# Patient Record
Sex: MALE | Race: WHITE | NOT HISPANIC OR LATINO | Employment: OTHER | URBAN - METROPOLITAN AREA
[De-identification: names, ages, dates, MRNs, and addresses within clinical notes are randomized per-mention and may not be internally consistent; named-entity substitution may affect disease eponyms.]

---

## 2023-05-18 DIAGNOSIS — I10 BENIGN ESSENTIAL HYPERTENSION: Primary | ICD-10-CM

## 2023-05-18 PROBLEM — Z85.46 HISTORY OF PROSTATE CANCER: Status: ACTIVE | Noted: 2023-05-18

## 2023-05-18 PROBLEM — N40.0 BPH (BENIGN PROSTATIC HYPERPLASIA): Status: ACTIVE | Noted: 2023-05-18

## 2023-05-18 PROBLEM — M25.551 PAIN OF RIGHT HIP JOINT: Status: ACTIVE | Noted: 2023-05-18

## 2023-05-18 PROBLEM — Z96.642 PRESENCE OF ARTIFICIAL HIP, LEFT: Status: ACTIVE | Noted: 2023-05-18

## 2023-05-18 PROBLEM — M16.11 ARTHRITIS OF RIGHT HIP: Status: ACTIVE | Noted: 2023-05-18

## 2023-05-18 PROBLEM — M25.552 PAIN OF LEFT HIP JOINT: Status: ACTIVE | Noted: 2023-05-18

## 2023-05-18 PROBLEM — M16.12 ARTHRITIS OF LEFT HIP: Status: ACTIVE | Noted: 2023-05-18

## 2023-05-18 RX ORDER — NEOMYCIN SULFATE, POLYMYXIN B SULFATE AND DEXAMETHASONE 3.5; 10000; 1 MG/ML; [USP'U]/ML; MG/ML
SUSPENSION/ DROPS OPHTHALMIC
COMMUNITY
Start: 2023-05-09 | End: 2023-07-17 | Stop reason: ALTCHOICE

## 2023-05-18 RX ORDER — ALBUTEROL SULFATE 90 UG/1
AEROSOL, METERED RESPIRATORY (INHALATION) 4 TIMES DAILY
COMMUNITY
Start: 2022-12-07 | End: 2023-07-17 | Stop reason: ALTCHOICE

## 2023-05-18 RX ORDER — LISINOPRIL 40 MG/1
40 TABLET ORAL DAILY
Qty: 90 TABLET | Refills: 3 | Status: SHIPPED | OUTPATIENT
Start: 2023-05-18 | End: 2024-06-03

## 2023-05-18 RX ORDER — KETOROLAC TROMETHAMINE 5 MG/ML
SOLUTION OPHTHALMIC
COMMUNITY
Start: 2023-05-09 | End: 2023-07-17 | Stop reason: ALTCHOICE

## 2023-05-18 RX ORDER — PREDNISONE 10 MG/1
TABLET ORAL
COMMUNITY
Start: 2022-12-07 | End: 2023-07-17 | Stop reason: ALTCHOICE

## 2023-05-18 RX ORDER — ACETAMINOPHEN 325 MG/1
TABLET ORAL EVERY 4 HOURS PRN
COMMUNITY
Start: 2022-12-07 | End: 2023-12-02 | Stop reason: ENTERED-IN-ERROR

## 2023-05-18 RX ORDER — OSELTAMIVIR PHOSPHATE 75 MG/1
CAPSULE ORAL EVERY 12 HOURS
COMMUNITY
Start: 2022-12-07 | End: 2023-07-17 | Stop reason: ALTCHOICE

## 2023-05-18 RX ORDER — LISINOPRIL 40 MG/1
40 TABLET ORAL DAILY
COMMUNITY
End: 2023-07-17 | Stop reason: SDUPTHER

## 2023-05-18 RX ORDER — TAMSULOSIN HYDROCHLORIDE 0.4 MG/1
0.4 CAPSULE ORAL DAILY
COMMUNITY
End: 2023-05-27

## 2023-05-26 DIAGNOSIS — N40.0 BENIGN PROSTATIC HYPERPLASIA, UNSPECIFIED WHETHER LOWER URINARY TRACT SYMPTOMS PRESENT: Primary | ICD-10-CM

## 2023-05-27 RX ORDER — TAMSULOSIN HYDROCHLORIDE 0.4 MG/1
CAPSULE ORAL
Qty: 90 CAPSULE | Refills: 3 | Status: SHIPPED | OUTPATIENT
Start: 2023-05-27

## 2023-07-17 ENCOUNTER — OFFICE VISIT (OUTPATIENT)
Dept: PRIMARY CARE | Facility: CLINIC | Age: 68
End: 2023-07-17
Payer: COMMERCIAL

## 2023-07-17 VITALS
BODY MASS INDEX: 39.89 KG/M2 | DIASTOLIC BLOOD PRESSURE: 82 MMHG | HEART RATE: 85 BPM | SYSTOLIC BLOOD PRESSURE: 144 MMHG | WEIGHT: 278 LBS | OXYGEN SATURATION: 96 %

## 2023-07-17 DIAGNOSIS — Z85.46 HISTORY OF PROSTATE CANCER: ICD-10-CM

## 2023-07-17 DIAGNOSIS — Z12.11 COLON CANCER SCREENING: ICD-10-CM

## 2023-07-17 DIAGNOSIS — Z12.5 PROSTATE CANCER SCREENING: ICD-10-CM

## 2023-07-17 DIAGNOSIS — Z13.6 ENCOUNTER FOR SCREENING FOR CARDIOVASCULAR DISORDERS: ICD-10-CM

## 2023-07-17 DIAGNOSIS — I10 BENIGN ESSENTIAL HYPERTENSION: ICD-10-CM

## 2023-07-17 DIAGNOSIS — Z00.00 WELLNESS EXAMINATION: Primary | ICD-10-CM

## 2023-07-17 DIAGNOSIS — Z13.29 SCREENING FOR THYROID DISORDER: ICD-10-CM

## 2023-07-17 DIAGNOSIS — N40.0 BENIGN PROSTATIC HYPERPLASIA, UNSPECIFIED WHETHER LOWER URINARY TRACT SYMPTOMS PRESENT: ICD-10-CM

## 2023-07-17 DIAGNOSIS — Z13.0 SCREENING FOR DEFICIENCY ANEMIA: ICD-10-CM

## 2023-07-17 DIAGNOSIS — Z01.818 PREOPERATIVE CLEARANCE: ICD-10-CM

## 2023-07-17 PROBLEM — M25.552 PAIN OF LEFT HIP JOINT: Status: RESOLVED | Noted: 2023-05-18 | Resolved: 2023-07-17

## 2023-07-17 PROBLEM — M25.551 PAIN OF RIGHT HIP JOINT: Status: RESOLVED | Noted: 2023-05-18 | Resolved: 2023-07-17

## 2023-07-17 LAB
ALANINE AMINOTRANSFERASE (SGPT) (U/L) IN SER/PLAS: 19 U/L (ref 10–52)
ALBUMIN (G/DL) IN SER/PLAS: 4.3 G/DL (ref 3.4–5)
ALKALINE PHOSPHATASE (U/L) IN SER/PLAS: 87 U/L (ref 33–136)
ANION GAP IN SER/PLAS: 13 MMOL/L (ref 10–20)
ASPARTATE AMINOTRANSFERASE (SGOT) (U/L) IN SER/PLAS: 14 U/L (ref 9–39)
BASOPHILS (10*3/UL) IN BLOOD BY AUTOMATED COUNT: 0.06 X10E9/L (ref 0–0.1)
BASOPHILS/100 LEUKOCYTES IN BLOOD BY AUTOMATED COUNT: 0.7 % (ref 0–2)
BILIRUBIN TOTAL (MG/DL) IN SER/PLAS: 0.4 MG/DL (ref 0–1.2)
CALCIUM (MG/DL) IN SER/PLAS: 9.2 MG/DL (ref 8.6–10.6)
CARBON DIOXIDE, TOTAL (MMOL/L) IN SER/PLAS: 24 MMOL/L (ref 21–32)
CHLORIDE (MMOL/L) IN SER/PLAS: 101 MMOL/L (ref 98–107)
CHOLESTEROL (MG/DL) IN SER/PLAS: 188 MG/DL (ref 0–199)
CHOLESTEROL IN HDL (MG/DL) IN SER/PLAS: 57.2 MG/DL
CHOLESTEROL/HDL RATIO: 3.3
CREATININE (MG/DL) IN SER/PLAS: 0.69 MG/DL (ref 0.5–1.3)
EOSINOPHILS (10*3/UL) IN BLOOD BY AUTOMATED COUNT: 0.11 X10E9/L (ref 0–0.7)
EOSINOPHILS/100 LEUKOCYTES IN BLOOD BY AUTOMATED COUNT: 1.2 % (ref 0–6)
ERYTHROCYTE DISTRIBUTION WIDTH (RATIO) BY AUTOMATED COUNT: 14.1 % (ref 11.5–14.5)
ERYTHROCYTE MEAN CORPUSCULAR HEMOGLOBIN CONCENTRATION (G/DL) BY AUTOMATED: 31.4 G/DL (ref 32–36)
ERYTHROCYTE MEAN CORPUSCULAR VOLUME (FL) BY AUTOMATED COUNT: 101 FL (ref 80–100)
ERYTHROCYTES (10*6/UL) IN BLOOD BY AUTOMATED COUNT: 4.62 X10E12/L (ref 4.5–5.9)
GFR MALE: >90 ML/MIN/1.73M2
GLUCOSE (MG/DL) IN SER/PLAS: 82 MG/DL (ref 74–99)
HEMATOCRIT (%) IN BLOOD BY AUTOMATED COUNT: 46.5 % (ref 41–52)
HEMOGLOBIN (G/DL) IN BLOOD: 14.6 G/DL (ref 13.5–17.5)
IMMATURE GRANULOCYTES/100 LEUKOCYTES IN BLOOD BY AUTOMATED COUNT: 0.6 % (ref 0–0.9)
LDL: 96 MG/DL (ref 0–99)
LEUKOCYTES (10*3/UL) IN BLOOD BY AUTOMATED COUNT: 8.9 X10E9/L (ref 4.4–11.3)
LYMPHOCYTES (10*3/UL) IN BLOOD BY AUTOMATED COUNT: 2.02 X10E9/L (ref 1.2–4.8)
LYMPHOCYTES/100 LEUKOCYTES IN BLOOD BY AUTOMATED COUNT: 22.7 % (ref 13–44)
MONOCYTES (10*3/UL) IN BLOOD BY AUTOMATED COUNT: 0.83 X10E9/L (ref 0.1–1)
MONOCYTES/100 LEUKOCYTES IN BLOOD BY AUTOMATED COUNT: 9.3 % (ref 2–10)
NEUTROPHILS (10*3/UL) IN BLOOD BY AUTOMATED COUNT: 5.81 X10E9/L (ref 1.2–7.7)
NEUTROPHILS/100 LEUKOCYTES IN BLOOD BY AUTOMATED COUNT: 65.5 % (ref 40–80)
NRBC (PER 100 WBCS) BY AUTOMATED COUNT: 0 /100 WBC (ref 0–0)
PLATELETS (10*3/UL) IN BLOOD AUTOMATED COUNT: 229 X10E9/L (ref 150–450)
POTASSIUM (MMOL/L) IN SER/PLAS: 4.7 MMOL/L (ref 3.5–5.3)
PROTEIN TOTAL: 7.1 G/DL (ref 6.4–8.2)
SODIUM (MMOL/L) IN SER/PLAS: 133 MMOL/L (ref 136–145)
THYROTROPIN (MIU/L) IN SER/PLAS BY DETECTION LIMIT <= 0.05 MIU/L: 1.44 MIU/L (ref 0.44–3.98)
TRIGLYCERIDE (MG/DL) IN SER/PLAS: 176 MG/DL (ref 0–149)
UREA NITROGEN (MG/DL) IN SER/PLAS: 14 MG/DL (ref 6–23)
VLDL: 35 MG/DL (ref 0–40)

## 2023-07-17 PROCEDURE — G0439 PPPS, SUBSEQ VISIT: HCPCS | Performed by: STUDENT IN AN ORGANIZED HEALTH CARE EDUCATION/TRAINING PROGRAM

## 2023-07-17 PROCEDURE — 80061 LIPID PANEL: CPT

## 2023-07-17 PROCEDURE — 84154 ASSAY OF PSA FREE: CPT

## 2023-07-17 PROCEDURE — 1159F MED LIST DOCD IN RCRD: CPT | Performed by: STUDENT IN AN ORGANIZED HEALTH CARE EDUCATION/TRAINING PROGRAM

## 2023-07-17 PROCEDURE — 1125F AMNT PAIN NOTED PAIN PRSNT: CPT | Performed by: STUDENT IN AN ORGANIZED HEALTH CARE EDUCATION/TRAINING PROGRAM

## 2023-07-17 PROCEDURE — 1170F FXNL STATUS ASSESSED: CPT | Performed by: STUDENT IN AN ORGANIZED HEALTH CARE EDUCATION/TRAINING PROGRAM

## 2023-07-17 PROCEDURE — 85025 COMPLETE CBC W/AUTO DIFF WBC: CPT

## 2023-07-17 PROCEDURE — 3079F DIAST BP 80-89 MM HG: CPT | Performed by: STUDENT IN AN ORGANIZED HEALTH CARE EDUCATION/TRAINING PROGRAM

## 2023-07-17 PROCEDURE — 93000 ELECTROCARDIOGRAM COMPLETE: CPT | Performed by: STUDENT IN AN ORGANIZED HEALTH CARE EDUCATION/TRAINING PROGRAM

## 2023-07-17 PROCEDURE — 99214 OFFICE O/P EST MOD 30 MIN: CPT | Performed by: STUDENT IN AN ORGANIZED HEALTH CARE EDUCATION/TRAINING PROGRAM

## 2023-07-17 PROCEDURE — 80053 COMPREHEN METABOLIC PANEL: CPT

## 2023-07-17 PROCEDURE — 84443 ASSAY THYROID STIM HORMONE: CPT

## 2023-07-17 PROCEDURE — 84153 ASSAY OF PSA TOTAL: CPT

## 2023-07-17 PROCEDURE — 3077F SYST BP >= 140 MM HG: CPT | Performed by: STUDENT IN AN ORGANIZED HEALTH CARE EDUCATION/TRAINING PROGRAM

## 2023-07-17 PROCEDURE — 1160F RVW MEDS BY RX/DR IN RCRD: CPT | Performed by: STUDENT IN AN ORGANIZED HEALTH CARE EDUCATION/TRAINING PROGRAM

## 2023-07-17 ASSESSMENT — ENCOUNTER SYMPTOMS
OCCASIONAL FEELINGS OF UNSTEADINESS: 1
LOSS OF SENSATION IN FEET: 0
DEPRESSION: 0

## 2023-07-17 ASSESSMENT — ACTIVITIES OF DAILY LIVING (ADL)
BATHING: INDEPENDENT
MANAGING_FINANCES: INDEPENDENT
DOING_HOUSEWORK: INDEPENDENT
TAKING_MEDICATION: INDEPENDENT
GROCERY_SHOPPING: INDEPENDENT
DRESSING: INDEPENDENT

## 2023-07-17 ASSESSMENT — PATIENT HEALTH QUESTIONNAIRE - PHQ9
SUM OF ALL RESPONSES TO PHQ9 QUESTIONS 1 AND 2: 0
2. FEELING DOWN, DEPRESSED OR HOPELESS: NOT AT ALL
1. LITTLE INTEREST OR PLEASURE IN DOING THINGS: NOT AT ALL

## 2023-07-17 ASSESSMENT — PAIN SCALES - GENERAL: PAINLEVEL: 4

## 2023-07-17 NOTE — PATIENT INSTRUCTIONS
1.  Wellness visit.  Medicare wellness.  Also needs preoperative clearance.  No concerns on exam.  Screening labs ordered today.  EKG today in clinic shows no concerns.  He is up-to-date with pneumonia shots.  Defers on colon cancer screening.    2.  History of hypertension, continue current med.    3.  History of BPH, prostate cancer.  Continue Flomax.    4.  Chronic right hip pain.  Cleared for orthopedic surgery next month.

## 2023-07-20 LAB
PROSTATE SPECIFIC AG (NG/ML) IN SER/PLAS: 4.8 NG/ML (ref 0–4)
PROSTATE SPECIFIC AG FREE (NG/ML) IN SER/PLAS: 0.1 NG/ML
PROSTATE SPECIFIC AG FREE/PROSTATE SPECIFIC AG TOTAL IN SER/PLAS: 2 %

## 2023-07-21 NOTE — RESULT ENCOUNTER NOTE
Spoke with patient about results. He does not know of any family history of prostate. He does have some symptoms but he is taking flomax for it. He said to let him do some research and he will get back to you about seeing urology.

## 2023-07-25 ENCOUNTER — TELEPHONE (OUTPATIENT)
Dept: PRIMARY CARE | Facility: CLINIC | Age: 68
End: 2023-07-25
Payer: COMMERCIAL

## 2023-07-25 NOTE — TELEPHONE ENCOUNTER
Pt wife called and is wondering with the pt PSA being elevated if that is going to stop surgery and what the pt could do to get it back to normal.

## 2023-08-01 ENCOUNTER — HOSPITAL ENCOUNTER (OUTPATIENT)
Dept: DATA CONVERSION | Facility: HOSPITAL | Age: 68
End: 2023-08-02
Attending: ORTHOPAEDIC SURGERY | Admitting: ORTHOPAEDIC SURGERY
Payer: COMMERCIAL

## 2023-08-01 DIAGNOSIS — I10 ESSENTIAL (PRIMARY) HYPERTENSION: ICD-10-CM

## 2023-08-01 DIAGNOSIS — F17.210 NICOTINE DEPENDENCE, CIGARETTES, UNCOMPLICATED: ICD-10-CM

## 2023-08-01 DIAGNOSIS — E66.9 OBESITY, UNSPECIFIED: ICD-10-CM

## 2023-08-01 DIAGNOSIS — F10.10 ALCOHOL ABUSE, UNCOMPLICATED: ICD-10-CM

## 2023-08-01 DIAGNOSIS — M16.11 UNILATERAL PRIMARY OSTEOARTHRITIS, RIGHT HIP: ICD-10-CM

## 2023-08-01 DIAGNOSIS — N40.0 BENIGN PROSTATIC HYPERPLASIA WITHOUT LOWER URINARY TRACT SYMPTOMS: ICD-10-CM

## 2023-08-02 LAB
ANION GAP IN SER/PLAS: 11 MMOL/L (ref 10–20)
BASOPHILS (10*3/UL) IN BLOOD BY AUTOMATED COUNT: 0.02 X10E9/L (ref 0–0.1)
BASOPHILS/100 LEUKOCYTES IN BLOOD BY AUTOMATED COUNT: 0.2 % (ref 0–2)
CALCIUM (MG/DL) IN SER/PLAS: 8.4 MG/DL (ref 8.6–10.3)
CARBON DIOXIDE, TOTAL (MMOL/L) IN SER/PLAS: 24 MMOL/L (ref 21–32)
CHLORIDE (MMOL/L) IN SER/PLAS: 101 MMOL/L (ref 98–107)
CREATININE (MG/DL) IN SER/PLAS: 0.66 MG/DL (ref 0.5–1.3)
EOSINOPHILS (10*3/UL) IN BLOOD BY AUTOMATED COUNT: 0.04 X10E9/L (ref 0–0.7)
EOSINOPHILS/100 LEUKOCYTES IN BLOOD BY AUTOMATED COUNT: 0.4 % (ref 0–6)
ERYTHROCYTE DISTRIBUTION WIDTH (RATIO) BY AUTOMATED COUNT: 13.1 % (ref 11.5–14.5)
ERYTHROCYTE MEAN CORPUSCULAR HEMOGLOBIN CONCENTRATION (G/DL) BY AUTOMATED: 32.7 G/DL (ref 32–36)
ERYTHROCYTE MEAN CORPUSCULAR VOLUME (FL) BY AUTOMATED COUNT: 96 FL (ref 80–100)
ERYTHROCYTES (10*6/UL) IN BLOOD BY AUTOMATED COUNT: 3.72 X10E12/L (ref 4.5–5.9)
GFR MALE: >90 ML/MIN/1.73M2
GLUCOSE (MG/DL) IN SER/PLAS: 118 MG/DL (ref 74–99)
HEMATOCRIT (%) IN BLOOD BY AUTOMATED COUNT: 35.8 % (ref 41–52)
HEMOGLOBIN (G/DL) IN BLOOD: 11.7 G/DL (ref 13.5–17.5)
IMMATURE GRANULOCYTES/100 LEUKOCYTES IN BLOOD BY AUTOMATED COUNT: 0.7 % (ref 0–0.9)
LEUKOCYTES (10*3/UL) IN BLOOD BY AUTOMATED COUNT: 9 X10E9/L (ref 4.4–11.3)
LYMPHOCYTES (10*3/UL) IN BLOOD BY AUTOMATED COUNT: 1.26 X10E9/L (ref 1.2–4.8)
LYMPHOCYTES/100 LEUKOCYTES IN BLOOD BY AUTOMATED COUNT: 14 % (ref 13–44)
MONOCYTES (10*3/UL) IN BLOOD BY AUTOMATED COUNT: 0.89 X10E9/L (ref 0.1–1)
MONOCYTES/100 LEUKOCYTES IN BLOOD BY AUTOMATED COUNT: 9.9 % (ref 2–10)
NEUTROPHILS (10*3/UL) IN BLOOD BY AUTOMATED COUNT: 6.7 X10E9/L (ref 1.2–7.7)
NEUTROPHILS/100 LEUKOCYTES IN BLOOD BY AUTOMATED COUNT: 74.8 % (ref 40–80)
NRBC (PER 100 WBCS) BY AUTOMATED COUNT: 0 /100 WBC (ref 0–0)
PLATELETS (10*3/UL) IN BLOOD AUTOMATED COUNT: 208 X10E9/L (ref 150–450)
POTASSIUM (MMOL/L) IN SER/PLAS: 4 MMOL/L (ref 3.5–5.3)
SODIUM (MMOL/L) IN SER/PLAS: 132 MMOL/L (ref 136–145)
UREA NITROGEN (MG/DL) IN SER/PLAS: 7 MG/DL (ref 6–23)

## 2023-08-03 LAB
ANION GAP IN SER/PLAS: NORMAL
BASOPHILS (10*3/UL) IN BLOOD BY AUTOMATED COUNT: NORMAL
BASOPHILS/100 LEUKOCYTES IN BLOOD BY AUTOMATED COUNT: NORMAL
CALCIUM (MG/DL) IN SER/PLAS: NORMAL
CARBON DIOXIDE, TOTAL (MMOL/L) IN SER/PLAS: NORMAL
CHLORIDE (MMOL/L) IN SER/PLAS: NORMAL
CREATININE (MG/DL) IN SER/PLAS: NORMAL
EOSINOPHILS (10*3/UL) IN BLOOD BY AUTOMATED COUNT: NORMAL
EOSINOPHILS/100 LEUKOCYTES IN BLOOD BY AUTOMATED COUNT: NORMAL
ERYTHROCYTE DISTRIBUTION WIDTH (RATIO) BY AUTOMATED COUNT: NORMAL
ERYTHROCYTE MEAN CORPUSCULAR HEMOGLOBIN CONCENTRATION (G/DL) BY AUTOMATED: NORMAL
ERYTHROCYTE MEAN CORPUSCULAR VOLUME (FL) BY AUTOMATED COUNT: NORMAL
ERYTHROCYTES (10*6/UL) IN BLOOD BY AUTOMATED COUNT: NORMAL
GFR FEMALE: NORMAL
GFR MALE: NORMAL
GLUCOSE (MG/DL) IN SER/PLAS: NORMAL
HEMATOCRIT (%) IN BLOOD BY AUTOMATED COUNT: NORMAL
HEMOGLOBIN (G/DL) IN BLOOD: NORMAL
IMMATURE GRANULOCYTES/100 LEUKOCYTES IN BLOOD BY AUTOMATED COUNT: NORMAL
LEUKOCYTES (10*3/UL) IN BLOOD BY AUTOMATED COUNT: NORMAL
LYMPHOCYTES (10*3/UL) IN BLOOD BY AUTOMATED COUNT: NORMAL
LYMPHOCYTES/100 LEUKOCYTES IN BLOOD BY AUTOMATED COUNT: NORMAL
MANUAL DIFFERENTIAL Y/N: NORMAL
MONOCYTES (10*3/UL) IN BLOOD BY AUTOMATED COUNT: NORMAL
MONOCYTES/100 LEUKOCYTES IN BLOOD BY AUTOMATED COUNT: NORMAL
NEUTROPHILS (10*3/UL) IN BLOOD BY AUTOMATED COUNT: NORMAL
NEUTROPHILS/100 LEUKOCYTES IN BLOOD BY AUTOMATED COUNT: NORMAL
NRBC (PER 100 WBCS) BY AUTOMATED COUNT: NORMAL
PLATELETS (10*3/UL) IN BLOOD AUTOMATED COUNT: NORMAL
POTASSIUM (MMOL/L) IN SER/PLAS: NORMAL
SODIUM (MMOL/L) IN SER/PLAS: NORMAL
UREA NITROGEN (MG/DL) IN SER/PLAS: NORMAL

## 2023-08-11 LAB
COMPLETE PATHOLOGY REPORT: NORMAL
CONVERTED CLINICAL DIAGNOSIS-HISTORY: NORMAL
CONVERTED FINAL DIAGNOSIS: NORMAL
CONVERTED FINAL REPORT PDF LINK TO COPY AND PASTE: NORMAL
CONVERTED GROSS DESCRIPTION: NORMAL

## 2023-09-29 VITALS — WEIGHT: 277.78 LBS | BODY MASS INDEX: 38.89 KG/M2 | HEIGHT: 71 IN

## 2023-09-29 DIAGNOSIS — Z96.641 STATUS POST HIP REPLACEMENT, RIGHT: Primary | ICD-10-CM

## 2023-09-29 RX ORDER — TRAMADOL HYDROCHLORIDE 50 MG/1
50 TABLET ORAL EVERY 6 HOURS PRN
COMMUNITY
Start: 2023-08-07 | End: 2023-12-02 | Stop reason: ENTERED-IN-ERROR

## 2023-09-29 RX ORDER — OXYCODONE AND ACETAMINOPHEN 5; 325 MG/1; MG/1
1 TABLET ORAL EVERY 6 HOURS
COMMUNITY
Start: 2023-08-07 | End: 2023-12-02 | Stop reason: ENTERED-IN-ERROR

## 2023-09-29 RX ORDER — OXYCODONE HYDROCHLORIDE 5 MG/1
5 TABLET ORAL EVERY 4 HOURS PRN
COMMUNITY
Start: 2023-08-02 | End: 2023-12-02 | Stop reason: ENTERED-IN-ERROR

## 2023-09-30 NOTE — H&P
History of Present Illness:   History Present Illness:  Reason for surgery: Right hip pain secondary to arthritis   HPI:    Patient is a 68-year-old gentleman who presented with worsening right hip pain and difficulty with performing activities of daily living.  This was secondary to degenerative  joint disease of his right hip.  He had tried and failed multiple modalities of conservative management.  In the end however he wished to undergo an elective right total hip replacement.    Allergies:        Allergies:  ·  No Known Allergies :     Home Medication Review:   Home Medications Reviewed: yes     Impression/Procedure:   ·  Impression and Planned Procedure: Patient has elected undergo a right total hip replacement.       ERAS (Enhanced Recovery After Surgery):  ·  ERAS Patient: no       Physical Exam by System:    Constitutional: Patient is alert and orient x3.   He is in no acute distress.   Respiratory/Thorax: Lungs are clear to auscultation  bilaterally.   Cardiovascular: Heart is regular rate and rhythm.   S1 and S2 are normal.   Musculoskeletal: Patient complains of right groin  pain with flexion or rotation of his right hip.     Consent:   COVID-19 Consent:  ·  COVID-19 Risk Consent Surgeon has reviewed key risks related to the risk of sharifa COVID-19 and if they contract COVID-19 what the risks are.       Electronic Signatures:  Kip Webb)  (Signed 01-Aug-2023 07:24)   Authored: History of Present Illness, Allergies, Home  Medication Review, Impression/Procedure, ERAS, Physical Exam, Consent, Note Completion      Last Updated: 01-Aug-2023 07:24 by Kip Webb)

## 2023-09-30 NOTE — PROGRESS NOTES
Service: Orthopaedics     Subjective Data:   LINDA GUTIÉRREZ is a 68 year old Male who is Hospital Day # 2 and POD #1 for 1. RIGHT TOTAL HIP REPLACEMENT WITH PORTABLE FLAT PLATE XRAY;    Additional Information:    Patient is postoperative day 1 following a right total hip replacement.  He still has moderate pain.  He currently denies numbness and paresthesias in his right lower  extremity.  He states he does have pain when attempting to place weight on his right leg.    Objective Data:     Objective Information:      T   P  R  BP   MAP  SpO2   Value  36.2  91  20  132/89   103  94%  Date/Time 8/2 2:56 8/2 2:56 8/2 2:56 8/2 2:56  8/2 2:56 8/2 2:56  Range  (35.5C - 36.9C )  (55 - 94 )  (18 - 20 )  (107 - 146 )/ (59 - 92 )  (77 - 103 )  (94% - 96% )  Highest temp of 36.9 C was recorded at 8/1 23:41      Pain reported at 8/2 3:45: sleeping    ---- Intake and Output  -----  Mn/Dy/Year Time  Intake   Output  Net  Aug 2, 2023 6:00 am  800   350  450  Aug 1, 2023 10:00 pm  400   925  -525    The Intake and Output Totals for the last 24 hours are:      Intake   Output  Net      1200   1275  -75    Physical Exam Narrative:  ·  Physical Exam:    Patient is in no acute distress.  He is sitting up at bedside.  Exam of his right leg reveals his bandage has mild bloody shadow.  His thigh compartments are soft.   He has no right calf tenderness.  He has adequate strength with resisted plantarflexion and dorsiflexion of his right foot and ankle.  He has mild complaints of pain with just rotation of his right hip.    Recent Lab Results:    Results:    CBC: 8/2/2023 05:50              \     Hgb     /                              \     11.7 L    /  WBC  ----------------  Plt               9.0       ----------------    208              /     Hct     \                              /     35.8 L    \            RBC: 3.72 L    MCV: 96     Neutrophil %: 74.8      BMP: 8/2/2023 05:50  NA+        Cl-     BUN  /                          132 L   101    7  /  --------------------------------  Glucose                ---------------------------  118 H    K+     HCO3-   Creat \                         4.0  24    0.66  \  Calcium : 8.4 L    Anion Gap : 11      Radiology Results:    Results:        Impression:    1.  Postoperative changes status post  right hip arthroplasty.  2.  No evidence of hardware failure or acute fracture..     Xray Pelvis 1 or 2 View [Aug  1 2023  3:30PM]      Assessment and Plan:        Additional Dx:   Arthritis of right hip: Entered Date: 01-Aug-2023 13:10    Comorbidities:  ·  Comorbidity obesity   ·  Obesity obesity (BMI 35-39.9)   ·  BMI 38.85     Code Status:  ·  Code Status Full Code     Assessment:    Patient is postop day 1 following a right total hip replacement.  He will be mobilized today with physical therapy.  He is weightbearing as tolerated on his right  lower extremity.  He will be on aspirin twice daily for DVT prophylaxis.  The plan would be for the patient to be able to be discharged home later today following therapy.      Electronic Signatures:  Kip Webb)  (Signed 02-Aug-2023 07:53)   Authored: Service, Subjective Data, Objective Data, Assessment  and Plan, Note Completion      Last Updated: 02-Aug-2023 07:53 by Kip Webb)

## 2023-09-30 NOTE — PROGRESS NOTES
Service: Medicine     Subjective Data:   LINDA GUTIÉRREZ is a 68 year old Male who is Hospital Day # 2 and POD #1 for 1. RIGHT TOTAL HIP REPLACEMENT WITH PORTABLE FLAT PLATE XRAY;     Seen this morning, ambulating with walker with nursing.  Urine retention with straight cath needed overnight.  Urinating better this AM.  No other complaints.  restarted lisinopril.  Patient anxious  for discharge.    Objective Data:     Objective Information:      T   P  R  BP   MAP  SpO2   Value  37.2  91  18  167/72   104  94%  Date/Time 8/2 8:04 8/2 8:04 8/2 8:04 8/2 8:04 8/2 8:04 8/2 8:04  Range  (35.5C - 37.2C )  (55 - 94 )  (18 - 20 )  (107 - 167 )/ (59 - 92 )  (77 - 104 )  (94% - 96% )  Highest temp of 37.2 C was recorded at 8/2 8:04      Pain reported at 8/2 12:55: 8 = Severe    ---- Intake and Output  -----  Mn/Dy/Year Time  Intake   Output  Net  Aug 2, 2023 6:00 am  800   350  450  Aug 1, 2023 10:00 pm  400   925  -525    The Intake and Output Totals for the last 24 hours are:      Intake   Output  Net      1200   1275  -75    GENERAL:  awake/alert/oriented x3, no distress, alert and cooperative  HEENT: Normal Inspection, Mucous membranes moist, No JVD, No Lymphadenopathy  CARDIOVASCULAR: RRR, no murmurs, 2+ equal pulses of the extremities, normal S1 and S 2  RESPIRATORY: Patent airways, CTAB, normal breath sounds with good chest expansion, thorax symmetric, No Wheezes, Rales or Rhonchi  ABDOMEN: Soft, Non-Tender, Normal Bowel Sounds, No Distention  SKIN: Warm and dry, no lesions, no rashes  EXTREMITIES: normal extremities, no cyanosis edema, contusions or wounds, no clubbing  NEURO: A&O x 3, CN II-XII grossly intact  PSYCH: Appropriate mood and behavior      Medication:    Medications:          Continuous Medications       --------------------------------    1. Lactated Ringers Infusion:  1000  mL  IntraVenous  <Continuous>         Scheduled Medications       --------------------------------    1. Acetaminophen:   650  mg  Oral  Every 6 Hours    2. Aspirin Enteric Coated:  325  mg  Oral  2 Times a Day    3. ceFAZolin 2 gram/ D5W 100 mL Premix IVPB:  100  mL  IntraVenous Piggyback  Once    4. Docusate:  100  mg  Oral  2 Times a Day    5. Lisinopril:  40  mg  Oral  Daily    6. Polyethylene Glycol:  17  gram(s)  Oral  2 Times a Day    7. Tamsulosin:  0.4  mg  Oral  Daily         PRN Medications       --------------------------------    1. diphenhydrAMINE:  25  mg  Oral  Every 6 Hours    2. Morphine Injectable:  2  mg  IntraVenous Push  Every 2 Hours    3. Ondansetron Injectable:  4  mg  IntraVenous Push  Every 6 Hours    4. oxyCODONE Immediate Release:  5  mg  Oral  Every 4 Hours    5. oxyCODONE Immediate Release:  10  mg  Oral  Every 4 Hours        Recent Lab Results:    Results:    CBC: 8/2/2023 05:50              \     Hgb     /                              \     11.7 L    /  WBC  ----------------  Plt               9.0       ----------------    208              /     Hct     \                              /     35.8 L    \            RBC: 3.72 L    MCV: 96     Neutrophil %: 74.8      BMP: 8/2/2023 05:50  NA+        Cl-     BUN  /                         132 L   101    7  /  --------------------------------  Glucose                ---------------------------  118 H    K+     HCO3-   Creat \                         4.0  24    0.66  \  Calcium : 8.4 L    Anion Gap : 11      Assessment and Plan:   Code Status:  ·  Code Status Full Code     Advance Care Planning:  Advance Care Planning: I evaluated the patient  and determined the patient's capacity to understand the risks, benefits and alternatives to treatment. I elicited the patient's goals for treatment and reviewed advance directives and medical orders for life sustaining treatment. The patient was given  an opportunity to review a blank advance directive as appropriate.     Assessment:        1.) Right hip OA POD #1  Stable.  Pain controlled    2.) HTN  Resumed  lisinopril this AM    3.) BPH  Cont flomax    4.) Daily ETOH abuse  May consider CIWA if any signs of etoh withdrawal were to develop  No signs of withdrawal this AM    5.) tobacco abuse  Nicotine patch offered, declined at this time.       DVT Proph  as per ortho    Medically stable at this time      Electronic Signatures:  Juaquin Joseph)  (Signed 02-Aug-2023 13:07)   Authored: Service, Subjective Data, Objective Data, Assessment  and Plan, Note Completion      Last Updated: 02-Aug-2023 13:07 by Juaquin Joseph ()

## 2023-09-30 NOTE — DISCHARGE SUMMARY
Send Summary:   Discharge Summary Providers:  Provider Role Provider Name   · Attending Kip Webb   · Consulting Juaquin Joseph       Note Recipients: Eddie Crum DO - [phone] [preferred]       Discharge:    Summary:   Admission Date: .01-Aug-2023 08:26:00   Discharge Date: 02-Aug-2023   Attending Physician at Discharge: Kip Webb   Admission Reason: Hip pain (1)   Final Discharge Diagnoses: Osteoarthritis   Procedures: Date: 01-Aug-2023 13:07:00  Procedure Name: 1. RIGHT TOTAL HIP REPLACEMENT WITH PORTABLE FLAT PLATE XRAY   Condition at Discharge: Satisfactory   Disposition at Discharge: Home Health Care - New   Vital Signs:        T   P  R  BP   MAP  SpO2   Value  37.2  91  20  158/87   104  95%  Date/Time 8/2 12:55 8/2 12:55 8/2 12:55 8/2 12:55  8/2 8:04 8/2 12:55  Range  (35.5C - 37.2C )  (55 - 94 )  (18 - 20 )  (107 - 167 )/ (59 - 92 )  (77 - 104 )  (94% - 96% )  Highest temp of 37.2 C was recorded at 8/2 8:04    Date:            Weight/Scale Type:  Height:   01-Aug-2023 16:00  126  kg / bed  180.1  cm  Physical Exam:    Patient is in no acute distress.  He is sitting up at bedside.  His right hip bandage has mild bloody shadow.  He has no right calf tenderness.  He has adequate strength  with resisted plantarflexion dorsiflexion of his right foot and ankle.  Hospital Course:    Patient had severe right hip pain and limitations in activities of daily living secondary to right hip arthritis.  On August 1, 2023, he underwent an elective right  total hip replacement.  Patient tolerated the procedure without any complications.  He was admitted to the orthopedic floor afterwards.  He was cleared to be discharged home by physical therapy.  He was discharged home on postoperative day #1 in stable  condition.  He will follow-up with orthopedic surgery in 1 month.      Discharge Information:    and Continuing Care:   Lab Results - Pending:    Surgical Pathology Drawn at 01-Aug-2023 12:00:00  Radiology  Results - Pending: Xray Hip 1 View at 01-Aug-2023  14:08:00  Xray Hip 1 View at 01-Aug-2023 12:30:00   Discharge Instructions:    Activity:           activity as tolerated   with walker.          May shower..            May not drive.            Weight-bearing Instructions: weight-bearing as tolerated.            Follow hip precautions as instructed by therapists  Do NOT bend your hip past 90 degrees.  Do NOT sit on a low surface.  Do NOT roll your leg inward.  Do NOT cross your legs.  Do NOT bend over.    Nutrition/Diet:           resume normal diet          Encourage Fluids:   Increase your water and fiber intake to help prevent constipation    Wound Care:           Wound Site:   right hip          Change Dressing:   Remove dressing in one week.  Follow directions on instruction sheet provided.          Cover With:   no dressing, leave open to air,  if no drainage noted          Other Instructions:   Call your surgeon's office if you need assistance with bandage    Additional Orders:           Special Equipment:   use regular pillow between knees while in bed to maintain hip precautions          Additional Instructions:   .  Continue to apply ice to incision 4 to 5 times a day for 20 minutes    Take over the counter stool softener while you are taking pain medications.  Stop stool softener if you experience diarrhea.    Do NOT take any non steroid anti-inflammatory medications (NSAIDS) such as ibuprofen, aleve, or naproxen.    Wear bilateral maciej hose compression stockings to lower legs for next 4 to 6 weeks    Continue to use incentive spirometer at home.  Use 10 times every hour while you are awake     NO SMOKING.  Smoking will increase your risk for complications including infections     Home Care Certification:           Home Care Agency:    [facility] [phone]          Skilled Disciplines Ordered:   PT,  OT    Home Care Services:           Home Care Skilled Service:   Rehab (PT/OT/SP eval and treat),   "wound care    Follow Up Appointments:    Follow-Up Appointment 01:           Physician/Dept/Service:   Dr Webb          Call to Schedule in:   4 weeks          Phone Number:   [phone]    Discharge Medications: Home Medication   tamsulosin 0.4 mg oral capsule - 1 cap(s) orally once a day (in the morning)  lisinopril 40 mg oral tablet - 1 tab(s) orally once a day (in the morning)  Tylenol 500 mg oral tablet - 2 tab(s) orally every 8 hours as needed for mild pain   aspirin 325 mg oral delayed release tablet - 1 tab(s) orally 2 times a day x 14 days   Colace 100 mg oral capsule - 1 cap(s) orally once a day  traMADol 50 mg oral tablet - 1 tab(s) orally every 4 to 6 hours x 7 days as needed for moderate pain G89.18  oxyCODONE 5 mg oral tablet - 1 tab(s) orally every 4 hours x 7 days as needed for severe incisional pain G89.18     PRN Medication   MiraLax oral powder for reconstitution - orally once a day, As Needed for constipation  take dose if you do NOT have bowel movement by Saturday Aug 4th     DNR Status:   ·  Code Status Code Status order at time of discharge: Full Code       Electronic Signatures:  Kip Webb)  (Signed 02-Aug-2023 14:56)   Authored: Send Summary, Summary Content, Ongoing Care,  DNR Status, Note Completion      Last Updated: 02-Aug-2023 14:56 by Kip Webb)    References:  1.  Data Referenced From \"Consult-Medicine\" 01-Aug-2023 17:34   "

## 2023-10-01 ENCOUNTER — DOCUMENTATION (OUTPATIENT)
Dept: ORTHOPEDIC SURGERY | Facility: CLINIC | Age: 68
End: 2023-10-01
Payer: COMMERCIAL

## 2023-10-01 NOTE — OP NOTE
PROCEDURE DETAILS    Preoperative Diagnosis:  Unilateral osteoarthritis of hip, right, M16.11    Postoperative Diagnosis:  Unilateral osteoarthritis of hip, right, M16.11    Surgeon: Kip Webb  Resident/Fellow/Other Assistant: Eddie Kirby    Procedure:  1. RIGHT TOTAL HIP REPLACEMENT WITH PORTABLE FLAT PLATE XRAY    Anesthesia: No anesthesiologist associated with this case  Estimated Blood Loss: 500mL  Blood Replaced: none  Findings: severe DJD right hip  Specimens(s) Collected: yes,  right femoral head   Complications: none  Implants: DePuy Corail femoral stem and cluster hole acetabular shell  Patient Returned To/Condition: stable    Date of Dictation: 01-Aug-2023  Dictated By: Kip Webb MD  Dictation Job Number: [id number]                            Attestation:   Note Completion:  Attending Attestation I performed the procedure without a resident         Electronic Signatures:  Kip Webb)  (Signed 01-Aug-2023 13:09)   Authored: Post-Operative Note, Chart Review, Note Completion      Last Updated: 01-Aug-2023 13:09 by Kip Webb)

## 2023-10-02 ENCOUNTER — OFFICE VISIT (OUTPATIENT)
Dept: ORTHOPEDIC SURGERY | Facility: CLINIC | Age: 68
End: 2023-10-02
Payer: COMMERCIAL

## 2023-10-02 ENCOUNTER — ANCILLARY PROCEDURE (OUTPATIENT)
Dept: RADIOLOGY | Facility: CLINIC | Age: 68
End: 2023-10-02
Payer: COMMERCIAL

## 2023-10-02 DIAGNOSIS — Z96.641 STATUS POST HIP REPLACEMENT, RIGHT: ICD-10-CM

## 2023-10-02 DIAGNOSIS — M16.11 PRIMARY OSTEOARTHRITIS OF RIGHT HIP: Primary | ICD-10-CM

## 2023-10-02 PROCEDURE — 99024 POSTOP FOLLOW-UP VISIT: CPT | Performed by: ORTHOPAEDIC SURGERY

## 2023-10-02 PROCEDURE — 1159F MED LIST DOCD IN RCRD: CPT | Performed by: ORTHOPAEDIC SURGERY

## 2023-10-02 PROCEDURE — 73502 X-RAY EXAM HIP UNI 2-3 VIEWS: CPT | Mod: RT

## 2023-10-02 PROCEDURE — 3008F BODY MASS INDEX DOCD: CPT | Performed by: ORTHOPAEDIC SURGERY

## 2023-10-02 PROCEDURE — 1160F RVW MEDS BY RX/DR IN RCRD: CPT | Performed by: ORTHOPAEDIC SURGERY

## 2023-10-02 PROCEDURE — 1125F AMNT PAIN NOTED PAIN PRSNT: CPT | Performed by: ORTHOPAEDIC SURGERY

## 2023-10-02 PROCEDURE — 73502 X-RAY EXAM HIP UNI 2-3 VIEWS: CPT | Mod: RIGHT SIDE

## 2023-10-02 NOTE — PROGRESS NOTES
Subjective    Patient ID: Wade Machado is a 68 y.o. male.    Chief Complaint: Follow-up right total hip replacement  Last Surgery: Right total hip replacement  Last Surgery Date: No surgery found    HPI    Patient is approximately 2-month status post a right total hip replacement.  He has completed his formal physical therapy.  He has had very little pain since his last visit.  He denies numbness and paresthesias in his right lower extremity.    Objective   Ortho Exam  Patient is in no acute distress.  He walks with no evidence of an antalgic gait.  Exam of his right hip reveals his incision is well-healed.  He has no complaints of pain with flexion or rotation of his right lower extremity.  He has 5-5 strength with resisted plantarflexion and dorsiflexion of his right foot and ankle.  Image Results:  XR hip w pelvis  Narrative: Interpreted By:  SHANKAR MCAARIO MD  MRN: [id number]  Patient Name: WADE MACHADO     STUDY:  HIP, UNILATERAL W/PELVIS WHEN PERFORMED 2-3 VIEWS;  8/30/2023 8:40 am     INDICATION:  s/p SOPHIA  M16.11: Arthritis of right hip.     COMPARISON:  Most recent prior is from 08/01/2023     ACCESSION NUMBER(S):  [id number]     ORDERING CLINICIAN:  KUN MTZ     TECHNIQUE:  AP view pelvis and 2 views  of the  right hip were obtained.     FINDINGS:  Previous bilateral total hip arthroplasties, more recently on the  right. Left femoral and acetabular components remain well seated and  in good alignment, however, the distal stem of the femoral component  was not included. More recently status post right hip arthroplasty.  Right femoral and acetabular components remain well seated and in  good alignment. There are sclerotic arthritic changes in both SI  joints.  Previous prostate gland brachytherapy. Prominent bilateral iliac and  femoral artery calcifications.  No lytic or blastic destructive bone lesion.  No acute fracture or dislocation.  No opaque soft tissue foreign body.  No periosteal reaction or  erosion.     Impression: Stable and intact bilateral total hip arthroplasties.      Assessment/Plan   Encounter Diagnoses:  Primary osteoarthritis of right hip    Status post hip replacement, right    Orders Placed This Encounter    XR hip right 2 or 3 views     Patient is doing satisfactory following his right total hip replacement.  He will continue with his dislocation precautions.  He will continue with his home exercise program.  He will follow-up in 1 year with x-rays of his right hip.

## 2023-10-02 NOTE — Clinical Note
October 2, 2023     Eddie Crum DO  [address]  [address]  [facility] [address]    Patient: Wade Machado   YOB: 1955   Date of Visit: 10/2/2023       Dear Dr. Eddie Crum, :    Thank you for referring Wade Machado to me for evaluation. Below are my notes for this consultation.  If you have questions, please do not hesitate to call me. I look forward to following your patient along with you.       Sincerely,     Kip Webb MD      CC: No Recipients  ______________________________________________________________________________________

## 2023-10-03 ENCOUNTER — APPOINTMENT (OUTPATIENT)
Dept: PHYSICAL THERAPY | Facility: CLINIC | Age: 68
End: 2023-10-03
Payer: COMMERCIAL

## 2023-10-05 ENCOUNTER — APPOINTMENT (OUTPATIENT)
Dept: PHYSICAL THERAPY | Facility: CLINIC | Age: 68
End: 2023-10-05
Payer: COMMERCIAL

## 2023-10-09 ENCOUNTER — APPOINTMENT (OUTPATIENT)
Dept: PHYSICAL THERAPY | Facility: CLINIC | Age: 68
End: 2023-10-09
Payer: COMMERCIAL

## 2023-10-09 ENCOUNTER — HOSPITAL ENCOUNTER (EMERGENCY)
Facility: HOSPITAL | Age: 68
Discharge: HOME | End: 2023-10-09
Attending: INTERNAL MEDICINE
Payer: COMMERCIAL

## 2023-10-09 ENCOUNTER — APPOINTMENT (OUTPATIENT)
Dept: RADIOLOGY | Facility: HOSPITAL | Age: 68
End: 2023-10-09
Payer: COMMERCIAL

## 2023-10-09 VITALS
HEIGHT: 71 IN | TEMPERATURE: 97.3 F | SYSTOLIC BLOOD PRESSURE: 168 MMHG | DIASTOLIC BLOOD PRESSURE: 86 MMHG | RESPIRATION RATE: 20 BRPM | OXYGEN SATURATION: 96 % | WEIGHT: 264.55 LBS | HEART RATE: 87 BPM | BODY MASS INDEX: 37.04 KG/M2

## 2023-10-09 DIAGNOSIS — S73.004A DISLOCATION OF RIGHT HIP, INITIAL ENCOUNTER (MULTI): Primary | ICD-10-CM

## 2023-10-09 LAB
ABO GROUP (TYPE) IN BLOOD: NORMAL
ALBUMIN SERPL BCP-MCNC: 3.9 G/DL (ref 3.4–5)
ALP SERPL-CCNC: 82 U/L (ref 33–136)
ALT SERPL W P-5'-P-CCNC: 17 U/L (ref 10–52)
ANION GAP SERPL CALC-SCNC: 13 MMOL/L (ref 10–20)
ANTIBODY SCREEN: NORMAL
AST SERPL W P-5'-P-CCNC: 21 U/L (ref 9–39)
BASOPHILS # BLD AUTO: 0.05 X10*3/UL (ref 0–0.1)
BASOPHILS NFR BLD AUTO: 0.5 %
BILIRUB SERPL-MCNC: 0.5 MG/DL (ref 0–1.2)
BUN SERPL-MCNC: 6 MG/DL (ref 6–23)
CALCIUM SERPL-MCNC: 8.8 MG/DL (ref 8.6–10.3)
CHLORIDE SERPL-SCNC: 100 MMOL/L (ref 98–107)
CO2 SERPL-SCNC: 23 MMOL/L (ref 21–32)
CREAT SERPL-MCNC: 0.6 MG/DL (ref 0.5–1.3)
EOSINOPHIL # BLD AUTO: 0.1 X10*3/UL (ref 0–0.7)
EOSINOPHIL NFR BLD AUTO: 0.9 %
ERYTHROCYTE [DISTWIDTH] IN BLOOD BY AUTOMATED COUNT: 13.8 % (ref 11.5–14.5)
GFR SERPL CREATININE-BSD FRML MDRD: >90 ML/MIN/1.73M*2
GLUCOSE SERPL-MCNC: 95 MG/DL (ref 74–99)
HCT VFR BLD AUTO: 41.3 % (ref 41–52)
HGB BLD-MCNC: 13.4 G/DL (ref 13.5–17.5)
IMM GRANULOCYTES # BLD AUTO: 0.05 X10*3/UL (ref 0–0.7)
IMM GRANULOCYTES NFR BLD AUTO: 0.5 % (ref 0–0.9)
LYMPHOCYTES # BLD AUTO: 1.74 X10*3/UL (ref 1.2–4.8)
LYMPHOCYTES NFR BLD AUTO: 16 %
MCH RBC QN AUTO: 30.8 PG (ref 26–34)
MCHC RBC AUTO-ENTMCNC: 32.4 G/DL (ref 32–36)
MCV RBC AUTO: 95 FL (ref 80–100)
MONOCYTES # BLD AUTO: 0.99 X10*3/UL (ref 0.1–1)
MONOCYTES NFR BLD AUTO: 9.1 %
NEUTROPHILS # BLD AUTO: 7.97 X10*3/UL (ref 1.2–7.7)
NEUTROPHILS NFR BLD AUTO: 73 %
NRBC BLD-RTO: 0 /100 WBCS (ref 0–0)
PLATELET # BLD AUTO: 230 X10*3/UL (ref 150–450)
PMV BLD AUTO: 10.9 FL (ref 7.5–11.5)
POTASSIUM SERPL-SCNC: 5.1 MMOL/L (ref 3.5–5.3)
PROT SERPL-MCNC: 6.5 G/DL (ref 6.4–8.2)
RBC # BLD AUTO: 4.35 X10*6/UL (ref 4.5–5.9)
RH FACTOR (ANTIGEN D): NORMAL
SODIUM SERPL-SCNC: 131 MMOL/L (ref 136–145)
WBC # BLD AUTO: 10.9 X10*3/UL (ref 4.4–11.3)

## 2023-10-09 PROCEDURE — 73501 X-RAY EXAM HIP UNI 1 VIEW: CPT | Mod: RT

## 2023-10-09 PROCEDURE — 96376 TX/PRO/DX INJ SAME DRUG ADON: CPT

## 2023-10-09 PROCEDURE — 99285 EMERGENCY DEPT VISIT HI MDM: CPT | Mod: 25

## 2023-10-09 PROCEDURE — 84075 ASSAY ALKALINE PHOSPHATASE: CPT | Performed by: INTERNAL MEDICINE

## 2023-10-09 PROCEDURE — 36415 COLL VENOUS BLD VENIPUNCTURE: CPT | Performed by: INTERNAL MEDICINE

## 2023-10-09 PROCEDURE — 27265 TREAT HIP DISLOCATION: CPT | Performed by: INTERNAL MEDICINE

## 2023-10-09 PROCEDURE — 96360 HYDRATION IV INFUSION INIT: CPT | Mod: XU

## 2023-10-09 PROCEDURE — 96361 HYDRATE IV INFUSION ADD-ON: CPT | Mod: XU

## 2023-10-09 PROCEDURE — 73502 X-RAY EXAM HIP UNI 2-3 VIEWS: CPT | Mod: RIGHT SIDE | Performed by: RADIOLOGY

## 2023-10-09 PROCEDURE — 96374 THER/PROPH/DIAG INJ IV PUSH: CPT

## 2023-10-09 PROCEDURE — 85025 COMPLETE CBC W/AUTO DIFF WBC: CPT | Performed by: INTERNAL MEDICINE

## 2023-10-09 PROCEDURE — 2500000004 HC RX 250 GENERAL PHARMACY W/ HCPCS (ALT 636 FOR OP/ED): Performed by: INTERNAL MEDICINE

## 2023-10-09 PROCEDURE — 73502 X-RAY EXAM HIP UNI 2-3 VIEWS: CPT | Mod: RT

## 2023-10-09 PROCEDURE — 86850 RBC ANTIBODY SCREEN: CPT | Performed by: INTERNAL MEDICINE

## 2023-10-09 RX ORDER — PROPOFOL 10 MG/ML
0.5 INJECTION, EMULSION INTRAVENOUS AS NEEDED
Status: DISCONTINUED | OUTPATIENT
Start: 2023-10-09 | End: 2023-10-09 | Stop reason: HOSPADM

## 2023-10-09 RX ORDER — PROPOFOL 10 MG/ML
100 INJECTION, EMULSION INTRAVENOUS ONCE
Status: DISCONTINUED | OUTPATIENT
Start: 2023-10-09 | End: 2023-10-09 | Stop reason: HOSPADM

## 2023-10-09 RX ORDER — PROPOFOL 10 MG/ML
INJECTION, EMULSION INTRAVENOUS CODE/TRAUMA/SEDATION MEDICATION
Status: COMPLETED | OUTPATIENT
Start: 2023-10-09 | End: 2023-10-09

## 2023-10-09 RX ADMIN — SODIUM CHLORIDE 1000 ML: 9 INJECTION, SOLUTION INTRAVENOUS at 12:05

## 2023-10-09 RX ADMIN — PROPOFOL 40 MG: 10 INJECTION, EMULSION INTRAVENOUS at 13:36

## 2023-10-09 RX ADMIN — PROPOFOL 60 MG: 10 INJECTION, EMULSION INTRAVENOUS at 13:02

## 2023-10-09 RX ADMIN — PROPOFOL 60 MG: 10 INJECTION, EMULSION INTRAVENOUS at 13:35

## 2023-10-09 RX ADMIN — PROPOFOL 25 MG: 10 INJECTION, EMULSION INTRAVENOUS at 13:03

## 2023-10-09 RX ADMIN — Medication 2 L/MIN: at 12:05

## 2023-10-09 ASSESSMENT — PAIN DESCRIPTION - LOCATION
LOCATION: HIP

## 2023-10-09 ASSESSMENT — LIFESTYLE VARIABLES
EVER FELT BAD OR GUILTY ABOUT YOUR DRINKING: NO
EVER HAD A DRINK FIRST THING IN THE MORNING TO STEADY YOUR NERVES TO GET RID OF A HANGOVER: NO
HAVE YOU EVER FELT YOU SHOULD CUT DOWN ON YOUR DRINKING: NO
HAVE PEOPLE ANNOYED YOU BY CRITICIZING YOUR DRINKING: NO

## 2023-10-09 ASSESSMENT — PAIN - FUNCTIONAL ASSESSMENT
PAIN_FUNCTIONAL_ASSESSMENT: 0-10

## 2023-10-09 ASSESSMENT — PAIN DESCRIPTION - ORIENTATION: ORIENTATION: RIGHT

## 2023-10-09 ASSESSMENT — COLUMBIA-SUICIDE SEVERITY RATING SCALE - C-SSRS
1. IN THE PAST MONTH, HAVE YOU WISHED YOU WERE DEAD OR WISHED YOU COULD GO TO SLEEP AND NOT WAKE UP?: NO
2. HAVE YOU ACTUALLY HAD ANY THOUGHTS OF KILLING YOURSELF?: NO
6. HAVE YOU EVER DONE ANYTHING, STARTED TO DO ANYTHING, OR PREPARED TO DO ANYTHING TO END YOUR LIFE?: NO

## 2023-10-09 ASSESSMENT — PAIN SCALES - GENERAL
PAINLEVEL_OUTOF10: 4
PAINLEVEL_OUTOF10: 2
PAINLEVEL_OUTOF10: 4
PAINLEVEL_OUTOF10: 0 - NO PAIN

## 2023-10-09 ASSESSMENT — PAIN DESCRIPTION - PAIN TYPE: TYPE: ACUTE PAIN

## 2023-10-09 NOTE — DISCHARGE INSTRUCTIONS
Please follow-up with orthopedics as outpatient.  If having recurrent dislocation ongoing pain, or other concerns please return to the emergency department for further evaluation.

## 2023-10-09 NOTE — ED PROVIDER NOTES
HPI   Chief Complaint   Patient presents with    Hip Pain       Patient presenting for pain in the right hip.  Patient notes in August of this year he had a hip replacement.  Patient notes that last evening he went out to have dinner and drinks with his wife.  Upon returning home he bent over to turn on the stereo.  Upon kneeling on the ground he felt a pop in his right hip.  Patient notes he could not get off the ground.  Patient has not been able to walk since this time.  Patient did not fall or hit his head.  Patient took Motrin this morning with minimal relief of pain.  Patient believes the hip might of gone back into place this time but has not attempted to ambulate on the hip.                          No data recorded                Patient History   Past Medical History:   Diagnosis Date    Unspecified osteoarthritis, unspecified site     Osteoarthritis     Past Surgical History:   Procedure Laterality Date    OTHER SURGICAL HISTORY  05/09/2022    Retinal detachment repair    OTHER SURGICAL HISTORY  05/09/2022    Cataract surgery     No family history on file.  Social History     Tobacco Use    Smoking status: Every Day     Types: Cigarettes    Smokeless tobacco: Never   Substance Use Topics    Alcohol use: Yes     Alcohol/week: 15.0 standard drinks of alcohol     Types: 15 Standard drinks or equivalent per week    Drug use: Never       Physical Exam   ED Triage Vitals [10/09/23 1039]   Temp Heart Rate Resp BP   36.3 °C (97.3 °F) 93 16 159/89      SpO2 Temp src Heart Rate Source Patient Position   96 % -- Monitor --      BP Location FiO2 (%)     -- --       Physical Exam  Vitals and nursing note reviewed. Exam conducted with a chaperone present.   Constitutional:       Appearance: Normal appearance.   HENT:      Head: Atraumatic.      Right Ear: External ear normal.      Left Ear: External ear normal.      Nose: Nose normal.      Mouth/Throat:      Mouth: Mucous membranes are moist.      Pharynx: Oropharynx  is clear.   Eyes:      Extraocular Movements: Extraocular movements intact.      Pupils: Pupils are equal, round, and reactive to light.   Cardiovascular:      Rate and Rhythm: Normal rate and regular rhythm.      Pulses: Normal pulses.   Pulmonary:      Effort: Pulmonary effort is normal.      Breath sounds: Normal breath sounds.   Abdominal:      Palpations: Abdomen is soft.      Tenderness: There is no abdominal tenderness.   Musculoskeletal:         General: No signs of injury.      Cervical back: Normal range of motion and neck supple. No rigidity or tenderness.      Right hip: Tenderness present. Decreased range of motion.      Left hip: No tenderness. Normal range of motion.      Right knee: No tenderness.      Left knee: No tenderness.      Right ankle: No tenderness.      Left ankle: No tenderness.      Comments: Right lower extremity shortened compared to the left.   Skin:     General: Skin is warm and dry.   Neurological:      General: No focal deficit present.      Mental Status: He is alert and oriented to person, place, and time. Mental status is at baseline.   Psychiatric:         Mood and Affect: Mood normal.         Behavior: Behavior normal.         ED Course & MDM   Diagnoses as of 10/10/23 1102   Dislocation of right hip, initial encounter (CMS/[facility])       Medical Decision Making  Patient presenting with a discussion of the right hip after bending over.  Patient was kneeling and felt a pop in his right hip.  Patient notes that this happened over 12 hours ago.  Patient is unable to get off the ground.  Patient found to have a superior dislocation of the prosthetic right hip.  Required procedural sedation and reduction in emergency room.  Patient required 2 attempts of procedural sedation and reduction.  Orthopedic surgery consulted.  Successful second attempt.  Patient placed in knee immobilizer.  Patient ambulatory with crutches in the ED.  Patient given orthopedic follow-up as outpatient.   Patient is neurovascular intact distally postreduction.      Amount and/or Complexity of Data Reviewed  Radiology: ordered and independent interpretation performed.        Procedure  Orthopaedic Injury Treatment - Lower Extremity    Performed by: Lamine Torres DO  Authorized by: Lamine Torres DO    Consent:     Consent obtained:  Written    Consent given by:  Patient    Risks, benefits, and alternatives were discussed: yes      Risks discussed:  Irreducible dislocation, recurrent dislocation, vascular damage and fracture    Alternatives discussed:  No treatment and delayed treatment  Universal protocol:     Procedure explained and questions answered to patient or proxy's satisfaction: yes      Relevant documents present and verified: yes      Test results available: yes      Imaging studies available: yes      Required blood products, implants, devices, and special equipment available: yes      Site/side marked: yes      Immediately prior to procedure, a time out was called: yes      Patient identity confirmed:  Verbally with patient and hospital-assigned identification number  Location:     Location:  Hip    Hip injury location:  L hip    Hip dislocation type comment:  Superior    Etiology: spontaneous      Prosthesis: yes    Pre-procedure details:     Distal perfusion: normal      Range of motion: reduced    Sedation:     Sedation type:  Deep  Anesthesia:     Anesthesia method:  None  Procedure details:     Manipulation performed: yes      Hip reduction method:  Traction and counter traction    Reduction successful: yes      Reduction confirmed with imaging: yes      Immobilization: Knee immobilizer.    Supplies used: Crutches.  Post-procedure details:     Neurological function: normal      Distal perfusion: normal      Range of motion: improved      Procedure completion:  Tolerated  Comments:      Patient required 2 attempts for reduction of right hip dislocation.  Patient required sedation x2.  Moderate  Sedation    Performed by: Lamine Torres DO  Authorized by: Lamine Torres DO    Consent:     Consent obtained:  Verbal    Consent given by:  Patient    Risks discussed:  Allergic reaction, dysrhythmia, prolonged sedation necessitating reversal, inadequate sedation, respiratory compromise necessitating ventilatory assistance and intubation, vomiting, prolonged hypoxia resulting in organ damage and nausea    Alternatives discussed:  Regional anesthesia  [place] protocol:     Procedure explained and questions answered to patient or proxy's satisfaction: yes      Relevant documents present and verified: yes      Test results available: yes      Imaging studies available: yes      Required blood products, implants, devices, and special equipment available: yes      Site/side marked: yes      Immediately prior to procedure, a time out was called: yes      Patient identity confirmed:  Verbally with patient and hospital-assigned identification number  Indications:     Procedure performed:  Dislocation reduction    Procedure necessitating sedation performed by:  Physician performing sedation    Intended level of sedation:  Deep  Pre-sedation assessment:     Time since last food or drink:  4 hours    ASA classification: class 2 - patient with mild systemic disease      Mouth opening:  3 or more finger widths    Thyromental distance:  3 finger widths    Mallampati score:  III - soft palate, base of uvula visible    Neck mobility: normal      Pre-sedation assessments completed and reviewed: airway patency, anesthesia/sedation history, cardiovascular function, hydration status, mental status, nausea/vomiting, pain level, respiratory function and temperature      Pre-sedation assessments completed and reviewed comment:  Patient notes he has a history of difficult intubations    History of difficult intubation: yes      Pre-sedation assessment completed:  10/9/2023 12:35 PM  Immediate pre-procedure details:     Reassessment:  Patient reassessed immediately prior to procedure      Reviewed: vital signs, relevant labs/tests and NPO status      Verified: bag valve mask available, emergency equipment available, intubation equipment available, IV patency confirmed, oxygen available and reversal medications available    Procedure details (see MAR for exact dosages):     Sedation start time:  10/9/2023 1:02 PM    Preoxygenation:  Nasal cannula    Sedation:  Propofol (60 mg x1, then 25 mg x 1)    Analgesia:  None    Intra-procedure monitoring:  Blood pressure monitoring, continuous capnometry, frequent LOC assessments, cardiac monitor, continuous pulse oximetry and frequent vital sign checks    Intra-procedure events: none      Sedation end time:  10/9/2023 1:16 PM    Total sedation time (minutes):  14  Post-procedure details:     Attendance: Constant attendance by certified staff until patient recovered      Recovery: Patient returned to pre-procedure baseline      Estimated blood loss (see I/O flowsheets): no      Post-sedation assessments completed and reviewed: airway patency, cardiovascular function, mental status, nausea/vomiting and respiratory function      Procedure completion:  Tolerated  Moderate Sedation    Performed by: Lamine Torres DO  Authorized by: Lamine Torres DO    Consent:     Consent obtained:  Written    Consent given by:  Patient    Risks, benefits, and alternatives were discussed: yes      Risks discussed:  Prolonged hypoxia resulting in organ damage, prolonged sedation necessitating reversal, dysrhythmia, allergic reaction, inadequate sedation, respiratory compromise necessitating ventilatory assistance and intubation, nausea and vomiting    Alternatives discussed:  Regional anesthesia  [place] protocol:     Procedure explained and questions answered to patient or proxy's satisfaction: yes      Relevant documents present and verified: yes      Test results available: yes      Imaging studies available: yes       Required blood products, implants, devices, and special equipment available: yes      Site/side marked: yes      Immediately prior to procedure, a time out was called: yes      Patient identity confirmed:  Verbally with patient and hospital-assigned identification number  Indications:     Procedure performed:  Dislocation reduction    Procedure necessitating sedation performed by:  Physician performing sedation    Intended level of sedation:  Deep  Pre-sedation assessment:     Time since last food or drink:  4 hours    ASA classification: class 2 - patient with mild systemic disease      Mouth opening:  3 or more finger widths    Thyromental distance:  3 finger widths    Mallampati score:  III - soft palate, base of uvula visible    Neck mobility: normal      Pre-sedation assessments completed and reviewed: airway patency, anesthesia/sedation history, cardiovascular function, hydration status, mental status, nausea/vomiting, pain level, respiratory function and temperature      History of difficult intubation: yes      Pre-sedation assessment completed:  10/9/2023 1:35 PM  Immediate pre-procedure details:     Reassessment: Patient reassessed immediately prior to procedure      Reviewed: vital signs      Verified: bag valve mask available, emergency equipment available, intubation equipment available, IV patency confirmed and oxygen available    Procedure details (see MAR for exact dosages):     Sedation start time:  10/9/2023 1:35 PM    Preoxygenation:  Nasal cannula    Sedation:  Propofol (100)    Intra-procedure monitoring:  Blood pressure monitoring, continuous capnometry, frequent LOC assessments, frequent vital sign checks, continuous pulse oximetry and cardiac monitor    Intra-procedure events: none      Intra-procedure management:  Airway repositioning    Sedation end time:  10/9/2023 2:19 PM  Post-procedure details:     Post-sedation assessment completed:  10/9/2023 2:19 PM    Attendance: Constant attendance  by certified staff until patient recovered      Recovery: Patient returned to pre-procedure baseline      Estimated blood loss (see I/O flowsheets): no      Post-sedation assessments completed and reviewed: airway patency, cardiovascular function, hydration status, mental status, nausea/vomiting, pain level, respiratory function and temperature      Specimens recovered:  None    Patient is stable for discharge or admission: yes      Procedure completion:  Tolerated       Lamine Torres,   10/10/23 1101

## 2023-10-09 NOTE — ED TRIAGE NOTES
"BIBA from home for right hip pain. Pt knelt down to the floor last night and states he felt his right hip pop out. Pt was unable to get up, on the ground all night but states \"I think I felt it go back in\". Rates pain 3/10, right hip replaced on August 1st. Sensation and pulses intact. + Shortening and rotation  "

## 2023-10-11 ENCOUNTER — APPOINTMENT (OUTPATIENT)
Dept: PHYSICAL THERAPY | Facility: CLINIC | Age: 68
End: 2023-10-11
Payer: COMMERCIAL

## 2023-10-17 ENCOUNTER — APPOINTMENT (OUTPATIENT)
Dept: PHYSICAL THERAPY | Facility: CLINIC | Age: 68
End: 2023-10-17
Payer: COMMERCIAL

## 2023-10-23 ENCOUNTER — OFFICE VISIT (OUTPATIENT)
Dept: ORTHOPEDIC SURGERY | Facility: CLINIC | Age: 68
End: 2023-10-23
Payer: MEDICARE

## 2023-10-23 ENCOUNTER — ANCILLARY PROCEDURE (OUTPATIENT)
Dept: RADIOLOGY | Facility: CLINIC | Age: 68
End: 2023-10-23
Payer: COMMERCIAL

## 2023-10-23 ENCOUNTER — APPOINTMENT (OUTPATIENT)
Dept: PHYSICAL THERAPY | Facility: CLINIC | Age: 68
End: 2023-10-23
Payer: COMMERCIAL

## 2023-10-23 DIAGNOSIS — T84.020A: ICD-10-CM

## 2023-10-23 DIAGNOSIS — T84.020A: Primary | ICD-10-CM

## 2023-10-23 PROCEDURE — 99024 POSTOP FOLLOW-UP VISIT: CPT | Performed by: ORTHOPAEDIC SURGERY

## 2023-10-23 PROCEDURE — 1160F RVW MEDS BY RX/DR IN RCRD: CPT | Performed by: ORTHOPAEDIC SURGERY

## 2023-10-23 PROCEDURE — 1159F MED LIST DOCD IN RCRD: CPT | Performed by: ORTHOPAEDIC SURGERY

## 2023-10-23 PROCEDURE — 3008F BODY MASS INDEX DOCD: CPT | Performed by: ORTHOPAEDIC SURGERY

## 2023-10-23 PROCEDURE — 73502 X-RAY EXAM HIP UNI 2-3 VIEWS: CPT | Mod: RT,FY

## 2023-10-23 PROCEDURE — 1125F AMNT PAIN NOTED PAIN PRSNT: CPT | Performed by: ORTHOPAEDIC SURGERY

## 2023-10-23 NOTE — PROGRESS NOTES
Subjective    Patient ID: Wade Machado is a 68 y.o. male.    Chief Complaint: No chief complaint on file.     Last Surgery: Right hip replacement  Last Surgery Date: No surgery found    HPI  Patient comes in for follow-up of his right total hip dislocation.  2 weeks ago he had undergone a closed reduction in the emergency department after dislocation.  He was placed into a knee immobilizer.  He comes in today for his follow-up after this.  He denies any further trauma.    Objective   Ortho Exam  Patient is in no acute distress.  Exam of his right hip reveals his incision is well-healed.  He is using a cane to assist with ambulation.  He has pain-free flexion and rotation at his right hip.    Image Results:  XR hip right 1 view  Narrative: Interpreted By:  Schoenberger, Joseph,   STUDY:  XR HIP RIGHT 1 VIEW; ;  10/9/2023 1:57 pm      INDICATION:  Signs/Symptoms:post-reduction.      COMPARISON:  Exam performed at 11:25 a.m.      ACCESSION NUMBER(S):  [id number]      ORDERING CLINICIAN:  ADOLFO MEDELLIN      FINDINGS:  In the interval since the prior exam there is successful reduction of  the right hip prosthetic dislocation. No acute fractures are seen.      Impression: See above          MACRO:  None      Signed by: Joseph Schoenberger 10/9/2023 2:12 PM  Dictation workstation:   [id number]  XR hip right 2 or 3 views  Narrative: Interpreted By:  Alphonso Pickett,   STUDY:  XR HIP RIGHT 2 OR 3 VIEWS;  10/9/2023 11:38 am      INDICATION:  Signs/Symptoms:possible dislocation.      COMPARISON:  Previous exam from 10/02/2023      ACCESSION NUMBER(S):  [id number]      ORDERING CLINICIAN:  ADOLFO MEDELLIN      TECHNIQUE:  AP view pelvis and 2 views  of the  right hip were obtained.      FINDINGS:  Previous bilateral total hip arthroplasty. The left hip prosthesis  remains stable and intact. There is superior dislocation of the  femoral component of the right hip prosthesis relative to the  acetabular component. No acute  fracture. No abnormal lucency adjacent  to either the right femoral or acetabular component. There is no  acute, displaced fracture fragment. Stable sclerotic arthritic  changes in both SI joints.      Previous prostate gland brachytherapy. Prominent iliac and femoral  artery calcifications. No lytic or blastic destructive bone lesion.  No opaque soft tissue foreign body.  No periosteal reaction or erosion.      Impression: Previous right hip arthroplasty, now with superior dislocation of the  femoral component.      Intact left hip arthroplasty.      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  The patient hopefully will follow-up in 1 year with x-rays of his right hip.

## 2023-11-19 ENCOUNTER — HOSPITAL ENCOUNTER (EMERGENCY)
Facility: HOSPITAL | Age: 68
Discharge: HOME | End: 2023-11-20
Attending: EMERGENCY MEDICINE
Payer: COMMERCIAL

## 2023-11-19 ENCOUNTER — APPOINTMENT (OUTPATIENT)
Dept: RADIOLOGY | Facility: HOSPITAL | Age: 68
End: 2023-11-19
Payer: COMMERCIAL

## 2023-11-19 DIAGNOSIS — S73.004A HIP DISLOCATION, RIGHT, INITIAL ENCOUNTER (MULTI): Primary | ICD-10-CM

## 2023-11-19 LAB
ALBUMIN SERPL BCP-MCNC: 3.8 G/DL (ref 3.4–5)
ALP SERPL-CCNC: 92 U/L (ref 33–136)
ALT SERPL W P-5'-P-CCNC: 12 U/L (ref 10–52)
ANION GAP SERPL CALC-SCNC: 16 MMOL/L (ref 10–20)
AST SERPL W P-5'-P-CCNC: 23 U/L (ref 9–39)
BASOPHILS # BLD AUTO: 0.06 X10*3/UL (ref 0–0.1)
BASOPHILS NFR BLD AUTO: 0.5 %
BILIRUB SERPL-MCNC: 0.4 MG/DL (ref 0–1.2)
BUN SERPL-MCNC: 7 MG/DL (ref 6–23)
CALCIUM SERPL-MCNC: 8.5 MG/DL (ref 8.6–10.3)
CHLORIDE SERPL-SCNC: 96 MMOL/L (ref 98–107)
CO2 SERPL-SCNC: 20 MMOL/L (ref 21–32)
CREAT SERPL-MCNC: 0.58 MG/DL (ref 0.5–1.3)
EOSINOPHIL # BLD AUTO: 0.19 X10*3/UL (ref 0–0.7)
EOSINOPHIL NFR BLD AUTO: 1.6 %
ERYTHROCYTE [DISTWIDTH] IN BLOOD BY AUTOMATED COUNT: 14.6 % (ref 11.5–14.5)
GFR SERPL CREATININE-BSD FRML MDRD: >90 ML/MIN/1.73M*2
GLUCOSE SERPL-MCNC: 87 MG/DL (ref 74–99)
HCT VFR BLD AUTO: 43.5 % (ref 41–52)
HGB BLD-MCNC: 14.2 G/DL (ref 13.5–17.5)
IMM GRANULOCYTES # BLD AUTO: 0.07 X10*3/UL (ref 0–0.7)
IMM GRANULOCYTES NFR BLD AUTO: 0.6 % (ref 0–0.9)
LYMPHOCYTES # BLD AUTO: 2.42 X10*3/UL (ref 1.2–4.8)
LYMPHOCYTES NFR BLD AUTO: 19.8 %
MCH RBC QN AUTO: 30.8 PG (ref 26–34)
MCHC RBC AUTO-ENTMCNC: 32.6 G/DL (ref 32–36)
MCV RBC AUTO: 94 FL (ref 80–100)
MONOCYTES # BLD AUTO: 0.8 X10*3/UL (ref 0.1–1)
MONOCYTES NFR BLD AUTO: 6.6 %
NEUTROPHILS # BLD AUTO: 8.67 X10*3/UL (ref 1.2–7.7)
NEUTROPHILS NFR BLD AUTO: 70.9 %
NRBC BLD-RTO: 0 /100 WBCS (ref 0–0)
PLATELET # BLD AUTO: 325 X10*3/UL (ref 150–450)
POTASSIUM SERPL-SCNC: 4.6 MMOL/L (ref 3.5–5.3)
PROT SERPL-MCNC: 7.3 G/DL (ref 6.4–8.2)
RBC # BLD AUTO: 4.61 X10*6/UL (ref 4.5–5.9)
SODIUM SERPL-SCNC: 127 MMOL/L (ref 136–145)
WBC # BLD AUTO: 12.2 X10*3/UL (ref 4.4–11.3)

## 2023-11-19 PROCEDURE — 99284 EMERGENCY DEPT VISIT MOD MDM: CPT | Mod: 25

## 2023-11-19 PROCEDURE — 94681 O2 UPTK CO2 OUTP % O2 XTRC: CPT

## 2023-11-19 PROCEDURE — 27265 TREAT HIP DISLOCATION: CPT | Performed by: EMERGENCY MEDICINE

## 2023-11-19 PROCEDURE — 80053 COMPREHEN METABOLIC PANEL: CPT | Performed by: EMERGENCY MEDICINE

## 2023-11-19 PROCEDURE — 73502 X-RAY EXAM HIP UNI 2-3 VIEWS: CPT | Mod: RT,FY,FR

## 2023-11-19 PROCEDURE — 73502 X-RAY EXAM HIP UNI 2-3 VIEWS: CPT | Mod: RIGHT SIDE | Performed by: RADIOLOGY

## 2023-11-19 PROCEDURE — 99152 MOD SED SAME PHYS/QHP 5/>YRS: CPT

## 2023-11-19 PROCEDURE — 36415 COLL VENOUS BLD VENIPUNCTURE: CPT | Performed by: EMERGENCY MEDICINE

## 2023-11-19 PROCEDURE — 99285 EMERGENCY DEPT VISIT HI MDM: CPT | Mod: 25

## 2023-11-19 PROCEDURE — 85025 COMPLETE CBC W/AUTO DIFF WBC: CPT | Performed by: EMERGENCY MEDICINE

## 2023-11-19 PROCEDURE — 2500000004 HC RX 250 GENERAL PHARMACY W/ HCPCS (ALT 636 FOR OP/ED): Performed by: EMERGENCY MEDICINE

## 2023-11-19 PROCEDURE — 73501 X-RAY EXAM HIP UNI 1 VIEW: CPT | Mod: RT,FY,FR

## 2023-11-19 PROCEDURE — 96374 THER/PROPH/DIAG INJ IV PUSH: CPT

## 2023-11-19 PROCEDURE — 99285 EMERGENCY DEPT VISIT HI MDM: CPT | Mod: 25 | Performed by: EMERGENCY MEDICINE

## 2023-11-19 RX ORDER — MORPHINE SULFATE 4 MG/ML
4 INJECTION, SOLUTION INTRAMUSCULAR; INTRAVENOUS ONCE
Status: COMPLETED | OUTPATIENT
Start: 2023-11-19 | End: 2023-11-19

## 2023-11-19 RX ORDER — KETAMINE HYDROCHLORIDE 100 MG/ML
1.5 INJECTION, SOLUTION INTRAMUSCULAR; INTRAVENOUS ONCE
Status: DISCONTINUED | OUTPATIENT
Start: 2023-11-19 | End: 2023-11-20 | Stop reason: HOSPADM

## 2023-11-19 RX ADMIN — MORPHINE SULFATE 4 MG: 4 INJECTION, SOLUTION INTRAMUSCULAR; INTRAVENOUS at 22:31

## 2023-11-19 ASSESSMENT — PAIN - FUNCTIONAL ASSESSMENT
PAIN_FUNCTIONAL_ASSESSMENT: 0-10
PAIN_FUNCTIONAL_ASSESSMENT: 0-10

## 2023-11-19 ASSESSMENT — LIFESTYLE VARIABLES
HAVE YOU EVER FELT YOU SHOULD CUT DOWN ON YOUR DRINKING: NO
HAVE YOU EVER FELT YOU SHOULD CUT DOWN ON YOUR DRINKING: NO
EVER FELT BAD OR GUILTY ABOUT YOUR DRINKING: NO
HAVE PEOPLE ANNOYED YOU BY CRITICIZING YOUR DRINKING: NO
REASON UNABLE TO ASSESS: NO
EVER HAD A DRINK FIRST THING IN THE MORNING TO STEADY YOUR NERVES TO GET RID OF A HANGOVER: NO
HAVE PEOPLE ANNOYED YOU BY CRITICIZING YOUR DRINKING: NO
EVER HAD A DRINK FIRST THING IN THE MORNING TO STEADY YOUR NERVES TO GET RID OF A HANGOVER: NO
REASON UNABLE TO ASSESS: NO

## 2023-11-19 ASSESSMENT — PAIN SCALES - GENERAL
PAINLEVEL_OUTOF10: 2
PAINLEVEL_OUTOF10: 10 - WORST POSSIBLE PAIN
PAINLEVEL_OUTOF10: 9

## 2023-11-19 ASSESSMENT — PAIN DESCRIPTION - LOCATION: LOCATION: HIP

## 2023-11-19 ASSESSMENT — COLUMBIA-SUICIDE SEVERITY RATING SCALE - C-SSRS
1. IN THE PAST MONTH, HAVE YOU WISHED YOU WERE DEAD OR WISHED YOU COULD GO TO SLEEP AND NOT WAKE UP?: NO
6. HAVE YOU EVER DONE ANYTHING, STARTED TO DO ANYTHING, OR PREPARED TO DO ANYTHING TO END YOUR LIFE?: NO
2. HAVE YOU ACTUALLY HAD ANY THOUGHTS OF KILLING YOURSELF?: NO

## 2023-11-19 ASSESSMENT — PAIN DESCRIPTION - ORIENTATION: ORIENTATION: RIGHT

## 2023-11-20 ENCOUNTER — APPOINTMENT (OUTPATIENT)
Dept: RADIOLOGY | Facility: HOSPITAL | Age: 68
End: 2023-11-20
Payer: COMMERCIAL

## 2023-11-20 VITALS
WEIGHT: 245 LBS | DIASTOLIC BLOOD PRESSURE: 68 MMHG | HEIGHT: 71 IN | BODY MASS INDEX: 34.3 KG/M2 | HEART RATE: 76 BPM | RESPIRATION RATE: 18 BRPM | SYSTOLIC BLOOD PRESSURE: 138 MMHG | OXYGEN SATURATION: 96 % | TEMPERATURE: 98.4 F

## 2023-11-20 PROCEDURE — 73501 X-RAY EXAM HIP UNI 1 VIEW: CPT | Mod: RT,FY,FR

## 2023-11-20 RX ORDER — KETAMINE HYDROCHLORIDE 100 MG/ML
1.5 INJECTION, SOLUTION INTRAMUSCULAR; INTRAVENOUS ONCE
Status: DISCONTINUED | OUTPATIENT
Start: 2023-11-20 | End: 2023-11-20 | Stop reason: HOSPADM

## 2023-11-20 NOTE — ED PROVIDER NOTES
HPI   Chief Complaint   Patient presents with    Fall       68-year-old male with history of right hip replacement presents to the ED for right hip pain after fall.  He has history of recurrent hip dislocations.  He reports fall from standing today onto his right side.  Complaining of pain.  Denies head trauma or loss of consciousness.  No chest pain or shortness of breath.  No abdominal pain.                          Gama Coma Scale Score: 15                  Patient History   Past Medical History:   Diagnosis Date    Unspecified osteoarthritis, unspecified site     Osteoarthritis     Past Surgical History:   Procedure Laterality Date    OTHER SURGICAL HISTORY  05/09/2022    Retinal detachment repair    OTHER SURGICAL HISTORY  05/09/2022    Cataract surgery     No family history on file.  Social History     Tobacco Use    Smoking status: Every Day     Types: Cigarettes    Smokeless tobacco: Never   Substance Use Topics    Alcohol use: Yes     Alcohol/week: 15.0 standard drinks of alcohol     Types: 15 Standard drinks or equivalent per week    Drug use: Never       Physical Exam   ED Triage Vitals   Temp Heart Rate Resp BP   11/19/23 2140 11/19/23 2140 11/19/23 2140 11/19/23 2140   36.9 °C (98.4 °F) 80 20 140/72      SpO2 Temp Source Heart Rate Source Patient Position   11/19/23 2140 11/19/23 2140 11/19/23 2140 11/19/23 2140   97 % Temporal Monitor Lying      BP Location FiO2 (%)     11/19/23 2140 11/19/23 2320     Right arm 32 %       Physical Exam  Constitutional:       Appearance: Normal appearance.   HENT:      Head: Normocephalic and atraumatic.   Eyes:      Extraocular Movements: Extraocular movements intact.      Conjunctiva/sclera: Conjunctivae normal.      Pupils: Pupils are equal, round, and reactive to light.   Cardiovascular:      Rate and Rhythm: Normal rate and regular rhythm.   Pulmonary:      Effort: Pulmonary effort is normal.      Breath sounds: Normal breath sounds.   Abdominal:      General:  Abdomen is flat.      Palpations: Abdomen is soft.      Tenderness: There is no abdominal tenderness.   Musculoskeletal:         General: Tenderness (Right hip) and deformity (RLE rotated) present.      Cervical back: Normal range of motion and neck supple.   Skin:     General: Skin is warm and dry.      Capillary Refill: Capillary refill takes less than 2 seconds.   Neurological:      General: No focal deficit present.      Mental Status: He is alert and oriented to person, place, and time.   Psychiatric:         Mood and Affect: Mood normal.         Behavior: Behavior normal.         Thought Content: Thought content normal.         Judgment: Judgment normal.         ED Course & MDM   Diagnoses as of 11/20/23 0227   Hip dislocation, right, initial encounter (CMS/[facility])       Medical Decision Making  88-year-old male presents to the ED with mechanical fall.  Upon arrival to the ED in no acute distress.  He is neurovascular intact in the right lower extremity.  Have concern for dislocation.  Obtain x-ray which confirmed dislocation. No fracture. After 2 attempts under conscious sedation with ketamine patient was successfully reduced.  Knee immobilizer was applied.  Will give crutches.  Will recommend close orthopedic surgery follow-up.  Will recommend patient not attempt severe range of motions of hip until follow-up.        Procedure  Moderate Sedation    Performed by: Edward Marley MD  Authorized by: Edward Marley MD    Consent:     Consent obtained:  Verbal    Consent given by:  Patient    Risks, benefits, and alternatives were discussed: yes      Risks discussed:  Allergic reaction, prolonged hypoxia resulting in organ damage, prolonged sedation necessitating reversal and respiratory compromise necessitating ventilatory assistance and intubation  Universal protocol:     Procedure explained and questions answered to patient or proxy's satisfaction: yes      Relevant documents present and verified: yes      Test  results available: yes      Imaging studies available: yes      Required blood products, implants, devices, and special equipment available: yes      Site/side marked: yes      Immediately prior to procedure, a time out was called: yes      Patient identity confirmed:  Verbally with patient and hospital-assigned identification number  Indications:     Procedure performed:  Dislocation reduction    Procedure necessitating sedation performed by:  Physician performing sedation    Intended level of sedation:  Moderate  Pre-sedation assessment:     NPO status caution: unable to specify NPO status      ASA classification: class 2 - patient with mild systemic disease      Mouth openin finger widths    Thyromental distance:  3 finger widths    Mallampati score:  II - soft palate, uvula, fauces visible    Neck mobility: normal      Pre-sedation assessments completed and reviewed: airway patency, anesthesia/sedation history, cardiovascular function, hydration status, mental status, nausea/vomiting, pain level, respiratory function and temperature      History of difficult intubation: yes    Immediate pre-procedure details:     Reassessment: Patient reassessed immediately prior to procedure      Reviewed: vital signs      Verified: bag valve mask available, emergency equipment available, intubation equipment available, IV patency confirmed, oxygen available and reversal medications available    Procedure details (see MAR for exact dosages):     Preoxygenation:  Nasal cannula    Sedation:  Ketamine    Analgesia:  Morphine    Intra-procedure monitoring:  Blood pressure monitoring, continuous capnometry, frequent LOC assessments, cardiac monitor, continuous pulse oximetry and frequent vital sign checks    Intra-procedure events: none    Post-procedure details:     Post-sedation assessments completed and reviewed: airway patency, cardiovascular function, hydration status, mental status, nausea/vomiting, pain level,  respiratory function and temperature      Procedure completion:  Tolerated  Orthopaedic Injury Treatment - Lower Extremity    Performed by: Edward Marley MD  Authorized by: Edward Marley MD    Consent:     Consent obtained:  Verbal    Consent given by:  Patient    Risks discussed:  Recurrent dislocation  Universal protocol:     Procedure explained and questions answered to patient or proxy's satisfaction: yes      Relevant documents present and verified: yes      Test results available: yes      Imaging studies available: yes      Required blood products, implants, devices, and special equipment available: yes      Site/side marked: yes      Immediately prior to procedure, a time out was called: yes      Patient identity confirmed:  Verbally with patient  Location:     Location:  Hip    Hip injury location:  R hip    Hip dislocation type: posterior      Etiology: traumatic      Prosthesis: yes    Pre-procedure details:     Distal perfusion: normal      Range of motion: reduced    Sedation:     Sedation type:  Moderate sedation  Anesthesia:     Anesthesia method:  None  Procedure details:     Manipulation performed: yes      Hip reduction method:  Traction and counter traction    Reduction successful: yes      Reduction confirmed with imaging: yes      Immobilization:  Brace  Post-procedure details:     Neurological function: normal      Distal perfusion: normal      Range of motion: improved      Procedure completion:  Tolerated       Edward Marley MD  11/20/23 0228

## 2023-11-20 NOTE — ED TRIAGE NOTES
PT arrives from home with wife, via [place] [facility] with c/o Right Hip pain after a mechanical fall at home. PT states he slipped and fell while getting ready for bed and landed on his right hip.  PT states he had a Hip replacement in August of this year.  PT states he has also dislocated that hip in the past.  PT has no other complaints.

## 2023-11-27 ENCOUNTER — APPOINTMENT (OUTPATIENT)
Dept: RADIOLOGY | Facility: HOSPITAL | Age: 68
End: 2023-11-27
Payer: COMMERCIAL

## 2023-11-27 ENCOUNTER — HOSPITAL ENCOUNTER (EMERGENCY)
Facility: HOSPITAL | Age: 68
Discharge: HOME | End: 2023-11-27
Attending: STUDENT IN AN ORGANIZED HEALTH CARE EDUCATION/TRAINING PROGRAM
Payer: COMMERCIAL

## 2023-11-27 VITALS
OXYGEN SATURATION: 96 % | TEMPERATURE: 98.1 F | HEIGHT: 70 IN | SYSTOLIC BLOOD PRESSURE: 132 MMHG | BODY MASS INDEX: 35.07 KG/M2 | DIASTOLIC BLOOD PRESSURE: 83 MMHG | RESPIRATION RATE: 20 BRPM | HEART RATE: 76 BPM | WEIGHT: 245 LBS

## 2023-11-27 DIAGNOSIS — S73.004A DISLOCATION OF RIGHT HIP, INITIAL ENCOUNTER (MULTI): Primary | ICD-10-CM

## 2023-11-27 PROCEDURE — 2500000004 HC RX 250 GENERAL PHARMACY W/ HCPCS (ALT 636 FOR OP/ED): Performed by: STUDENT IN AN ORGANIZED HEALTH CARE EDUCATION/TRAINING PROGRAM

## 2023-11-27 PROCEDURE — 96374 THER/PROPH/DIAG INJ IV PUSH: CPT | Mod: 59

## 2023-11-27 PROCEDURE — 73502 X-RAY EXAM HIP UNI 2-3 VIEWS: CPT | Mod: RIGHT SIDE | Performed by: RADIOLOGY

## 2023-11-27 PROCEDURE — 96361 HYDRATE IV INFUSION ADD-ON: CPT

## 2023-11-27 PROCEDURE — 73501 X-RAY EXAM HIP UNI 1 VIEW: CPT | Mod: RT,FR

## 2023-11-27 PROCEDURE — 96375 TX/PRO/DX INJ NEW DRUG ADDON: CPT | Mod: 59

## 2023-11-27 PROCEDURE — 99285 EMERGENCY DEPT VISIT HI MDM: CPT | Performed by: STUDENT IN AN ORGANIZED HEALTH CARE EDUCATION/TRAINING PROGRAM

## 2023-11-27 PROCEDURE — 27265 TREAT HIP DISLOCATION: CPT | Performed by: STUDENT IN AN ORGANIZED HEALTH CARE EDUCATION/TRAINING PROGRAM

## 2023-11-27 PROCEDURE — 73502 X-RAY EXAM HIP UNI 2-3 VIEWS: CPT | Mod: RT,FY

## 2023-11-27 RX ORDER — SODIUM CHLORIDE, SODIUM LACTATE, POTASSIUM CHLORIDE, CALCIUM CHLORIDE 600; 310; 30; 20 MG/100ML; MG/100ML; MG/100ML; MG/100ML
INJECTION, SOLUTION INTRAVENOUS
Status: COMPLETED | OUTPATIENT
Start: 2023-11-27 | End: 2023-11-27

## 2023-11-27 RX ORDER — PROPOFOL 10 MG/ML
100 INJECTION, EMULSION INTRAVENOUS ONCE
Status: DISCONTINUED | OUTPATIENT
Start: 2023-11-27 | End: 2023-11-27 | Stop reason: HOSPADM

## 2023-11-27 RX ORDER — ONDANSETRON HYDROCHLORIDE 2 MG/ML
4 INJECTION, SOLUTION INTRAVENOUS ONCE
Status: COMPLETED | OUTPATIENT
Start: 2023-11-27 | End: 2023-11-27

## 2023-11-27 RX ORDER — PROPOFOL 10 MG/ML
0.5 INJECTION, EMULSION INTRAVENOUS AS NEEDED
Status: DISCONTINUED | OUTPATIENT
Start: 2023-11-27 | End: 2023-11-27 | Stop reason: HOSPADM

## 2023-11-27 RX ADMIN — SODIUM CHLORIDE, POTASSIUM CHLORIDE, SODIUM LACTATE AND CALCIUM CHLORIDE 1000 ML: 600; 310; 30; 20 INJECTION, SOLUTION INTRAVENOUS at 04:43

## 2023-11-27 RX ADMIN — PROPOFOL 80 MG: 10 INJECTION, EMULSION INTRAVENOUS at 05:45

## 2023-11-27 RX ADMIN — HYDROMORPHONE HYDROCHLORIDE 0.5 MG: 1 INJECTION, SOLUTION INTRAMUSCULAR; INTRAVENOUS; SUBCUTANEOUS at 04:29

## 2023-11-27 RX ADMIN — SODIUM CHLORIDE, SODIUM LACTATE, POTASSIUM CHLORIDE, AND CALCIUM CHLORIDE 999 ML/HR: 600; 310; 30; 20 INJECTION, SOLUTION INTRAVENOUS at 05:15

## 2023-11-27 RX ADMIN — ONDANSETRON 4 MG: 2 INJECTION INTRAMUSCULAR; INTRAVENOUS at 04:28

## 2023-11-27 ASSESSMENT — PAIN SCALES - GENERAL
PAINLEVEL_OUTOF10: 0 - NO PAIN
PAINLEVEL_OUTOF10: 2
PAINLEVEL_OUTOF10: 2

## 2023-11-27 ASSESSMENT — LIFESTYLE VARIABLES
HAVE PEOPLE ANNOYED YOU BY CRITICIZING YOUR DRINKING: NO
EVER HAD A DRINK FIRST THING IN THE MORNING TO STEADY YOUR NERVES TO GET RID OF A HANGOVER: NO
HAVE YOU EVER FELT YOU SHOULD CUT DOWN ON YOUR DRINKING: NO
HAVE PEOPLE ANNOYED YOU BY CRITICIZING YOUR DRINKING: NO
REASON UNABLE TO ASSESS: NO
REASON UNABLE TO ASSESS: NO
EVER FELT BAD OR GUILTY ABOUT YOUR DRINKING: NO
EVER FELT BAD OR GUILTY ABOUT YOUR DRINKING: NO
HAVE PEOPLE ANNOYED YOU BY CRITICIZING YOUR DRINKING: NO
HAVE YOU EVER FELT YOU SHOULD CUT DOWN ON YOUR DRINKING: NO
HAVE YOU EVER FELT YOU SHOULD CUT DOWN ON YOUR DRINKING: NO
EVER FELT BAD OR GUILTY ABOUT YOUR DRINKING: NO
REASON UNABLE TO ASSESS: NO

## 2023-11-27 ASSESSMENT — COLUMBIA-SUICIDE SEVERITY RATING SCALE - C-SSRS
2. HAVE YOU ACTUALLY HAD ANY THOUGHTS OF KILLING YOURSELF?: NO
6. HAVE YOU EVER DONE ANYTHING, STARTED TO DO ANYTHING, OR PREPARED TO DO ANYTHING TO END YOUR LIFE?: NO
1. IN THE PAST MONTH, HAVE YOU WISHED YOU WERE DEAD OR WISHED YOU COULD GO TO SLEEP AND NOT WAKE UP?: NO

## 2023-11-27 ASSESSMENT — PAIN - FUNCTIONAL ASSESSMENT
PAIN_FUNCTIONAL_ASSESSMENT: 0-10

## 2023-11-27 ASSESSMENT — PAIN DESCRIPTION - PAIN TYPE: TYPE: ACUTE PAIN

## 2023-11-27 ASSESSMENT — PAIN DESCRIPTION - LOCATION: LOCATION: HIP

## 2023-11-27 ASSESSMENT — PAIN DESCRIPTION - ORIENTATION: ORIENTATION: RIGHT

## 2023-11-27 NOTE — ED PROVIDER NOTES
HPI   No chief complaint on file.      This is a 68-year-old male presented to the emergency department for right hip pain.  Patient states he was sitting on the toilet when he felt the hip pop.  He was having trouble ambulating due to pain.  He has had a hip replacement in August of this year and has had several dislocations since that time.  Today symptoms feel similar.  Denies falls or any additional injuries after the incident.  He denies any numbness or tingling to the distal extremity.      History provided by:  Patient   used: No                        No data recorded                Patient History   Past Medical History:   Diagnosis Date    Unspecified osteoarthritis, unspecified site     Osteoarthritis     Past Surgical History:   Procedure Laterality Date    OTHER SURGICAL HISTORY  05/09/2022    Retinal detachment repair    OTHER SURGICAL HISTORY  05/09/2022    Cataract surgery     No family history on file.  Social History     Tobacco Use    Smoking status: Every Day     Types: Cigarettes    Smokeless tobacco: Never   Substance Use Topics    Alcohol use: Yes     Alcohol/week: 15.0 standard drinks of alcohol     Types: 15 Standard drinks or equivalent per week    Drug use: Never       Physical Exam   ED Triage Vitals   Temp Pulse Resp BP   -- -- -- --      SpO2 Temp src Heart Rate Source Patient Position   -- -- -- --      BP Location FiO2 (%)     -- --       Physical Exam  GEN: well appearing, no acute distress  BACK: no CVA tenderness, no vertebral point tenderness  EXT: no LE edema, 2+ periph pulses in bilat DP, strength and sensation intact distally in bilateral lower extremities, right lower extremity shortened compared to left, tenderness to palpation at right hip with palpable deformity.  NEURO:  no focal deficits, no facial asymmetry, moving all extremities  PSYCH: AAOx3 answers questions appropriately    ED Course & MDM   Diagnoses as of 11/28/23 1401   Dislocation of right  hip, initial encounter (CMS/Formerly [facility])       Medical Decision Making  This is a 68-year-old male presented to the emergency department for right hip pain.  Patient stable upon presentation to the emergency department, no acute distress and vitals are unremarkable.  On exam he does have a shortened right lower extremity with tenderness palpation at the right hip.  Presentation concerning for recurrent hip dislocation.  X-rays to be performed.  Patient's pain was treated with Dilaudid and Zofran in the emergency department.  No concern for septic joint.  Low suspicion for fracture. X-ray of the hip to confirm patient's dislocation without signs of fracture.  After consent was performed conscious sedation and reduction was successfully completed with confirmation on x-ray.  Patient placed in a knee immobilizer.  He will be discharged home with plans to follow-up with orthopedic surgery for reevaluation.  Patient amenable to this plan.  Lower extremity remained neurovascularly intact throughout his time in emergency department.    Procedure  Orthopaedic Injury Treatment - Lower Extremity    Performed by: Dameon Whyte MD  Authorized by: Dameon Whyte MD    Consent:     Consent obtained:  Written    Consent given by:  Patient and spouse    Risks, benefits, and alternatives were discussed: yes      Risks discussed:  Recurrent dislocation, fracture, irreducible dislocation, vascular damage and nerve damage    Alternatives discussed:  No treatment  Universal protocol:     Procedure explained and questions answered to patient or proxy's satisfaction: yes      Relevant documents present and verified: yes      Imaging studies available: yes      Required blood products, implants, devices, and special equipment available: no      Site/side marked: yes      Immediately prior to procedure, a time out was called: yes      Patient identity confirmed:  Verbally with patient and hospital-assigned identification number  Location:     Location:   Hip    Hip injury location:  R hip    Hip dislocation type: posterior      Etiology: spontaneous      Prosthesis: yes    Pre-procedure details:     Distal perfusion: normal      Range of motion: reduced    Sedation:     Sedation type:  Moderate sedation  Anesthesia:     Anesthesia method:  None  Procedure details:     Hip reduction method:  Direct traction    Reduction successful: yes      Reduction confirmed with imaging: yes      Immobilization:  Brace (knee immobilizer)  Post-procedure details:     Neurological function: normal      Distal perfusion: normal      Range of motion: normal      Procedure completion:  Tolerated       Dameon Whyte MD  11/28/23 0619

## 2023-11-27 NOTE — DISCHARGE INSTRUCTIONS
Please follow-up with orthopedic surgery at the number provided.  Maintain dislocation precautions.  Return to the emergency department for any new or worsening symptoms.

## 2023-12-02 ENCOUNTER — APPOINTMENT (OUTPATIENT)
Dept: RADIOLOGY | Facility: HOSPITAL | Age: 68
End: 2023-12-02
Payer: COMMERCIAL

## 2023-12-02 ENCOUNTER — HOSPITAL ENCOUNTER (EMERGENCY)
Facility: HOSPITAL | Age: 68
Discharge: HOME | End: 2023-12-02
Attending: EMERGENCY MEDICINE
Payer: COMMERCIAL

## 2023-12-02 VITALS
OXYGEN SATURATION: 95 % | SYSTOLIC BLOOD PRESSURE: 124 MMHG | HEART RATE: 81 BPM | TEMPERATURE: 97.3 F | RESPIRATION RATE: 20 BRPM | HEIGHT: 71 IN | WEIGHT: 210 LBS | DIASTOLIC BLOOD PRESSURE: 93 MMHG | BODY MASS INDEX: 29.4 KG/M2

## 2023-12-02 DIAGNOSIS — S73.004A HIP DISLOCATION, RIGHT, INITIAL ENCOUNTER (MULTI): Primary | ICD-10-CM

## 2023-12-02 PROCEDURE — 99285 EMERGENCY DEPT VISIT HI MDM: CPT | Mod: 25 | Performed by: EMERGENCY MEDICINE

## 2023-12-02 PROCEDURE — 99152 MOD SED SAME PHYS/QHP 5/>YRS: CPT | Mod: 59

## 2023-12-02 PROCEDURE — 73501 X-RAY EXAM HIP UNI 1 VIEW: CPT | Mod: RT,FY

## 2023-12-02 PROCEDURE — 27265 TREAT HIP DISLOCATION: CPT | Performed by: EMERGENCY MEDICINE

## 2023-12-02 PROCEDURE — 2500000005 HC RX 250 GENERAL PHARMACY W/O HCPCS: Performed by: EMERGENCY MEDICINE

## 2023-12-02 PROCEDURE — 94760 N-INVAS EAR/PLS OXIMETRY 1: CPT

## 2023-12-02 PROCEDURE — 73502 X-RAY EXAM HIP UNI 2-3 VIEWS: CPT | Mod: RT

## 2023-12-02 PROCEDURE — 99284 EMERGENCY DEPT VISIT MOD MDM: CPT | Mod: 25

## 2023-12-02 PROCEDURE — 96374 THER/PROPH/DIAG INJ IV PUSH: CPT | Mod: 59

## 2023-12-02 PROCEDURE — 2500000004 HC RX 250 GENERAL PHARMACY W/ HCPCS (ALT 636 FOR OP/ED): Performed by: EMERGENCY MEDICINE

## 2023-12-02 PROCEDURE — 73502 X-RAY EXAM HIP UNI 2-3 VIEWS: CPT | Mod: RIGHT SIDE | Performed by: RADIOLOGY

## 2023-12-02 RX ORDER — KETAMINE HYDROCHLORIDE 100 MG/ML
1.5 INJECTION, SOLUTION INTRAMUSCULAR; INTRAVENOUS ONCE
Status: COMPLETED | OUTPATIENT
Start: 2023-12-02 | End: 2023-12-02

## 2023-12-02 RX ORDER — ETOMIDATE 2 MG/ML
INJECTION INTRAVENOUS
Status: COMPLETED | OUTPATIENT
Start: 2023-12-02 | End: 2023-12-02

## 2023-12-02 RX ORDER — MORPHINE SULFATE 4 MG/ML
4 INJECTION, SOLUTION INTRAMUSCULAR; INTRAVENOUS ONCE
Status: COMPLETED | OUTPATIENT
Start: 2023-12-02 | End: 2023-12-02

## 2023-12-02 RX ADMIN — Medication 145 MG: at 11:49

## 2023-12-02 RX ADMIN — MORPHINE SULFATE 4 MG: 4 INJECTION, SOLUTION INTRAMUSCULAR; INTRAVENOUS at 08:50

## 2023-12-02 ASSESSMENT — PAIN DESCRIPTION - PAIN TYPE: TYPE: ACUTE PAIN;CHRONIC PAIN

## 2023-12-02 ASSESSMENT — PAIN - FUNCTIONAL ASSESSMENT
PAIN_FUNCTIONAL_ASSESSMENT: 0-10

## 2023-12-02 ASSESSMENT — PAIN SCALES - GENERAL
PAINLEVEL_OUTOF10: 5 - MODERATE PAIN
PAINLEVEL_OUTOF10: 0 - NO PAIN
PAINLEVEL_OUTOF10: 3
PAINLEVEL_OUTOF10: 0 - NO PAIN
PAINLEVEL_OUTOF10: 5 - MODERATE PAIN
PAINLEVEL_OUTOF10: 3
PAINLEVEL_OUTOF10: 0 - NO PAIN

## 2023-12-02 ASSESSMENT — PAIN DESCRIPTION - LOCATION
LOCATION: HIP
LOCATION: HIP

## 2023-12-02 ASSESSMENT — PAIN DESCRIPTION - ORIENTATION
ORIENTATION: RIGHT
ORIENTATION: RIGHT

## 2023-12-02 ASSESSMENT — LIFESTYLE VARIABLES: REASON UNABLE TO ASSESS: NO

## 2023-12-02 ASSESSMENT — PAIN DESCRIPTION - FREQUENCY: FREQUENCY: CONSTANT/CONTINUOUS

## 2023-12-02 NOTE — PROGRESS NOTES
Pharmacy Medication History Review    Wade Machado is a 68 y.o. male admitted for No Principal Problem: There is no principal problem currently on the Problem List. Please update the Problem List and refresh.. Pharmacy reviewed the patient's [id number] medications and allergies for accuracy.    The list below reflectives the updated PTA list. Please review each medication in order reconciliation for additional clarification and justification.  (Not in a hospital admission)       The list below reflectives the updated allergy list. Please review each documented allergy for additional clarification and justification.  Allergies  Reviewed by Alison Mckinley CPhT on 12/2/2023   No Known Allergies         Below are additional concerns with the patient's PTA list.    See PTA med list    Alison Mckinley CPhT

## 2023-12-02 NOTE — ED PROVIDER NOTES
HPI   Chief Complaint   Patient presents with    Hip Pain       68-year-old male with history of right hip prosthesis with multiple recent dislocations presents to the ED with right hip pain.  He reports trying to get up from the toilet today and felt a pop in his right hip.  Upon arrival to the ED he is complaining of pain in that side.  Denies any other injuries.                          Gama Coma Scale Score: 15                  Patient History   Past Medical History:   Diagnosis Date    Unspecified osteoarthritis, unspecified site     Osteoarthritis     Past Surgical History:   Procedure Laterality Date    JOINT REPLACEMENT      OTHER SURGICAL HISTORY  05/09/2022    Retinal detachment repair    OTHER SURGICAL HISTORY  05/09/2022    Cataract surgery     No family history on file.  Social History     Tobacco Use    Smoking status: Every Day     Types: Cigarettes    Smokeless tobacco: Never   Substance Use Topics    Alcohol use: Not on file     Comment: 12 pack weekly    Drug use: Yes     Frequency: 1.0 times per week     Types: Marijuana       Physical Exam   ED Triage Vitals [12/02/23 0809]   Temp Heart Rate Resp BP   36.3 °C (97.3 °F) 69 20 128/58      SpO2 Temp Source Heart Rate Source Patient Position   96 % Temporal Monitor --      BP Location FiO2 (%)     -- 21 %       Physical Exam  Vitals and nursing note reviewed.   Constitutional:       Appearance: Normal appearance.   HENT:      Head: Normocephalic and atraumatic.   Eyes:      Extraocular Movements: Extraocular movements intact.      Pupils: Pupils are equal, round, and reactive to light.   Cardiovascular:      Rate and Rhythm: Normal rate and regular rhythm.   Pulmonary:      Effort: Pulmonary effort is normal.      Breath sounds: Normal breath sounds.   Abdominal:      General: Abdomen is flat.      Palpations: Abdomen is soft.      Tenderness: There is no abdominal tenderness.   Musculoskeletal:         General: Normal range of motion.       Cervical back: Normal range of motion and neck supple.      Comments: Right lower extremity shortening.  Good peripheral pulses and sensation   Skin:     General: Skin is warm and dry.      Capillary Refill: Capillary refill takes less than 2 seconds.   Neurological:      General: No focal deficit present.      Mental Status: He is alert and oriented to person, place, and time. Mental status is at baseline.   Psychiatric:         Mood and Affect: Mood normal.         Behavior: Behavior normal.         Thought Content: Thought content normal.         Judgment: Judgment normal.         ED Course & MDM   Diagnoses as of 12/02/23 1307   Hip dislocation, right, initial encounter (CMS/Former[facility])       Medical Decision Making  68-year-old male presents to the ED with a right hip dislocation.  Upon arrival to the ED in no distress.  Extremity is neurovascular intact.  X-ray confirmed dislocation.  Conscious sedation was performed with ketamine.  Please see procedure portion of note for further details.  Had successful reduction after 1 attempt.  Patient was observed in the ED until clinically able to be discharged.  He has an orthopedic surgeon follow-up in 2 days.  He was given precautions for recurrent dislocations including wide range of movements of the hip and avoidance of stairs and it was recommended to wear knee immobilizer.  All questions were answered.        Procedure  Procedures     Edward Marley MD  12/02/23 1316

## 2023-12-02 NOTE — ED PROCEDURE NOTE
Procedure  Moderate Sedation    Performed by: Edward Marley MD  Authorized by: Edward Marley MD    Consent:     Consent obtained:  Verbal    Consent given by:  Patient    Risks discussed:  Respiratory compromise necessitating ventilatory assistance and intubation  Universal protocol:     Procedure explained and questions answered to patient or proxy's satisfaction: yes      Relevant documents present and verified: yes      Test results available: yes      Imaging studies available: yes      Required blood products, implants, devices, and special equipment available: yes      Site/side marked: yes      Immediately prior to procedure, a time out was called: yes      Patient identity confirmed:  Verbally with patient  Indications:     Procedure performed:  Dislocation reduction    Procedure necessitating sedation performed by:  Physician performing sedation    Intended level of sedation:  Moderate  Pre-sedation assessment:     NPO status caution: urgency dictates proceeding with non-ideal NPO status      ASA classification: class 2 - patient with mild systemic disease      Mouth opening:  3 or more finger widths    Thyromental distance:  3 finger widths    Mallampati score:  II - soft palate, uvula, fauces visible    Neck mobility: normal      History of difficult intubation: yes    Immediate pre-procedure details:     Reassessment: Patient reassessed immediately prior to procedure      Reviewed: vital signs      Verified: bag valve mask available, emergency equipment available, intubation equipment available, IV patency confirmed, oxygen available, reversal medications available and suction available    Procedure details (see MAR for exact dosages):     Preoxygenation:  Nasal cannula    Sedation:  Ketamine    Analgesia:  Morphine    Intra-procedure monitoring:  Blood pressure monitoring, continuous capnometry, frequent LOC assessments, frequent vital sign checks, continuous pulse oximetry and cardiac monitor     Intra-procedure events: none    Post-procedure details:     Estimated blood loss (see I/O flowsheets): no               Edward Marley MD  12/02/23 5941

## 2023-12-02 NOTE — ED PROCEDURE NOTE
Procedure  Orthopaedic Injury Treatment - Lower Extremity    Performed by: Edward Marley MD  Authorized by: Edward Marley MD    Consent:     Consent obtained:  Verbal    Consent given by:  Patient    Risks discussed:  Recurrent dislocation    Alternatives discussed:  No treatment  Universal protocol:     Procedure explained and questions answered to patient or proxy's satisfaction: yes      Relevant documents present and verified: yes      Test results available: yes      Imaging studies available: yes      Required blood products, implants, devices, and special equipment available: yes      Site/side marked: yes      Immediately prior to procedure, a time out was called: yes      Patient identity confirmed:  Verbally with patient  Location:     Location:  Hip    Hip injury location:  R hip    Hip dislocation type: posterior      Etiology: spontaneous      Prosthesis: yes    Pre-procedure details:     Distal perfusion: normal      Range of motion: normal    Sedation:     Sedation type:  Moderate sedation  Anesthesia:     Anesthesia method:  None  Procedure details:     Manipulation performed: yes      Hip reduction method:  Extension and external rotation    Reduction successful: yes      Reduction confirmed with imaging: yes      Immobilization:  Brace  Post-procedure details:     Neurological function: normal      Distal perfusion: normal      Range of motion: improved      Procedure completion:  Tolerated             Edward Marley MD  12/02/23 9542

## 2023-12-02 NOTE — ED TRIAGE NOTES
Rt hip replaced 8/2023. Return for the 2nd time to ed in 1 week for pop  possible dislodge rt hip while getting on toilet

## 2023-12-02 NOTE — SIGNIFICANT EVENT
12/02/23 1104   Medical Gas Therapy/Pulse Ox   Medical Gas Therapy None (Room air)   SpO2 98 %   ETCO2 (mmHg) 20 mmHg   FiO2 (%) 21 %   Incentive Spirometry Treatment   Respiratory Depth/Rhythm Shallow   Respiratory Effort Unlabored     Respiratory therapy at bedside for airway management and to monitor respiratory status during moderate sedation procedure.

## 2023-12-04 ENCOUNTER — OFFICE VISIT (OUTPATIENT)
Dept: ORTHOPEDIC SURGERY | Facility: CLINIC | Age: 68
End: 2023-12-04
Payer: COMMERCIAL

## 2023-12-04 DIAGNOSIS — T84.020S FAILURE OF RIGHT TOTAL HIP ARTHROPLASTY WITH DISLOCATION OF HIP, SEQUELA: Primary | ICD-10-CM

## 2023-12-04 DIAGNOSIS — T84.020S FAILURE OF RIGHT TOTAL HIP ARTHROPLASTY WITH DISLOCATION OF HIP, SEQUELA: ICD-10-CM

## 2023-12-04 PROCEDURE — 99213 OFFICE O/P EST LOW 20 MIN: CPT | Performed by: ORTHOPAEDIC SURGERY

## 2023-12-04 PROCEDURE — 1125F AMNT PAIN NOTED PAIN PRSNT: CPT | Performed by: ORTHOPAEDIC SURGERY

## 2023-12-04 PROCEDURE — 1159F MED LIST DOCD IN RCRD: CPT | Performed by: ORTHOPAEDIC SURGERY

## 2023-12-04 PROCEDURE — 3008F BODY MASS INDEX DOCD: CPT | Performed by: ORTHOPAEDIC SURGERY

## 2023-12-04 PROCEDURE — 1160F RVW MEDS BY RX/DR IN RCRD: CPT | Performed by: ORTHOPAEDIC SURGERY

## 2023-12-04 NOTE — PROGRESS NOTES
Subjective    Patient ID: Wade Machado is a 68 y.o. male.    Chief Complaint: OTHER (F/U RT HIP DISLOCATION.)     Last Surgery: No surgery found  Last Surgery Date: No surgery found    HPI  Patient comes in for follow-up of his right total hip replacement.  For the first 2 months after surgery he had no issues.  However within the past 5 weeks he has dislocated 4 times.  All 4 times a day been reducible in the emergency department under sedation.  He states he now is a raised toilet seat.  He will also use a knee immobilizer more often.    Objective   Ortho Exam  Patient is in no acute distress.  He is using a cane to assist with ambulation.  He has a mild antalgic gait favoring his right lower extremity.  Currently his right hip is clinically located.  He has no right calf tenderness.    Image Results:  XR hip right with pelvis when performed 1 view  Narrative: Interpreted By:  Schoenberger, Joseph,   STUDY:  XR HIP RIGHT WITH PELVIS WHEN PERFORMED 1 VIEW; ;  12/2/2023 11:38 am      INDICATION:  Signs/Symptoms:pain; post-reduction.      COMPARISON:  12/02/2023      ACCESSION NUMBER(S):  [id number]      ORDERING CLINICIAN:  ЕЛЕНА VILLATORO      FINDINGS:  No acute fracture. Apparent successful reduction of the dislocated  right hip prosthetic dislocation.      Impression: Successful reduction of the right hip prosthetic dislocation          MACRO:  None      Signed by: Joseph Schoenberger 12/2/2023 11:41 AM  Dictation workstation:   [id number]  XR hip right with pelvis when performed 2 or 3 views  Narrative: Interpreted By:  Schoenberger, Joseph,   STUDY:  XR HIP RIGHT WITH PELVIS WHEN PERFORMED 2 OR 3 VIEWS; ;  12/2/2023  8:57 am      INDICATION:  Signs/Symptoms:pain, concern for dislocation.      COMPARISON:  11/27/2023      ACCESSION NUMBER(S):  [id number]      ORDERING CLINICIAN:  ЕЛЕНА VILLATORO      FINDINGS:  There is a dislocation of the right hip arthroplasty with superior  displacement of the prosthetic  component. Acetabular component  unchanged in position. Or no definite acute fracture seen.      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  I explained the patient that during the initial procedure it was difficult because his posterior wall was very eroded.  At this time I explained to him he may require a revision.  I strongly advised the patient to at least seek a second opinion from a total joint specialist.  We will do so and then follow-up with me once a decision has been made if he chooses to not have a revision done by the total joint specialist.

## 2023-12-08 ENCOUNTER — APPOINTMENT (OUTPATIENT)
Dept: ORTHOPEDIC SURGERY | Facility: CLINIC | Age: 68
End: 2023-12-08
Payer: COMMERCIAL

## 2023-12-11 ENCOUNTER — APPOINTMENT (OUTPATIENT)
Dept: ORTHOPEDIC SURGERY | Facility: CLINIC | Age: 68
End: 2023-12-11
Payer: COMMERCIAL

## 2023-12-11 ENCOUNTER — OFFICE VISIT (OUTPATIENT)
Dept: ORTHOPEDIC SURGERY | Facility: CLINIC | Age: 68
End: 2023-12-11
Payer: COMMERCIAL

## 2023-12-11 ENCOUNTER — TELEPHONE (OUTPATIENT)
Dept: PRIMARY CARE | Facility: CLINIC | Age: 68
End: 2023-12-11

## 2023-12-11 ENCOUNTER — ANCILLARY PROCEDURE (OUTPATIENT)
Dept: RADIOLOGY | Facility: CLINIC | Age: 68
End: 2023-12-11
Payer: COMMERCIAL

## 2023-12-11 VITALS — WEIGHT: 245 LBS | BODY MASS INDEX: 34.3 KG/M2 | HEIGHT: 71 IN

## 2023-12-11 DIAGNOSIS — Z96.641 HISTORY OF TOTAL HIP ARTHROPLASTY, RIGHT: Primary | ICD-10-CM

## 2023-12-11 DIAGNOSIS — Z96.641 HISTORY OF TOTAL HIP ARTHROPLASTY, RIGHT: ICD-10-CM

## 2023-12-11 DIAGNOSIS — N40.0 BENIGN PROSTATIC HYPERPLASIA, UNSPECIFIED WHETHER LOWER URINARY TRACT SYMPTOMS PRESENT: Primary | ICD-10-CM

## 2023-12-11 PROCEDURE — 99215 OFFICE O/P EST HI 40 MIN: CPT | Performed by: STUDENT IN AN ORGANIZED HEALTH CARE EDUCATION/TRAINING PROGRAM

## 2023-12-11 PROCEDURE — 99417 PROLNG OP E/M EACH 15 MIN: CPT | Performed by: STUDENT IN AN ORGANIZED HEALTH CARE EDUCATION/TRAINING PROGRAM

## 2023-12-11 PROCEDURE — 1160F RVW MEDS BY RX/DR IN RCRD: CPT | Performed by: STUDENT IN AN ORGANIZED HEALTH CARE EDUCATION/TRAINING PROGRAM

## 2023-12-11 PROCEDURE — 3008F BODY MASS INDEX DOCD: CPT | Performed by: STUDENT IN AN ORGANIZED HEALTH CARE EDUCATION/TRAINING PROGRAM

## 2023-12-11 PROCEDURE — 73502 X-RAY EXAM HIP UNI 2-3 VIEWS: CPT | Mod: RT

## 2023-12-11 PROCEDURE — 99205 OFFICE O/P NEW HI 60 MIN: CPT | Performed by: STUDENT IN AN ORGANIZED HEALTH CARE EDUCATION/TRAINING PROGRAM

## 2023-12-11 PROCEDURE — 1125F AMNT PAIN NOTED PAIN PRSNT: CPT | Performed by: STUDENT IN AN ORGANIZED HEALTH CARE EDUCATION/TRAINING PROGRAM

## 2023-12-11 PROCEDURE — 1159F MED LIST DOCD IN RCRD: CPT | Performed by: STUDENT IN AN ORGANIZED HEALTH CARE EDUCATION/TRAINING PROGRAM

## 2023-12-11 PROCEDURE — 73502 X-RAY EXAM HIP UNI 2-3 VIEWS: CPT | Mod: RIGHT SIDE | Performed by: RADIOLOGY

## 2023-12-11 ASSESSMENT — PAIN - FUNCTIONAL ASSESSMENT: PAIN_FUNCTIONAL_ASSESSMENT: 0-10

## 2023-12-11 ASSESSMENT — PAIN SCALES - GENERAL
PAINLEVEL_OUTOF10: 1
PAINLEVEL: 1

## 2023-12-11 ASSESSMENT — PAIN DESCRIPTION - DESCRIPTORS: DESCRIPTORS: SORE

## 2023-12-11 NOTE — PROGRESS NOTES
SOPHIA/TKA Related Summary  R hip  8/1/2023: Primary SOPHIA (Dr. Kip Webb at ). Details below  R knee: N  L hip  4/10/2019: Primary SOPHIA (Dr. Kip Webb at ). Doing very well.  L knee: N  Lumbar spine surgery/pathology: N  Hx of infxn in native or replaced hip/knee: N        CC/SUBJECTIVE/HPI  PCP: Eddie Crum DO  Referring provider: Dr. Kip Webb  Occupation: Retired   Wade is a 68 y.o. male presenting for R SOPHIA instability.  He has a history of bilateral SOPHIA's with Dr. Fadi Webb. His R SOPHIA, performed in 2019, has always served him very well.  His L SOPHIA, performed on 8/1/2023, did well initially.  However, on 10/8/2023, he sustained a posterosuperior dislocation while bending over. This was closed reduced in the ED on 10/9/2023, and he was placed in a knee immobilizer.  He saw Dr. Webb on 10/23/2023, at which time posterior hip precautions were reiterated. He sustained recurrent posterosuperior dislocations that were closed reduced on 11/19/2023 (during a fall), 11/27/2023 (sitting on toilet), and 12/2/2023 (rising from toilet).  He saw Dr. Webb on 12/4/2023, reported he now had a raised toilet seat, and was recommended to use the knee immobilizer more often. Dr. Webb told the patient that he may need a revision but also advised him to seek a second opinion.    R hip symptoms  Pain: 1/10  Onset: after arthroplasty  Duration: <3mo  Location: Diffuse  Quality: dull/ache  Associated symptoms: difficulty in/out of chair/car and difficulty with socks/shoes/clothes  Ambulation limit due to pain: 100-500ft  Other symptoms: none  R hip treatment to date  Tried: PT, home exercises, and OTCs  Most recent injection date: none  Assistive device: cane  Treatment attempted for <3mo and is never effective  Prior opinions: possible revision surgery (Dr. Webb)  Subjective leg lengths  Near equal         HISTORIES  Dental Health  Pt-reported on form today: No active  issues    PMH  Pt-reported on form today: Denies heart/lung/kidney/liver issues, diabetes, stroke, seizure, bariatric surgery, anticoagulants, MRSA, cancer, personal/familial coagulopathies except as noted: cancer (Type: prostate. Status: being treated (radioactive seeds implanted 2015). Ever metastatic: N)  System-generated (PMH and problem list both included for completeness since recent EMR change caused discrepancies):   Past Medical History:   Diagnosis Date    Unspecified osteoarthritis, unspecified site     Osteoarthritis      Patient Active Problem List   Diagnosis    Arthritis of left hip    Arthritis of right hip    Benign essential hypertension    BPH (benign prostatic hyperplasia)    History of prostate cancer    Presence of artificial hip, left    Cerebral cysts     PSH  Pt-reported on form today: Per above.   System-generated:   Past Surgical History:   Procedure Laterality Date    JOINT REPLACEMENT      OTHER SURGICAL HISTORY  05/09/2022    Retinal detachment repair    OTHER SURGICAL HISTORY  05/09/2022    Cataract surgery     Family Hx  Pt-reported clot/coagulopathies on form today: none  System-generated: No family history on file.    Social Hx  Pt-reported substance use (etoh, tob, illicits) on form today: EtOH (6 drink(s)/wk) and cigarettes (1.5PPD x45yrs)  System-generated:   Social History     Tobacco Use    Smoking status: Every Day     Types: Cigarettes    Smokeless tobacco: Never   Substance Use Topics    Drug use: Yes     Frequency: 1.0 times per week     Types: Marijuana     Allergies  Pt-reported (meds, metals) on form today: N  System-generated: No Known Allergies    Current Meds  Pt-reported narcotics on form today: N  System-generated:   Current Outpatient Medications:     lisinopril 40 mg tablet, Take 1 tablet (40 mg) by mouth once daily. for blood pressure, Disp: 90 tablet, Rfl: 3    tamsulosin (Flomax) 0.4 mg 24 hr capsule, TAKE ONE CAPSULE BY MOUTH EVERY DAY, Disp: 90 capsule, Rfl:  "3    ROS: Neg except HPI        OBJECTIVE  Physical exam  Estimated body mass index is 29.29 kg/m² as calculated from the following:    Height as of 12/2/23: 1.803 m (5' 11\").    Weight as of 12/2/23: 95.3 kg (210 lb).  Gen/psych: NAD, conversational, appropriate    Ambulation  Gait: normal  Assistive device: none     Operated leg focused MSK exam: R  Hip  Trendelenberg test: Positive (also 3+/5 abd strength when lying)  Skin/incision: well healed, likely posterior approach  Tenderness: none  Pain with log roll: neg  ROM: Not test given instability. Able to mobilize, get onto clinic bed, etc without issue.  Neurovascular    Strength: 4+/5 hip/knee/ankle flexion and extension  Sensory (L2-S1): SILT throughout lower extremity  Edema/stasis: no pitting edema  Pulse: DP 1+, PT 1+    Imaging  12/11/2023 R hip radiographs (AP Pelvis, AP/Lateral) on my read: SOPHIA components in acceptable position with no sign of gross implant/hardware failure.        ASSESSMENT & PLAN  Patient verbalized understanding of below A&P. All questions answered.  #1: R Primary SOPHIA instability  Unfortunately, it is not clear what has led to the sequence of doing well for 2 months postoperatively then sustaining repeated posterosuperior dislocations. Differential includes:  Brace noncompliance: Not wearing knee immobilizer regularly as instructed.  Lack of full bracing: Has not tried an HKO.  Infection: History and symptoms not highly suspicious, but history of smoking does raise risk.  Component malposition or wear: AP pelvis, AP and cross table lat R hip show cup anteversion and inclination within normal limits and, although difficult to tell exactly on radiographs, no excessive stem version.  No significant asymmetry to suggest severe polyethylene wear. No signs of gross loosening when compared to previous imaging.  Impingement: Difficult to rule out clinically, as I did not want to risk recurrent dislocation.  Lack of soft tissue tension: " Difficult to ascertain in clinic. 3+/5 abd strength. Long term history of chronic smoking may play a role in soft tissue quality.   Muscular dysfunction (ie Parkinson's): R lower extremity 4+/5 in all distributions and no hx of such conditions, making this less likely.  Metallosis: Not MoM and no reason to suspect other debris this early postop.  I appreciate Dr. Webb sending the patient for a second opinion.My recommendations are below, and I sent Dr. Webb a note.  Rule out periprosthetic joint infection.  Prescribe hip-knee orthosis to control hip flexion (no hip flexion >70º, no knee flexion >90º) and abd pillow at night. These must be worn diligently.  Strict Hip Dislocation Precautions (no flexion beyond 70 degrees, no internal rotation beyond neutral, no adduction beyond neutral, may do abduction exercises). Wade does not seem to be diligent about these, as previous notes say he was educated but today he denies knowing about them. They must be followed diligently.  Immediate smoking cessation. Pt smokes 1.5PPD x45yrs, and exam room smelled strongly of smoke today. This would be beneficial for his overall health and would also be beneficial in the setting of revision surgery.  Surgical options should be reserved for continued repeat dislocations despite strict adherence to these conservative measures.

## 2023-12-11 NOTE — TELEPHONE ENCOUNTER
Pt. Would like recomendaton to urologist.  He is having incontinence.  Please advise  Was with Dr. Mckenna previously.-going 12/20  Wanted to get into someone sooner.  Can you also please put in for bw for him?  Joe [phone]

## 2023-12-21 ENCOUNTER — APPOINTMENT (OUTPATIENT)
Dept: RADIOLOGY | Facility: HOSPITAL | Age: 68
End: 2023-12-21
Payer: COMMERCIAL

## 2023-12-21 ENCOUNTER — HOSPITAL ENCOUNTER (EMERGENCY)
Facility: HOSPITAL | Age: 68
Discharge: HOME | End: 2023-12-21
Attending: STUDENT IN AN ORGANIZED HEALTH CARE EDUCATION/TRAINING PROGRAM
Payer: COMMERCIAL

## 2023-12-21 VITALS
DIASTOLIC BLOOD PRESSURE: 70 MMHG | TEMPERATURE: 98.4 F | WEIGHT: 240 LBS | HEART RATE: 84 BPM | BODY MASS INDEX: 33.47 KG/M2 | OXYGEN SATURATION: 100 % | RESPIRATION RATE: 16 BRPM | SYSTOLIC BLOOD PRESSURE: 130 MMHG

## 2023-12-21 DIAGNOSIS — T84.020A: Primary | ICD-10-CM

## 2023-12-21 PROCEDURE — 99285 EMERGENCY DEPT VISIT HI MDM: CPT | Performed by: STUDENT IN AN ORGANIZED HEALTH CARE EDUCATION/TRAINING PROGRAM

## 2023-12-21 PROCEDURE — 27265 TREAT HIP DISLOCATION: CPT | Performed by: STUDENT IN AN ORGANIZED HEALTH CARE EDUCATION/TRAINING PROGRAM

## 2023-12-21 PROCEDURE — 73502 X-RAY EXAM HIP UNI 2-3 VIEWS: CPT | Mod: RT

## 2023-12-21 PROCEDURE — 73502 X-RAY EXAM HIP UNI 2-3 VIEWS: CPT | Mod: RIGHT SIDE

## 2023-12-21 PROCEDURE — 2500000004 HC RX 250 GENERAL PHARMACY W/ HCPCS (ALT 636 FOR OP/ED): Performed by: STUDENT IN AN ORGANIZED HEALTH CARE EDUCATION/TRAINING PROGRAM

## 2023-12-21 RX ORDER — FENTANYL CITRATE 50 UG/ML
INJECTION, SOLUTION INTRAMUSCULAR; INTRAVENOUS
Status: DISCONTINUED
Start: 2023-12-21 | End: 2023-12-21 | Stop reason: HOSPADM

## 2023-12-21 RX ORDER — PROPOFOL 10 MG/ML
40 INJECTION, EMULSION INTRAVENOUS ONCE
Status: DISCONTINUED | OUTPATIENT
Start: 2023-12-21 | End: 2023-12-21 | Stop reason: HOSPADM

## 2023-12-21 RX ORDER — PROPOFOL 10 MG/ML
INJECTION, EMULSION INTRAVENOUS CODE/TRAUMA/SEDATION MEDICATION
Status: COMPLETED | OUTPATIENT
Start: 2023-12-21 | End: 2023-12-21

## 2023-12-21 RX ORDER — PROPOFOL 10 MG/ML
20 INJECTION, EMULSION INTRAVENOUS AS NEEDED
Status: DISCONTINUED | OUTPATIENT
Start: 2023-12-21 | End: 2023-12-21 | Stop reason: HOSPADM

## 2023-12-21 RX ORDER — FENTANYL CITRATE 50 UG/ML
INJECTION, SOLUTION INTRAMUSCULAR; INTRAVENOUS CODE/TRAUMA/SEDATION MEDICATION
Status: COMPLETED | OUTPATIENT
Start: 2023-12-21 | End: 2023-12-21

## 2023-12-21 RX ADMIN — PROPOFOL 40 MG: 10 INJECTION, EMULSION INTRAVENOUS at 12:44

## 2023-12-21 RX ADMIN — PROPOFOL 20 MG: 10 INJECTION, EMULSION INTRAVENOUS at 12:46

## 2023-12-21 RX ADMIN — FENTANYL CITRATE 50 MCG: 50 INJECTION, SOLUTION INTRAMUSCULAR; INTRAVENOUS at 12:42

## 2023-12-21 RX ADMIN — PROPOFOL 20 MG: 10 INJECTION, EMULSION INTRAVENOUS at 12:45

## 2023-12-21 ASSESSMENT — COLUMBIA-SUICIDE SEVERITY RATING SCALE - C-SSRS
6. HAVE YOU EVER DONE ANYTHING, STARTED TO DO ANYTHING, OR PREPARED TO DO ANYTHING TO END YOUR LIFE?: NO
2. HAVE YOU ACTUALLY HAD ANY THOUGHTS OF KILLING YOURSELF?: NO
1. IN THE PAST MONTH, HAVE YOU WISHED YOU WERE DEAD OR WISHED YOU COULD GO TO SLEEP AND NOT WAKE UP?: NO

## 2023-12-21 NOTE — ED TRIAGE NOTES
Pt to ER via EMS with hip dislocation. States that he had a right hip replacement in August and has had multiple dislocation since. Reports he getting out of his car when he felt it dislocate.

## 2023-12-21 NOTE — ED NOTES
Writer discussed discharge information with patient. Patient verbalized understanding. IV removed Questions answered. No acute distress upon discharge.      Jayla Phillips RN  12/21/23 4962

## 2023-12-21 NOTE — ED PROCEDURE NOTE
Procedure  Moderate Sedation    Performed by: Josue Gilbert MD  Authorized by: Josue Gilbert MD    Consent:     Consent obtained:  Verbal    Consent given by:  Patient    Risks, benefits, and alternatives were discussed: yes      Risks discussed:  Allergic reaction, prolonged hypoxia resulting in organ damage, dysrhythmia, prolonged sedation necessitating reversal, inadequate sedation, respiratory compromise necessitating ventilatory assistance and intubation, nausea and vomiting    Alternatives discussed:  Analgesia without sedation and anxiolysis  Universal protocol:     Procedure explained and questions answered to patient or proxy's satisfaction: yes      Imaging studies available: yes      Site/side marked: yes      Immediately prior to procedure, a time out was called: yes      Patient identity confirmed:  Verbally with patient, arm band and hospital-assigned identification number  Indications:     Procedure performed:  Dislocation reduction    Procedure necessitating sedation performed by:  Physician performing sedation    Intended level of sedation:  Moderate  Pre-sedation assessment:     Time since last food or drink:  Last evening    NPO status caution: urgency dictates proceeding with non-ideal NPO status      ASA classification: class 1 - normal, healthy patient      Mallampati score:  II - soft palate, uvula, fauces visible    Neck mobility: normal      Pre-sedation assessments completed and reviewed: airway patency, anesthesia/sedation history, cardiovascular function, hydration status, mental status, nausea/vomiting, pain level, respiratory function and temperature      History of difficult intubation: no    Immediate pre-procedure details:     Reassessment: Patient reassessed immediately prior to procedure      Reviewed: vital signs, relevant labs/tests and NPO status      Verified: bag valve mask available, emergency equipment available, intubation equipment available, IV patency confirmed,  oxygen available and suction available    Procedure details (see MAR for exact dosages):     Sedation start time:  12/21/2023 12:41 PM    Preoxygenation:  Nasal cannula    Sedation:  Propofol    Analgesia:  Fentanyl    Intra-procedure monitoring:  Blood pressure monitoring, cardiac monitor, continuous pulse oximetry, continuous capnometry, frequent LOC assessments and frequent vital sign checks    Intra-procedure events: none      Sedation end time:  12/21/2023 12:47 PM    Total sedation time (minutes):  6  Post-procedure details:     Post-sedation assessment completed:  12/21/2023 2:02 PM    Recovery: Patient returned to pre-procedure baseline      Estimated blood loss (see I/O flowsheets): no      Specimens recovered:  None    Patient is stable for discharge or admission: yes      Procedure completion:  Tolerated well, no immediate complications               Josue Gilbert MD  12/21/23 6355     46y F complaining of dizzniess x1week, associated with L sided headache w/pins and needles radiating down to the neck. Episode of NBNB emesis this AM. Pt. also states "my ears feels like they're clogged" which started 2-3 days ago. No SOB, CP, diarrhea.

## 2023-12-22 NOTE — ED PROCEDURE NOTE
Procedure  Moderate Sedation    Performed by: Dameon Whyte MD  Authorized by: Dameon Whyte MD    Consent:     Consent obtained:  Written and verbal    Consent given by:  Patient and spouse    Risks, benefits, and alternatives were discussed: yes      Risks discussed:  Allergic reaction, prolonged hypoxia resulting in organ damage, prolonged sedation necessitating reversal, inadequate sedation and respiratory compromise necessitating ventilatory assistance and intubation    Alternatives discussed:  Analgesia without sedation and anxiolysis  Universal protocol:     Procedure explained and questions answered to patient or proxy's satisfaction: yes      Relevant documents present and verified: yes      Imaging studies available: yes      Site/side marked: yes      Patient identity confirmed:  Verbally with patient, hospital-assigned identification number and arm band  Indications:     Procedure performed:  Dislocation reduction    Procedure necessitating sedation performed by:  Physician performing sedation    Intended level of sedation:  Moderate  Pre-sedation assessment:     NPO status caution: urgency dictates proceeding with non-ideal NPO status      ASA classification: class 2 - patient with mild systemic disease      Mouth opening:  3 or more finger widths    Thyromental distance:  3 finger widths    Mallampati score:  II - soft palate, uvula, fauces visible    Neck mobility: normal      Pre-sedation assessments completed and reviewed: airway patency, anesthesia/sedation history, cardiovascular function, hydration status, mental status, nausea/vomiting, pain level, respiratory function and temperature      History of difficult intubation: no    Immediate pre-procedure details:     Reassessment: Patient reassessed immediately prior to procedure      Reviewed: vital signs      Verified: bag valve mask available, emergency equipment available, intubation equipment available, IV patency confirmed and oxygen available     Procedure details (see MAR for exact dosages):     Preoxygenation:  Nasal cannula    Sedation:  Propofol    Intra-procedure monitoring:  Blood pressure monitoring, cardiac monitor, continuous pulse oximetry, continuous capnometry, frequent LOC assessments and frequent vital sign checks    Intra-procedure events: none    Post-procedure details:     Attendance: Constant attendance by certified staff until patient recovered      Recovery: Patient returned to pre-procedure baseline      Estimated blood loss (see I/O flowsheets): no      Post-sedation assessments completed and reviewed: airway patency, cardiovascular function, hydration status, mental status, pain level and respiratory function      Specimens recovered:  None    Patient is stable for discharge or admission: yes      Procedure completion:  Tolerated               Dameon Whyte MD  12/22/23 5521

## 2024-01-11 ENCOUNTER — APPOINTMENT (OUTPATIENT)
Dept: RADIOLOGY | Facility: HOSPITAL | Age: 69
End: 2024-01-11
Payer: COMMERCIAL

## 2024-01-11 ENCOUNTER — HOSPITAL ENCOUNTER (EMERGENCY)
Facility: HOSPITAL | Age: 69
Discharge: HOME | End: 2024-01-11
Attending: STUDENT IN AN ORGANIZED HEALTH CARE EDUCATION/TRAINING PROGRAM
Payer: COMMERCIAL

## 2024-01-11 VITALS
OXYGEN SATURATION: 97 % | BODY MASS INDEX: 33.6 KG/M2 | WEIGHT: 240 LBS | HEART RATE: 77 BPM | RESPIRATION RATE: 20 BRPM | SYSTOLIC BLOOD PRESSURE: 144 MMHG | DIASTOLIC BLOOD PRESSURE: 77 MMHG | HEIGHT: 71 IN

## 2024-01-11 DIAGNOSIS — S73.004A: Primary | ICD-10-CM

## 2024-01-11 DIAGNOSIS — S73.005A: Primary | ICD-10-CM

## 2024-01-11 PROCEDURE — 73501 X-RAY EXAM HIP UNI 1 VIEW: CPT | Mod: RT

## 2024-01-11 PROCEDURE — 2500000004 HC RX 250 GENERAL PHARMACY W/ HCPCS (ALT 636 FOR OP/ED): Performed by: STUDENT IN AN ORGANIZED HEALTH CARE EDUCATION/TRAINING PROGRAM

## 2024-01-11 PROCEDURE — 72170 X-RAY EXAM OF PELVIS: CPT | Performed by: STUDENT IN AN ORGANIZED HEALTH CARE EDUCATION/TRAINING PROGRAM

## 2024-01-11 PROCEDURE — 27265 TREAT HIP DISLOCATION: CPT | Performed by: STUDENT IN AN ORGANIZED HEALTH CARE EDUCATION/TRAINING PROGRAM

## 2024-01-11 PROCEDURE — 2500000005 HC RX 250 GENERAL PHARMACY W/O HCPCS: Performed by: STUDENT IN AN ORGANIZED HEALTH CARE EDUCATION/TRAINING PROGRAM

## 2024-01-11 PROCEDURE — 72170 X-RAY EXAM OF PELVIS: CPT

## 2024-01-11 PROCEDURE — 94681 O2 UPTK CO2 OUTP % O2 XTRC: CPT

## 2024-01-11 PROCEDURE — 99285 EMERGENCY DEPT VISIT HI MDM: CPT | Performed by: STUDENT IN AN ORGANIZED HEALTH CARE EDUCATION/TRAINING PROGRAM

## 2024-01-11 RX ORDER — PROPOFOL 10 MG/ML
INJECTION, EMULSION INTRAVENOUS CODE/TRAUMA/SEDATION MEDICATION
Status: COMPLETED | OUTPATIENT
Start: 2024-01-11 | End: 2024-01-11

## 2024-01-11 RX ORDER — PROPOFOL 10 MG/ML
100 INJECTION, EMULSION INTRAVENOUS ONCE
Status: COMPLETED | OUTPATIENT
Start: 2024-01-11 | End: 2024-01-11

## 2024-01-11 RX ORDER — ONDANSETRON HYDROCHLORIDE 2 MG/ML
4 INJECTION, SOLUTION INTRAVENOUS ONCE
Status: COMPLETED | OUTPATIENT
Start: 2024-01-11 | End: 2024-01-11

## 2024-01-11 RX ORDER — PROPOFOL 10 MG/ML
0.5 INJECTION, EMULSION INTRAVENOUS AS NEEDED
Status: DISCONTINUED | OUTPATIENT
Start: 2024-01-11 | End: 2024-01-11 | Stop reason: HOSPADM

## 2024-01-11 RX ADMIN — PROPOFOL 30 MG: 10 INJECTION, EMULSION INTRAVENOUS at 10:57

## 2024-01-11 RX ADMIN — Medication: at 10:25

## 2024-01-11 RX ADMIN — PROPOFOL 50 MG: 10 INJECTION, EMULSION INTRAVENOUS at 10:56

## 2024-01-11 RX ADMIN — HYDROMORPHONE HYDROCHLORIDE 0.5 MG: 1 INJECTION, SOLUTION INTRAMUSCULAR; INTRAVENOUS; SUBCUTANEOUS at 10:02

## 2024-01-11 RX ADMIN — SODIUM CHLORIDE, POTASSIUM CHLORIDE, SODIUM LACTATE AND CALCIUM CHLORIDE 1000 ML: 600; 310; 30; 20 INJECTION, SOLUTION INTRAVENOUS at 10:25

## 2024-01-11 RX ADMIN — PROPOFOL 80 MG: 10 INJECTION, EMULSION INTRAVENOUS at 10:56

## 2024-01-11 RX ADMIN — ONDANSETRON 4 MG: 2 INJECTION INTRAMUSCULAR; INTRAVENOUS at 10:03

## 2024-01-11 ASSESSMENT — PAIN DESCRIPTION - PAIN TYPE: TYPE: ACUTE PAIN

## 2024-01-11 ASSESSMENT — LIFESTYLE VARIABLES
HAVE YOU EVER FELT YOU SHOULD CUT DOWN ON YOUR DRINKING: NO
REASON UNABLE TO ASSESS: NO
EVER FELT BAD OR GUILTY ABOUT YOUR DRINKING: NO
EVER HAD A DRINK FIRST THING IN THE MORNING TO STEADY YOUR NERVES TO GET RID OF A HANGOVER: NO
HAVE PEOPLE ANNOYED YOU BY CRITICIZING YOUR DRINKING: NO

## 2024-01-11 ASSESSMENT — PAIN - FUNCTIONAL ASSESSMENT
PAIN_FUNCTIONAL_ASSESSMENT: 0-10
PAIN_FUNCTIONAL_ASSESSMENT: 0-10

## 2024-01-11 ASSESSMENT — PAIN SCALES - GENERAL
PAINLEVEL_OUTOF10: 3
PAINLEVEL_OUTOF10: 10 - WORST POSSIBLE PAIN
PAINLEVEL_OUTOF10: 5 - MODERATE PAIN

## 2024-01-11 ASSESSMENT — COLUMBIA-SUICIDE SEVERITY RATING SCALE - C-SSRS
2. HAVE YOU ACTUALLY HAD ANY THOUGHTS OF KILLING YOURSELF?: NO
1. IN THE PAST MONTH, HAVE YOU WISHED YOU WERE DEAD OR WISHED YOU COULD GO TO SLEEP AND NOT WAKE UP?: NO
6. HAVE YOU EVER DONE ANYTHING, STARTED TO DO ANYTHING, OR PREPARED TO DO ANYTHING TO END YOUR LIFE?: NO

## 2024-01-11 ASSESSMENT — PAIN DESCRIPTION - LOCATION: LOCATION: HIP

## 2024-01-11 ASSESSMENT — PAIN DESCRIPTION - ORIENTATION: ORIENTATION: RIGHT

## 2024-01-11 NOTE — ED PROVIDER NOTES
"HPI   Chief Complaint   Patient presents with    Hip Pain     Pt reports had rt hip surgery in Aug - since then hip has popped out of placed 6x since - reports he was putting on a sock and felt a \"POP AND PAIN\" 10/10        This is a 68-year-old male presenting to emergency department for right hip pain.  Patient has a history of hip replacement and over the last several months has had recurrent dislocations.  States he was bending down to put on his socks when he felt his hip pop out.  States he feels similar to past events.  He is endorsing pain at the right hip.  He denies falling or hitting his head.  He denies any numbness or weakness to the lower extremity.  He was not wearing his knee immobilizer at the time.      History provided by:  Patient   used: No                        Ward Coma Scale Score: 15                  Patient History   Past Medical History:   Diagnosis Date    Unspecified osteoarthritis, unspecified site     Osteoarthritis     Past Surgical History:   Procedure Laterality Date    JOINT REPLACEMENT      OTHER SURGICAL HISTORY  05/09/2022    Retinal detachment repair    OTHER SURGICAL HISTORY  05/09/2022    Cataract surgery     No family history on file.  Social History     Tobacco Use    Smoking status: Every Day     Types: Cigarettes    Smokeless tobacco: Never   Substance Use Topics    Alcohol use: Not on file     Comment: 12 pack weekly    Drug use: Yes     Frequency: 1.0 times per week     Types: Marijuana       Physical Exam   ED Triage Vitals [01/11/24 1006]   Temp Heart Rate Resp BP   -- 88 20 157/81      SpO2 Temp src Heart Rate Source Patient Position   98 % -- -- --      BP Location FiO2 (%)     -- --       Physical Exam  GEN: well appearing, no acute distress  CVS/CHEST: reg rate, nl rhythm, no murmurs/gallops/rubs  PULM: CTAB b/l no wheezes, crackles, or rhonchi   EXT: no LE edema, 2+ periph pulses in bilat DP, right lower extremity shortened, sensation " intact distally, tenderness and palpable deformity at right hip   NEURO:  no focal deficits, no facial asymmetry, moving all extremities  PSYCH: AAOx3 answers questions appropriately  ED Course & MDM   ED Course as of 01/15/24 0016   Thu Jan 11, 2024   1206 Patient sedated and hip reduced successfully as confirmed by x-ray.  Patient to follow-up with his orthopedic surgeon.  He is amenable to this plan.  Lower extremity remain neurovascular intact throughout the procedure.  Return precautions discussed and patient discharged in stable condition. [DE]      ED Course User Index  [DE] Dameon Whyte MD         Diagnoses as of 01/15/24 0016   Bilateral hip dislocation, initial encounter (CMS/[facility])       Medical Decision Making  This is a 68-year-old male presenting to emergency department for right hip pain.  Patient stable upon presentation to the emergency department, no acute distress and vitals are unremarkable.  On exam patient does have findings concerning for right hip dislocation with palpable deformity and pain at the right hip.  Is neurovascular intact distally.  X-ray to be performed for further evaluation.  Patient given Dilaudid and Zofran for symptom control.    X-ray confirmed dislocation.  Patient will require sedation for reduction.      Procedure  Orthopaedic Injury Treatment - Lower Extremity    Performed by: Dameon Whyte MD  Authorized by: Dameon Whyte MD    Consent:     Consent obtained:  Written    Consent given by:  Patient    Risks discussed:  Fracture, nerve damage, irreducible dislocation, recurrent dislocation, restricted joint movement and vascular damage    Alternatives discussed:  No treatment and immobilization  Universal protocol:     Procedure explained and questions answered to patient or proxy's satisfaction: yes      Imaging studies available: yes      Patient identity confirmed:  Verbally with patient, hospital-assigned identification number and arm band  Location:     Location:  Hip     Hip injury location:  R hip    Hip dislocation type: anterior      Etiology: spontaneous      Prosthesis: yes    Pre-procedure details:     Distal perfusion: normal      Range of motion: reduced    Sedation:     Sedation type:  Moderate sedation  Procedure details:     Manipulation performed: yes      Hip reduction method:  Direct traction    Reduction successful: yes      Reduction confirmed with imaging: yes      Immobilization:  Brace    Supplies used:  Prefabricated splint (knee immobilizer)  Post-procedure details:     Neurological function: normal      Distal perfusion: normal      Range of motion: improved      Procedure completion:  Tolerated  Moderate Sedation    Performed by: Dameon Whyte MD  Authorized by: Dameon Whyte MD    Consent:     Consent obtained:  Written    Consent given by:  Patient    Risks discussed:  Allergic reaction, prolonged hypoxia resulting in organ damage, prolonged sedation necessitating reversal, dysrhythmia, inadequate sedation, nausea, vomiting and respiratory compromise necessitating ventilatory assistance and intubation    Alternatives discussed:  Analgesia without sedation  Universal protocol:     Procedure explained and questions answered to patient or proxy's satisfaction: yes      Imaging studies available: yes      Patient identity confirmed:  Verbally with patient, hospital-assigned identification number, anonymous protocol, patient vented/unresponsive and arm band  Indications:     Procedure performed:  Fracture reduction    Procedure necessitating sedation performed by:  Physician performing sedation    Intended level of sedation:  Moderate  Pre-sedation assessment:     NPO status caution: unable to specify NPO status      ASA classification: class 3 - patient with severe systemic disease      Mouth openin finger widths    Thyromental distance:  2 finger widths    Mallampati score:  III - soft palate, base of uvula visible    Neck mobility: normal      Pre-sedation  assessments completed and reviewed: airway patency, cardiovascular function, hydration status, mental status, nausea/vomiting, pain level, respiratory function and temperature      History of difficult intubation: no    Immediate pre-procedure details:     Reassessment: Patient reassessed immediately prior to procedure      Reviewed: vital signs      Verified: bag valve mask available, emergency equipment available, intubation equipment available, IV patency confirmed, oxygen available and suction available    Procedure details (see MAR for exact dosages):     Preoxygenation:  Nasal cannula    Sedation:  Propofol    Analgesia:  Hydromorphone    Intra-procedure monitoring:  Blood pressure monitoring, cardiac monitor, continuous pulse oximetry, continuous capnometry, frequent LOC assessments and frequent vital sign checks    Intra-procedure events: none      Intra-procedure management:  Supplemental oxygen  Post-procedure details:     Attendance: Constant attendance by certified staff until patient recovered      Recovery: Patient returned to pre-procedure baseline      Estimated blood loss (see I/O flowsheets): no      Post-sedation assessments completed and reviewed: airway patency, cardiovascular function, hydration status, mental status, nausea/vomiting, pain level and respiratory function      Specimens recovered:  None    Patient is stable for discharge or admission: yes      Procedure completion:  Tolerated       Dameon Whyte MD  01/15/24 0020

## 2024-01-17 ENCOUNTER — HOSPITAL ENCOUNTER (OUTPATIENT)
Dept: RADIOLOGY | Facility: CLINIC | Age: 69
Discharge: HOME | End: 2024-01-17
Payer: COMMERCIAL

## 2024-01-17 DIAGNOSIS — C61 MALIGNANT NEOPLASM OF PROSTATE (MULTI): ICD-10-CM

## 2024-01-17 DIAGNOSIS — R97.21 RISING PSA FOLLOWING TREATMENT FOR MALIGNANT NEOPLASM OF PROSTATE: ICD-10-CM

## 2024-01-17 PROCEDURE — 3430000001 HC RX 343 DIAGNOSTIC RADIOPHARMACEUTICALS: Performed by: UROLOGY

## 2024-01-17 PROCEDURE — A9800 HC RX 343 DIAGNOSTIC RADIOPHARMACEUTICALS: HCPCS | Performed by: UROLOGY

## 2024-01-17 PROCEDURE — 78815 PET IMAGE W/CT SKULL-THIGH: CPT | Mod: PET TUMOR SUBSQ TX STRATEGY | Performed by: RADIOLOGY

## 2024-01-17 PROCEDURE — 78815 PET IMAGE W/CT SKULL-THIGH: CPT | Mod: PS

## 2024-01-17 RX ADMIN — KIT FOR THE PREPARATION OF GALLIUM GA 68 GOZETOTIDE INJECTION 4.11 MILLICURIE: KIT INTRAVENOUS at 10:32

## 2024-02-08 ENCOUNTER — HOSPITAL ENCOUNTER (EMERGENCY)
Facility: HOSPITAL | Age: 69
Discharge: HOME | End: 2024-02-08
Attending: STUDENT IN AN ORGANIZED HEALTH CARE EDUCATION/TRAINING PROGRAM
Payer: COMMERCIAL

## 2024-02-08 ENCOUNTER — APPOINTMENT (OUTPATIENT)
Dept: RADIOLOGY | Facility: HOSPITAL | Age: 69
End: 2024-02-08
Payer: COMMERCIAL

## 2024-02-08 VITALS
OXYGEN SATURATION: 99 % | SYSTOLIC BLOOD PRESSURE: 143 MMHG | BODY MASS INDEX: 33.6 KG/M2 | HEIGHT: 71 IN | HEART RATE: 79 BPM | DIASTOLIC BLOOD PRESSURE: 67 MMHG | RESPIRATION RATE: 18 BRPM | WEIGHT: 240 LBS

## 2024-02-08 DIAGNOSIS — S73.004A DISLOCATION OF RIGHT HIP, INITIAL ENCOUNTER (MULTI): Primary | ICD-10-CM

## 2024-02-08 PROCEDURE — 2500000004 HC RX 250 GENERAL PHARMACY W/ HCPCS (ALT 636 FOR OP/ED): Performed by: STUDENT IN AN ORGANIZED HEALTH CARE EDUCATION/TRAINING PROGRAM

## 2024-02-08 PROCEDURE — 73502 X-RAY EXAM HIP UNI 2-3 VIEWS: CPT | Mod: RT

## 2024-02-08 PROCEDURE — 96374 THER/PROPH/DIAG INJ IV PUSH: CPT | Performed by: STUDENT IN AN ORGANIZED HEALTH CARE EDUCATION/TRAINING PROGRAM

## 2024-02-08 PROCEDURE — 27265 TREAT HIP DISLOCATION: CPT | Mod: RT | Performed by: STUDENT IN AN ORGANIZED HEALTH CARE EDUCATION/TRAINING PROGRAM

## 2024-02-08 PROCEDURE — 99285 EMERGENCY DEPT VISIT HI MDM: CPT | Performed by: STUDENT IN AN ORGANIZED HEALTH CARE EDUCATION/TRAINING PROGRAM

## 2024-02-08 PROCEDURE — 73501 X-RAY EXAM HIP UNI 1 VIEW: CPT | Mod: RT

## 2024-02-08 PROCEDURE — 73502 X-RAY EXAM HIP UNI 2-3 VIEWS: CPT | Mod: RIGHT SIDE | Performed by: RADIOLOGY

## 2024-02-08 PROCEDURE — 96375 TX/PRO/DX INJ NEW DRUG ADDON: CPT | Performed by: STUDENT IN AN ORGANIZED HEALTH CARE EDUCATION/TRAINING PROGRAM

## 2024-02-08 PROCEDURE — 2500000005 HC RX 250 GENERAL PHARMACY W/O HCPCS: Performed by: STUDENT IN AN ORGANIZED HEALTH CARE EDUCATION/TRAINING PROGRAM

## 2024-02-08 PROCEDURE — 96361 HYDRATE IV INFUSION ADD-ON: CPT | Performed by: STUDENT IN AN ORGANIZED HEALTH CARE EDUCATION/TRAINING PROGRAM

## 2024-02-08 RX ORDER — ONDANSETRON HYDROCHLORIDE 2 MG/ML
4 INJECTION, SOLUTION INTRAVENOUS ONCE
Status: COMPLETED | OUTPATIENT
Start: 2024-02-08 | End: 2024-02-08

## 2024-02-08 RX ORDER — PROPOFOL 10 MG/ML
50 INJECTION, EMULSION INTRAVENOUS AS NEEDED
Status: DISCONTINUED | OUTPATIENT
Start: 2024-02-08 | End: 2024-02-09 | Stop reason: HOSPADM

## 2024-02-08 RX ORDER — FENTANYL CITRATE 50 UG/ML
50 INJECTION, SOLUTION INTRAMUSCULAR; INTRAVENOUS ONCE
Status: COMPLETED | OUTPATIENT
Start: 2024-02-08 | End: 2024-02-08

## 2024-02-08 RX ORDER — PROPOFOL 10 MG/ML
100 INJECTION, EMULSION INTRAVENOUS ONCE
Status: COMPLETED | OUTPATIENT
Start: 2024-02-08 | End: 2024-02-08

## 2024-02-08 RX ADMIN — SODIUM CHLORIDE 1000 ML: 9 INJECTION, SOLUTION INTRAVENOUS at 20:38

## 2024-02-08 RX ADMIN — ONDANSETRON 4 MG: 2 INJECTION INTRAMUSCULAR; INTRAVENOUS at 20:38

## 2024-02-08 RX ADMIN — FENTANYL CITRATE 50 MCG: 50 INJECTION INTRAMUSCULAR; INTRAVENOUS at 20:40

## 2024-02-08 RX ADMIN — Medication: at 20:05

## 2024-02-08 RX ADMIN — PROPOFOL 100 MG: 10 INJECTION, EMULSION INTRAVENOUS at 21:30

## 2024-02-08 ASSESSMENT — PAIN SCALES - GENERAL
PAINLEVEL_OUTOF10: 2
PAINLEVEL_OUTOF10: 0 - NO PAIN
PAINLEVEL_OUTOF10: 5 - MODERATE PAIN
PAINLEVEL_OUTOF10: 5 - MODERATE PAIN
PAINLEVEL_OUTOF10: 2

## 2024-02-08 ASSESSMENT — PAIN DESCRIPTION - LOCATION: LOCATION: HIP

## 2024-02-08 ASSESSMENT — PAIN DESCRIPTION - PAIN TYPE: TYPE: ACUTE PAIN

## 2024-02-08 ASSESSMENT — PAIN - FUNCTIONAL ASSESSMENT
PAIN_FUNCTIONAL_ASSESSMENT: 0-10

## 2024-02-08 ASSESSMENT — LIFESTYLE VARIABLES
EVER HAD A DRINK FIRST THING IN THE MORNING TO STEADY YOUR NERVES TO GET RID OF A HANGOVER: NO
HAVE PEOPLE ANNOYED YOU BY CRITICIZING YOUR DRINKING: NO
HAVE YOU EVER FELT YOU SHOULD CUT DOWN ON YOUR DRINKING: NO
EVER FELT BAD OR GUILTY ABOUT YOUR DRINKING: NO

## 2024-02-08 ASSESSMENT — PAIN DESCRIPTION - ORIENTATION: ORIENTATION: RIGHT

## 2024-02-09 ENCOUNTER — TELEPHONE (OUTPATIENT)
Dept: PRIMARY CARE | Facility: CLINIC | Age: 69
End: 2024-02-09
Payer: COMMERCIAL

## 2024-02-09 NOTE — DISCHARGE INSTRUCTIONS
As discussed you had a hip dislocation please utilize the crutches please try to keep weight off the extremity follow-up with orthopedic surgery soon as possible.  Recommend Tylenol Motrin for mild to moderate pain should you been experiencing pain out of proportion dislocation once again numbness tingling call 911 or return immediately.

## 2024-02-09 NOTE — ED PROVIDER NOTES
EMERGENCY DEPARTMENT ENCOUNTER      Pt Name: Wade Machado  MRN: [id number]  Birthdate 1955  Date of evaluation: 2/8/2024  Provider: Theron Harris DO    CHIEF COMPLAINT       Chief Complaint   Patient presents with    Hip Pain     Was at bar right hip dislocation        HISTORY OF PRESENT ILLNESS    Wade Machado is a 69 y.o. male who presents to the emergency department with EMS for suspected right hip dislocation.  Patient reports that he has dislocated his right hip multiple times in the past.  He reports that he was at a bar this evening drink a total of 6 beers went to stand to use the restroom and subsequently twisted and felt his hip pop.  He denies any close head injury or fall.  There were bystanders which confirmed this for EMS.  Patient is not on anticoagulation denies any further associated injuries denies any numbness tingling in the affected extremity.          Nursing Notes were reviewed.    REVIEW OF SYSTEMS     CONSTITUTIONAL: Denies fever, sweats, chills.   NEURO: Denies difficulty walking, numbness, weakness, tingling, headache.   HEENT: Denies sore throat, rhinorrhea, changes in vision.   CARDIO: Denies chest pain, palpitations.  PULM: Denies shortness of breath, cough.   GI: Denies abdominal pain, nausea, vomiting, diarrhea, constipation, melena, hematochezia.  : Denies painful urination, frequency, hematuria.   MSK: Endorses hip pain.  SKIN: Denies rash, lesions.   ENDOCRINE: Denies unexpected weight-loss.   HEME: Denies bleeding disorder.     PAST MEDICAL HISTORY     Past Medical History:   Diagnosis Date    Unspecified osteoarthritis, unspecified site     Osteoarthritis       SURGICAL HISTORY       Past Surgical History:   Procedure Laterality Date    JOINT REPLACEMENT      OTHER SURGICAL HISTORY  05/09/2022    Retinal detachment repair    OTHER SURGICAL HISTORY  05/09/2022    Cataract surgery       ALLERGIES     Patient has no known allergies.    FAMILY HISTORY     No family  history on file.     SOCIAL HISTORY       Social History     Socioeconomic History    Marital status:      Spouse name: None    Number of children: None    Years of education: None    Highest education level: None   Occupational History    None   Tobacco Use    Smoking status: Every Day     Types: Cigarettes    Smokeless tobacco: Never   Substance and Sexual Activity    Alcohol use: None     Comment: 12 pack weekly    Drug use: Yes     Frequency: 1.0 times per week     Types: Marijuana    Sexual activity: None   Other Topics Concern    None   Social History Narrative    None     Social Determinants of Health     Financial Resource Strain: Not on file   Food Insecurity: Not on file   Transportation Needs: Not on file   Physical Activity: Not on file   Stress: Not on file   Social Connections: Not on file   Intimate Partner Violence: Not on file   Housing Stability: Not on file       PHYSICAL EXAM   VS: As documented in the triage note from today's date and EMR flowsheet were reviewed.  Gen: Well developed. No acute distress. Seated in bed. Appears nontoxic.  Alert and oriented to person time and place smells of alcohol.  Skin: Warm. Dry. Intact. No rashes or lesions.  Eyes: Pupils equally round and reactive to light. Clear sclera. EOMI.  HENT: Atraumatic appearance. Mucosal membranes moist. No oral lesions, uvula midline, airway patent.  TMs clear bilaterally nares clear bilaterally no evidence of basilar skull fracture no castorena signs or raccoon eyes.  No cervical tenderness or step-offs.  CV: Regular rate and regular rhythm. S1, S2. No pedal edema. Warm extremities.  Resp: Nonlabored breathing Clear to auscultation bilaterally. No increased work of breathing.   GI: Soft and nontender. No rebound or guarding. Bowel sounds x4 present.  Blanton sign McBurney's point tenderness is negative no CVA tenderness.  MSK: Symmetric muscle bulk. No joint swelling in the extremities. Compartments are soft. Neurovascularly  intact x4 extremities. Radial pulses +2 equal bilaterally.  No T or L-spine tenderness.  Right lower extremity is shortened pedal pulses are intact bilaterally +2.  Neurovascular intact distal to the injury.  There is no knee ankle or toe pain.  Deformity noted over the right hip.  No skin abrasions.  Neuro: Alert. CN II - XII intact. Speech fluent. Moving all extremities. No focal deficits.  Psych: Appropriate. [place].    DIAGNOSTIC RESULTS   RADIOLOGY:   Non-plain film images such as CT, Ultrasound and MRI are read by the radiologist. Plain radiographic images are visualized and preliminarily interpreted by the emergency physician with the below findings: Interpretation of right hip x-ray dislocation.      Interpretation per the Radiologist below, if available at the time of this note:    XR hip right with pelvis when performed 1 view   Final Result   Status post reduction of the right hip arthroplasties dislocation.             MACRO:   None        Signed by: Samuel Lr 2/8/2024 9:59 PM   Dictation workstation:   [id number]      XR hip right with pelvis when performed 2 or 3 views   Final Result   1.  Status post bilateral total hip arthroplasties. Recurrent   superior dislocation at the right hip arthroplasty.                  MACRO:   None.        Signed by: Ana Luisa Bradley 2/8/2024 8:49 PM   Dictation workstation:   [id number]            ED BEDSIDE ULTRASOUND:   Performed by ED Physician - none    LABS:  Labs Reviewed - No data to display    All other labs were within normal range or not returned as of this dictation.    EMERGENCY DEPARTMENT COURSE/MDM:   Vitals:    Vitals:    02/08/24 2152 02/08/24 2227 02/08/24 2228 02/08/24 2255   BP: 112/58 111/60  143/67   BP Location: Right arm Right arm  Right arm   Patient Position: Lying Sitting  Lying   Pulse: 72 71  79   Resp: 18 17  18   SpO2: 95% 98% 99% 99%   Weight:       Height:           I reviewed the patient's triage vitals and they are  borderline hypertensive recommended follow-up with primary physician for repeat checks.    Due to the above findings the following was ordered x-ray imaging of the hip and pelvis Fentanyl for pain Zofran for any refractory nausea.    X-ray imaging is concerning for hip dislocation.  Patient while intoxicated cannot formally consent his spouse is now at bedside she is his next of kin did consent for sedation reduction.  Patient was appropriately sedated with propofol and fentanyl for pain.  His right hip was easily reduced.  It was immobilized by nursing staff afterwards.  Neurovascularly intact both prior and after reduction.  Postreduction images were obtained confirming reduction.  Patient had no desaturations or complications during procedure.  He is given adequate time to metabolize the propofol.  He was discharged under the care of his spouse who has not been drinking this evening.  Patient and spouse appreciative of care agreeable with plan with orthopedics follow-up.  They did specifically request a new orthopedist to follow-up with as they stated they were unhappy with her previous care.    Moderate Sedation    Performed by: Theron Harris DO  Authorized by: Theron Harris DO    Consent:     Consent obtained:  Verbal and written    Consent given by:  Patient and spouse    Risks, benefits, and alternatives were discussed: yes      Risks discussed:  Allergic reaction, dysrhythmia, inadequate sedation, nausea, respiratory compromise necessitating ventilatory assistance and intubation, vomiting, prolonged sedation necessitating reversal and prolonged hypoxia resulting in organ damage    Alternatives discussed:  Analgesia without sedation, anxiolysis and regional anesthesia  [place] protocol:     Procedure explained and questions answered to patient or proxy's satisfaction: yes      Relevant documents present and verified: yes      Test results available: yes      Imaging studies available: yes       Site/side marked: yes      Immediately prior to procedure, a time out was called: yes      Patient identity confirmed:  Verbally with patient and arm band  Indications:     Procedure performed:  Dislocation reduction    Procedure necessitating sedation performed by:  Physician performing sedation    Intended level of sedation:  Moderate  Pre-sedation assessment:     NPO status caution: unable to specify NPO status      ASA classification: class 2 - patient with mild systemic disease      Mouth opening:  3 or more finger widths    Thyromental distance:  3 finger widths    Mallampati score:  II - soft palate, uvula, fauces visible    Neck mobility: normal      Pre-sedation assessments completed and reviewed: airway patency, anesthesia/sedation history, cardiovascular function, hydration status, mental status, nausea/vomiting, pain level, respiratory function and temperature      History of difficult intubation: no    Immediate pre-procedure details:     Reassessment: Patient reassessed immediately prior to procedure      Reviewed: vital signs, relevant labs/tests and NPO status      Verified: bag valve mask available, emergency equipment available, intubation equipment available, IV patency confirmed, oxygen available and suction available    Procedure details (see MAR for exact dosages):     Preoxygenation:  Nasal cannula    Sedation:  Propofol    Analgesia:  Fentanyl    Intra-procedure monitoring:  Blood pressure monitoring, cardiac monitor, continuous pulse oximetry, continuous capnometry, frequent LOC assessments and frequent vital sign checks    Intra-procedure events: none    Post-procedure details:     Attendance: Constant attendance by certified staff until patient recovered      Recovery: Patient returned to pre-procedure baseline      Estimated blood loss (see I/O flowsheets): no      Complications:  None    Post-sedation assessments completed and reviewed: airway patency, cardiovascular function, hydration  status, mental status, nausea/vomiting, pain level, respiratory function and temperature      Specimens recovered:  None    Patient is stable for discharge or admission: yes      Procedure completion:  Tolerated well, no immediate complications  Comments:      No desaturations during sedation patient tolerated well first reduction success postreduction x-rays confirmed.  Patient remains neurovascular intact status post procedure.        Diagnoses as of 02/10/24 0418   Dislocation of right hip, initial encounter (CMS/[facility])       Patient was counseled regarding labs, imaging, likely diagnosis, and plan. All questions were answered.     ------------------------------------------------------------------  Information provided by the patient and EMS  Past medical history complicating workup history of hip dislocation  Previous medical records reviewed previous office visit 12/11/2023 for dislocation.  ------------------------------------------------------------------  ED Medications administered this visit:    Medications   fentaNYL PF (Sublimaze) injection 50 mcg (50 mcg intravenous Given 2/8/24 2040)   ondansetron (Zofran) injection 4 mg (4 mg intravenous Given 2/8/24 2038)   sodium chloride 0.9 % bolus 1,000 mL (0 mL intravenous Stopped 2/8/24 2228)   propofol (Diprivan) injection 100 mg (100 mg intravenous Given 2/8/24 2130)       New Prescriptions from this visit:    Discharge Medication List as of 2/8/2024  9:42 PM          Follow-up:  Eddie Crum DO  [address] [facility] [address] [phone]    Schedule an appointment as soon as possible for a visit in 2 days      [facility]  [address] [facility] [address] [phone]  Go to   If symptoms worsen    Sree Newman MD  [address] [facility] [address] [phone]    Schedule an appointment as soon as possible for a visit           Final Impression:   1. Dislocation of right hip,  initial encounter (CMS/[facility])          Theron Harris DO    (Please note that portions of this note were completed with a voice recognition program.  Efforts were made to edit the dictations but occasionally words are mis-transcribed.)     Theron Harris DO  02/10/24 0418

## 2024-02-09 NOTE — TELEPHONE ENCOUNTER
Patients wife called regarding her  and said his hip went out  7 times and she wanted to see what to do.She does not want to take him to the ortho he went to before.Please advise  Phuong [phone]

## 2024-02-19 ENCOUNTER — APPOINTMENT (OUTPATIENT)
Dept: ORTHOPEDIC SURGERY | Facility: CLINIC | Age: 69
End: 2024-02-19
Payer: COMMERCIAL

## 2024-02-19 ENCOUNTER — TELEPHONE (OUTPATIENT)
Dept: ORTHOPEDIC SURGERY | Facility: CLINIC | Age: 69
End: 2024-02-19

## 2024-02-19 NOTE — TELEPHONE ENCOUNTER
pt spouse called in with concerns regarding pt rt hip, spouse stated  did follow up with Dr Powers 12/8/2023 and Dr told pt that everything was fine, wife thinks its more dislocated and would like to know what else they need to do.

## 2024-02-26 ENCOUNTER — OFFICE VISIT (OUTPATIENT)
Dept: ORTHOPEDIC SURGERY | Facility: CLINIC | Age: 69
End: 2024-02-26
Payer: COMMERCIAL

## 2024-02-26 VITALS — HEIGHT: 71 IN | BODY MASS INDEX: 33.6 KG/M2 | WEIGHT: 240 LBS

## 2024-02-26 DIAGNOSIS — T84.020S FAILURE OF RIGHT TOTAL HIP ARTHROPLASTY WITH DISLOCATION OF HIP, SEQUELA: Primary | ICD-10-CM

## 2024-02-26 PROCEDURE — 99417 PROLNG OP E/M EACH 15 MIN: CPT | Performed by: STUDENT IN AN ORGANIZED HEALTH CARE EDUCATION/TRAINING PROGRAM

## 2024-02-26 PROCEDURE — 3008F BODY MASS INDEX DOCD: CPT | Performed by: STUDENT IN AN ORGANIZED HEALTH CARE EDUCATION/TRAINING PROGRAM

## 2024-02-26 PROCEDURE — 99215 OFFICE O/P EST HI 40 MIN: CPT | Performed by: STUDENT IN AN ORGANIZED HEALTH CARE EDUCATION/TRAINING PROGRAM

## 2024-02-26 PROCEDURE — 1125F AMNT PAIN NOTED PAIN PRSNT: CPT | Performed by: STUDENT IN AN ORGANIZED HEALTH CARE EDUCATION/TRAINING PROGRAM

## 2024-02-26 PROCEDURE — 1159F MED LIST DOCD IN RCRD: CPT | Performed by: STUDENT IN AN ORGANIZED HEALTH CARE EDUCATION/TRAINING PROGRAM

## 2024-02-26 ASSESSMENT — PAIN SCALES - GENERAL: PAINLEVEL_OUTOF10: 1

## 2024-02-26 ASSESSMENT — PAIN DESCRIPTION - DESCRIPTORS: DESCRIPTORS: ACHING

## 2024-02-26 ASSESSMENT — PAIN - FUNCTIONAL ASSESSMENT: PAIN_FUNCTIONAL_ASSESSMENT: 0-10

## 2024-02-26 NOTE — PROGRESS NOTES
"SOPHIA/TKA Related Summary           L hip  4/10/2019: Primary SOPHIA (Dr. Webb at ). Doing very well.  L knee: N  R hip  8/1/2023: Primary SOPHIA (Dr. Webb at )  R knee: N  Lumbar spine surgery/pathology: N  Hx of infxn in native or replaced hip/knee: N          CC/SUBJECTIVE/HPI            Wade Machado is a 69 y.o. male following up regarding his R SOPHIA instability.  We first met on 12/11/2023, at which time he had been referred to me for a second opinion.  He was having very little pain but had sustained 3 posterior superior dislocations that were all closed reduced (11/19/2023 during a fall, 11/27/2023 sitting on toilet, and 12/2/2023 rising from toilet).  I communicated several recommendations at that time for conservative treatment that should be exhausted prior to surgical intervention. He has subsequently sustained 3 more dislocations that have all been closed reduced (12/21/2023 getting into his vehicle, 1/11/2024 bending over to put socks on, 2/8/2024 rising from a seat after having 6 beers).          HISTORIES            Patient reports no major changes in health, substance use, or baseline medications since last visit.          OBJECTIVE            Physical exam  Estimated body mass index is 33.47 kg/m² as calculated from the following:    Height as of 2/8/24: 1.803 m (5' 11\").    Weight as of 2/8/24: 109 kg (240 lb).  Gen/psych: NAD, conversational, appropriate    Ambulation  Gait: normal  Assistive device: cane  Spine: Unchanged from previous visit    Focused MSK exam: R  SOPHIA  Trendelenberg test: neg  Skin/incision: well healed, likely posterior approach  Tenderness: none  Pain with log roll: neg  ROM: within expected range, nonpainful  Neurovascular    Strength: 5/5 hip/knee/ankle flexion and extension  Sensory (L2-S1): SILT throughout lower extremity  Edema/stasis: no pitting edema  Pulse: DP 1+, PT 1+      Imaging  2/8/2024 R hip radiographs (AP Pelvis, AP/Lateral) then repeat AP pelvis on my " read: SOPHIA components in acceptable position with no sign of gross implant/hardware failure.  No signs of stem subsidence compared to immediate postoperative radiographs.  Repeat superior posterior dislocation followed by reduction.           ASSESSMENT & PLAN           Patient verbalized understanding of below A&P. All questions answered.  R Primary SOPHIA instability  The differential for the etiology of his dislocations remains the same as our previous appointment.  I would advocate for exhausting conservative measures prior to surgery and also for preoperative optimization. This would include strict adherence to posterior precautions (handout provided today), HKO bracing, PJI rule out, possible CT for stem version evaluation, and smoking cessation.  Wade is interested in finding a provider who will more readily move forward with surgery without a bracing trial and smoking cessation. I think this is completely reasonable if he and the provider are in agreement on the risks and benefits of doing so. He is going to reach out to his local orthopaedics department for a referral.   Follow-up: He will contact my office if we can be of assistance, and I am happy to see him back at any time.  PMH, PSH, other considerations discussed  Prostate cancer currently being treated  Current smoker    Time: I spent 60min preparing to see pt, obtaining/reviewing separately obtained hx, performing necessary/appropriate PE/eval, independently interpreting results and communicating them with counseling/education to pt/fam/caregiver, placing orders, communicating/coordinating care with other providers, and documenting in EMR.

## 2024-03-02 ENCOUNTER — APPOINTMENT (OUTPATIENT)
Dept: RADIOLOGY | Facility: HOSPITAL | Age: 69
End: 2024-03-02
Payer: COMMERCIAL

## 2024-03-02 ENCOUNTER — HOSPITAL ENCOUNTER (EMERGENCY)
Facility: HOSPITAL | Age: 69
Discharge: HOME | End: 2024-03-02
Attending: STUDENT IN AN ORGANIZED HEALTH CARE EDUCATION/TRAINING PROGRAM
Payer: COMMERCIAL

## 2024-03-02 VITALS
HEART RATE: 80 BPM | BODY MASS INDEX: 33.6 KG/M2 | TEMPERATURE: 98.6 F | SYSTOLIC BLOOD PRESSURE: 142 MMHG | DIASTOLIC BLOOD PRESSURE: 62 MMHG | HEIGHT: 71 IN | OXYGEN SATURATION: 98 % | WEIGHT: 240 LBS | RESPIRATION RATE: 16 BRPM

## 2024-03-02 DIAGNOSIS — S73.004A DISLOCATION OF RIGHT HIP, INITIAL ENCOUNTER (MULTI): Primary | ICD-10-CM

## 2024-03-02 PROCEDURE — 96374 THER/PROPH/DIAG INJ IV PUSH: CPT

## 2024-03-02 PROCEDURE — 2500000005 HC RX 250 GENERAL PHARMACY W/O HCPCS: Performed by: STUDENT IN AN ORGANIZED HEALTH CARE EDUCATION/TRAINING PROGRAM

## 2024-03-02 PROCEDURE — 96375 TX/PRO/DX INJ NEW DRUG ADDON: CPT

## 2024-03-02 PROCEDURE — 73501 X-RAY EXAM HIP UNI 1 VIEW: CPT | Mod: RIGHT SIDE | Performed by: RADIOLOGY

## 2024-03-02 PROCEDURE — 73502 X-RAY EXAM HIP UNI 2-3 VIEWS: CPT | Mod: RT

## 2024-03-02 PROCEDURE — 27265 TREAT HIP DISLOCATION: CPT | Mod: RT

## 2024-03-02 PROCEDURE — 99285 EMERGENCY DEPT VISIT HI MDM: CPT | Mod: 25

## 2024-03-02 PROCEDURE — 73501 X-RAY EXAM HIP UNI 1 VIEW: CPT | Mod: RT

## 2024-03-02 PROCEDURE — 73502 X-RAY EXAM HIP UNI 2-3 VIEWS: CPT | Mod: RIGHT SIDE | Performed by: RADIOLOGY

## 2024-03-02 PROCEDURE — 2500000004 HC RX 250 GENERAL PHARMACY W/ HCPCS (ALT 636 FOR OP/ED): Performed by: STUDENT IN AN ORGANIZED HEALTH CARE EDUCATION/TRAINING PROGRAM

## 2024-03-02 PROCEDURE — 96361 HYDRATE IV INFUSION ADD-ON: CPT

## 2024-03-02 RX ORDER — PROPOFOL 10 MG/ML
100 INJECTION, EMULSION INTRAVENOUS ONCE
Status: DISCONTINUED | OUTPATIENT
Start: 2024-03-02 | End: 2024-03-02 | Stop reason: HOSPADM

## 2024-03-02 RX ORDER — ONDANSETRON HYDROCHLORIDE 2 MG/ML
4 INJECTION, SOLUTION INTRAVENOUS ONCE
Status: COMPLETED | OUTPATIENT
Start: 2024-03-02 | End: 2024-03-02

## 2024-03-02 RX ORDER — PROPOFOL 10 MG/ML
0.5 INJECTION, EMULSION INTRAVENOUS AS NEEDED
Status: DISCONTINUED | OUTPATIENT
Start: 2024-03-02 | End: 2024-03-02 | Stop reason: HOSPADM

## 2024-03-02 RX ORDER — PROPOFOL 10 MG/ML
INJECTION, EMULSION INTRAVENOUS CODE/TRAUMA/SEDATION MEDICATION
Status: COMPLETED | OUTPATIENT
Start: 2024-03-02 | End: 2024-03-02

## 2024-03-02 RX ADMIN — ONDANSETRON 4 MG: 2 INJECTION INTRAMUSCULAR; INTRAVENOUS at 07:40

## 2024-03-02 RX ADMIN — PROPOFOL 50 MG: 10 INJECTION, EMULSION INTRAVENOUS at 07:52

## 2024-03-02 RX ADMIN — SODIUM CHLORIDE, POTASSIUM CHLORIDE, SODIUM LACTATE AND CALCIUM CHLORIDE 1000 ML: 600; 310; 30; 20 INJECTION, SOLUTION INTRAVENOUS at 07:39

## 2024-03-02 RX ADMIN — PROPOFOL 30 MG: 10 INJECTION, EMULSION INTRAVENOUS at 07:54

## 2024-03-02 RX ADMIN — PROPOFOL 20 MG: 10 INJECTION, EMULSION INTRAVENOUS at 07:53

## 2024-03-02 RX ADMIN — Medication: at 07:15

## 2024-03-02 RX ADMIN — HYDROMORPHONE HYDROCHLORIDE 0.5 MG: 1 INJECTION, SOLUTION INTRAMUSCULAR; INTRAVENOUS; SUBCUTANEOUS at 07:39

## 2024-03-02 ASSESSMENT — LIFESTYLE VARIABLES
HAVE PEOPLE ANNOYED YOU BY CRITICIZING YOUR DRINKING: NO
EVER FELT BAD OR GUILTY ABOUT YOUR DRINKING: NO
EVER HAD A DRINK FIRST THING IN THE MORNING TO STEADY YOUR NERVES TO GET RID OF A HANGOVER: NO
HAVE YOU EVER FELT YOU SHOULD CUT DOWN ON YOUR DRINKING: NO

## 2024-03-02 ASSESSMENT — COLUMBIA-SUICIDE SEVERITY RATING SCALE - C-SSRS
6. HAVE YOU EVER DONE ANYTHING, STARTED TO DO ANYTHING, OR PREPARED TO DO ANYTHING TO END YOUR LIFE?: NO
1. IN THE PAST MONTH, HAVE YOU WISHED YOU WERE DEAD OR WISHED YOU COULD GO TO SLEEP AND NOT WAKE UP?: NO
2. HAVE YOU ACTUALLY HAD ANY THOUGHTS OF KILLING YOURSELF?: NO

## 2024-03-02 ASSESSMENT — PAIN SCALES - GENERAL
PAINLEVEL_OUTOF10: 4
PAINLEVEL_OUTOF10: 0 - NO PAIN
PAINLEVEL_OUTOF10: 8

## 2024-03-02 ASSESSMENT — PAIN - FUNCTIONAL ASSESSMENT
PAIN_FUNCTIONAL_ASSESSMENT: 0-10
PAIN_FUNCTIONAL_ASSESSMENT: 0-10

## 2024-03-02 NOTE — ED PROVIDER NOTES
"HPI   Chief Complaint   Patient presents with    Hip Pain     Pt c/o \"right hip dislocation\" Pt states this has happened several times since his hip replacement 8/2023       This is a 68-year-old male with past medical history of right hip prosthesis complicated by recurrent dislocations presenting the emergency department with concern for right hip dislocation.  States he felt a pop when he was getting out of his car.  He was able to lower himself to the ground and maneuver himself inside.  States he feels similar to past dislocations.  Endorses pain at the right hip.  Denies any numbness/tingling or pain further down the leg.  Denies falling or hitting his head.  Denies any blood thinners.      History provided by:  Patient   used: No                        [place] Coma Scale Score: 15                     Patient History   Past Medical History:   Diagnosis Date    Unspecified osteoarthritis, unspecified site     Osteoarthritis     Past Surgical History:   Procedure Laterality Date    JOINT REPLACEMENT      OTHER SURGICAL HISTORY  05/09/2022    Retinal detachment repair    OTHER SURGICAL HISTORY  05/09/2022    Cataract surgery     No family history on file.  Social History     Tobacco Use    Smoking status: Every Day     Types: Cigarettes    Smokeless tobacco: Never   Substance Use Topics    Alcohol use: Yes     Alcohol/week: 15.0 standard drinks of alcohol     Types: 15 Standard drinks or equivalent per week     Comment: 12 pack weekly    Drug use: Yes     Frequency: 1.0 times per week     Types: Marijuana       Physical Exam   ED Triage Vitals [03/02/24 0707]   Temp Heart Rate Respirations BP   -- 77 18 131/75      Pulse Ox Temp src Heart Rate Source Patient Position   95 % -- Monitor --      BP Location FiO2 (%)     -- --       Physical Exam  GEN: well appearing, no acute distress  CVS/CHEST: reg rate, nl rhythm, no murmurs/gallops/rubs  PULM: CTAB b/l no wheezes, crackles, or rhonchi   EXT: " no LE edema, 2+ periph pulses in bilat DP, right lower extremity shortened, sensation intact distally, tenderness and palpable deformity at right hip   NEURO:  no focal deficits, no facial asymmetry, moving all extremities  PSYCH: AAOx3 answers questions appropriately    ED Course & MDM   ED Course as of 03/02/24 0911   Sat Mar 02, 2024   0801 X-ray of the hip did confirm superior dislocation.  After consenting the patient, procedural sedation and successful dislocation reduction was performed as confirmed on x-ray.  Patient placed in knee immobilizer.  Patient currently in talks with his orthopedic surgeon about medical optimization versus surgery revision.  He is looking for second opinions and additional orthopedic doctors will be provided.  Patient feels comfortable going home I do feel he is safe for discharge. [DE]      ED Course User Index  [DE] Dameon Whyte MD         Diagnoses as of 03/02/24 0911   Dislocation of right hip, initial encounter (CMS/[facility])       Medical Decision Making  This is a 68-year-old male with past medical history of right hip prosthesis complicated by recurrent dislocations presenting the emergency department with concern for right hip dislocation.  Patient stable upon presentation to the emergency department, no acute distress.  Vitals are unremarkable.  Patient does have findings concerning for dislocation of the right hip on exam.  He is neurovascularly intact distally to the right lower extremity.  He was given IV Dilaudid for pain.  X-ray to be performed with plans for procedural sedation if dislocation is confirmed.      Procedure  Procedures     Dameon Whyte MD  03/02/24 0911

## 2024-03-06 NOTE — CONSULTS
Service:   Service: Medicine     Consult:  Consult requested by (Attending Name): Kip Webb   Reason: post op medical managment     History of Present Illness:   Admission Reason: Hip pain   HPI:    LINDA GUTIÉRREZ is a 68 year old Male with past medical history of hypertension, BPH and osteoarthritis who is postop day 0 for hip replacement.  Patient has been  dealing with hip discomfort limiting daily activity as an outpatient.  Underwent elective hip surgery today.  Postoperatively, patient appears comfortable at this time.  He does have a right eye hematoma from a fall on Saturday which was mechanical in  nature while drinking alcohol.  Denies any visual changes or symptoms associated.  Otherwise denies any shortness of breath chest pain, fevers chills or any other current complaints.  Patient's blood pressure soft but stable.  Labs ordered for the morning.   We will hold patient's lisinopril until needed.  Patient is a daily smoker but declines need for nicotine patch at this time.  Also drinks approximately 2-3 beers a day but denies any history of alcohol withdrawals.      pmhx - as above  pshx - cataracts, hip sx  past family - mother DM, CAD  past social - 2-3 beers daily.   1PPD X 40 years.     Review Family/Social History and ROS:   Social History:    Smoking Status: heavy user (uses >30 cig/day, OR  >1.5 ppd, OR >3 cans/pouches loose leaf tobacco per week, OR >1.5 vape pods per day) (1)   Alcohol Use: daily (1)   Drug Use: weekly  (1)            Allergies:  ·  No Known Allergies :     Objective:     Objective Information:        T   P  R  BP   MAP  SpO2   Value  36  55  20  107/59   77  96%  Date/Time 8/1 15:49 8/1 15:44 8/1 15:44 8/1 15:44  8/1 15:44 8/1 15:44  Range  (36C - 36C )  (55 - 55 )  (20 - 20 )  (107 - 107 )/ (59 - 59 )  (77 - 77 )  (96% - 96% )        Physical Exam:    GENERAL: Well developed, awake/alert/oriented x3, no distress, alert and cooperative  HEENT: AT/NC, PERRL, EOMI,  Normal Oropharynx, No Signs of Dehydration  NECK: Normal Inspection, No JVD, No Lymphadenopathy  CARDIOVASCULAR: RRR, no murmurs, 2+ equal pulses of the extremities, normal S1 and S 2  RESPIRATORY: Patent airways, CTAB, normal breath sounds with good chest expansion, thorax symmetric, No Wheezes, Rales or Rhonchi, Chest Wall Nontender  ABDOMEN: Soft, Non-Tender, Normal Bowel Sounds, No Distention, No organomegaly  BACK: No CVA Tenderness  SKIN: Warm and dry, no lesions, no rashes  EXTREMITIES: normal extremities, no cyanosis edema, contusions or wounds, no clubbing  NEURO: A&O x 3, CN II-XII grossly intact, Normal Motor and Sensation, Normal Mood and Affect  PSYCH: Appropriate mood and behavior      Medications:    Medications:          Continuous Medications       --------------------------------    1. Lactated Ringers Infusion:  1000  mL  IntraVenous  <Continuous>         Scheduled Medications       --------------------------------    1. Acetaminophen:  650  mg  Oral  Every 6 Hours    2. Aspirin Enteric Coated:  325  mg  Oral  2 Times a Day    3. ceFAZolin 2 gram/ D5W 100 mL Premix IVPB:  100  mL  IntraVenous Piggyback  Every 6 Hours    4. ceFAZolin 2 gram/ D5W 100 mL Premix IVPB:  100  mL  IntraVenous Piggyback  Once    5. Docusate:  100  mg  Oral  2 Times a Day    6. Polyethylene Glycol:  17  gram(s)  Oral  2 Times a Day    7. Tamsulosin:  0.4  mg  Oral  Daily         PRN Medications       --------------------------------    1. diphenhydrAMINE:  25  mg  Oral  Every 6 Hours    2. Morphine Injectable:  2  mg  IntraVenous Push  Every 2 Hours    3. Ondansetron Injectable:  4  mg  IntraVenous Push  Every 6 Hours    4. oxyCODONE Immediate Release:  5  mg  Oral  Every 4 Hours    5. oxyCODONE Immediate Release:  10  mg  Oral  Every 4 Hours           Currently Suspended Medications       --------------------------------    1. Lisinopril:  40  mg  Oral  Daily        Assessment:        1.) Right hip OA POD #0  Stable.   "Pain controlled    2.) HTN  Hold lisinopril for now, resume as BP will tolerate    3.) BPH  Cont flomax    4.) Daily ETOH abuse  May consider CIWA if any signs of etoh withdrawal were to develop  suspect low risk at this time    5.) tobacco abuse  Nicotine patch offered, declined at this time.       DVT Proph  as per ortho    Medically stable at this time, will cont to following.  AM labs.      Consult Status:  Consult Order ID: [id number]       Electronic Signatures:  Juaquin Joseph ()  (Signed 01-Aug-2023 17:42)   Authored: Service, History of Present Illness, Review  Family/Social History and ROS, Allergies, Objective, Assessment/Recommendations, Note Completion      Last Updated: 01-Aug-2023 17:42 by Juaquin Joseph ()    References:  1.  Data Referenced From \"Patient Profile - Adult v2\" 01-Aug-2023 16:00   "

## 2024-04-01 ENCOUNTER — APPOINTMENT (OUTPATIENT)
Dept: RADIOLOGY | Facility: HOSPITAL | Age: 69
End: 2024-04-01
Payer: COMMERCIAL

## 2024-04-01 ENCOUNTER — HOSPITAL ENCOUNTER (EMERGENCY)
Facility: HOSPITAL | Age: 69
Discharge: HOME | End: 2024-04-01
Attending: STUDENT IN AN ORGANIZED HEALTH CARE EDUCATION/TRAINING PROGRAM
Payer: COMMERCIAL

## 2024-04-01 VITALS
HEIGHT: 71 IN | OXYGEN SATURATION: 99 % | SYSTOLIC BLOOD PRESSURE: 136 MMHG | HEART RATE: 74 BPM | DIASTOLIC BLOOD PRESSURE: 65 MMHG | BODY MASS INDEX: 33.74 KG/M2 | WEIGHT: 241 LBS | TEMPERATURE: 97.7 F | RESPIRATION RATE: 18 BRPM

## 2024-04-01 DIAGNOSIS — T84.020A DISLOCATION OF INTERNAL RIGHT HIP PROSTHESIS, INITIAL ENCOUNTER (CMS-HCC): Primary | ICD-10-CM

## 2024-04-01 PROCEDURE — 99152 MOD SED SAME PHYS/QHP 5/>YRS: CPT | Performed by: STUDENT IN AN ORGANIZED HEALTH CARE EDUCATION/TRAINING PROGRAM

## 2024-04-01 PROCEDURE — 2500000005 HC RX 250 GENERAL PHARMACY W/O HCPCS: Performed by: NURSE PRACTITIONER

## 2024-04-01 PROCEDURE — 27265 TREAT HIP DISLOCATION: CPT | Performed by: NURSE PRACTITIONER

## 2024-04-01 PROCEDURE — 2500000004 HC RX 250 GENERAL PHARMACY W/ HCPCS (ALT 636 FOR OP/ED): Performed by: STUDENT IN AN ORGANIZED HEALTH CARE EDUCATION/TRAINING PROGRAM

## 2024-04-01 PROCEDURE — 2500000004 HC RX 250 GENERAL PHARMACY W/ HCPCS (ALT 636 FOR OP/ED): Performed by: NURSE PRACTITIONER

## 2024-04-01 PROCEDURE — 73502 X-RAY EXAM HIP UNI 2-3 VIEWS: CPT | Mod: RT

## 2024-04-01 PROCEDURE — 73501 X-RAY EXAM HIP UNI 1 VIEW: CPT | Mod: RT

## 2024-04-01 PROCEDURE — 99285 EMERGENCY DEPT VISIT HI MDM: CPT | Mod: 25

## 2024-04-01 PROCEDURE — 73502 X-RAY EXAM HIP UNI 2-3 VIEWS: CPT | Mod: RIGHT SIDE | Performed by: RADIOLOGY

## 2024-04-01 RX ORDER — FENTANYL CITRATE 50 UG/ML
75 INJECTION, SOLUTION INTRAMUSCULAR; INTRAVENOUS ONCE
Status: COMPLETED | OUTPATIENT
Start: 2024-04-01 | End: 2024-04-01

## 2024-04-01 RX ORDER — PROPOFOL 10 MG/ML
0.5 INJECTION, EMULSION INTRAVENOUS AS NEEDED
Status: DISCONTINUED | OUTPATIENT
Start: 2024-04-01 | End: 2024-04-01 | Stop reason: HOSPADM

## 2024-04-01 RX ORDER — PROPOFOL 10 MG/ML
100 INJECTION, EMULSION INTRAVENOUS ONCE
Status: COMPLETED | OUTPATIENT
Start: 2024-04-01 | End: 2024-04-01

## 2024-04-01 RX ADMIN — Medication: at 18:45

## 2024-04-01 RX ADMIN — PROPOFOL 70 MG: 10 INJECTION, EMULSION INTRAVENOUS at 19:33

## 2024-04-01 RX ADMIN — SODIUM CHLORIDE, POTASSIUM CHLORIDE, SODIUM LACTATE AND CALCIUM CHLORIDE 1000 ML: 600; 310; 30; 20 INJECTION, SOLUTION INTRAVENOUS at 19:24

## 2024-04-01 RX ADMIN — FENTANYL CITRATE 75 MCG: 50 INJECTION INTRAMUSCULAR; INTRAVENOUS at 19:23

## 2024-04-01 ASSESSMENT — LIFESTYLE VARIABLES
TOTAL SCORE: 0
HAVE PEOPLE ANNOYED YOU BY CRITICIZING YOUR DRINKING: NO
EVER HAD A DRINK FIRST THING IN THE MORNING TO STEADY YOUR NERVES TO GET RID OF A HANGOVER: NO
EVER FELT BAD OR GUILTY ABOUT YOUR DRINKING: NO
HAVE YOU EVER FELT YOU SHOULD CUT DOWN ON YOUR DRINKING: NO

## 2024-04-01 ASSESSMENT — PAIN SCALES - GENERAL
PAINLEVEL_OUTOF10: 2
PAINLEVEL_OUTOF10: 3
PAINLEVEL_OUTOF10: 2
PAINLEVEL_OUTOF10: 2
PAINLEVEL_OUTOF10: 1
PAINLEVEL_OUTOF10: 6

## 2024-04-01 ASSESSMENT — PAIN DESCRIPTION - LOCATION: LOCATION: HIP

## 2024-04-01 ASSESSMENT — PAIN DESCRIPTION - ORIENTATION: ORIENTATION: RIGHT

## 2024-04-01 ASSESSMENT — PAIN - FUNCTIONAL ASSESSMENT: PAIN_FUNCTIONAL_ASSESSMENT: 0-10

## 2024-04-01 NOTE — ED TRIAGE NOTES
Pt states right hip dislocated about 3:30 today. Pt is due for surgery 5-1-24 with Dr Reilly. Pt comes in to have hip put back in place.

## 2024-04-01 NOTE — DISCHARGE INSTRUCTIONS
Your hip was reduced in the emergency department.  Please call your orthopedic surgeon.  Please return to the emergency department if you again feel that your hip is dislocated, develop right hip pain, or have any other concerns.

## 2024-04-01 NOTE — ED PROVIDER NOTES
HPI   Chief Complaint   Patient presents with    Hip Pain       Patient is a healthy nontoxic-appearing 59-year-old male with a past medical history of arthritis, BPH, prostate cancer, cerebral cysts, presents to the emergency today for complaint of right hip dislocation.  Patient states he was bending over to let his dog out of the gate.  Patient states in the process he felt his right hip dislocated.  Patient states he has had his right hip dislocated several times with past several months and has an appointment to see orthopedic specialist next month.  Patient denies any injuries trauma or falls but states he cannot bear any weight or has no range of motion to the right lower extremity.  Patient denies any radiation of pain to the lower back.  Patient denies urinary complaints, testicular pain, chest pain, shortness of breath difficulty breathing, abdominal pain with nausea or vomiting, fever, shaking, or chills.                          Gama Coma Scale Score: 15                     Patient History   Past Medical History:   Diagnosis Date    Unspecified osteoarthritis, unspecified site     Osteoarthritis     Past Surgical History:   Procedure Laterality Date    JOINT REPLACEMENT      OTHER SURGICAL HISTORY  05/09/2022    Retinal detachment repair    OTHER SURGICAL HISTORY  05/09/2022    Cataract surgery     No family history on file.  Social History     Tobacco Use    Smoking status: Every Day     Types: Cigarettes    Smokeless tobacco: Never   Substance Use Topics    Alcohol use: Yes     Alcohol/week: 15.0 standard drinks of alcohol     Types: 15 Standard drinks or equivalent per week     Comment: 12 pack weekly    Drug use: Yes     Frequency: 1.0 times per week     Types: Marijuana       Physical Exam   ED Triage Vitals [04/01/24 1625]   Temperature Heart Rate Respirations BP   37.2 °C (99 °F) 88 18 131/75      Pulse Ox Temp src Heart Rate Source Patient Position   94 % -- -- --      BP Location FiO2 (%)      -- --       Physical Exam  Vitals and nursing note reviewed.   Constitutional:       General: He is not in acute distress.     Appearance: Normal appearance. He is not ill-appearing, toxic-appearing or diaphoretic.   HENT:      Head: Normocephalic.   Cardiovascular:      Rate and Rhythm: Normal rate and regular rhythm.      Pulses: Normal pulses.      Heart sounds: Normal heart sounds. No murmur heard.     No friction rub. No gallop.   Pulmonary:      Effort: Pulmonary effort is normal. No respiratory distress.      Breath sounds: Normal breath sounds. No stridor. No wheezing, rhonchi or rales.   Chest:      Chest wall: No tenderness.   Musculoskeletal:         General: Tenderness and deformity present. No swelling or signs of injury. Normal range of motion.      Cervical back: Normal range of motion and neck supple.      Right lower leg: No edema.      Left lower leg: No edema.      Comments: Reproducible tenderness upon palpation to the right hip, no range of motion attempted due to gross deformity and suspected dislocation, DP PT pulse are strong and regular, cap refills less than 3 seconds.   Skin:     General: Skin is warm.      Capillary Refill: Capillary refill takes less than 2 seconds.      Coloration: Skin is not jaundiced or pale.      Findings: No bruising, erythema, lesion or rash.   Neurological:      General: No focal deficit present.      Mental Status: He is alert and oriented to person, place, and time.         ED Course & MDM   ED Course as of 04/01/24 2039 Mon Apr 01, 2024   1844 XR hip right with pelvis when performed 2 or 3 views  Per my view, appears to be a superior lateral dislocation of the right hip prosthesis. [AB]   1856 X-ray of the right hip reveals  FINDINGS:  Right hip, three views      The patient is status post right total hip arthroplasty. There is  posterior dislocation of the femoral head in relation to the  acetabulum. No perihardware lucency is seen. No definite  fracture.  Left total hip arthroplasty noted as well. Seeds over the prostate  noted. Vascular calcifications present as well   [EC]   2000 XR hip right with pelvis when performed 1 view  Per my view, interval reduction noted [AB]   2036 X-ray hip reveals  IMPRESSION:  Interval reduction of previously described right total hip  arthroplasties dislocation. No new bony abnormalities identified.   [EC]      ED Course User Index  [AB] Josue Gilbert MD  [EC] DARIA Lamar         Diagnoses as of 04/01/24 2039   Dislocation of internal right hip prosthesis, initial encounter (CMS/[facility])       Medical Decision Making  Good patient's pain presentation a thorough exam was performed.  Patient has Reproducible tenderness upon palpation to the right hip, no range of motion attempted due to gross deformity and suspected dislocation, DP PT pulse are strong and regular, cap refills less than 3 seconds.  Remains hemodynamically stable during emergency evaluation, is afebrile, I have low suspicion for vascular compromise.  X-ray was ordered of the right hip interpreted by radiologist reveals posterior dislocation of the femoral head relation to the acetabulum.  Risk and benefits were discussed with patient in regards to hip reduction.  Patient has full mental capacity and understands material no further questions and is agreed to have hip reduction performed.  Consent was signed by the patient.  Please refer to procedure note.  Patient tolerated procedure well without any complications, patient remained neurovascularly intact after hip reduction, knee immobilizer was placed.  X-ray after procedure confirms successful reduction.  I encourage patient to follow-up with primary care provider as previously arranged however symptoms become worse return to emergency room for further evaluation.  Patient was agreeable this plan discharged home in stable condition.    DARIA Lamar     Portions of this note were  generated using digital voice recognition software, and may contain grammatical errors      Procedure  Orthopaedic Injury Treatment - Lower Extremity    Performed by: DARIA Lamar  Authorized by: Josue Gilbert MD    Consent:     Consent obtained:  Verbal    Consent given by:  Patient    Risks, benefits, and alternatives were discussed: yes      Risks discussed:  Fracture, nerve damage, restricted joint movement, vascular damage, recurrent dislocation and irreducible dislocation    Alternatives discussed:  No treatment, delayed treatment, alternative treatment, immobilization and referral  Universal protocol:     Procedure explained and questions answered to patient or proxy's satisfaction: yes      Relevant documents present and verified: yes      Test results available: yes      Imaging studies available: yes      Required blood products, implants, devices, and special equipment available: yes      Site/side marked: yes      Immediately prior to procedure, a time out was called: yes      Patient identity confirmed:  Verbally with patient and arm band  Location:     Location:  Hip    Hip injury location:  R hip    Hip dislocation type: anterior      Etiology: spontaneous      Prosthesis: yes    Pre-procedure details:     Distal perfusion: normal      Range of motion: normal    Sedation:     Sedation type:  Moderate sedation  Anesthesia:     Anesthesia method:  None  Procedure details:     Manipulation performed: yes      Hip reduction method:  Traction and counter traction    Reduction successful: yes      Reduction confirmed with imaging: yes      Immobilization:  Brace    Supplies used:  Prefabricated splint  Post-procedure details:     Neurological function: normal      Distal perfusion: normal      Range of motion: improved      Procedure completion:  Tolerated       DARIA Lamar  04/01/24 2036

## 2024-04-01 NOTE — PROGRESS NOTES
ED PHARMACIST PROCEDURAL SEDATION PARTICIPATION    Patient Name: Wade Machado  MRN: [id number]  Location: [place] [address]    Patient Weight (kg):   Wt Readings from Last 1 Encounters:   04/01/24 109 kg (241 lb)        Wade Machado presented to the ED requiring procedural sedation for R hip reduction due to dislocation.     PharmD at bedside for procurement, preparation, and administration of propofol for procedural sedation. PharmD gave propofol IV push pursuant to ED Pharmacist Collaborative Practice Agreement for medication administration.    Dose administered:  70 mg total    Signature: Eric Marvin RPh  4/1/2024  7:56 PM

## 2024-04-02 NOTE — ED PROCEDURE NOTE
Procedure  Moderate Sedation    Performed by: Josue Gilbert MD  Authorized by: Josue Gilbert MD    Consent:     Consent obtained:  Verbal    Consent given by:  Patient    Risks, benefits, and alternatives were discussed: yes      Risks discussed:  Allergic reaction, dysrhythmia, inadequate sedation, nausea, vomiting, respiratory compromise necessitating ventilatory assistance and intubation, prolonged sedation necessitating reversal and prolonged hypoxia resulting in organ damage    Alternatives discussed:  Analgesia without sedation  Universal protocol:     Procedure explained and questions answered to patient or proxy's satisfaction: yes      Imaging studies available: yes      Immediately prior to procedure, a time out was called: yes      Patient identity confirmed:  Verbally with patient and arm band  Indications:     Procedure performed:  Dislocation reduction    Procedure necessitating sedation performed by:  Physician performing sedation    Intended level of sedation:  Moderate  Pre-sedation assessment:     NPO status caution: unable to specify NPO status      ASA classification: class 2 - patient with mild systemic disease      Pre-sedation assessments completed and reviewed: airway patency, anesthesia/sedation history, cardiovascular function, hydration status, mental status, nausea/vomiting, pain level, respiratory function and temperature      History of difficult intubation: no    Immediate pre-procedure details:     Reassessment: Patient reassessed immediately prior to procedure      Reviewed: vital signs, relevant labs/tests and NPO status      Verified: bag valve mask available, emergency equipment available, intubation equipment available, IV patency confirmed, oxygen available and suction available    Procedure details (see MAR for exact dosages):     Sedation start time:  4/1/2024 7:28 PM    Preoxygenation:  Nasal cannula    Sedation:  Propofol    Analgesia:  Fentanyl    Intra-procedure  monitoring:  Blood pressure monitoring, cardiac monitor, continuous pulse oximetry, continuous capnometry, frequent LOC assessments and frequent vital sign checks    Intra-procedure events: none      Sedation end time:  4/1/2024 7:40 PM    Total sedation time (minutes):  12  Post-procedure details:     Attendance: Constant attendance by certified staff until patient recovered      Recovery: Patient returned to pre-procedure baseline      Estimated blood loss (see I/O flowsheets): no      Specimens recovered:  None    Patient is stable for discharge or admission: yes      Procedure completion:  Tolerated well, no immediate complications               Josue Gilbert MD  04/01/24 [id number]

## 2024-04-17 ENCOUNTER — TELEPHONE (OUTPATIENT)
Dept: PRIMARY CARE | Facility: CLINIC | Age: 69
End: 2024-04-17
Payer: COMMERCIAL

## 2024-04-17 DIAGNOSIS — J06.9 URI, ACUTE: Primary | ICD-10-CM

## 2024-04-17 RX ORDER — BENZONATATE 200 MG/1
200 CAPSULE ORAL 3 TIMES DAILY PRN
Qty: 45 CAPSULE | Refills: 0 | Status: SHIPPED | OUTPATIENT
Start: 2024-04-17 | End: 2024-05-02

## 2024-04-17 RX ORDER — METHYLPREDNISOLONE 4 MG/1
TABLET ORAL
Qty: 21 TABLET | Refills: 0 | Status: SHIPPED | OUTPATIENT
Start: 2024-04-17

## 2024-04-17 RX ORDER — AZITHROMYCIN 250 MG/1
TABLET, FILM COATED ORAL DAILY
Qty: 6 TABLET | Refills: 0 | Status: SHIPPED | OUTPATIENT
Start: 2024-04-17 | End: 2024-04-21

## 2024-04-17 NOTE — TELEPHONE ENCOUNTER
Pt was here last yr in July & having hip revision May 1 & is having cough, congested.  Can he have rx to [facility] [phone]  No allergies.  Ty

## 2024-05-03 ENCOUNTER — HOSPITAL ENCOUNTER (OUTPATIENT)
Dept: CARDIOLOGY | Facility: HOSPITAL | Age: 69
Discharge: HOME | End: 2024-05-03
Payer: COMMERCIAL

## 2024-05-03 ENCOUNTER — HOSPITAL ENCOUNTER (EMERGENCY)
Facility: HOSPITAL | Age: 69
Discharge: HOME | End: 2024-05-03
Attending: STUDENT IN AN ORGANIZED HEALTH CARE EDUCATION/TRAINING PROGRAM
Payer: COMMERCIAL

## 2024-05-03 ENCOUNTER — APPOINTMENT (OUTPATIENT)
Dept: RADIOLOGY | Facility: HOSPITAL | Age: 69
End: 2024-05-03
Payer: COMMERCIAL

## 2024-05-03 VITALS
DIASTOLIC BLOOD PRESSURE: 62 MMHG | SYSTOLIC BLOOD PRESSURE: 110 MMHG | OXYGEN SATURATION: 98 % | WEIGHT: 240 LBS | HEART RATE: 89 BPM | BODY MASS INDEX: 33.6 KG/M2 | RESPIRATION RATE: 18 BRPM | HEIGHT: 71 IN

## 2024-05-03 DIAGNOSIS — R42 LIGHTHEADEDNESS: Primary | ICD-10-CM

## 2024-05-03 LAB
ALBUMIN SERPL BCP-MCNC: 3.6 G/DL (ref 3.4–5)
ALP SERPL-CCNC: 70 U/L (ref 33–136)
ALT SERPL W P-5'-P-CCNC: 11 U/L (ref 10–52)
ANION GAP SERPL CALC-SCNC: 13 MMOL/L (ref 10–20)
AST SERPL W P-5'-P-CCNC: 11 U/L (ref 9–39)
BASOPHILS # BLD AUTO: 0.03 X10*3/UL (ref 0–0.1)
BASOPHILS NFR BLD AUTO: 0.3 %
BILIRUB SERPL-MCNC: 0.6 MG/DL (ref 0–1.2)
BUN SERPL-MCNC: 10 MG/DL (ref 6–23)
CALCIUM SERPL-MCNC: 8.7 MG/DL (ref 8.6–10.3)
CARDIAC TROPONIN I PNL SERPL HS: 6 NG/L (ref 0–20)
CHLORIDE SERPL-SCNC: 99 MMOL/L (ref 98–107)
CO2 SERPL-SCNC: 25 MMOL/L (ref 21–32)
CREAT SERPL-MCNC: 0.64 MG/DL (ref 0.5–1.3)
EGFRCR SERPLBLD CKD-EPI 2021: >90 ML/MIN/1.73M*2
EOSINOPHIL # BLD AUTO: 0.15 X10*3/UL (ref 0–0.7)
EOSINOPHIL NFR BLD AUTO: 1.3 %
ERYTHROCYTE [DISTWIDTH] IN BLOOD BY AUTOMATED COUNT: 16.3 % (ref 11.5–14.5)
GLUCOSE SERPL-MCNC: 96 MG/DL (ref 74–99)
HCT VFR BLD AUTO: 38.9 % (ref 41–52)
HGB BLD-MCNC: 13.1 G/DL (ref 13.5–17.5)
IMM GRANULOCYTES # BLD AUTO: 0.06 X10*3/UL (ref 0–0.7)
IMM GRANULOCYTES NFR BLD AUTO: 0.5 % (ref 0–0.9)
LYMPHOCYTES # BLD AUTO: 2.5 X10*3/UL (ref 1.2–4.8)
LYMPHOCYTES NFR BLD AUTO: 21.1 %
MAGNESIUM SERPL-MCNC: 1.72 MG/DL (ref 1.6–2.4)
MCH RBC QN AUTO: 30.6 PG (ref 26–34)
MCHC RBC AUTO-ENTMCNC: 33.7 G/DL (ref 32–36)
MCV RBC AUTO: 91 FL (ref 80–100)
MONOCYTES # BLD AUTO: 1.04 X10*3/UL (ref 0.1–1)
MONOCYTES NFR BLD AUTO: 8.8 %
NEUTROPHILS # BLD AUTO: 8.05 X10*3/UL (ref 1.2–7.7)
NEUTROPHILS NFR BLD AUTO: 68 %
NRBC BLD-RTO: 0 /100 WBCS (ref 0–0)
PLATELET # BLD AUTO: 224 X10*3/UL (ref 150–450)
POTASSIUM SERPL-SCNC: 3.5 MMOL/L (ref 3.5–5.3)
PROT SERPL-MCNC: 6.4 G/DL (ref 6.4–8.2)
RBC # BLD AUTO: 4.28 X10*6/UL (ref 4.5–5.9)
SODIUM SERPL-SCNC: 133 MMOL/L (ref 136–145)
WBC # BLD AUTO: 11.8 X10*3/UL (ref 4.4–11.3)

## 2024-05-03 PROCEDURE — 85025 COMPLETE CBC W/AUTO DIFF WBC: CPT | Performed by: STUDENT IN AN ORGANIZED HEALTH CARE EDUCATION/TRAINING PROGRAM

## 2024-05-03 PROCEDURE — 36415 COLL VENOUS BLD VENIPUNCTURE: CPT | Performed by: STUDENT IN AN ORGANIZED HEALTH CARE EDUCATION/TRAINING PROGRAM

## 2024-05-03 PROCEDURE — 84484 ASSAY OF TROPONIN QUANT: CPT | Performed by: STUDENT IN AN ORGANIZED HEALTH CARE EDUCATION/TRAINING PROGRAM

## 2024-05-03 PROCEDURE — 83735 ASSAY OF MAGNESIUM: CPT | Performed by: STUDENT IN AN ORGANIZED HEALTH CARE EDUCATION/TRAINING PROGRAM

## 2024-05-03 PROCEDURE — 93005 ELECTROCARDIOGRAM TRACING: CPT

## 2024-05-03 PROCEDURE — 71045 X-RAY EXAM CHEST 1 VIEW: CPT

## 2024-05-03 PROCEDURE — 99283 EMERGENCY DEPT VISIT LOW MDM: CPT | Mod: 25

## 2024-05-03 PROCEDURE — 71045 X-RAY EXAM CHEST 1 VIEW: CPT | Mod: FOREIGN READ | Performed by: RADIOLOGY

## 2024-05-03 PROCEDURE — 80053 COMPREHEN METABOLIC PANEL: CPT | Performed by: STUDENT IN AN ORGANIZED HEALTH CARE EDUCATION/TRAINING PROGRAM

## 2024-05-03 ASSESSMENT — LIFESTYLE VARIABLES
HAVE PEOPLE ANNOYED YOU BY CRITICIZING YOUR DRINKING: NO
EVER HAD A DRINK FIRST THING IN THE MORNING TO STEADY YOUR NERVES TO GET RID OF A HANGOVER: NO
HAVE YOU EVER FELT YOU SHOULD CUT DOWN ON YOUR DRINKING: NO
TOTAL SCORE: 0
EVER FELT BAD OR GUILTY ABOUT YOUR DRINKING: NO

## 2024-05-03 ASSESSMENT — PAIN - FUNCTIONAL ASSESSMENT: PAIN_FUNCTIONAL_ASSESSMENT: 0-10

## 2024-05-03 NOTE — ED PROVIDER NOTES
"EMERGENCY DEPARTMENT ENCOUNTER      Pt Name: Wade Machado  MRN: [id number]  Birthdate 1955  Date of evaluation: 5/3/2024  Provider: Theron Harris DO    CHIEF COMPLAINT       Chief Complaint   Patient presents with    Dizziness     Pt sts was sitting on stoop watching wife mow the lawn and became dizzy, \"spaced out\" and was told he didn't look well. S/p right hip revision and was taking Oxycontin with last dose at 1700 today.         HISTORY OF PRESENT ILLNESS    Wade Machado is a 69 y.o. male who presents to the emergency department with Spouse and EMS for near syncopal episode at home. Patient had not eaten or drank very much today.  Reportedly was taking oxycodone for his recent right hip surgery as well.  He was seated on the stairs outside while his spouse was doing lawn work.  He felt as if he was going to pass out subsequently while standing therefore sat back down.  Spouse reports that he seemed to be incoherent for a few seconds and then was much more appropriate.  Once EMS arrived patient was mentating appropriately.  She denies any fall or injury.  Reports that he took pain medications oxycodone prior to the incident as well.  He was outside on the stairs in warm weather.  Denies any further associated symptoms no chest pain shortness of breath or syncope.          Nursing Notes were reviewed.    REVIEW OF SYSTEMS     CONSTITUTIONAL: Denies fever, sweats, chills.   NEURO: Denies difficulty walking, numbness, weakness, tingling, headache.   HEENT: Denies sore throat, rhinorrhea, changes in vision.   CARDIO: Endorses near syncope.  Denies chest pain, palpitations.  PULM: Denies shortness of breath, cough.   GI: Denies abdominal pain, nausea, vomiting, diarrhea, constipation, melena, hematochezia.  : Denies painful urination, frequency, hematuria.   MSK: Denies recent trauma.   SKIN: Denies rash, lesions.   ENDOCRINE: Denies unexpected weight-loss.   HEME: Denies bleeding disorder.     PAST " MEDICAL HISTORY     Past Medical History:   Diagnosis Date    Unspecified osteoarthritis, unspecified site     Osteoarthritis       SURGICAL HISTORY       Past Surgical History:   Procedure Laterality Date    JOINT REPLACEMENT      OTHER SURGICAL HISTORY  05/09/2022    Retinal detachment repair    OTHER SURGICAL HISTORY  05/09/2022    Cataract surgery       ALLERGIES     Patient has no known allergies.    FAMILY HISTORY     No family history on file.     SOCIAL HISTORY       Social History     Socioeconomic History    Marital status:      Spouse name: Not on file    Number of children: Not on file    Years of education: Not on file    Highest education level: Not on file   Occupational History    Not on file   Tobacco Use    Smoking status: Every Day     Types: Cigarettes    Smokeless tobacco: Never   Substance and Sexual Activity    Alcohol use: Yes     Alcohol/week: 15.0 standard drinks of alcohol     Types: 15 Standard drinks or equivalent per week     Comment: 12 pack weekly    Drug use: Yes     Frequency: 1.0 times per week     Types: Marijuana    Sexual activity: Not on file   Other Topics Concern    Not on file   Social History Narrative    Not on file     Social Determinants of Health     Financial Resource Strain: Not on file   Food Insecurity: Not on file   Transportation Needs: Not on file   Physical Activity: Not on file   Stress: Not on file   Social Connections: Not on file   Intimate Partner Violence: Not on file   Housing Stability: Not on file       PHYSICAL EXAM   VS: As documented in the triage note from today's date and EMR flowsheet were reviewed.  Gen: Well developed. No acute distress. Seated in bed. Appears nontoxic.   Skin: Warm. Dry. Intact. No rashes or lesions.  Surgical wound over the right hip well-healing no signs of infection nor cellulitis/abscess.  Eyes: Pupils equally round and reactive to light. Clear sclera. EOMI.  HENT: Atraumatic appearance. Mucosal membranes moist. No  oral lesions, uvula midline, airway patent.  TMs Wong nares clear bilaterally.  No midline cervical tenderness or step-off trachea is midline.  CV: Regular rate and regular rhythm. S1, S2. No pedal edema. Warm extremities.  Resp: Nonlabored breathing Clear to auscultation bilaterally. No increased work of breathing.   GI: Soft and nontender. No rebound or guarding. Bowel sounds x4 present.   MSK: Symmetric muscle bulk. No joint swelling in the extremities. Compartments are soft. Neurovascularly intact x4 extremities. Radial pulses +2 equal bilaterally.  Pedal pulses +2 equal bilateral.  Neuro: Alert. CN II - XII intact. Speech fluent. Moving all extremities. No focal deficits. Gait normal.  NIH 0.  Psych: Appropriate. Kempt.    DIAGNOSTIC RESULTS   RADIOLOGY:   Non-plain film images such as CT, Ultrasound and MRI are read by the radiologist. Plain radiographic images are visualized and preliminarily interpreted by the emergency physician with the below findings: Chest x-ray no widened mediastinum no cardiomegaly no evidence of pneumonia.      Interpretation per the Radiologist below, if available at the time of this note:    XR chest 1 view   Final Result   No acute findings on single AP view of the chest.   Signed by Sawyer Lewis MD            ED BEDSIDE ULTRASOUND:   Performed by ED Physician - none    LABS:  Labs Reviewed   CBC WITH AUTO DIFFERENTIAL - Abnormal       Result Value    WBC 11.8 (*)     nRBC 0.0      RBC 4.28 (*)     Hemoglobin 13.1 (*)     Hematocrit 38.9 (*)     MCV 91      MCH 30.6      MCHC 33.7      RDW 16.3 (*)     Platelets 224      Neutrophils % 68.0      Immature Granulocytes %, Automated 0.5      Lymphocytes % 21.1      Monocytes % 8.8      Eosinophils % 1.3      Basophils % 0.3      Neutrophils Absolute 8.05 (*)     Immature Granulocytes Absolute, Automated 0.06      Lymphocytes Absolute 2.50      Monocytes Absolute 1.04 (*)     Eosinophils Absolute 0.15      Basophils Absolute 0.03    "  COMPREHENSIVE METABOLIC PANEL - Abnormal    Glucose 96      Sodium 133 (*)     Potassium 3.5      Chloride 99      Bicarbonate 25      Anion Gap 13      Urea Nitrogen 10      Creatinine 0.64      eGFR >90      Calcium 8.7      Albumin 3.6      Alkaline Phosphatase 70      Total Protein 6.4      AST 11      Bilirubin, Total 0.6      ALT 11     MAGNESIUM - Normal    Magnesium 1.72     TROPONIN I, HIGH SENSITIVITY - Normal    Troponin I, High Sensitivity 6      Narrative:     Less than 99th percentile of normal range cutoff-  Female and children under 18 years old <14 ng/L; Male <21 ng/L: Negative  Repeat testing should be performed if clinically indicated.     Female and children under 18 years old 14-50 ng/L; Male 21-50 ng/L:  Consistent with possible cardiac damage and possible increased clinical   risk. Serial measurements may help to assess extent of myocardial damage.     >50 ng/L: Consistent with cardiac damage, increased clinical risk and  myocardial infarction. Serial measurements may help assess extent of   myocardial damage.      NOTE: Children less than 1 year old may have higher baseline troponin   levels and results should be interpreted in conjunction with the overall   clinical context.     NOTE: Troponin I testing is performed using a different   testing methodology at [facility] than at other   [facility]. Direct result comparisons should only   be made within the same method.       All other labs were within normal range or not returned as of this dictation.    EMERGENCY DEPARTMENT COURSE/MDM:   Vitals:    Vitals:    05/03/24 1900 05/03/24 1925 05/03/24 1931 05/03/24 1934   BP: 123/69 104/63 120/71 110/62   BP Location: Right arm Right arm Right arm    Patient Position: Lying Lying Sitting Standing   Pulse: 71 64 67 89   Resp: 18      SpO2: 98%      Weight: 109 kg (240 lb)      Height: 1.803 m (5' 11\")          I reviewed the patient's triage vitals and they are within normal " range.    Due to the above findings the following was ordered cardiac workup to include orthostatic vitals.      Lab work is grossly benign mild leukocytosis suspect reactive systemic secondary to recent surgery no infectious etiology on clinical examination.  Hyponatremia is improved from baseline no additional significant electrolyte derangements renal function appropriate.  Anemia 1 point down from previous baseline although suspect this is related to recent surgery no active signs of bleeding denies any bloody or tarry stools either.  Patient remains hemodynamically stable no dysrhythmias while in the department.  Cardiac troponin x 1 negative no acute injury pattern EKG suspect this is coupled dehydration with narcotic use.  He is recommended decreasing narcotic use adequate hydration and rest he is appreciative of care agreeable with this plan and spouse agreeable as well discharged in stable condition.    ED Course as of 05/03/24 2014   Fri May 03, 2024   1933 Interpreted by the Emergency Department Attending: ECG revealed normal sinus rhythm at a rate of 63 beats per minute with OH interval 168 , QRS of 86 , QTc of 411.  No acute injury pattern. Previous EKG on July 28 revealed no significant changes.    [MG]      ED Course User Index  [MG] Theron Harris,          Diagnoses as of 05/03/24 2014   Lightheadedness       Patient was counseled regarding labs, imaging, likely diagnosis, and plan. All questions were answered.     ------------------------------------------------------------------  Information provided by EMS and patient  Past medical history complicating workup recent hip surgery.  Previous medical records reviewed previous hospital admission 5/1/2024  ------------------------------------------------------------------  ED Medications administered this visit:  Medications - No data to display    New Prescriptions from this visit:    New Prescriptions    No medications on file       Follow-up:   Eddie Crum DO  [address] [place] [facility] [address]  [phone]    Schedule an appointment as soon as possible for a visit in 2 days      [facility] Emergency Medicine  [address]  [facility] [address] [phone]  Go to   If symptoms worsen        Final Impression:   1. Lightheadedness          Theron Harris DO    (Please note that portions of this note were completed with a voice recognition program.  Efforts were made to edit the dictations but occasionally words are mis-transcribed.)     Theron Harris DO  05/03/24 2015

## 2024-05-04 NOTE — DISCHARGE INSTRUCTIONS
Suspect this is likely related to the dehydration as well as narcotic use.  Recommend to stay hydrated adequate sleep and food intake.  Continue to take your medications as directed.  Should you been experiencing chest pain shortness of breath symptoms concerning to call 911 or return immediately otherwise follow-up with your primary provider in the coming days.

## 2024-05-06 NOTE — OP NOTE
SURGEON:  Kip Webb MD    PREOPERATIVE DIAGNOSIS:    End-stage degenerative joint disease, right hip.    POSTOPERATIVE DIAGNOSIS:    End-stage degenerative joint disease, right hip.    PROCEDURE:    Right total hip arthroplasty.    ASSISTANT:    1. HEBER Montes.  2. Mohinder Sweet SA.    ANESTHESIA:    Spinal anesthetic with intravenous sedation.    COMPLICATIONS:    None.    ESTIMATED BLOOD LOSS:    500 mL.    COMPONENTS UTILIZED:    A DePuy size 12 high offset CORI femoral stem, a DePuy 36 mm diameter  +5 mm ceramic femoral head and neck.     A DePuy 54 mm cluster-hole acetabular shell, two 6.5 mm cancellous  bone screws, a DePuy 10 degree posterior offset liner.     INDICATIONS:    The patient is a 68-year-old male who had presented with right hip  pain and limitations in activities of daily living secondary to a  degenerative joint disease of his right hip.  He had tried and failed  conservative management.  We then discussed surgical treatment  options including a right total hip replacement.  I explained to him  the risks, benefits, and alternatives of this procedure.  I explained  to him the risks of the procedure include, but not limited to,  infection; iatrogenic fracture, limb length inequality, hardware  failure; postoperative pain and stiffness, postoperative dislocation,  postoperative DVT and pulmonary emboli; as well as risks associated  with anesthesia.  The patient voiced understanding and informed  consent was obtained.     DESCRIPTION OF PROCEDURE:    The patient was properly identified in the preoperative waiting area  and his right hip was marked as site of surgery.  Ancef was  administered intravenously.  The patient was taken back to the  operating room suite.  He then received a spinal anesthetic.  The  patient was moved over to the OR table.  He received tranexamic acid  intravenously.  He was placed in the left lateral decubitus position  with right hip elevated.  The  patient's right lower extremity was  prepped and draped in sterile fashion.  A preoperative verification  time-out was taken.  At this point, I then made approximately a 10  inch curvilinear incision along the lateral aspect of the patient's  right hip superiorly.  Sharp dissection was carried through skin and  subcutaneous tissue.  Electrocautery was used to achieve hemostasis.  I dissected down to the level of the IT band.  At this point, I  sharply split the IT band.  I placed retractor deep to the IT band.  I then used electrocautery down to tenotomize the short external  rotators off the greater trochanter.  Sharp dissection was then  further carried down to the femoral head and neck.  I did take short  external rotators with #5 Ethibond.  The hip was dislocated.  At this  point, I used a sagittal saw to make my osteotomy through the femoral  neck, leaving at least 15 mm of native neck length.  The femoral head  was removed.  I then placed retractors at the 3 and 6 o'clock  positions on acetabulum.  All soft tissue was excised from  acetabulum.  I began reaming acetabulum with a 48 mm reamer.  I  increased in 1 mm increment with the reamers until I reached a 53 mm  reamer.  At this point, there was bone bleeding.  However, during  reaming, it was noted the patient had very poor bone stock and bone  quality along the posterior wall of the acetabulum.  Due to this, a  portion of the femoral head was then reamed to obtain bone graft.  This was placed in this region.  At this point, I placed in a trial  54 mm acetabular shell.  I used a single portable x-ray shot to  confirm approximately 40 degrees of anteversion, 20 degrees of  abduction.  I was satisfied with this.  The liner was removed.  I  then placed in the DePuy 54 mm cluster hole acetabular shell.  Because of the poor bone quality, even with the bone graft in, I  placed into cancellous bone screws in the superior position.  Again,  I used a single portable  x-ray to confirm alignment of the acetabular  shell.  I then placed in my acetabular liner with a 10 degree  posterior lip.  Wound was irrigated with normal saline.  At this  point, I turned my attention back to the proximal femur.  I used a  box cutting reamer to open up the canal followed by a canal-finding  reamer and a lateralizing reamer.  I then began broaching.  I began  with a size 8 broach and increased in increments to the best fit with  a size 12 broach.  I initially trialed with a standard offset neck  starting with a 1.5 mm neck length femoral head.  However, in the  process of trialing, I had more stability using a high offset neck  with a +5 mm neck length femoral head.  At this point, with the trial  components, then I had performed 1 final x-ray shot showing adequate  alignment of the final acetabular shell with the remaining trial  components.  I was satisfied with this.  At this point, all trial  components were removed from the proximal femur and the trial liner  was removed.  I then impacted in the final DePuy liner with a 10  degree posterior lip.  I impacted in my final DePuy size 12 high  offset CORI femoral stem.  I also impacted on the 36 mm diameter +5  mm ceramic femoral head.  Hip was re-dislocated.  I was satisfied  with my gross limb length in comparison.  I also satisfied with my  stability flexing the hip to 90 degrees and internally rotated  greater than to nearly 80 degrees.  The wound was copiously irrigated  with normal saline.  I repaired the short external rotators back to  the greater trochanter.  The IT band was closed with #1 Vicryl.  Subcutaneous tissue was closed with 2-0 Vicryl.  Skin was  approximated with staples.  Silver-impregnated Aquacel dressing was  applied.  The patient was returned to the supine position with an  abduction pillow between his legs.  He was awakened from anesthesia.  The patient was brought to the recovery room in stable condition.  There were no  complications during the case.  All sponge and needle  counts were correct at the end of the case.  Estimated blood loss was  500 mL.     The patient is weightbearing as tolerated in his right lower  extremity.  He will be instructed on hip dislocation precautions.  He   will be placed on aspirin b.i.d. for DVT prophylaxis.  He will be  placed on Ancef for antibiotic prophylaxis.       Kip Webb MD    DD:  08/01/2023 13:06:32 EST  DT:  08/01/2023 13:59:06 EST  DICTATION NUMBER:  [id number]  INTERNAL JOB NUMBER:  [id number]             Electronic Signatures:  Kip Webb) (Signed on 01-Aug-2023 14:38)   Authored  Unsigned, Draft (SYS GENERATED) (Entered on 01-Aug-2023 13:59)   Entered    Last Updated: 01-Aug-2023 14:38 by Kip Webb)

## 2024-05-07 LAB
ATRIAL RATE: 63 BPM
P AXIS: 51 DEGREES
P OFFSET: 194 MS
P ONSET: 137 MS
PR INTERVAL: 168 MS
Q ONSET: 221 MS
QRS COUNT: 11 BEATS
QRS DURATION: 86 MS
QT INTERVAL: 402 MS
QTC CALCULATION(BAZETT): 411 MS
QTC FREDERICIA: 408 MS
R AXIS: -4 DEGREES
T AXIS: 37 DEGREES
T OFFSET: 422 MS
VENTRICULAR RATE: 63 BPM

## 2024-05-11 ENCOUNTER — ANESTHESIA (OUTPATIENT)
Dept: OPERATING ROOM | Facility: HOSPITAL | Age: 69
End: 2024-05-11
Payer: COMMERCIAL

## 2024-05-11 ENCOUNTER — APPOINTMENT (OUTPATIENT)
Dept: RADIOLOGY | Facility: HOSPITAL | Age: 69
End: 2024-05-11
Payer: COMMERCIAL

## 2024-05-11 ENCOUNTER — HOSPITAL ENCOUNTER (EMERGENCY)
Facility: HOSPITAL | Age: 69
Discharge: HOME | End: 2024-05-11
Attending: STUDENT IN AN ORGANIZED HEALTH CARE EDUCATION/TRAINING PROGRAM
Payer: COMMERCIAL

## 2024-05-11 VITALS
BODY MASS INDEX: 33.6 KG/M2 | TEMPERATURE: 97.9 F | DIASTOLIC BLOOD PRESSURE: 73 MMHG | SYSTOLIC BLOOD PRESSURE: 136 MMHG | OXYGEN SATURATION: 100 % | RESPIRATION RATE: 16 BRPM | HEIGHT: 71 IN | WEIGHT: 240 LBS | HEART RATE: 74 BPM

## 2024-05-11 DIAGNOSIS — S73.004S HIP DISLOCATION, RIGHT, SEQUELA: Primary | ICD-10-CM

## 2024-05-11 PROCEDURE — 27252 TREAT HIP DISLOCATION: CPT | Mod: RT,59

## 2024-05-11 PROCEDURE — 96361 HYDRATE IV INFUSION ADD-ON: CPT

## 2024-05-11 PROCEDURE — 73502 X-RAY EXAM HIP UNI 2-3 VIEWS: CPT | Mod: RIGHT SIDE | Performed by: RADIOLOGY

## 2024-05-11 PROCEDURE — 2500000004 HC RX 250 GENERAL PHARMACY W/ HCPCS (ALT 636 FOR OP/ED): Performed by: STUDENT IN AN ORGANIZED HEALTH CARE EDUCATION/TRAINING PROGRAM

## 2024-05-11 PROCEDURE — 96374 THER/PROPH/DIAG INJ IV PUSH: CPT

## 2024-05-11 PROCEDURE — 73501 X-RAY EXAM HIP UNI 1 VIEW: CPT | Mod: RT

## 2024-05-11 PROCEDURE — 2500000004 HC RX 250 GENERAL PHARMACY W/ HCPCS (ALT 636 FOR OP/ED): Performed by: NURSE PRACTITIONER

## 2024-05-11 PROCEDURE — A27257 PR CLOSED RX SPONT HIP DISLOC,ANESTH: Performed by: ANESTHESIOLOGIST ASSISTANT

## 2024-05-11 PROCEDURE — 73502 X-RAY EXAM HIP UNI 2-3 VIEWS: CPT | Mod: RT

## 2024-05-11 PROCEDURE — 96375 TX/PRO/DX INJ NEW DRUG ADDON: CPT

## 2024-05-11 PROCEDURE — 3700000002 HC GENERAL ANESTHESIA TIME - EACH INCREMENTAL 1 MINUTE: Performed by: STUDENT IN AN ORGANIZED HEALTH CARE EDUCATION/TRAINING PROGRAM

## 2024-05-11 PROCEDURE — 27266 TREAT HIP DISLOCATION: CPT

## 2024-05-11 PROCEDURE — 99284 EMERGENCY DEPT VISIT MOD MDM: CPT | Mod: 25

## 2024-05-11 PROCEDURE — 2500000005 HC RX 250 GENERAL PHARMACY W/O HCPCS: Performed by: ANESTHESIOLOGY

## 2024-05-11 PROCEDURE — 2500000004 HC RX 250 GENERAL PHARMACY W/ HCPCS (ALT 636 FOR OP/ED): Performed by: ANESTHESIOLOGY

## 2024-05-11 PROCEDURE — A27257 PR CLOSED RX SPONT HIP DISLOC,ANESTH: Performed by: ANESTHESIOLOGY

## 2024-05-11 PROCEDURE — 29515 APPLICATION SHORT LEG SPLINT: CPT | Mod: 59

## 2024-05-11 PROCEDURE — 99285 EMERGENCY DEPT VISIT HI MDM: CPT | Mod: 25

## 2024-05-11 PROCEDURE — 3700000001 HC GENERAL ANESTHESIA TIME - INITIAL BASE CHARGE: Performed by: STUDENT IN AN ORGANIZED HEALTH CARE EDUCATION/TRAINING PROGRAM

## 2024-05-11 RX ORDER — AMOXICILLIN 250 MG
1 CAPSULE ORAL 2 TIMES DAILY
COMMUNITY
Start: 2024-05-02

## 2024-05-11 RX ORDER — PROPOFOL 10 MG/ML
INJECTION, EMULSION INTRAVENOUS CODE/TRAUMA/SEDATION MEDICATION
Status: COMPLETED | OUTPATIENT
Start: 2024-05-11 | End: 2024-05-11

## 2024-05-11 RX ORDER — ONDANSETRON HYDROCHLORIDE 2 MG/ML
4 INJECTION, SOLUTION INTRAVENOUS ONCE
Status: COMPLETED | OUTPATIENT
Start: 2024-05-11 | End: 2024-05-11

## 2024-05-11 RX ORDER — SODIUM CHLORIDE 9 MG/ML
100 INJECTION, SOLUTION INTRAVENOUS CONTINUOUS
Status: DISCONTINUED | OUTPATIENT
Start: 2024-05-11 | End: 2024-05-11 | Stop reason: HOSPADM

## 2024-05-11 RX ORDER — MORPHINE SULFATE 4 MG/ML
4 INJECTION, SOLUTION INTRAMUSCULAR; INTRAVENOUS ONCE
Status: COMPLETED | OUTPATIENT
Start: 2024-05-11 | End: 2024-05-11

## 2024-05-11 RX ORDER — PROPOFOL 10 MG/ML
0.5 INJECTION, EMULSION INTRAVENOUS AS NEEDED
Status: DISCONTINUED | OUTPATIENT
Start: 2024-05-11 | End: 2024-05-11 | Stop reason: HOSPADM

## 2024-05-11 RX ORDER — PROPOFOL 10 MG/ML
100 INJECTION, EMULSION INTRAVENOUS ONCE
Status: DISCONTINUED | OUTPATIENT
Start: 2024-05-11 | End: 2024-05-11 | Stop reason: HOSPADM

## 2024-05-11 RX ORDER — OXYCODONE AND ACETAMINOPHEN 5; 325 MG/1; MG/1
1-2 TABLET ORAL
COMMUNITY
Start: 2024-05-02

## 2024-05-11 RX ORDER — ASPIRIN 81 MG/1
81 TABLET ORAL
COMMUNITY
Start: 2024-05-02 | End: 2024-06-01

## 2024-05-11 RX ORDER — FINASTERIDE 5 MG/1
5 TABLET, FILM COATED ORAL DAILY
COMMUNITY
Start: 2024-01-22

## 2024-05-11 RX ADMIN — PROPOFOL 20 MG: 10 INJECTION, EMULSION INTRAVENOUS at 12:42

## 2024-05-11 RX ADMIN — PROPOFOL 60 MG: 10 INJECTION, EMULSION INTRAVENOUS at 12:37

## 2024-05-11 RX ADMIN — PROPOFOL 20 MG: 10 INJECTION, EMULSION INTRAVENOUS at 12:39

## 2024-05-11 RX ADMIN — SODIUM CHLORIDE 100 ML/HR: 9 INJECTION, SOLUTION INTRAVENOUS at 11:01

## 2024-05-11 RX ADMIN — Medication 60 MG: at 14:55

## 2024-05-11 RX ADMIN — PROPOFOL 150 MG: 10 INJECTION, EMULSION INTRAVENOUS at 14:55

## 2024-05-11 RX ADMIN — ONDANSETRON 4 MG: 2 INJECTION INTRAMUSCULAR; INTRAVENOUS at 11:32

## 2024-05-11 RX ADMIN — HYDROMORPHONE HYDROCHLORIDE 0.5 MG: 1 INJECTION, SOLUTION INTRAMUSCULAR; INTRAVENOUS; SUBCUTANEOUS at 14:22

## 2024-05-11 RX ADMIN — MORPHINE SULFATE 4 MG: 4 INJECTION, SOLUTION INTRAMUSCULAR; INTRAVENOUS at 11:32

## 2024-05-11 ASSESSMENT — PAIN SCALES - GENERAL
PAINLEVEL_OUTOF10: 3
PAINLEVEL_OUTOF10: 0 - NO PAIN
PAINLEVEL_OUTOF10: 8
PAINLEVEL_OUTOF10: 10 - WORST POSSIBLE PAIN
PAINLEVEL_OUTOF10: 9

## 2024-05-11 ASSESSMENT — PAIN DESCRIPTION - LOCATION: LOCATION: HIP

## 2024-05-11 ASSESSMENT — PAIN - FUNCTIONAL ASSESSMENT
PAIN_FUNCTIONAL_ASSESSMENT: 0-10

## 2024-05-11 ASSESSMENT — LIFESTYLE VARIABLES
HAVE PEOPLE ANNOYED YOU BY CRITICIZING YOUR DRINKING: YES
EVER HAD A DRINK FIRST THING IN THE MORNING TO STEADY YOUR NERVES TO GET RID OF A HANGOVER: NO
EVER FELT BAD OR GUILTY ABOUT YOUR DRINKING: NO
HAVE YOU EVER FELT YOU SHOULD CUT DOWN ON YOUR DRINKING: NO
TOTAL SCORE: 1

## 2024-05-11 NOTE — PROCEDURES
Orthopedic Injury Treatment - Cast/Splint/Fracture    Date/Time: 5/11/2024 3:17 PM    Performed by: Wade Pugh DO  Authorized by: Wade Pugh DO    Consent:     Consent obtained:  Verbal    Consent given by:  Patient    Risks, benefits, and alternatives were discussed: yes      Alternatives discussed:  Delayed treatment  Universal protocol:     Patient identity confirmed:  Verbally with patient  Injury:     Injury location:  Hip    Hip injury location:  R hip  Pre-procedure details:     Distal neurologic exam:  Normal    Distal perfusion: distal pulses strong      Range of motion: reduced    Sedation:     Sedation type:  Deep  Anesthesia:     Anesthesia method:  None  Procedure details:     Manipulation performed: yes      Skin traction used: yes      Reduction successful: yes      X-ray confirmed reduction: yes      Immobilization:  Brace    Supplies used:  Knee immobilizer  Post-procedure details:     Distal neurologic exam:  Normal    Distal perfusion: distal pulses strong      Range of motion: improved      Procedure completion:  Tolerated  Comments:      Right prosthetic hip dislocation    Wade Pugh DO

## 2024-05-11 NOTE — DISCHARGE INSTRUCTIONS
Successful reduction of dislocation of the right hip.  Resume all postoperative orders.  Do not let buttocks follow the level of the knees, use raised toilet seat.  Call orthopedics Monday to set up follow-up appointment.  Resume any normal medications.

## 2024-05-11 NOTE — ED PROVIDER NOTES
Limitations to History: None     HPI:      Wade Machado is a 69 y.o. occasion male with significant past medical history for hypertension, obesity and recurrent right hip dislocation with revision of right SOPHIA on 5/1 per Dr. Maldonado presenting to ED today from home via EMS for evaluation of a possible hip dislocation.  Patient states he sat down on the toilet to urinate today and felt a pop in the right hip, he feels it is dislocated.  Mild aching pain is rated 3/10.  Symptoms are similar to prior dislocations.  N.p.o. since last evening.  Denies fever/chills, cough/cold symptoms, chest pain, shortness of breath, nausea/vomiting, abdominal pain, urinary symptoms, change in bowel habits or any other complaints.  Smoker, daily EtOH, no IV drug use.  PCP is Dr. Crum.  Saline 1 L wide open with Zofran and morphine.    Additional History Obtained from: Prior notes    ------------------------------------------------------------------------------------------------------------------------------------------    VS: As documented in the triage note and EMR flowsheet from this visit were reviewed.    Physical Exam:  Gen: Older  male, nontoxic looking.  Pleasant and interactive.  Awake and alert, oriented x 3.  Well-nourished and hydrated.  Head/Neck: NCAT, neck w/ FROM  Eyes: EOMI, PERRL, anicteric sclerae, noninjected conjunctivae  Ears: TMs clear b/l without sign of infection  Nose: Nares patent w/o rhinorrhea  Mouth:  MMM, no OP lesions noted  Heart: RRR no MRG  Lungs: CTA b/l no RRW, no increased work of breathing  Abdomen: Soft with bowel sounds, nontender.  No CVA tenderness.  Musculoskeletal: Tenderness over the right greater trochanter.  Right lower extremity shortened and externally rotated.  MSPs intact.  Skin intact.    Neurologic: Alert, symmetrical facies, phonates clearly, moves all extremities equally, responsive to touch, ambulates normally   Skin: Warm and dry.  No rashes  noted        ------------------------------------------------------------------------------------------------------------------------------------------    Medical Decision Makin-year-old male with history of recurrent right hip dislocation status post SOPHIA with revision  per Dr. Maldonado is evaluated at the bedside for possible hip dislocation.  Patient sat down on the toilet this morning, he felt a pop in the right hip.  Right lower extremity shortened and externally rotated.  N.p.o. since yesterday.  Remains NPO.  IV established, normal saline at a maintenance rate.  X-ray right hip/pelvis pending.      ED Course as of 24 1530   Sat May 11, 2024   1230 X-ray shows posterior dislocation of the right hip arthroplasty.  Consent obtained.  See procedure note for reduction. [SB]   1300 Successful reduction after multiple attempts unsuccessful unsuccessful reduction after multiple attempts per Dr. Harris.  Dr. Reilly on page discussed the case. [SB]   1813 I spoke with  SRINIVASA Ware for Dr. Reilly, he states Dr. Reilly is out of town and was wondering if we can get orthopedics here at [facility] to come in and perform the reduction.  If not the patient will need to be transferred to [facility].  Dr. Pugh is willing to come evaluate the patient at bedside to perform reduction.  ETA 2 PM. [SB]   1502 Dr. Pugh and anesthesia at bedside for reduction.  Appears to be reduced, waiting on postreduction film. [SB]   1523 Postreduction film shows satisfactory reduction of the dislocation.  Remains hemodynamically stable.  Neurovascularly intact.  Discharge home.  Follow-up with orthopedics in the next 2 to 3 days.  Doing well postop orders.  Advised not to let his seat fall below the level of his knees as this is likely to happen again.  Should use a raised toilet seat.  Resume any normal medications.  Return precautions discussed.  Diagnosis, treatment and plan discussed with patient, he verbalizes understanding and is  in agreement.  Condition stable for discharge. [SB]      ED Course User Index  [SB] COLTON Ponce-CNP         Diagnoses as of 05/11/24 1530   Hip dislocation, right, sequela       EKG interpreted by myself (ED attending physician): Not ordered    Chronic Medical Conditions Significantly Affecting Care: None    External Records Reviewed: I reviewed recent and relevant outside records including: None    Discussion of Management with Other Providers: Seen and evaluated with ED attending physician, Steven, he agrees with the treatment plan and final disposition of the patient.    I discussed the patient/results with: Dr. Keaton Antonio, COLTON-ELDER  05/11/24 [id number]

## 2024-05-11 NOTE — ED TRIAGE NOTES
Dislocated Rt hip, went to stand up from bathroom and dislocated it. Happened 10 times. No LOC no head injury.

## 2024-05-11 NOTE — ED PROCEDURE NOTE
Procedure  Moderate Sedation    Performed by: Theron Harris DO  Authorized by: Theron Harris DO    Consent:     Consent obtained:  Verbal and written    Consent given by:  Patient    Risks, benefits, and alternatives were discussed: yes      Risks discussed:  Allergic reaction, dysrhythmia, inadequate sedation, nausea, vomiting, respiratory compromise necessitating ventilatory assistance and intubation, prolonged sedation necessitating reversal and prolonged hypoxia resulting in organ damage    Alternatives discussed:  Analgesia without sedation, anxiolysis and regional anesthesia  Leonard protocol:     Procedure explained and questions answered to patient or proxy's satisfaction: yes      Relevant documents present and verified: yes      Test results available: yes      Imaging studies available: yes      Required blood products, implants, devices, and special equipment available: yes      Site/side marked: yes      Immediately prior to procedure, a time out was called: yes      Patient identity confirmed:  Verbally with patient and arm band  Indications:     Procedure performed:  Dislocation reduction    Procedure necessitating sedation performed by:  Physician performing sedation    Intended level of sedation:  Moderate  Pre-sedation assessment:     NPO status caution: unable to specify NPO status      ASA classification: class 2 - patient with mild systemic disease      Mouth opening:  3 or more finger widths    Thyromental distance:  4 finger widths    Mallampati score:  I - soft palate, uvula, fauces, pillars visible    Neck mobility: normal      Pre-sedation assessments completed and reviewed: airway patency, anesthesia/sedation history, cardiovascular function, hydration status, mental status, nausea/vomiting, pain level, respiratory function and temperature      History of difficult intubation: no    Immediate pre-procedure details:     Reviewed: vital signs, relevant labs/tests and NPO status       Verified: bag valve mask available, emergency equipment available, intubation equipment available, IV patency confirmed, oxygen available, reversal medications available and suction available    Procedure details (see MAR for exact dosages):     Preoxygenation:  Nasal cannula    Sedation:  Propofol    Analgesia:  Morphine    Intra-procedure monitoring:  Blood pressure monitoring, cardiac monitor, continuous pulse oximetry, continuous capnometry, frequent LOC assessments and frequent vital sign checks    Intra-procedure events: none    Post-procedure details:     Attendance: Constant attendance by certified staff until patient recovered      Recovery: Patient returned to pre-procedure baseline      Estimated blood loss (see I/O flowsheets): no      Complications:  None    Specimens recovered:  None    Procedure completion:  Tolerated well, no immediate complications  Comments:      Multiple reduction attempts unsuccessful patient remains neurovascularly intact.               Theron Harris,   05/11/24 1258

## 2024-05-11 NOTE — CONSULTS
Reason For Consult  Right prosthetic hip dislocation     History Of Present Illness  Wade Machado is a 69 y.o. male presenting with right prosthetic hip dislocation.  Patient has a history of prior right total hip arthroplasty with Dr. Reilly in the past with recent recurrent dislocations.  He underwent right hip revision arthroplasty on 5/1/2024 to a dual mobility liner via a posterior approach.  The patient was getting up from the toilet earlier today and felt a pop in his hip with immediate pain and deformity.  Found to have a recurrent dislocation on x-ray in the ED.  Attempted closed reduction was unsuccessful by the ED staff.  Patient's pain is currently well-controlled.  Denies any numbness or tingling about the right lower extremity.     Past Medical History  He has a past medical history of Unspecified osteoarthritis, unspecified site.    Surgical History  He has a past surgical history that includes Other surgical history (05/09/2022); Other surgical history (05/09/2022); and Joint replacement.     Social History  He reports that he has been smoking cigarettes. He has never used smokeless tobacco. He reports current alcohol use of about 15.0 standard drinks of alcohol per week. He reports current drug use. Frequency: 1.00 time per week. Drug: Marijuana.    Family History  No family history on file.     Allergies  Patient has no known allergies.    Review of Systems  As above     Physical Exam  RLE:  Aquacel dressing is intact over right hip incision, minimal drainage, staples intact. RLE is shortened and externally rotated.  Very minimal tenderness palpation about the right hip.  He does have pain with any attempted passive motion about the right hip.  Motor is 5/5 intact distally in plantarflexion, dorsiflexion, EHL.  Sensation grossly intact light touch L4-S1.  Palpable DP pulse.     Last Recorded Vitals  Blood pressure 173/68, pulse 73, temperature 36.5 °C (97.7 °F), temperature source Temporal,  "resp. rate 20, height 1.803 m (5' 11\"), weight 109 kg (240 lb), SpO2 97%.    Relevant Results  No results found for this or any previous visit (from the past 24 hour(s)).    XR hip right with pelvis when performed 1 view 05/11/2024    Narrative  Interpreted By:  Ary Encinas,  STUDY:  XR HIP RIGHT WITH PELVIS WHEN PERFORMED 1 VIEW; ;  5/11/2024 12:59 pm    INDICATION:  Signs/Symptoms:Postreduction.    COMPARISON:  05/11/2024 at 12:14 p.m.    ACCESSION NUMBER(S):  [id number]    ORDERING CLINICIAN:  SILVIA SCOTT    FINDINGS:  Persistent superior dislocation involving the femoral component of  the right hip replacement hardware similar to prior. Surgical clips  within the lateral aspect of the right hip. Radiation beads over the  prostatic bed. No fractures.    Impression  Persistent superior dislocation of the right hip hardware similar to  prior. No significant interval change since prior. No fracture.      MACRO:  None    Signed by: Ary Encinas 5/11/2024 1:08 PM  Dictation workstation:   [id number]               Assessment/Plan     Right prosthetic hip dislocation status post revision total hip arthroplasty with Dr. Reilly on 5/1/2024    Closed reduction was performed with anesthesia in the emergency department.  Successful reduction was confirmed by flatplate radiographs.  Knee immobilizer was placed.  Patient is okay for weightbearing as tolerated with strict posterior precautions with knee immobilizer in place at all times.  He is okay for discharge from orthopedic standpoint.  Follow-up with Dr. Reilly as an outpatient early next week.  I did discuss the case with Dr. Reilly who is out of town.    I spent 30 minutes in the professional and overall care of this patient.      Wade Pugh, DO    "

## 2024-05-12 ENCOUNTER — HOSPITAL ENCOUNTER (EMERGENCY)
Facility: HOSPITAL | Age: 69
Discharge: OTHER INSTITUTION | End: 2024-05-13
Attending: STUDENT IN AN ORGANIZED HEALTH CARE EDUCATION/TRAINING PROGRAM
Payer: COMMERCIAL

## 2024-05-12 ENCOUNTER — APPOINTMENT (OUTPATIENT)
Dept: RADIOLOGY | Facility: HOSPITAL | Age: 69
End: 2024-05-12
Payer: COMMERCIAL

## 2024-05-12 DIAGNOSIS — S73.004A HIP DISLOCATION, RIGHT, INITIAL ENCOUNTER (MULTI): Primary | ICD-10-CM

## 2024-05-12 PROCEDURE — 2500000004 HC RX 250 GENERAL PHARMACY W/ HCPCS (ALT 636 FOR OP/ED): Performed by: EMERGENCY MEDICINE

## 2024-05-12 PROCEDURE — 73502 X-RAY EXAM HIP UNI 2-3 VIEWS: CPT | Mod: RT

## 2024-05-12 PROCEDURE — 96374 THER/PROPH/DIAG INJ IV PUSH: CPT

## 2024-05-12 PROCEDURE — 99152 MOD SED SAME PHYS/QHP 5/>YRS: CPT

## 2024-05-12 PROCEDURE — 96361 HYDRATE IV INFUSION ADD-ON: CPT

## 2024-05-12 PROCEDURE — 2500000005 HC RX 250 GENERAL PHARMACY W/O HCPCS: Performed by: STUDENT IN AN ORGANIZED HEALTH CARE EDUCATION/TRAINING PROGRAM

## 2024-05-12 PROCEDURE — 73501 X-RAY EXAM HIP UNI 1 VIEW: CPT | Mod: RT

## 2024-05-12 PROCEDURE — 73502 X-RAY EXAM HIP UNI 2-3 VIEWS: CPT | Mod: RIGHT SIDE | Performed by: RADIOLOGY

## 2024-05-12 PROCEDURE — 99285 EMERGENCY DEPT VISIT HI MDM: CPT

## 2024-05-12 PROCEDURE — 2500000004 HC RX 250 GENERAL PHARMACY W/ HCPCS (ALT 636 FOR OP/ED): Performed by: STUDENT IN AN ORGANIZED HEALTH CARE EDUCATION/TRAINING PROGRAM

## 2024-05-12 RX ORDER — PROPOFOL 10 MG/ML
0.5 INJECTION, EMULSION INTRAVENOUS AS NEEDED
Status: DISCONTINUED | OUTPATIENT
Start: 2024-05-12 | End: 2024-05-13 | Stop reason: HOSPADM

## 2024-05-12 RX ORDER — PROPOFOL 10 MG/ML
INJECTION, EMULSION INTRAVENOUS CODE/TRAUMA/SEDATION MEDICATION
Status: COMPLETED | OUTPATIENT
Start: 2024-05-12 | End: 2024-05-12

## 2024-05-12 RX ORDER — MORPHINE SULFATE 4 MG/ML
8 INJECTION, SOLUTION INTRAMUSCULAR; INTRAVENOUS ONCE
Status: COMPLETED | OUTPATIENT
Start: 2024-05-12 | End: 2024-05-12

## 2024-05-12 RX ORDER — PROPOFOL 10 MG/ML
100 INJECTION, EMULSION INTRAVENOUS ONCE
Status: DISCONTINUED | OUTPATIENT
Start: 2024-05-12 | End: 2024-05-13 | Stop reason: HOSPADM

## 2024-05-12 RX ORDER — FENTANYL CITRATE 50 UG/ML
50 INJECTION, SOLUTION INTRAMUSCULAR; INTRAVENOUS ONCE
Status: COMPLETED | OUTPATIENT
Start: 2024-05-12 | End: 2024-05-12

## 2024-05-12 RX ORDER — PROPOFOL 10 MG/ML
100 INJECTION, EMULSION INTRAVENOUS ONCE
Status: COMPLETED | OUTPATIENT
Start: 2024-05-12 | End: 2024-05-12

## 2024-05-12 RX ADMIN — PROPOFOL 80 MG: 10 INJECTION, EMULSION INTRAVENOUS at 19:54

## 2024-05-12 RX ADMIN — PROPOFOL 40 MG: 10 INJECTION, EMULSION INTRAVENOUS at 20:07

## 2024-05-12 RX ADMIN — PROPOFOL 80 MG: 10 INJECTION, EMULSION INTRAVENOUS at 19:31

## 2024-05-12 RX ADMIN — MORPHINE SULFATE 8 MG: 4 INJECTION, SOLUTION INTRAMUSCULAR; INTRAVENOUS at 21:37

## 2024-05-12 RX ADMIN — FENTANYL CITRATE 50 MCG: 50 INJECTION INTRAMUSCULAR; INTRAVENOUS at 18:43

## 2024-05-12 RX ADMIN — Medication 2 L/MIN: at 19:30

## 2024-05-12 RX ADMIN — PROPOFOL 30 MG: 10 INJECTION, EMULSION INTRAVENOUS at 20:09

## 2024-05-12 RX ADMIN — PROPOFOL 20 MG: 10 INJECTION, EMULSION INTRAVENOUS at 20:13

## 2024-05-12 RX ADMIN — Medication 3 L/MIN: at 17:55

## 2024-05-12 RX ADMIN — SODIUM CHLORIDE 1000 ML: 9 INJECTION, SOLUTION INTRAVENOUS at 18:43

## 2024-05-12 ASSESSMENT — PAIN SCALES - WONG BAKER: WONGBAKER_NUMERICALRESPONSE: NO HURT

## 2024-05-12 ASSESSMENT — PAIN - FUNCTIONAL ASSESSMENT: PAIN_FUNCTIONAL_ASSESSMENT: WONG-BAKER FACES

## 2024-05-12 ASSESSMENT — PAIN SCALES - GENERAL: PAINLEVEL_OUTOF10: 0 - NO PAIN

## 2024-05-12 NOTE — ED PROVIDER NOTES
EMERGENCY DEPARTMENT ENCOUNTER      Pt Name: Wade Machado  MRN: [id number]  Birthdate 1955  Date of evaluation: 5/12/2024  Provider: Theron Harris DO    CHIEF COMPLAINT       Chief Complaint   Patient presents with    Hip Problem     Right hip dislocation       HISTORY OF PRESENT ILLNESS    Wade Machado is a 69 y.o. male who presents to the emergency department with EMS for suspected right hip dislocation.  Patient had multiple hip dislocations in the past most recently yesterday.  This did require orthopedic reduction.  Patient reports that he was getting his pants on and subsequently felt and heard a pop in the right hip immediately was unable to bear weight did not have associated fall.  Denies any additional associated symptoms at this time.  He denies any numbness tingling or weakness throughout the extremity.  He still does have staples in place from recent fixation well-healing.           Nursing Notes were reviewed.    REVIEW OF SYSTEMS     CONSTITUTIONAL: Denies fever, sweats, chills.   NEURO: Denies difficulty walking, numbness, weakness, tingling, headache.   HEENT: Denies sore throat, rhinorrhea, changes in vision.   CARDIO: Denies chest pain, palpitations.  PULM: Denies shortness of breath, cough.   GI: Denies abdominal pain, nausea, vomiting, diarrhea, constipation, melena, hematochezia.  : Denies painful urination, frequency, hematuria.   MSK: Endorses right hip pain.  SKIN:  Endorses surgical site well-healing.  ENDOCRINE: Denies unexpected weight-loss.   HEME: Denies bleeding disorder.     PAST MEDICAL HISTORY     Past Medical History:   Diagnosis Date    Unspecified osteoarthritis, unspecified site     Osteoarthritis       SURGICAL HISTORY       Past Surgical History:   Procedure Laterality Date    JOINT REPLACEMENT      OTHER SURGICAL HISTORY  05/09/2022    Retinal detachment repair    OTHER SURGICAL HISTORY  05/09/2022    Cataract surgery       ALLERGIES     Patient has no known  allergies.    FAMILY HISTORY     No family history on file.     SOCIAL HISTORY       Social History     Socioeconomic History    Marital status:      Spouse name: Not on file    Number of children: Not on file    Years of education: Not on file    Highest education level: Not on file   Occupational History    Not on file   Tobacco Use    Smoking status: Every Day     Types: Cigarettes    Smokeless tobacco: Never   Substance and Sexual Activity    Alcohol use: Yes     Alcohol/week: 15.0 standard drinks of alcohol     Types: 15 Standard drinks or equivalent per week     Comment: 12 pack weekly    Drug use: Yes     Frequency: 1.0 times per week     Types: Marijuana    Sexual activity: Not on file   Other Topics Concern    Not on file   Social History Narrative    Not on file     Social Determinants of Health     Financial Resource Strain: Not on file   Food Insecurity: Not on file   Transportation Needs: Not on file   Physical Activity: Not on file   Stress: Not on file   Social Connections: Not on file   Intimate Partner Violence: Not on file   Housing Stability: Not on file       PHYSICAL EXAM   VS: As documented in the triage note from today's date and EMR flowsheet were reviewed.  Gen: Well developed. No acute distress. Seated in bed. Appears nontoxic.   Skin: Warm. Dry. Intact. No rashes or lesions.  Surgical site well-healing staples in place no signs of infection.  Eyes: Pupils equally round and reactive to light. Clear sclera.   HENT: Atraumatic appearance. Mucosal membranes moist. No oral lesions, uvula midline, airway patent.   CV: Regular rate and regular rhythm. S1, S2. No pedal edema. Warm extremities.  Resp: Nonlabored breathing Clear to auscultation bilaterally. No increased work of breathing.   GI: Soft and nontender. No rebound or guarding.   MSK: Symmetric muscle bulk. No joint swelling in the extremities. Compartments are soft. Neurovascularly intact x4 extremities. Radial pulses +2 equal  bilaterally.  Pedal pulses +2 equal bilaterally.  Deformity at the right hip.  Neuro: Alert. Speech fluent. Moving all extremities. No focal deficits.   Psych: Appropriate. Kempt.    DIAGNOSTIC RESULTS   RADIOLOGY:   Non-plain film images such as CT, Ultrasound and MRI are read by the radiologist. Plain radiographic images are visualized and preliminarily interpreted by the emergency physician with the below findings: X-ray imaging of the right hip consistent with dislocation of the prosthesis.      Interpretation per the Radiologist below, if available at the time of this note:    XR hip right with pelvis when performed 1 view   Final Result   There is dislocation of the femoral component from the acetabular   cup. No perihardware fracture.        MACRO:   None        Signed by: Shannon Fairchild 5/12/2024 9:17 PM   Dictation workstation:   [id number]      XR hip right with pelvis when performed 1 view   Final Result   There is dislocation of the femoral component from the acetabular   cup. No perihardware fracture.        MACRO:   None        Signed by: Shannon Fairchild 5/12/2024 9:17 PM   Dictation workstation:   [id number]      XR hip right with pelvis when performed 1 view   Final Result   There is dislocation of the femoral component from the acetabular   cup. No perihardware fracture.        MACRO:   None        Signed by: Shannon Fairchild 5/12/2024 9:17 PM   Dictation workstation:   [id number]      XR hip right with pelvis when performed 1 view   Final Result   There is dislocation of the femoral component from the acetabular   cup. No perihardware fracture.        MACRO:   None        Signed by: Shannon Fairchild 5/12/2024 9:17 PM   Dictation workstation:   [id number]      XR hip right with pelvis when performed 1 view   Final Result   1. Please see findings.        MACRO:   None.        Signed by: Tiki Charlton 5/12/2024 8:23 PM   Dictation workstation:   [id number]      XR hip right with pelvis when  performed 2 or 3 views   Final Result   Dislocation of femoral component of right hip arthroplasty.             MACRO:   None        Signed by: Yoandy Dennycher 5/12/2024 6:59 PM   Dictation workstation:   [id number]            ED BEDSIDE ULTRASOUND:   Performed by ED Physician - none    LABS:  Labs Reviewed - No data to display    All other labs were within normal range or not returned as of this dictation.    EMERGENCY DEPARTMENT COURSE/MDM:   Vitals:    Vitals:    05/12/24 2040 05/12/24 2045 05/12/24 2130 05/13/24 0000   BP: 129/72 128/71 138/65 126/60   BP Location:    Left arm   Patient Position:       Pulse: 66 55 57 63   Resp: 20 18 19 15   Temp:       TempSrc:       SpO2: 99% 100% 100% 99%   Weight:       Height:           I reviewed the patient's triage vitals and they are patient arrives hypertensive recommend follow-up with primary physician for repeat checks.  Suspect secondary to pain    Due to the above findings the following was ordered fentanyl ordered for pain x-ray imaging of the hip as well as conscious sedation with propofol for suspected dislocation.    I did verbally and written consent for propofol sedation with the right hip reduction.  After confirmational x-ray imaging patient was sedated with propofol multiple attempts by both myself as well as the other emergency department physician in the department were unsuccessful.  Patient recovered appropriately.  He had no episodes of apnea or desaturation.  I did have a thorough discussion with  who reduced the patient's hip yesterday.  He recommended that I reach out to  the patient's surgeon as we do not have anesthesia at this time available and paralytics were required yesterday.  I was able to speak with  NIKOS who recommended that we transfer to [facility] as this will likely need revision.  Patient was signed out to the oncoming physician to ensure excepting physician at [facility] awaiting callback.  Patient  was updated on plan he is appreciative of care as well as his spouse at bedside.      Moderate Sedation    Performed by: Theron Harris DO  Authorized by: Theron Harris DO    Consent:     Consent obtained:  Verbal and written    Consent given by:  Patient    Risks, benefits, and alternatives were discussed: yes      Risks discussed:  Allergic reaction, inadequate sedation, nausea, vomiting, respiratory compromise necessitating ventilatory assistance and intubation, prolonged sedation necessitating reversal, prolonged hypoxia resulting in organ damage and dysrhythmia    Alternatives discussed:  Analgesia without sedation, regional anesthesia and anxiolysis  Universal protocol:     Procedure explained and questions answered to patient or proxy's satisfaction: yes      Relevant documents present and verified: yes      Test results available: yes      Imaging studies available: yes      Required blood products, implants, devices, and special equipment available: yes      Site/side marked: yes      Immediately prior to procedure, a time out was called: yes      Patient identity confirmed:  Verbally with patient and arm band  Indications:     Procedure performed:  Dislocation reduction    Procedure necessitating sedation performed by:  Physician performing sedation    Intended level of sedation:  Moderate  Pre-sedation assessment:     NPO status caution: unable to specify NPO status      ASA classification: class 2 - patient with mild systemic disease      Mouth opening:  3 or more finger widths    Thyromental distance:  4 finger widths    Mallampati score:  I - soft palate, uvula, fauces, pillars visible    Neck mobility: normal      Pre-sedation assessments completed and reviewed: airway patency, anesthesia/sedation history, cardiovascular function, hydration status, mental status, nausea/vomiting, pain level, respiratory function and temperature      History of difficult intubation: no    Immediate  pre-procedure details:     Reassessment: Patient reassessed immediately prior to procedure      Reviewed: vital signs, relevant labs/tests and NPO status      Verified: bag valve mask available, emergency equipment available, intubation equipment available, IV patency confirmed, oxygen available, reversal medications available and suction available    Procedure details (see MAR for exact dosages):     Preoxygenation:  Nasal cannula    Sedation:  Propofol    Analgesia:  Fentanyl    Intra-procedure monitoring:  Blood pressure monitoring, cardiac monitor, continuous pulse oximetry, continuous capnometry, frequent LOC assessments and frequent vital sign checks    Intra-procedure events: none    Post-procedure details:     Attendance: Constant attendance by certified staff until patient recovered      Recovery: Patient returned to pre-procedure baseline      Estimated blood loss (see I/O flowsheets): no      Post-sedation assessments completed and reviewed: airway patency, cardiovascular function, hydration status, mental status, nausea/vomiting, respiratory function and temperature      Specimens recovered:  None    Patient is stable for discharge or admission: no      Procedure completion:  Tolerated well, no immediate complications  Comments:      Unsuccessful reduction no complications no desaturations no apnea patient returned to baseline.      ED Course as of 05/13/24 1413   Sun May 12, 2024   2024 Multiple attempts at reduction without success orthopedic surgery called. [MG]   2028 Spoke with orthopedic surgery on-call  recommended that we speak with  in transfer patient as we do not have anesthesia in house this evening and patient will likely need paralysis potential revision. [MG]      ED Course User Index  [MG] Theron Harris DO         Diagnoses as of 05/13/24 1413   Hip dislocation, right, initial encounter (Multi)       Patient was counseled regarding labs, imaging, likely diagnosis,  and plan. All questions were answered.     ------------------------------------------------------------------  Information provided by the patient and EMS  Consults orthopedic surgery  Past medical history complicating workup recent right hip revision  Previous medical records reviewed hospital admission 5/1/2024  Shared medical decision making patient agreeable with transfer to outside hospital  ------------------------------------------------------------------  ED Medications administered this visit:    Medications   sodium chloride 0.9 % bolus 1,000 mL (0 mL intravenous Stopped 5/13/24 0011)   propofol (Diprivan) injection 100 mg (80 mg intravenous Given 5/12/24 1931)   fentaNYL PF (Sublimaze) injection 50 mcg (50 mcg intravenous Given 5/12/24 1843)   propofol (Diprivan) injection (20 mg intravenous Given 5/12/24 2013)   morphine injection 8 mg (8 mg intravenous Given 5/12/24 2137)           Final Impression:   1. Hip dislocation, right, initial encounter (Multi)          Theron Harris DO    (Please note that portions of this note were completed with a voice recognition program.  Efforts were made to edit the dictations but occasionally words are mis-transcribed.)     Theron Harris DO  05/13/24 1841

## 2024-05-13 VITALS
SYSTOLIC BLOOD PRESSURE: 126 MMHG | HEIGHT: 71 IN | RESPIRATION RATE: 15 BRPM | WEIGHT: 240 LBS | TEMPERATURE: 99.3 F | BODY MASS INDEX: 33.6 KG/M2 | HEART RATE: 63 BPM | DIASTOLIC BLOOD PRESSURE: 60 MMHG | OXYGEN SATURATION: 99 %

## 2024-05-13 ASSESSMENT — PAIN DESCRIPTION - PAIN TYPE: TYPE: ACUTE PAIN

## 2024-05-13 ASSESSMENT — PAIN DESCRIPTION - LOCATION: LOCATION: HIP

## 2024-05-13 ASSESSMENT — PAIN DESCRIPTION - ORIENTATION: ORIENTATION: RIGHT

## 2024-05-13 ASSESSMENT — PAIN DESCRIPTION - DESCRIPTORS: DESCRIPTORS: DISCOMFORT

## 2024-05-13 ASSESSMENT — PAIN SCALES - GENERAL: PAINLEVEL_OUTOF10: 2

## 2024-05-13 ASSESSMENT — PAIN - FUNCTIONAL ASSESSMENT: PAIN_FUNCTIONAL_ASSESSMENT: 0-10

## 2024-05-13 NOTE — PROGRESS NOTES
Patient was turned over to me with plans for transfer to [facility].  Spoke with nursing supervisor.  Patient will be a direct admit over there.  Patient was given morphine 8 mg IV here in the emergency department.

## 2024-05-14 ENCOUNTER — ANESTHESIA EVENT (OUTPATIENT)
Dept: OPERATING ROOM | Facility: HOSPITAL | Age: 69
End: 2024-05-14

## 2024-05-14 ENCOUNTER — HOSPITAL ENCOUNTER (OUTPATIENT)
Facility: HOSPITAL | Age: 69
Setting detail: OUTPATIENT SURGERY
End: 2024-05-14
Attending: STUDENT IN AN ORGANIZED HEALTH CARE EDUCATION/TRAINING PROGRAM | Admitting: STUDENT IN AN ORGANIZED HEALTH CARE EDUCATION/TRAINING PROGRAM
Payer: MEDICARE

## 2024-05-14 ENCOUNTER — HOSPITAL ENCOUNTER (OUTPATIENT)
Facility: HOSPITAL | Age: 69
Setting detail: OUTPATIENT SURGERY
End: 2024-05-14
Attending: STUDENT IN AN ORGANIZED HEALTH CARE EDUCATION/TRAINING PROGRAM | Admitting: STUDENT IN AN ORGANIZED HEALTH CARE EDUCATION/TRAINING PROGRAM
Payer: COMMERCIAL

## 2024-05-14 RX ORDER — SUCCINYLCHOLINE CHLORIDE 100 MG/5ML
SYRINGE (ML) INTRAVENOUS AS NEEDED
Status: DISCONTINUED | OUTPATIENT
Start: 2024-05-11 | End: 2024-05-14

## 2024-05-14 RX ORDER — PROPOFOL 10 MG/ML
INJECTION, EMULSION INTRAVENOUS AS NEEDED
Status: DISCONTINUED | OUTPATIENT
Start: 2024-05-11 | End: 2024-05-14

## 2024-05-14 SDOH — HEALTH STABILITY: MENTAL HEALTH: CURRENT SMOKER: 0

## 2024-05-14 NOTE — OP NOTE
Closed Reduction Right Hip (R) Operative Note     Date: 2024  OR Location: [facility]    Name: Wade Machado, : 1955, Age: 69 y.o., MRN: [id number], Sex: male    Diagnosis  * No Diagnosis Codes entered * * No Diagnosis Codes entered *     Procedures  Closed Reduction Right Hip  68560 - MO TX SPON HIP DISLC ABDCT SPLNT/TRCJ W/MANJ ANES      Surgeons      * Wade Pugh - Primary    Resident/Fellow/Other Assistant:  Surgeons and Role:  * No surgeons found with a matching role *    Procedure Summary  Anesthesia: * No anesthesia type entered *  ASA: III  Anesthesia Staff: Anesthesiologist: Julio Bolanos MD  C-AA: FRANK Santoyo  Estimated Blood Loss: 0mL  Intra-op Medications: Administrations occurring from 1440 to 1510 on 24:  * No intraprocedure medications in log *           Anesthesia Record               Intraprocedure I/O Totals          Intake    Propofol Drip 0.00 mL    The total shown is the total volume documented since Anesthesia Start was filed.    Total Intake 0 mL          Specimen: No specimens collected     Staff:   No surgical staff documented.         Drains and/or Catheters: * None in log *    Tourniquet Times:         Implants:     Findings: Posterior superior right prosthetic hip dislocation     Indications: Wade Machado is an 69 y.o. male who is having surgery for Right hip dislocation.     The patient was seen in the preoperative area. The risks, benefits, complications, treatment options, non-operative alternatives, expected recovery and outcomes were discussed with the patient. The possibilities of reaction to medication, pulmonary aspiration, injury to surrounding structures, bleeding, recurrent infection, the need for additional procedures, failure to diagnose a condition, and creating a complication requiring transfusion or operation were discussed with the patient. The patient concurred with the proposed plan, giving informed consent.  The site of surgery was  properly noted/marked if necessary per policy. The patient has been actively warmed in preoperative area. Preoperative antibiotics are not indicated. Venous thrombosis prophylaxis are not indicated.    Procedure Details: Patient was identified by his hospital wristband and confirmed with patient and anesthesia staff.  He underwent anesthesia with bag masking.  Upon full muscle paralysis, patient's right lower extremity was manipulated in flexion at the right hip and in line longitudinal traction was applied with gentle internal and external rotation producing audible clunk and successful reduction of the right hip.  Leg lengths were then found to be approximately equal.  Reduction was confirmed on flatplate imaging.  Postreduction neurovascular status was unchanged.  Patient was placed into a knee immobilizer.  He is okay for weightbearing as tolerated with strict posterior precautions.  Follow-up with Dr. Reilly as an outpatient.    Complications:  None; patient tolerated the procedure well.    Disposition:  DC home from ED  Condition: stable         Additional Details: None    Attending Attestation: I was present and scrubbed for the entire procedure.    Wade Pugh  Phone Number: [phone]

## 2024-05-14 NOTE — ANESTHESIA POSTPROCEDURE EVALUATION
Patient: Wade Machado    Procedure Summary       Date: 05/11/24 Room / Location: PAR OR BEDSIDE / Virtual PAR OR    Anesthesia Start: 1440 Anesthesia Stop: 1505    Procedure: Closed Reduction Right Hip (Right: Hip) Diagnosis: (Right hip dislocation)    Surgeons: Wade Pugh DO Responsible Provider: Julio Bolanos MD    Anesthesia Type: general ASA Status: 3            Anesthesia Type: general    Vitals Value Taken Time   BP    05/14/24 1248   Temp    05/14/24 1248   Pulse    05/14/24 1248   Resp    05/14/24 1248   SpO2    05/14/24 1248       Anesthesia Post Evaluation    Patient location during evaluation: PACU  Patient participation: complete - patient participated  Level of consciousness: awake and alert  Pain management: satisfactory to patient  Airway patency: patent  Cardiovascular status: acceptable  Respiratory status: acceptable  Hydration status: acceptable  Postoperative Nausea and Vomiting: none        No notable events documented.

## 2024-05-14 NOTE — ANESTHESIA PREPROCEDURE EVALUATION
Patient: Wade Machado    Procedure Information       Date/Time: 05/11/24 2930    Procedure: Closed Reduction Right Hip (Right: Hip)    Location: [facility] OR BEDSIDE / Virtual [facility] OR    Surgeons: Wade Pugh, DO            Relevant Problems   Anesthesia   (-) Difficult intubation   (-) PONV (postoperative nausea and vomiting)      Cardiac   (+) Benign essential hypertension      /Renal   (+) BPH (benign prostatic hyperplasia)       Clinical information reviewed:                   NPO Detail:  No data recorded     Physical Exam    Airway  Mallampati: II  TM distance: >3 FB  Neck ROM: full     Cardiovascular - normal exam  Rhythm: regular  Rate: normal     Dental    Pulmonary - normal exam     Abdominal            Anesthesia Plan    History of general anesthesia?: yes  History of complications of general anesthesia?: no    ASA 3     general     The patient is not a current smoker.    intravenous induction   Anesthetic plan and risks discussed with patient.    Plan discussed with CRNA, CAA and attending.

## 2024-05-17 ENCOUNTER — HOSPITAL ENCOUNTER (OUTPATIENT)
Facility: HOSPITAL | Age: 69
Setting detail: OUTPATIENT SURGERY
End: 2024-05-17
Attending: STUDENT IN AN ORGANIZED HEALTH CARE EDUCATION/TRAINING PROGRAM | Admitting: STUDENT IN AN ORGANIZED HEALTH CARE EDUCATION/TRAINING PROGRAM
Payer: COMMERCIAL

## 2024-06-02 DIAGNOSIS — I10 BENIGN ESSENTIAL HYPERTENSION: ICD-10-CM

## 2024-06-03 RX ORDER — LISINOPRIL 40 MG/1
40 TABLET ORAL DAILY
Qty: 90 TABLET | Refills: 3 | Status: SHIPPED | OUTPATIENT
Start: 2024-06-03

## 2024-07-01 ENCOUNTER — TELEPHONE (OUTPATIENT)
Dept: PRIMARY CARE | Facility: CLINIC | Age: 69
End: 2024-07-01
Payer: COMMERCIAL

## 2024-07-01 DIAGNOSIS — N30.00 ACUTE CYSTITIS WITHOUT HEMATURIA: Primary | ICD-10-CM

## 2024-07-01 RX ORDER — SULFAMETHOXAZOLE AND TRIMETHOPRIM 800; 160 MG/1; MG/1
1 TABLET ORAL 2 TIMES DAILY
Qty: 14 TABLET | Refills: 0 | Status: SHIPPED | OUTPATIENT
Start: 2024-07-01 | End: 2024-07-08

## 2024-07-01 NOTE — TELEPHONE ENCOUNTER
Patient spouse called stating the patient might have a UTI. They want to know if you can prescribe antibiotics to [facility] on file     C/o; urgency, burning , smell    [phone] - jhoana

## 2024-08-08 ENCOUNTER — HOSPITAL ENCOUNTER (EMERGENCY)
Facility: HOSPITAL | Age: 69
Discharge: OTHER INSTITUTION | End: 2024-08-09
Attending: EMERGENCY MEDICINE
Payer: COMMERCIAL

## 2024-08-08 ENCOUNTER — APPOINTMENT (OUTPATIENT)
Dept: RADIOLOGY | Facility: HOSPITAL | Age: 69
End: 2024-08-08
Payer: COMMERCIAL

## 2024-08-08 ENCOUNTER — HOSPITAL ENCOUNTER (OUTPATIENT)
Dept: CARDIOLOGY | Facility: HOSPITAL | Age: 69
Discharge: HOME | End: 2024-08-08
Payer: COMMERCIAL

## 2024-08-08 DIAGNOSIS — N30.00 ACUTE CYSTITIS WITHOUT HEMATURIA: Primary | ICD-10-CM

## 2024-08-08 DIAGNOSIS — I24.9 ACS (ACUTE CORONARY SYNDROME) (MULTI): ICD-10-CM

## 2024-08-08 DIAGNOSIS — I61.3 PONTINE HEMORRHAGE (MULTI): ICD-10-CM

## 2024-08-08 DIAGNOSIS — N32.1 COLOVESICAL FISTULA: ICD-10-CM

## 2024-08-08 DIAGNOSIS — K57.92 DIVERTICULITIS: ICD-10-CM

## 2024-08-08 LAB
ANION GAP BLDV CALCULATED.4IONS-SCNC: 13 MMOL/L (ref 10–25)
APPEARANCE UR: ABNORMAL
BASE EXCESS BLDV CALC-SCNC: -1.1 MMOL/L (ref -2–3)
BASOPHILS # BLD AUTO: 0.05 X10*3/UL (ref 0–0.1)
BASOPHILS NFR BLD AUTO: 0.3 %
BILIRUB UR STRIP.AUTO-MCNC: NEGATIVE MG/DL
BODY TEMPERATURE: 37 DEGREES CELSIUS
CA-I BLDV-SCNC: 1.1 MMOL/L (ref 1.1–1.33)
CHLORIDE BLDV-SCNC: 102 MMOL/L (ref 98–107)
COLOR UR: YELLOW
EOSINOPHIL # BLD AUTO: 0.03 X10*3/UL (ref 0–0.7)
EOSINOPHIL NFR BLD AUTO: 0.2 %
ERYTHROCYTE [DISTWIDTH] IN BLOOD BY AUTOMATED COUNT: 14.6 % (ref 11.5–14.5)
GLUCOSE BLDV-MCNC: 125 MG/DL (ref 74–99)
GLUCOSE UR STRIP.AUTO-MCNC: NORMAL MG/DL
HCO3 BLDV-SCNC: 22.2 MMOL/L (ref 22–26)
HCT VFR BLD AUTO: 36.5 % (ref 41–52)
HCT VFR BLD EST: 36 % (ref 41–52)
HGB BLD-MCNC: 12.5 G/DL (ref 13.5–17.5)
HGB BLDV-MCNC: 11.9 G/DL (ref 13.5–17.5)
IMM GRANULOCYTES # BLD AUTO: 0.11 X10*3/UL (ref 0–0.7)
IMM GRANULOCYTES NFR BLD AUTO: 0.6 % (ref 0–0.9)
INHALED O2 CONCENTRATION: 21 %
KETONES UR STRIP.AUTO-MCNC: NEGATIVE MG/DL
LACTATE BLDV-SCNC: 1.4 MMOL/L (ref 0.4–2)
LEUKOCYTE ESTERASE UR QL STRIP.AUTO: ABNORMAL
LYMPHOCYTES # BLD AUTO: 0.7 X10*3/UL (ref 1.2–4.8)
LYMPHOCYTES NFR BLD AUTO: 4 %
MCH RBC QN AUTO: 30.4 PG (ref 26–34)
MCHC RBC AUTO-ENTMCNC: 34.2 G/DL (ref 32–36)
MCV RBC AUTO: 89 FL (ref 80–100)
MONOCYTES # BLD AUTO: 1.22 X10*3/UL (ref 0.1–1)
MONOCYTES NFR BLD AUTO: 7 %
NEUTROPHILS # BLD AUTO: 15.35 X10*3/UL (ref 1.2–7.7)
NEUTROPHILS NFR BLD AUTO: 87.9 %
NITRITE UR QL STRIP.AUTO: NEGATIVE
NRBC BLD-RTO: 0 /100 WBCS (ref 0–0)
OXYHGB MFR BLDV: 64.3 % (ref 45–75)
PCO2 BLDV: 32 MM HG (ref 41–51)
PH BLDV: 7.45 PH (ref 7.33–7.43)
PH UR STRIP.AUTO: 6 [PH]
PLATELET # BLD AUTO: 314 X10*3/UL (ref 150–450)
PO2 BLDV: 41 MM HG (ref 35–45)
POTASSIUM BLDV-SCNC: 3.5 MMOL/L (ref 3.5–5.3)
PROT UR STRIP.AUTO-MCNC: ABNORMAL MG/DL
RBC # BLD AUTO: 4.11 X10*6/UL (ref 4.5–5.9)
RBC # UR STRIP.AUTO: ABNORMAL /UL
RBC #/AREA URNS AUTO: ABNORMAL /HPF
SAO2 % BLDV: 67 % (ref 45–75)
SODIUM BLDV-SCNC: 134 MMOL/L (ref 136–145)
SP GR UR STRIP.AUTO: 1.02
UROBILINOGEN UR STRIP.AUTO-MCNC: NORMAL MG/DL
WBC # BLD AUTO: 17.5 X10*3/UL (ref 4.4–11.3)
WBC #/AREA URNS AUTO: >50 /HPF
WBC CLUMPS #/AREA URNS AUTO: ABNORMAL /HPF

## 2024-08-08 PROCEDURE — 85025 COMPLETE CBC W/AUTO DIFF WBC: CPT | Performed by: EMERGENCY MEDICINE

## 2024-08-08 PROCEDURE — 72125 CT NECK SPINE W/O DYE: CPT

## 2024-08-08 PROCEDURE — 81003 URINALYSIS AUTO W/O SCOPE: CPT | Performed by: EMERGENCY MEDICINE

## 2024-08-08 PROCEDURE — 83605 ASSAY OF LACTIC ACID: CPT | Performed by: EMERGENCY MEDICINE

## 2024-08-08 PROCEDURE — 87086 URINE CULTURE/COLONY COUNT: CPT | Mod: PARLAB | Performed by: EMERGENCY MEDICINE

## 2024-08-08 PROCEDURE — 87635 SARS-COV-2 COVID-19 AMP PRB: CPT | Performed by: EMERGENCY MEDICINE

## 2024-08-08 PROCEDURE — 99291 CRITICAL CARE FIRST HOUR: CPT | Performed by: EMERGENCY MEDICINE

## 2024-08-08 PROCEDURE — 84132 ASSAY OF SERUM POTASSIUM: CPT | Performed by: EMERGENCY MEDICINE

## 2024-08-08 PROCEDURE — 87040 BLOOD CULTURE FOR BACTERIA: CPT | Mod: PARLAB | Performed by: EMERGENCY MEDICINE

## 2024-08-08 PROCEDURE — 93005 ELECTROCARDIOGRAM TRACING: CPT

## 2024-08-08 PROCEDURE — 36415 COLL VENOUS BLD VENIPUNCTURE: CPT | Performed by: EMERGENCY MEDICINE

## 2024-08-08 PROCEDURE — 70450 CT HEAD/BRAIN W/O DYE: CPT

## 2024-08-08 PROCEDURE — 2500000004 HC RX 250 GENERAL PHARMACY W/ HCPCS (ALT 636 FOR OP/ED): Performed by: EMERGENCY MEDICINE

## 2024-08-08 PROCEDURE — 74176 CT ABD & PELVIS W/O CONTRAST: CPT

## 2024-08-08 PROCEDURE — 96361 HYDRATE IV INFUSION ADD-ON: CPT

## 2024-08-08 RX ORDER — ACETAMINOPHEN 325 MG/1
1000 TABLET ORAL ONCE
Status: COMPLETED | OUTPATIENT
Start: 2024-08-08 | End: 2024-08-08

## 2024-08-08 RX ADMIN — ACETAMINOPHEN 975 MG: 325 TABLET ORAL at 23:30

## 2024-08-08 RX ADMIN — SODIUM CHLORIDE 1000 ML: 9 INJECTION, SOLUTION INTRAVENOUS at 23:30

## 2024-08-08 ASSESSMENT — PAIN SCALES - GENERAL: PAINLEVEL_OUTOF10: 0 - NO PAIN

## 2024-08-08 ASSESSMENT — PAIN - FUNCTIONAL ASSESSMENT: PAIN_FUNCTIONAL_ASSESSMENT: 0-10

## 2024-08-09 ENCOUNTER — APPOINTMENT (OUTPATIENT)
Dept: RADIOLOGY | Facility: HOSPITAL | Age: 69
DRG: 065 | End: 2024-08-09
Payer: COMMERCIAL

## 2024-08-09 ENCOUNTER — APPOINTMENT (OUTPATIENT)
Dept: CARDIOLOGY | Facility: HOSPITAL | Age: 69
DRG: 065 | End: 2024-08-09
Payer: COMMERCIAL

## 2024-08-09 ENCOUNTER — HOSPITAL ENCOUNTER (INPATIENT)
Facility: HOSPITAL | Age: 69
End: 2024-08-09
Attending: STUDENT IN AN ORGANIZED HEALTH CARE EDUCATION/TRAINING PROGRAM | Admitting: NEUROLOGICAL SURGERY
Payer: COMMERCIAL

## 2024-08-09 VITALS
TEMPERATURE: 99.9 F | OXYGEN SATURATION: 99 % | HEIGHT: 66 IN | BODY MASS INDEX: 38.57 KG/M2 | SYSTOLIC BLOOD PRESSURE: 131 MMHG | WEIGHT: 240 LBS | RESPIRATION RATE: 16 BRPM | HEART RATE: 81 BPM | DIASTOLIC BLOOD PRESSURE: 70 MMHG

## 2024-08-09 DIAGNOSIS — N40.0 BENIGN PROSTATIC HYPERPLASIA, UNSPECIFIED WHETHER LOWER URINARY TRACT SYMPTOMS PRESENT: ICD-10-CM

## 2024-08-09 DIAGNOSIS — Z74.09 IMPAIRED FUNCTIONAL MOBILITY AND ENDURANCE: ICD-10-CM

## 2024-08-09 DIAGNOSIS — I61.3 PONTINE HEMORRHAGE (MULTI): Primary | ICD-10-CM

## 2024-08-09 DIAGNOSIS — N32.1 COLOVESICAL FISTULA: ICD-10-CM

## 2024-08-09 DIAGNOSIS — I10 BENIGN ESSENTIAL HYPERTENSION: ICD-10-CM

## 2024-08-09 DIAGNOSIS — R33.9 URINARY RETENTION: Primary | ICD-10-CM

## 2024-08-09 DIAGNOSIS — I1A.0 RESISTANT HYPERTENSION: ICD-10-CM

## 2024-08-09 DIAGNOSIS — Z96.642 PRESENCE OF ARTIFICIAL HIP, LEFT: ICD-10-CM

## 2024-08-09 DIAGNOSIS — N30.90 CYSTITIS: ICD-10-CM

## 2024-08-09 LAB
ABO GROUP (TYPE) IN BLOOD: NORMAL
ALBUMIN SERPL BCP-MCNC: 3.4 G/DL (ref 3.4–5)
ALBUMIN SERPL BCP-MCNC: 3.4 G/DL (ref 3.4–5)
ALP SERPL-CCNC: 107 U/L (ref 33–136)
ALT SERPL W P-5'-P-CCNC: 14 U/L (ref 10–52)
ANION GAP BLDV CALCULATED.4IONS-SCNC: 10 MMOL/L (ref 10–25)
ANION GAP SERPL CALC-SCNC: 17 MMOL/L (ref 10–20)
ANION GAP SERPL CALC-SCNC: 20 MMOL/L (ref 10–20)
ANTIBODY SCREEN: NORMAL
APPEARANCE UR: ABNORMAL
APTT PPP: 30 SECONDS (ref 27–38)
AST SERPL W P-5'-P-CCNC: 15 U/L (ref 9–39)
BASE EXCESS BLDV CALC-SCNC: 1 MMOL/L (ref -2–3)
BILIRUB SERPL-MCNC: 0.4 MG/DL (ref 0–1.2)
BILIRUB UR STRIP.AUTO-MCNC: NEGATIVE MG/DL
BNP SERPL-MCNC: 59 PG/ML (ref 0–99)
BODY TEMPERATURE: 37 DEGREES CELSIUS
BUN SERPL-MCNC: 11 MG/DL (ref 6–23)
BUN SERPL-MCNC: 12 MG/DL (ref 6–23)
CA-I BLDV-SCNC: 1.12 MMOL/L (ref 1.1–1.33)
CALCIUM SERPL-MCNC: 8.3 MG/DL (ref 8.6–10.6)
CALCIUM SERPL-MCNC: 8.7 MG/DL (ref 8.6–10.3)
CARDIAC TROPONIN I PNL SERPL HS: 13 NG/L (ref 0–53)
CHLORIDE BLDV-SCNC: 100 MMOL/L (ref 98–107)
CHLORIDE SERPL-SCNC: 101 MMOL/L (ref 98–107)
CHLORIDE SERPL-SCNC: 99 MMOL/L (ref 98–107)
CHOLEST SERPL-MCNC: 123 MG/DL (ref 0–199)
CHOLESTEROL/HDL RATIO: 3.3
CO2 SERPL-SCNC: 20 MMOL/L (ref 21–32)
CO2 SERPL-SCNC: 21 MMOL/L (ref 21–32)
COLOR UR: COLORLESS
CREAT SERPL-MCNC: 0.75 MG/DL (ref 0.5–1.3)
CREAT SERPL-MCNC: 0.83 MG/DL (ref 0.5–1.3)
CRP SERPL-MCNC: 20.88 MG/DL
EGFRCR SERPLBLD CKD-EPI 2021: >90 ML/MIN/1.73M*2
EGFRCR SERPLBLD CKD-EPI 2021: >90 ML/MIN/1.73M*2
ERYTHROCYTE [DISTWIDTH] IN BLOOD BY AUTOMATED COUNT: 14.5 % (ref 11.5–14.5)
ERYTHROCYTE [SEDIMENTATION RATE] IN BLOOD BY WESTERGREN METHOD: 76 MM/H (ref 0–20)
EST. AVERAGE GLUCOSE BLD GHB EST-MCNC: 126 MG/DL
GLUCOSE BLDV-MCNC: 102 MG/DL (ref 74–99)
GLUCOSE SERPL-MCNC: 123 MG/DL (ref 74–99)
GLUCOSE SERPL-MCNC: 82 MG/DL (ref 74–99)
GLUCOSE UR STRIP.AUTO-MCNC: NORMAL MG/DL
HBA1C MFR BLD: 6 %
HCO3 BLDV-SCNC: 26.6 MMOL/L (ref 22–26)
HCT VFR BLD AUTO: 38.2 % (ref 41–52)
HCT VFR BLD EST: 39 % (ref 41–52)
HDLC SERPL-MCNC: 37.6 MG/DL
HGB BLD-MCNC: 12.7 G/DL (ref 13.5–17.5)
HGB BLDV-MCNC: 13 G/DL (ref 13.5–17.5)
HOLD SPECIMEN: NORMAL
INR PPP: 1.3 (ref 0.9–1.1)
KETONES UR STRIP.AUTO-MCNC: NEGATIVE MG/DL
LACTATE BLDV-SCNC: 1.2 MMOL/L (ref 0.4–2)
LACTATE SERPL-SCNC: 1.4 MMOL/L (ref 0.4–2)
LDLC SERPL CALC-MCNC: 66 MG/DL
LEUKOCYTE ESTERASE UR QL STRIP.AUTO: ABNORMAL
MAGNESIUM SERPL-MCNC: 2.14 MG/DL (ref 1.6–2.4)
MCH RBC QN AUTO: 28.9 PG (ref 26–34)
MCHC RBC AUTO-ENTMCNC: 33.2 G/DL (ref 32–36)
MCV RBC AUTO: 87 FL (ref 80–100)
MUCOUS THREADS #/AREA URNS AUTO: ABNORMAL /LPF
NITRITE UR QL STRIP.AUTO: NEGATIVE
NON HDL CHOLESTEROL: 85 MG/DL (ref 0–149)
NRBC BLD-RTO: 0 /100 WBCS (ref 0–0)
OXYHGB MFR BLDV: 28 % (ref 45–75)
PCO2 BLDV: 45 MM HG (ref 41–51)
PH BLDV: 7.38 PH (ref 7.33–7.43)
PH UR STRIP.AUTO: 6.5 [PH]
PHOSPHATE SERPL-MCNC: 4.6 MG/DL (ref 2.5–4.9)
PLATELET # BLD AUTO: 384 X10*3/UL (ref 150–450)
PO2 BLDV: 22 MM HG (ref 35–45)
POTASSIUM BLDV-SCNC: 3.9 MMOL/L (ref 3.5–5.3)
POTASSIUM SERPL-SCNC: 3.8 MMOL/L (ref 3.5–5.3)
POTASSIUM SERPL-SCNC: 4.5 MMOL/L (ref 3.5–5.3)
PROT SERPL-MCNC: 7.4 G/DL (ref 6.4–8.2)
PROT UR STRIP.AUTO-MCNC: ABNORMAL MG/DL
PROTHROMBIN TIME: 15.2 SECONDS (ref 9.8–12.8)
RBC # BLD AUTO: 4.39 X10*6/UL (ref 4.5–5.9)
RBC # UR STRIP.AUTO: ABNORMAL /UL
RBC #/AREA URNS AUTO: >20 /HPF
RH FACTOR (ANTIGEN D): NORMAL
SAO2 % BLDV: 29 % (ref 45–75)
SARS-COV-2 RNA RESP QL NAA+PROBE: NOT DETECTED
SODIUM BLDV-SCNC: 133 MMOL/L (ref 136–145)
SODIUM SERPL-SCNC: 134 MMOL/L (ref 136–145)
SODIUM SERPL-SCNC: 135 MMOL/L (ref 136–145)
SP GR UR STRIP.AUTO: 1.02
TRIGL SERPL-MCNC: 95 MG/DL (ref 0–149)
UROBILINOGEN UR STRIP.AUTO-MCNC: NORMAL MG/DL
VLDL: 19 MG/DL (ref 0–40)
WBC # BLD AUTO: 15.2 X10*3/UL (ref 4.4–11.3)
WBC #/AREA URNS AUTO: >50 /HPF
WBC CLUMPS #/AREA URNS AUTO: ABNORMAL /HPF

## 2024-08-09 PROCEDURE — 36415 COLL VENOUS BLD VENIPUNCTURE: CPT | Performed by: STUDENT IN AN ORGANIZED HEALTH CARE EDUCATION/TRAINING PROGRAM

## 2024-08-09 PROCEDURE — 85610 PROTHROMBIN TIME: CPT

## 2024-08-09 PROCEDURE — 99223 1ST HOSP IP/OBS HIGH 75: CPT | Performed by: INTERNAL MEDICINE

## 2024-08-09 PROCEDURE — A9575 INJ GADOTERATE MEGLUMI 0.1ML: HCPCS | Performed by: NEUROLOGICAL SURGERY

## 2024-08-09 PROCEDURE — 70496 CT ANGIOGRAPHY HEAD: CPT | Performed by: RADIOLOGY

## 2024-08-09 PROCEDURE — 2500000004 HC RX 250 GENERAL PHARMACY W/ HCPCS (ALT 636 FOR OP/ED): Performed by: STUDENT IN AN ORGANIZED HEALTH CARE EDUCATION/TRAINING PROGRAM

## 2024-08-09 PROCEDURE — 71250 CT THORAX DX C-: CPT | Performed by: RADIOLOGY

## 2024-08-09 PROCEDURE — 2060000001 HC INTERMEDIATE ICU ROOM DAILY

## 2024-08-09 PROCEDURE — 85027 COMPLETE CBC AUTOMATED: CPT | Performed by: STUDENT IN AN ORGANIZED HEALTH CARE EDUCATION/TRAINING PROGRAM

## 2024-08-09 PROCEDURE — 97530 THERAPEUTIC ACTIVITIES: CPT | Mod: GP | Performed by: PHYSICAL THERAPIST

## 2024-08-09 PROCEDURE — 97165 OT EVAL LOW COMPLEX 30 MIN: CPT | Mod: GO

## 2024-08-09 PROCEDURE — 99222 1ST HOSP IP/OBS MODERATE 55: CPT

## 2024-08-09 PROCEDURE — 72125 CT NECK SPINE W/O DYE: CPT | Performed by: RADIOLOGY

## 2024-08-09 PROCEDURE — 2500000001 HC RX 250 WO HCPCS SELF ADMINISTERED DRUGS (ALT 637 FOR MEDICARE OP)

## 2024-08-09 PROCEDURE — 70498 CT ANGIOGRAPHY NECK: CPT | Performed by: RADIOLOGY

## 2024-08-09 PROCEDURE — 83036 HEMOGLOBIN GLYCOSYLATED A1C: CPT | Performed by: STUDENT IN AN ORGANIZED HEALTH CARE EDUCATION/TRAINING PROGRAM

## 2024-08-09 PROCEDURE — 70553 MRI BRAIN STEM W/O & W/DYE: CPT

## 2024-08-09 PROCEDURE — 84132 ASSAY OF SERUM POTASSIUM: CPT | Performed by: STUDENT IN AN ORGANIZED HEALTH CARE EDUCATION/TRAINING PROGRAM

## 2024-08-09 PROCEDURE — 86901 BLOOD TYPING SEROLOGIC RH(D): CPT

## 2024-08-09 PROCEDURE — 2550000001 HC RX 255 CONTRASTS: Performed by: STUDENT IN AN ORGANIZED HEALTH CARE EDUCATION/TRAINING PROGRAM

## 2024-08-09 PROCEDURE — 99285 EMERGENCY DEPT VISIT HI MDM: CPT

## 2024-08-09 PROCEDURE — 97161 PT EVAL LOW COMPLEX 20 MIN: CPT | Mod: GP | Performed by: PHYSICAL THERAPIST

## 2024-08-09 PROCEDURE — 99285 EMERGENCY DEPT VISIT HI MDM: CPT | Performed by: STUDENT IN AN ORGANIZED HEALTH CARE EDUCATION/TRAINING PROGRAM

## 2024-08-09 PROCEDURE — 70498 CT ANGIOGRAPHY NECK: CPT

## 2024-08-09 PROCEDURE — 70450 CT HEAD/BRAIN W/O DYE: CPT | Performed by: RADIOLOGY

## 2024-08-09 PROCEDURE — 97530 THERAPEUTIC ACTIVITIES: CPT | Mod: GO

## 2024-08-09 PROCEDURE — 99291 CRITICAL CARE FIRST HOUR: CPT | Performed by: STUDENT IN AN ORGANIZED HEALTH CARE EDUCATION/TRAINING PROGRAM

## 2024-08-09 PROCEDURE — 2550000001 HC RX 255 CONTRASTS: Performed by: NEUROLOGICAL SURGERY

## 2024-08-09 PROCEDURE — 99221 1ST HOSP IP/OBS SF/LOW 40: CPT | Performed by: COLON & RECTAL SURGERY

## 2024-08-09 PROCEDURE — 83735 ASSAY OF MAGNESIUM: CPT | Performed by: STUDENT IN AN ORGANIZED HEALTH CARE EDUCATION/TRAINING PROGRAM

## 2024-08-09 PROCEDURE — 86140 C-REACTIVE PROTEIN: CPT

## 2024-08-09 PROCEDURE — 2500000004 HC RX 250 GENERAL PHARMACY W/ HCPCS (ALT 636 FOR OP/ED)

## 2024-08-09 PROCEDURE — 70553 MRI BRAIN STEM W/O & W/DYE: CPT | Performed by: RADIOLOGY

## 2024-08-09 PROCEDURE — 84132 ASSAY OF SERUM POTASSIUM: CPT

## 2024-08-09 PROCEDURE — 2500000002 HC RX 250 W HCPCS SELF ADMINISTERED DRUGS (ALT 637 FOR MEDICARE OP, ALT 636 FOR OP/ED)

## 2024-08-09 PROCEDURE — 96365 THER/PROPH/DIAG IV INF INIT: CPT

## 2024-08-09 PROCEDURE — 74176 CT ABD & PELVIS W/O CONTRAST: CPT | Performed by: RADIOLOGY

## 2024-08-09 PROCEDURE — 80061 LIPID PANEL: CPT | Performed by: STUDENT IN AN ORGANIZED HEALTH CARE EDUCATION/TRAINING PROGRAM

## 2024-08-09 PROCEDURE — 2500000001 HC RX 250 WO HCPCS SELF ADMINISTERED DRUGS (ALT 637 FOR MEDICARE OP): Performed by: STUDENT IN AN ORGANIZED HEALTH CARE EDUCATION/TRAINING PROGRAM

## 2024-08-09 PROCEDURE — 83880 ASSAY OF NATRIURETIC PEPTIDE: CPT | Performed by: STUDENT IN AN ORGANIZED HEALTH CARE EDUCATION/TRAINING PROGRAM

## 2024-08-09 PROCEDURE — 87086 URINE CULTURE/COLONY COUNT: CPT

## 2024-08-09 PROCEDURE — 84484 ASSAY OF TROPONIN QUANT: CPT | Performed by: STUDENT IN AN ORGANIZED HEALTH CARE EDUCATION/TRAINING PROGRAM

## 2024-08-09 PROCEDURE — 2500000004 HC RX 250 GENERAL PHARMACY W/ HCPCS (ALT 636 FOR OP/ED): Performed by: EMERGENCY MEDICINE

## 2024-08-09 PROCEDURE — 96375 TX/PRO/DX INJ NEW DRUG ADDON: CPT

## 2024-08-09 PROCEDURE — 85652 RBC SED RATE AUTOMATED: CPT

## 2024-08-09 PROCEDURE — 81001 URINALYSIS AUTO W/SCOPE: CPT

## 2024-08-09 RX ORDER — GADOTERATE MEGLUMINE 376.9 MG/ML
20 INJECTION INTRAVENOUS
Status: COMPLETED | OUTPATIENT
Start: 2024-08-09 | End: 2024-08-09

## 2024-08-09 RX ORDER — KETOROLAC TROMETHAMINE 5 MG/ML
SOLUTION OPHTHALMIC
Status: ON HOLD | COMMUNITY
Start: 2023-05-09 | End: 2024-08-09 | Stop reason: ALTCHOICE

## 2024-08-09 RX ORDER — POLYETHYLENE GLYCOL 3350 17 G/17G
17 POWDER, FOR SOLUTION ORAL DAILY
Status: ACTIVE | OUTPATIENT
Start: 2024-08-09

## 2024-08-09 RX ORDER — ACETAMINOPHEN 325 MG/1
650 TABLET ORAL EVERY 4 HOURS PRN
Status: DISCONTINUED | OUTPATIENT
Start: 2024-08-09 | End: 2024-08-09

## 2024-08-09 RX ORDER — FENTANYL CITRATE 50 UG/ML
12.5 INJECTION, SOLUTION INTRAMUSCULAR; INTRAVENOUS ONCE
Status: COMPLETED | OUTPATIENT
Start: 2024-08-09 | End: 2024-08-09

## 2024-08-09 RX ORDER — ACETAMINOPHEN 160 MG/5ML
650 SOLUTION ORAL EVERY 4 HOURS PRN
Status: ACTIVE | OUTPATIENT
Start: 2024-08-09

## 2024-08-09 RX ORDER — HYDRALAZINE HYDROCHLORIDE 20 MG/ML
10 INJECTION INTRAMUSCULAR; INTRAVENOUS
Status: DISCONTINUED | OUTPATIENT
Start: 2024-08-09 | End: 2024-08-09 | Stop reason: HOSPADM

## 2024-08-09 RX ORDER — DEXTROSE 50 % IN WATER (D50W) INTRAVENOUS SYRINGE
12.5
Status: ACTIVE | OUTPATIENT
Start: 2024-08-09

## 2024-08-09 RX ORDER — LABETALOL HYDROCHLORIDE 5 MG/ML
10 INJECTION, SOLUTION INTRAVENOUS ONCE
Status: COMPLETED | OUTPATIENT
Start: 2024-08-09 | End: 2024-08-09

## 2024-08-09 RX ORDER — TAMSULOSIN HYDROCHLORIDE 0.4 MG/1
0.4 CAPSULE ORAL 2 TIMES DAILY
Status: DISPENSED | OUTPATIENT
Start: 2024-08-09

## 2024-08-09 RX ORDER — DEXTROSE 50 % IN WATER (D50W) INTRAVENOUS SYRINGE
25
Status: ACTIVE | OUTPATIENT
Start: 2024-08-09

## 2024-08-09 RX ORDER — SODIUM CHLORIDE 9 MG/ML
100 INJECTION, SOLUTION INTRAVENOUS CONTINUOUS
Status: ACTIVE | OUTPATIENT
Start: 2024-08-09

## 2024-08-09 RX ORDER — HYDRALAZINE HYDROCHLORIDE 20 MG/ML
10 INJECTION INTRAMUSCULAR; INTRAVENOUS
Status: DISPENSED | OUTPATIENT
Start: 2024-08-09 | End: 2024-08-11

## 2024-08-09 RX ORDER — FINASTERIDE 5 MG/1
5 TABLET, FILM COATED ORAL DAILY
Status: DISPENSED | OUTPATIENT
Start: 2024-08-09

## 2024-08-09 RX ORDER — LISINOPRIL 20 MG/1
40 TABLET ORAL DAILY
Status: DISPENSED | OUTPATIENT
Start: 2024-08-09

## 2024-08-09 RX ORDER — NICARDIPINE HYDROCHLORIDE 0.2 MG/ML
1-15 INJECTION INTRAVENOUS CONTINUOUS PRN
Status: DISPENSED | OUTPATIENT
Start: 2024-08-09

## 2024-08-09 RX ORDER — HYDROMORPHONE HYDROCHLORIDE 1 MG/ML
0.5 INJECTION, SOLUTION INTRAMUSCULAR; INTRAVENOUS; SUBCUTANEOUS ONCE
Status: COMPLETED | OUTPATIENT
Start: 2024-08-09 | End: 2024-08-09

## 2024-08-09 RX ORDER — ACETAMINOPHEN 325 MG/1
1-2 TABLET ORAL EVERY 4 HOURS PRN
Status: ON HOLD | COMMUNITY
Start: 2022-12-07

## 2024-08-09 RX ORDER — ACETAMINOPHEN 325 MG/1
650 TABLET ORAL EVERY 4 HOURS PRN
Status: DISPENSED | OUTPATIENT
Start: 2024-08-09

## 2024-08-09 RX ORDER — DOCUSATE SODIUM 100 MG/1
100 CAPSULE, LIQUID FILLED ORAL DAILY
Status: ON HOLD | COMMUNITY

## 2024-08-09 RX ORDER — ALBUTEROL SULFATE 90 UG/1
INHALANT RESPIRATORY (INHALATION) EVERY 6 HOURS
Status: ON HOLD | COMMUNITY
Start: 2022-12-07 | End: 2024-08-09

## 2024-08-09 RX ORDER — LABETALOL HYDROCHLORIDE 5 MG/ML
10 INJECTION, SOLUTION INTRAVENOUS EVERY 10 MIN PRN
Status: DISCONTINUED | OUTPATIENT
Start: 2024-08-09 | End: 2024-08-09 | Stop reason: HOSPADM

## 2024-08-09 RX ORDER — LABETALOL HYDROCHLORIDE 5 MG/ML
10 INJECTION, SOLUTION INTRAVENOUS EVERY 10 MIN PRN
Status: DISPENSED | OUTPATIENT
Start: 2024-08-09 | End: 2024-08-11

## 2024-08-09 RX ADMIN — FENTANYL CITRATE 12.5 MCG: 50 INJECTION INTRAMUSCULAR; INTRAVENOUS at 02:43

## 2024-08-09 RX ADMIN — PIPERACILLIN SODIUM AND TAZOBACTAM SODIUM 4.5 G: 4; .5 INJECTION, SOLUTION INTRAVENOUS at 01:15

## 2024-08-09 SDOH — SOCIAL STABILITY: SOCIAL INSECURITY: HAS ANYONE EVER THREATENED TO HURT YOUR FAMILY OR YOUR PETS?: NO

## 2024-08-09 SDOH — SOCIAL STABILITY: SOCIAL INSECURITY: WERE YOU ABLE TO COMPLETE ALL THE BEHAVIORAL HEALTH SCREENINGS?: YES

## 2024-08-09 SDOH — SOCIAL STABILITY: SOCIAL INSECURITY: HAVE YOU HAD ANY THOUGHTS OF HARMING ANYONE ELSE?: NO

## 2024-08-09 SDOH — SOCIAL STABILITY: SOCIAL INSECURITY: ABUSE: ADULT

## 2024-08-09 SDOH — SOCIAL STABILITY: SOCIAL INSECURITY: HAVE YOU HAD THOUGHTS OF HARMING ANYONE ELSE?: NO

## 2024-08-09 SDOH — SOCIAL STABILITY: SOCIAL INSECURITY: ARE YOU OR HAVE YOU BEEN THREATENED OR ABUSED PHYSICALLY, EMOTIONALLY, OR SEXUALLY BY ANYONE?: NO

## 2024-08-09 SDOH — SOCIAL STABILITY: SOCIAL INSECURITY: DO YOU FEEL ANYONE HAS EXPLOITED OR TAKEN ADVANTAGE OF YOU FINANCIALLY OR OF YOUR PERSONAL PROPERTY?: NO

## 2024-08-09 SDOH — SOCIAL STABILITY: SOCIAL INSECURITY: DOES ANYONE TRY TO KEEP YOU FROM HAVING/CONTACTING OTHER FRIENDS OR DOING THINGS OUTSIDE YOUR HOME?: NO

## 2024-08-09 SDOH — SOCIAL STABILITY: SOCIAL INSECURITY: DO YOU FEEL UNSAFE GOING BACK TO THE PLACE WHERE YOU ARE LIVING?: NO

## 2024-08-09 SDOH — SOCIAL STABILITY: SOCIAL INSECURITY: ARE THERE ANY APPARENT SIGNS OF INJURIES/BEHAVIORS THAT COULD BE RELATED TO ABUSE/NEGLECT?: NO

## 2024-08-09 ASSESSMENT — ACTIVITIES OF DAILY LIVING (ADL)
PATIENT'S MEMORY ADEQUATE TO SAFELY COMPLETE DAILY ACTIVITIES?: YES
BATHING_ASSISTANCE: MODERATE
TOILETING: INDEPENDENT
ASSISTIVE_DEVICE: NONE;EYEGLASSES
ADL_ASSISTANCE: INDEPENDENT
DRESSING YOURSELF: NEEDS ASSISTANCE
FEEDING YOURSELF: INDEPENDENT
WALKS IN HOME: INDEPENDENT
ADEQUATE_TO_COMPLETE_ADL: YES
BATHING: NEEDS ASSISTANCE
ADL_ASSISTANCE: INDEPENDENT
LACK_OF_TRANSPORTATION: NO
LACK_OF_TRANSPORTATION: NO
GROOMING: NEEDS ASSISTANCE
HEARING - RIGHT EAR: FUNCTIONAL
JUDGMENT_ADEQUATE_SAFELY_COMPLETE_DAILY_ACTIVITIES: YES
HEARING - LEFT EAR: FUNCTIONAL

## 2024-08-09 ASSESSMENT — PAIN SCALES - GENERAL
PAINLEVEL_OUTOF10: 3
PAINLEVEL_OUTOF10: 3
PAINLEVEL_OUTOF10: 0 - NO PAIN
PAINLEVEL_OUTOF10: 7
PAINLEVEL_OUTOF10: 0 - NO PAIN
PAINLEVEL_OUTOF10: 3
PAINLEVEL_OUTOF10: 0 - NO PAIN

## 2024-08-09 ASSESSMENT — PAIN - FUNCTIONAL ASSESSMENT
PAIN_FUNCTIONAL_ASSESSMENT: 0-10

## 2024-08-09 ASSESSMENT — LIFESTYLE VARIABLES
HAVE YOU OR SOMEONE ELSE BEEN INJURED AS A RESULT OF YOUR DRINKING: NO
HOW OFTEN DURING THE LAST YEAR HAVE YOU FOUND THAT YOU WERE NOT ABLE TO STOP DRINKING ONCE YOU HAD STARTED: NEVER
AUDIT-C TOTAL SCORE: 6
HOW OFTEN DURING THE LAST YEAR HAVE YOU BEEN UNABLE TO REMEMBER WHAT HAPPENED THE NIGHT BEFORE BECAUSE YOU HAD BEEN DRINKING: NEVER
HOW OFTEN DO YOU HAVE 6 OR MORE DRINKS ON ONE OCCASION: LESS THAN MONTHLY
HOW OFTEN DURING THE LAST YEAR HAVE YOU FAILED TO DO WHAT WAS NORMALLY EXPECTED FROM YOU BECAUSE OF DRINKING: NEVER
HOW OFTEN DURING THE LAST YEAR HAVE YOU HAD A FEELING OF GUILT OR REMORSE AFTER DRINKING: NEVER
HAVE YOU EVER FELT YOU SHOULD CUT DOWN ON YOUR DRINKING: NO
AUDIT TOTAL SCORE: 6
EVER HAD A DRINK FIRST THING IN THE MORNING TO STEADY YOUR NERVES TO GET RID OF A HANGOVER: NO
HAVE YOU EVER FELT YOU SHOULD CUT DOWN ON YOUR DRINKING: NO
HOW OFTEN DO YOU HAVE A DRINK CONTAINING ALCOHOL: 2-3 TIMES A WEEK
EVER FELT BAD OR GUILTY ABOUT YOUR DRINKING: NO
SKIP TO QUESTIONS 9-10: 0
TOTAL SCORE: 0
TOTAL SCORE: 0
HAVE PEOPLE ANNOYED YOU BY CRITICIZING YOUR DRINKING: NO
HOW OFTEN DURING THE LAST YEAR HAVE YOU NEEDED AN ALCOHOLIC DRINK FIRST THING IN THE MORNING TO GET YOURSELF GOING AFTER A NIGHT OF HEAVY DRINKING: NEVER
EVER HAD A DRINK FIRST THING IN THE MORNING TO STEADY YOUR NERVES TO GET RID OF A HANGOVER: NO
EVER FELT BAD OR GUILTY ABOUT YOUR DRINKING: NO
HOW MANY STANDARD DRINKS CONTAINING ALCOHOL DO YOU HAVE ON A TYPICAL DAY: 5 OR 6
AUDIT-C TOTAL SCORE: 6
AUDIT TOTAL SCORE: 0
HAVE PEOPLE ANNOYED YOU BY CRITICIZING YOUR DRINKING: NO
HAS A RELATIVE, FRIEND, DOCTOR, OR ANOTHER HEALTH PROFESSIONAL EXPRESSED CONCERN ABOUT YOUR DRINKING OR SUGGESTED YOU CUT DOWN: NO

## 2024-08-09 ASSESSMENT — COGNITIVE AND FUNCTIONAL STATUS - GENERAL
DAILY ACTIVITIY SCORE: 17
MOVING FROM LYING ON BACK TO SITTING ON SIDE OF FLAT BED WITH BEDRAILS: A LOT
MOVING TO AND FROM BED TO CHAIR: A LOT
MOBILITY SCORE: 11
PATIENT BASELINE BEDBOUND: NO
CLIMB 3 TO 5 STEPS WITH RAILING: TOTAL
MOVING FROM LYING ON BACK TO SITTING ON SIDE OF FLAT BED WITH BEDRAILS: A LOT
DRESSING REGULAR LOWER BODY CLOTHING: A LOT
DAILY ACTIVITIY SCORE: 17
HELP NEEDED FOR BATHING: A LOT
WALKING IN HOSPITAL ROOM: A LOT
HELP NEEDED FOR BATHING: A LOT
TURNING FROM BACK TO SIDE WHILE IN FLAT BAD: A LOT
STANDING UP FROM CHAIR USING ARMS: A LOT
TURNING FROM BACK TO SIDE WHILE IN FLAT BAD: A LOT
WALKING IN HOSPITAL ROOM: A LOT
DRESSING REGULAR UPPER BODY CLOTHING: A LITTLE
TOILETING: A LOT
MOVING TO AND FROM BED TO CHAIR: A LOT
CLIMB 3 TO 5 STEPS WITH RAILING: TOTAL
TOILETING: A LOT
DRESSING REGULAR UPPER BODY CLOTHING: A LITTLE
MOBILITY SCORE: 11
STANDING UP FROM CHAIR USING ARMS: A LOT
DRESSING REGULAR LOWER BODY CLOTHING: A LOT

## 2024-08-09 ASSESSMENT — PATIENT HEALTH QUESTIONNAIRE - PHQ9
2. FEELING DOWN, DEPRESSED OR HOPELESS: NOT AT ALL
SUM OF ALL RESPONSES TO PHQ9 QUESTIONS 1 & 2: 0
1. LITTLE INTEREST OR PLEASURE IN DOING THINGS: NOT AT ALL

## 2024-08-09 ASSESSMENT — PAIN DESCRIPTION - LOCATION: LOCATION: ABDOMEN

## 2024-08-09 ASSESSMENT — PAIN DESCRIPTION - ORIENTATION: ORIENTATION: RIGHT;LEFT;LOWER

## 2024-08-09 NOTE — ED TRIAGE NOTES
Pt to ed via cct after presenting to [place] ED for confusion and fall. Pt had NIH of 0 throughout [facility] visit. Pt was scanned and found to have a carmen hemorrhage infarct. Pt is A/O x4 upon arrival but does wax and wane with mentation. Pt does have short term loss of memory as well. Pt here for neuro consult and gen surg consult regarding bladder issues.

## 2024-08-09 NOTE — ED PROVIDER NOTES
Emergency Department Provider Note        History of Present Illness     History provided by: Patient and EMS  Limitations to History: None  External Records Reviewed with Brief Summary:  Reviewed outside ED note from yesterday in which patient had blood cultures obtained, was antibiosis with Zosyn, underwent imaging showing pontine hemorrhage versus mass in addition to colovesicular fistula.    HPI:  Wade Machado is a 69-year-old male history of hypertension, obesity, recurrent right hip dislocation presenting as transfer from OSH with concern for pontine hemorrhage versus mass.  Patient initially presented to OSH with concern for confusion by family, reportedly 2 weeks prior had Moeller catheter placed for urinary retention and was treated with antibiotics for concern of UTI.  Concern for possible falls.  Underwent CT imaging of the abdomen and head which showed concern for colovesicular fistula, treated with IV fluids and Zosyn.  Additionally had CT head given altered mental status which showed hyperdensity within the carmen    Physical Exam   Triage vitals:  T    HR    BP    RR    O2        General: Awake, alert, in no acute distress  Eyes: Gaze conjugate.  No scleral icterus or injection  HENT: Normo-cephalic, atraumatic. No stridor  CV: Regular rate, regular rhythm. Radial pulses 2+ bilaterally  Resp: Breathing non-labored, speaking in full sentences.  Clear to auscultation bilaterally  GI: Soft, non-distended, non-tender. No rebound or guarding.  : External catheter in place  MSK/Extremities: No gross bony deformities. Moving all extremities  Skin: Warm. Appropriate color  Neuro: Alert. Oriented. Face symmetric. Speech is fluent.  Gross strength and sensation intact in b/l UE and LEs  Psych: Appropriate mood and affect    Medical Decision Making & ED Course   Medical Decision Makin y.o. male presenting as transfer from OSH with concern for pontine hemorrhage versus mass.  Additionally found to have  UTI with colovesicular fistula, febrile on arrival to OSH, treated with Zosyn, blood cultures obtained at OSH.  Patient reportedly confused with baseline normal mentation.  GCS 15, ANO x 3 with NIH of 0 on arrival.  Neurosurgery consulted with recommendation for CT angio head and neck in addition to MRI brain with and without contrast.  Systolic BP goal less than 140.  Patient to be admitted to NSU however given UTI with colovesicular fistula ACS was consulted while in department for follow-up, evaluation and recommendations pending.  Patient will require continued treatment with antibiotics.  To be admitted to the NICU for further management with ACS following as consult.  ----  Scoring Tools Utilized: NIH Stroke Scale: 0       Differential diagnoses considered include but are not limited to: Pontine hemorrhage versus mass, urosepsis, colovesicular fistula     Social Determinants of Health which Significantly Impact Care: None identified     EKG Independent Interpretation: EKG not obtained    Independent Result Review and Interpretation: Relevant laboratory and radiographic results were reviewed and independently interpreted by myself.  As necessary, they are commented on in the ED Course.    Chronic conditions affecting the patient's care: As documented above in MDM    The patient was discussed with the following consultants/services:  ACS, neurosurgery    Care Considerations: As documented above in MDM    ED Course:  ED Course as of 08/09/24 0423   Fri Aug 09, 2024   0344 Emergency Medicine Attending Attestation:     [unfilled]    The patient was seen by the resident/fellow.  I have personally performed a substantive portion of the encounter.  I have seen and examined the patient; agree with the workup, evaluation, MDM, management and diagnosis.  The care plan has been discussed with the resident; I have reviewed the resident's note and agree with the documented findings.      Patient is a 69-year-old male who is  a transfer from [facility] for a acute hemorrhagic possible pontine lesion causing at least partial occlusion.  Patient presented for confusion, was found to have a UTI in conjunction with the CT findings.  Patient did have improvement of mental status, alert and orient x 4 per the transport team.  Here on arrival, patient is alert and oriented x 4, moves all 4 extremity spontaneously, has a nonfocal neurological examination with intact CN II-XII, intact strength in bilateral upper and lower extremities.  Patient has an age of 0 on arrival.  We will plan to consult neurosurgery and obtain repeat imaging.  He was treated for his UTI prior to transfer.    I independently interpreted patient's EKG and agree with the above mentioned interpretation.      Juan Toledo MD   [AM]      ED Course User Index  [AM] Juan Toledo MD         Diagnoses as of 08/09/24 0423   Pontine hemorrhage (Multi)   Colovesical fistula     Disposition   As a result of their workup, the patient will require admission to the hospital.  The patient was informed of his diagnosis.  The patient was given the opportunity to ask questions and I answered them. The patient agreed to be admitted to the hospital.    Procedures   Procedures    Patient seen and discussed with ED attending physician.    Lilly Soto DO  Emergency Medicine       Lilly Soto DO  Resident  08/09/24 0423     Ftsg Text: Because of the full-thickness nature of the defect, to protect underlying structures, and to avoid distortion, a full-thickness skin graft was planned. After prep and local anesthesia, a template was made of the defect and the graft was harvested.  The graft was defatted and trimmed to fit the defect. It was sutured into place circumferentially and a tie-over Bolster dressing was applied.  The donor site was converted to a fusiform defect, and after hemostasis, was closed in a layered fashion.

## 2024-08-09 NOTE — H&P
"History Of Present Illness  Wade Machado is a 69 y.o. male  presenting with pontine hemorrhage.    Patient presented to OSH with fever, fatigue, AMS and possible falls.     Patient's imaging was personally reviewed and notable for pontine hemorrhage.    Past Medical History  He has a past medical history of Hypertension, Prostate enlargement, and Unspecified osteoarthritis, unspecified site.    Surgical History  He has a past surgical history that includes Other surgical history (05/09/2022); Other surgical history (05/09/2022); and Joint replacement.     Social History  He reports that he has been smoking cigarettes. He has never used smokeless tobacco. He reports current alcohol use of about 15.0 standard drinks of alcohol per week. He reports current drug use. Frequency: 1.00 time per week. Drug: Marijuana.     Allergies  Patient has no known allergies.    Medications  (Not in a hospital admission)      Review of Systems negative other than listed in HPI.     Physical Exam  A&Ox3  Face symmetric  RUE 5/5  LUE 5/5  RLE 5/5  LLE 5/5  Sensation intact to light touch throughout all extremities      Last Recorded Vitals  Blood pressure 161/86, pulse 78, temperature 37.3 °C (99.2 °F), temperature source Oral, resp. rate 20, height 1.676 m (5' 6\"), weight 109 kg (240 lb), SpO2 100%.    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

## 2024-08-09 NOTE — ED PROVIDER NOTES
HPI   Chief Complaint   Patient presents with    Fatigue     Pt feeling feverish and fatigued last couple days. Today fell while going to bathroom. NO LOC, no thinners.        Patient presents for fever and weakness and fatigue throughout the past few days.  Is also had confusion per family.  Apparently 2 weeks ago he had a Moeller catheter placed for urinary retention subsequently had a urinary tract infection as well.  Unclear what antibiotic he was placed on.  No access to his urine cultures.  He denies any urinary symptoms.  He states he does have a cough.  He has occasional abdominal pain.  He states that he is fallen over the past few days although on repeat assessment his family states that he has not fallen.  He is normally alert and oriented without any level cognitive deficits.  No numbness or weakness in the extremities.  No slurred speech or visual just function.  No diplopia.              Patient History   Past Medical History:   Diagnosis Date    Unspecified osteoarthritis, unspecified site     Osteoarthritis     Past Surgical History:   Procedure Laterality Date    JOINT REPLACEMENT      OTHER SURGICAL HISTORY  05/09/2022    Retinal detachment repair    OTHER SURGICAL HISTORY  05/09/2022    Cataract surgery     No family history on file.  Social History     Tobacco Use    Smoking status: Every Day     Types: Cigarettes    Smokeless tobacco: Never   Substance Use Topics    Alcohol use: Yes     Alcohol/week: 15.0 standard drinks of alcohol     Types: 15 Standard drinks or equivalent per week     Comment: 12 pack weekly    Drug use: Yes     Frequency: 1.0 times per week     Types: Marijuana       Physical Exam   ED Triage Vitals [08/08/24 2256]   Temperature Heart Rate Respirations BP   (!) 38.9 °C (102 °F) 100 16 137/77      Pulse Ox Temp Source Heart Rate Source Patient Position   99 % Oral Monitor Lying      BP Location FiO2 (%)     Right arm --       Physical Exam  Vitals and nursing note reviewed.    Constitutional:       General: He is not in acute distress.     Appearance: He is well-developed.   HENT:      Head: Normocephalic and atraumatic.   Eyes:      Conjunctiva/sclera: Conjunctivae normal.   Cardiovascular:      Rate and Rhythm: Regular rhythm. Tachycardia present.      Heart sounds: No murmur heard.  Pulmonary:      Effort: Pulmonary effort is normal. No respiratory distress.      Breath sounds: Normal breath sounds.   Abdominal:      Palpations: Abdomen is soft.      Tenderness: There is no abdominal tenderness.   Musculoskeletal:         General: No swelling.      Cervical back: Neck supple.   Skin:     General: Skin is warm and dry.      Capillary Refill: Capillary refill takes less than 2 seconds.   Neurological:      General: No focal deficit present.      Mental Status: He is alert.      Comments: Oriented x 2.   Psychiatric:         Mood and Affect: Mood normal.           ED Course & MDM   Diagnoses as of 08/09/24 0226   Acute cystitis without hematuria   Colovesical fistula   Diverticulitis   Pontine hemorrhage (Multi)                 No data recorded     [place] Coma Scale Score: 15 (08/08/24 2300 : Nj Camara RN)                           Medical Decision Making  Sepsis: Urinary source.  Colovesicular fistula identified on CT scan.  May be secondary to diverticulitis Zosyn 4.5 g IV.  Posterior to the bladder of soft tissue mass of uncertain etiology.  Patient was given a liter normal saline.  Acetaminophen 1000 mg by mouth.    Hyperdensity in the carmen.  History of colloidal cyst and trans colossal resection at [facility] in 2004.  Not particularly showing signs of pontine hemorrhage of the size.  Normal cranial nerve exam.  No headache.  No vomiting.  This is possibly a more chronic finding vs hemorrhage.  Will need MRI.  Discussed with neurosurgery.  Recommend CT angio of the brain and neck.  Patient was transferred prior to CT angio being obtained.    Discussed with emergency  physician at [facility].  Patient be transferred to emergency department to emergency department.        Procedure  Critical Care    Performed by: Adonis Avendano MD  Authorized by: Adonis Avendano MD    Critical care provider statement:     Critical care time (minutes):  45    Critical care time was exclusive of:  Separately billable procedures and treating other patients    Critical care was necessary to treat or prevent imminent or life-threatening deterioration of the following conditions:  Sepsis    Critical care was time spent personally by me on the following activities:  Ordering and performing treatments and interventions, ordering and review of laboratory studies, ordering and review of radiographic studies, pulse oximetry, re-evaluation of patient's condition and review of old charts    Care discussed with: admitting provider         Adonis Avendano MD  08/09/24 0238       Adonis Avendano MD  08/09/24 0327

## 2024-08-09 NOTE — CONSULTS
"Reason For Consult  Possible colovesicular fistula    History Of Present Illness  Wade Machado is a 69 y.o. male with history of ventricular colloid cyst s/p R craniectomy for resection ([facility], 2005), HTN, alcohol use, tobacco use, obesity, prostate cancer, BPH, previous R total hip arthroplasty c/b recurrent R prosthetic hip dislocation 5/11 s/p R hip reduction, recent UTI who is presenting with pontine ICH vs mass found on imaging at OSH s/p altered mental status yesterday. CT AP without contrast was done and showed possible colovesicular fistula.     Patient is a poor historian, however AAOx3. He says he has had \"months\" of lower abdominal pain which he does not have right now. When asked if he went to a doctor for this pain he said yes \"that is when they found the cancer\" referring to his prostate cancer which was diagnosed years ago. He reports dysuria with his recent llamas and without the llamas as well as urinary retention. Denies pneumouria or fecaluria. Denies diarrhea. Has chronic constipation. Denies fever, chest pain, SOB. Denies history of IBS or IBD. He has not had a colonoscopy.      Past Medical History  He has a past medical history of Hypertension, Prostate enlargement, and Unspecified osteoarthritis, unspecified site.    Surgical History  He has a past surgical history that includes Other surgical history (05/09/2022); Other surgical history (05/09/2022); and Joint replacement.     Social History  He reports that he has been smoking cigarettes. He has never used smokeless tobacco. He reports current alcohol use of about 15.0 standard drinks of alcohol per week. He reports current drug use. Frequency: 1.00 time per week. Drug: Marijuana.    Family History  No family history on file.     Allergies  Patient has no known allergies.    Review of Systems  ROS negative except for what is stated in HPI     Physical Exam  Physical Exam  Constitutional:       General: He is not in acute distress.  HENT: " "     Mouth/Throat:      Mouth: Mucous membranes are moist.   Eyes:      Conjunctiva/sclera: Conjunctivae normal.   Cardiovascular:      Rate and Rhythm: Normal rate.   Pulmonary:      Effort: Pulmonary effort is normal.      Comments: RA  Abdominal:      General: There is no distension.      Palpations: Abdomen is soft.      Tenderness: There is no abdominal tenderness.   Skin:     General: Skin is warm and dry.   Neurological:      Mental Status: He is alert and oriented to person, place, and time.   Psychiatric:         Behavior: Behavior normal.            Last Recorded Vitals  Blood pressure 119/68, pulse 62, temperature 35.5 °C (95.9 °F), temperature source Temporal, resp. rate 19, height 1.676 m (5' 6\"), weight 109 kg (240 lb), SpO2 96%.    Relevant Results  Results for orders placed or performed during the hospital encounter of 08/09/24 (from the past 24 hour(s))   Blood Gas Venous Full Panel Unsolicited   Result Value Ref Range    POCT pH, Venous 7.38 7.33 - 7.43 pH    POCT pCO2, Venous 45 41 - 51 mm Hg    POCT pO2, Venous 22 (L) 35 - 45 mm Hg    POCT SO2, Venous 29 (L) 45 - 75 %    POCT Oxy Hemoglobin, Venous 28.0 (L) 45.0 - 75.0 %    POCT Hematocrit Calculated, Venous 39.0 (L) 41.0 - 52.0 %    POCT Sodium, Venous 133 (L) 136 - 145 mmol/L    POCT Potassium, Venous 3.9 3.5 - 5.3 mmol/L    POCT Chloride, Venous 100 98 - 107 mmol/L    POCT Ionized Calicum, Venous 1.12 1.10 - 1.33 mmol/L    POCT Glucose, Venous 102 (H) 74 - 99 mg/dL    POCT Lactate, Venous 1.2 0.4 - 2.0 mmol/L    POCT Base Excess, Venous 1.0 -2.0 - 3.0 mmol/L    POCT HCO3 Calculated, Venous 26.6 (H) 22.0 - 26.0 mmol/L    POCT Hemoglobin, Venous 13.0 (L) 13.5 - 17.5 g/dL    POCT Anion Gap, Venous 10.0 10.0 - 25.0 mmol/L    Patient Temperature 37.0 degrees Celsius   Coagulation Screen   Result Value Ref Range    Protime 15.2 (H) 9.8 - 12.8 seconds    INR 1.3 (H) 0.9 - 1.1    aPTT 30 27 - 38 seconds   Sedimentation rate, automated   Result Value " Ref Range    Sedimentation Rate 76 (H) 0 - 20 mm/h   C-reactive protein   Result Value Ref Range    C-Reactive Protein 20.88 (H) <1.00 mg/dL   Renal Function Panel   Result Value Ref Range    Glucose 82 74 - 99 mg/dL    Sodium 135 (L) 136 - 145 mmol/L    Potassium 4.5 3.5 - 5.3 mmol/L    Chloride 99 98 - 107 mmol/L    Bicarbonate 21 21 - 32 mmol/L    Anion Gap 20 10 - 20 mmol/L    Urea Nitrogen 11 6 - 23 mg/dL    Creatinine 0.83 0.50 - 1.30 mg/dL    eGFR >90 >60 mL/min/1.73m*2    Calcium 8.3 (L) 8.6 - 10.6 mg/dL    Phosphorus 4.6 2.5 - 4.9 mg/dL    Albumin 3.4 3.4 - 5.0 g/dL   CBC   Result Value Ref Range    WBC 15.2 (H) 4.4 - 11.3 x10*3/uL    nRBC 0.0 0.0 - 0.0 /100 WBCs    RBC 4.39 (L) 4.50 - 5.90 x10*6/uL    Hemoglobin 12.7 (L) 13.5 - 17.5 g/dL    Hematocrit 38.2 (L) 41.0 - 52.0 %    MCV 87 80 - 100 fL    MCH 28.9 26.0 - 34.0 pg    MCHC 33.2 32.0 - 36.0 g/dL    RDW 14.5 11.5 - 14.5 %    Platelets 384 150 - 450 x10*3/uL   Magnesium   Result Value Ref Range    Magnesium 2.14 1.60 - 2.40 mg/dL   Hemoglobin A1C   Result Value Ref Range    Hemoglobin A1C 6.0 (H) see below %    Estimated Average Glucose 126 Not Established mg/dL   Lipid Panel   Result Value Ref Range    Cholesterol 123 0 - 199 mg/dL    HDL-Cholesterol 37.6 mg/dL    Cholesterol/HDL Ratio 3.3     LDL Calculated 66 <=99 mg/dL    VLDL 19 0 - 40 mg/dL    Triglycerides 95 0 - 149 mg/dL    Non HDL Cholesterol 85 0 - 149 mg/dL   B-Type Natriuretic Peptide   Result Value Ref Range    BNP 59 0 - 99 pg/mL   Troponin I, High Sensitivity   Result Value Ref Range    Troponin I, High Sensitivity ([facility]) 13 0 - 53 ng/L          Assessment/Plan     Wade Machado is a 69M with hx of ventricular colloid cyst s/p resection (2005), HTN, alcohol use, tobacco use, obesity, prostate cancer, BPH, previous R total hip arthroplasty, recent UTI who is presenting with pontine ICH vs mass found on imaging at OSH s/p altered mental status yesterday. CT AP was done and showed  possible colovesicular fistula. On review of the images there appears to be an area of possible fistula with sigmoid colon abutting the bladder and air within the bladder wall, however no air within the bladder itself. Patient denies pneumouria or fecaluria and his abdominal exam is benign. He has had a recent UTI and has chronic urinary retention. Imaging shows diverticula and possible diverticulitis with possible abscess. Overall imaging evaluation is limited due to being without contrast.     - Recommend colonoscopy to better evaluate sigmoid colon for possible colovesical fistula and to rule out colon cancer given age and need for screening. This can be completed inpatient or outpatient depending on patient's other acute issues and compliance with outpatient follow-up.  - Recommend antibiotics for 14 days for UTI and possible acute diverticulitis.  - If persistent urinary retention or concerns with voiding could consider urology consult in the setting of known BPH and previous prostate cancer.  - Follow up outpatient with colorectal surgery for colovesicular fistula monitoring after resolution of acute issues.    Discussed with attending, Dr. Francisco Javier Lemus MD

## 2024-08-09 NOTE — HOSPITAL COURSE
Wade Machado is a 69 y.o. 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   CTH showing pontine ICH with ventriculomegaly, incr'd vents compared to 2005 and 2007. CT A/P with mild bilateral hydronephrosis, and concern for colovesicular fistula.   CT C spine neg for fx, CTA H/N stable hemorrhage, neg for vasc abnormality, MRI brain pontine hemorrhage.  8/9 Colorectal consulted--> Recommend colonoscopy to better evaluate sigmoid colon for possible colovesical fistula and to rule out colon cancer; antibiotics for 14 days for UTI and possible acute diverticulitis.  Urology consulted--> for possible colovesical fistula, recurrent cystitis and urinary retention (chronic).  Imaging shows mild bilateral hydronephrosis.  Will need management of likely bladder outlet obstruction due to BPH and cystoscopic work-up of bladder thickening seen on CT outpatient with his Urologist.  Continue home proscar and flomax.  Llamas placed. May need to be discharged with llamas and follow up with his Urologist for TOV.  GI consulted for eval for Colonoscopy.  8/12  Colonoscopy--> Sigmoid diverticulosis, mild segmental colitis associated with diverticulosis, circumferential edema in sigmoid colon near suspected fistula site (biopsied, awaiting pathology), small internal hemorrhoids.   Renal/Bladder US -->Limited sonographic assessment of the bladder, due to decompression. There is bladder wall thickening with intraluminal debris.  No hydronephrosis bilaterally   8/13 Urology reviewed Renal, Bladder US, hydronephrosis resolved, ok for discharge.  Discussed with Colorectal Surgery--> they reviewed colonoscopy.  Think may be edema 2/2 inflammatory process and not true colo vesicular  fistula; patient can follow up outpatient.    PT/OT eval recommend Rehab but patient and family request home with Home care  Discharged to home with llamas and follow up with Urology scheduled.  Requested follow up with Dr. Thomas Mcintyre Colorectal Surgery , PCP for HTN and Neurology Stroke.

## 2024-08-09 NOTE — PROGRESS NOTES
Physical Therapy    Physical Therapy Evaluation & Treatment    Patient Name: Wade Machado  MRN: [id number]  Today's Date: 8/9/2024   Time Calculation  Start Time: 1609  Stop Time: 1631  Time Calculation (min): 22 min    Assessment/Plan   PT Assessment  PT Assessment Results: Decreased strength, Decreased endurance, Impaired balance, Decreased mobility, Decreased coordination  Rehab Prognosis: Excellent  Barriers to Discharge: Mod A required for functional mobility  Evaluation/Treatment Tolerance: Patient tolerated treatment well  Medical Staff Made Aware: Yes  End of Session Communication: Bedside nurse  End of Session Patient Position: Bed, 3 rail up, Alarm on   IP OR SWING BED PT PLAN  Inpatient or Swing Bed: Inpatient  PT Plan  Treatment/Interventions: Bed mobility, Transfer training, Gait training, Stair training, Balance training, Strengthening, Endurance training, Therapeutic exercise, Therapeutic activity, Home exercise program, Postural re-education, Orthotic fitting/training  PT Plan: Ongoing PT  PT Frequency: 5 times per week  PT Discharge Recommendations: High intensity level of continued care  PT Recommended Transfer Status: Assist x2  PT - OK to Discharge: Yes      Subjective     General Visit Information:  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  Home Living:  Home Living  Type of Home:  House  Lives With: Spouse  Home Adaptive Equipment: Walker rolling or standard  Home Layout: Able to live on main level with bedroom/bathroom, Laundry in basement  Home Access: Stairs to enter with rails  Entrance Stairs-Number of Steps: 3  Bathroom Accessibility: Tub shower on main level, walk in shower in basement  Home Living Comments: Wife able to perform laundry in basement  Prior Level of Function:  Prior Function Per Pt/Caregiver Report  Level of [place]: Independent with ADLs and functional transfers  Receives Help From: Family  ADL Assistance: Independent  Homemaking Assistance:  (Wife performs IADLs)  Ambulatory Assistance: Independent  Vocational: Retired  Prior Function Comments: Denies history of falls however questionable historian  Precautions:  Precautions  Medical Precautions: Fall precautions  Precautions Comment: -150  Vital Signs:  Vital Signs  Heart Rate: 70  Resp: 22  SpO2: 96 %  BP: 132/67    Objective   Pain:  Pain Assessment  Pain Assessment: 0-10  0-10 (Numeric) Pain Score: 0 - No pain  Cognition:  Cognition  Overall Cognitive Status: Within Functional Limits  Orientation Level:  (A/O x3 with prompting 2/2 lethargy as pt was in a deep sleep prior to session)    General Assessments:  Activity Tolerance  Endurance: Tolerates 10 - 20 min exercise with multiple rests  Early Mobility/Exercise Safety Screen: Proceed with mobilization - No exclusion criteria met    Sensation  Light Touch: No apparent deficits    Coordination  Movements are Fluid and Coordinated: No    Postural Control  Postural Control: Impaired  Trunk Control: Post trunk lean in sitting    Static Sitting Balance  Static Sitting-Comment/Number of Minutes: Mod A  Dynamic Sitting Balance  Dynamic Sitting-Comments: Mod A    Static Standing Balance  Static Standing-Comment/Number of Minutes: Min A once fully upright with UE support  Dynamic Standing Balance  Dynamic Standing-Comments: not tested (pt up to chair earlier in  day - agreeable to assessment but wanted to return to bed end of session. Performed x3 stands at EOB)  Functional Assessments:       Bed Mobility  Bed Mobility: Yes  Bed Mobility 1  Bed Mobility 1: Supine to sitting, Sitting to supine  Level of Assistance 1: Moderate assistance  Bed Mobility Comments 1: HOB elevated  Bed Mobility 2  Bed Mobility Comments 2: Dep A x2 for boost in bed for optimal positioning end of session    Transfers  Transfer: Yes  Transfer 1  Technique 1: Sit to stand, Stand to sit  Transfer Device 1:  (R arm in arm)  Transfer Level of Assistance 1: Moderate assistance  Trials/Comments 1: Progressed to Min A once upright. Performed x3 standing trials ~20 sec standing.    Ambulation/Gait Training  Ambulation/Gait Training Performed: No    Extremity/Trunk Assessments:    RLE   RLE : Within Functional Limits  LLE   LLE : Within Functional Limits  Treatments:  Therapeutic Activity  Therapeutic Activity Performed: Yes  Therapeutic Activity 1: EOB sitting x10 mins with max cues for postural correction. x3 standing trials. See mobility for further detail  Outcome Measures:  [facility] Basic Mobility  Turning from your back to your side while in a flat bed without using bedrails: A lot  Moving from lying on your back to sitting on the side of a flat bed without using bedrails: A lot  Moving to and from bed to chair (including a wheelchair): A lot  Standing up from a chair using your arms (e.g. wheelchair or bedside chair): A lot  To walk in hospital room: A lot  Climbing 3-5 steps with railing: Total  Basic Mobility - Total Score: 11    FSS-ICU  Ambulation: Walks <50 feet with any assistance x1 or walks any distance with assistance x2 people  Rolling: Moderate assistance (performs 50 - 74% of task)  Sitting: Moderate assistance (performs 50 - 74% of task)  Transfer Sit-to-Stand: Moderate assistance (performs 50 - 74% of task)  Transfer Supine-to-Sit: Moderate assistance (performs 50 - 74% of task)  Total  Score: 13      Early Mobility/Exercise Safety Screen: Proceed with mobilization - No exclusion criteria met  ICU Mobility Scale: Standing [4]    Encounter Problems       Encounter Problems (Active)       Balance       STG - Maintains dynamic standing balance with upper extremity support on LRAD with Mod I        Start:  08/09/24    Expected End:  08/30/24            STG - Maintains dynamic sitting balance without upper extremity support independently        Start:  08/09/24    Expected End:  08/30/24               Mobility       STG - Patient will ambulate x200 ft with Mod I using LRAD       Start:  08/09/24    Expected End:  08/30/24            STG - Patient will ascend and descend 3 stairs with rail vs LRAD with Mod I        Start:  08/09/24    Expected End:  08/30/24               PT Transfers       STG - Patient will perform bed mobility independently        Start:  08/09/24    Expected End:  08/30/24            STG - Patient will transfer sit to and from stand with Mod I using LRAD       Start:  08/09/24    Expected End:  08/30/24                   Education Documentation  Precautions, taught by Ashley Jose PT at 8/9/2024  4:51 PM.  Learner: Patient  Readiness: Acceptance  Method: Explanation  Response: Needs Reinforcement    Body Mechanics, taught by Ashley Jose PT at 8/9/2024  4:51 PM.  Learner: Patient  Readiness: Acceptance  Method: Explanation  Response: Needs Reinforcement    Mobility Training, taught by Ashley Jose PT at 8/9/2024  4:51 PM.  Learner: Patient  Readiness: Acceptance  Method: Explanation  Response: Needs Reinforcement    Education Comments  No comments found.    Ashley Jose PT, DPT

## 2024-08-09 NOTE — CONSULTS
Urology [place]  Consult Note    Wade Machado  [id number]  1955 (69 y.o.)  Reason for Consult:  possible colovesical fistula, recurrent cystitis and urinary retention    HPI: Wade Machado is a 69 y.o. 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    Patient only able to recount limited history, rest was filled in by his wife at bedside.     Patient reports that he had prostate cancer in 2015 treated with brachytherapy. He follows with an outside urologist in [facility]. He was doing well until his PSA was elevated to 4.8 on 7/17/23. Most recent PSAs have reportedly been <0.1. He had a PSMA scan 1/17/24 which was negative for recurrence.     He has had new difficulty emptying due to BPH and recurrent UTIs/Cystitis for the past 6 months. He has taken flomax and finasteride but recently went into retention requiring an indwelling llamas for 2+ weeks due to repeatedly failing trial of voids. The llamas was removed on 7/31 by his urologist and he has only been able to void small amounts since. He has no suprapubic pain or distension.    Patient originally presented to OSH ED with fever, fatigue and AMS. CT AP without contrast showed mild bilateral hydronephrosis and possible colovesical fistula. Bladder is also notably distended on imaging with walled thickening. 3 cm lesion posterior to  bladder also seen concerning for possible abscess or mass.  Imaging limited by lack of contrast.    UA significant for 500LE and 1+ blood but nitrite negative. Urine culture pending. He was reportedly febrile as OSH but has been afebrile since arrival. His creatinine is stable at 0.83 and his WBC is elevated at 15.2 though downtrending from 17.5 since initiating Zosyn.    Outside records for urine cultures and antibiotic courses are unfortunately not available and patient/wife are unable to recall last antibiotic course.    Colorectal surgery was also consulted and recommended antibiotics for possible acute diverticulitis and future colonoscopy to evaluate potential fistula.      ROS:  Pertinent positives/negatives per HPI, 10 point comprehensive review of systems reviewed and otherwise negative.    No Known Allergies    Past Medical History:   Diagnosis Date    Hypertension     Prostate enlargement     Unspecified osteoarthritis, unspecified site     Osteoarthritis     Past Surgical History:   Procedure Laterality Date    JOINT REPLACEMENT      OTHER SURGICAL HISTORY  05/09/2022    Retinal detachment repair    OTHER SURGICAL HISTORY  05/09/2022    Cataract surgery     Social History     Tobacco Use    Smoking status: Every Day     Types: Cigarettes    Smokeless tobacco: Never   Substance Use Topics    Alcohol use: Yes     Alcohol/week: 15.0 standard drinks of alcohol     Types: 15 Standard drinks or equivalent per week     Comment: 12 pack weekly    Drug use: Yes     Frequency: 1.0 times per week     Types: Marijuana      No family history on file.  Current Facility-Administered Medications   Medication Dose Route Frequency Provider Last Rate Last Admin    acetaminophen (Tylenol) tablet 650 mg  650 mg oral q4h PRN Peter Dover MD   650 mg at 08/09/24 1021    dextrose 50 % injection 12.5 g  12.5 g intravenous q15 min PRN Peter Dover MD        dextrose 50 % injection 25 g  25 g intravenous q15 min PRHUSEYIN Green  MD Stanislaw        finasteride (Proscar) tablet 5 mg  5 mg oral Daily Kwabena Hui MD        glucagon (Glucagen) injection 1 mg  1 mg intramuscular q15 min PRN Peter Dover MD        glucagon (Glucagen) injection 1 mg  1 mg intramuscular q15 min PRN Peter Dover MD        hydrALAZINE (Apresoline) injection 10 mg  10 mg intravenous q20 min PRN Peter Dover MD        labetaloL (Normodyne,Trandate) injection 10 mg  10 mg intravenous q10 min PRN Peter Dover MD   10 mg at 08/09/24 0850    lisinopril tablet 40 mg  40 mg oral Daily Peter Dover MD   40 mg at 08/09/24 0819    niCARdipine (Cardene) 40 mg in sodium chloride 200 mL (0.2 mg/mL) infusion (premix)  1-15 mg/hr intravenous Continuous PRN Peter Dover MD   Stopped at 08/09/24 0645    piperacillin-tazobactam (Zosyn) 3.375 g in dextrose (iso) IV 50 mL  3.375 g intravenous q6h Nj Polanco MD   Stopped at 08/09/24 1138    polyethylene glycol (Glycolax, Miralax) packet 17 g  17 g oral Daily Peter Dover MD        sodium chloride 0.9% infusion  100 mL/hr intravenous Continuous Peter Dover  mL/hr at 08/09/24 0928 100 mL/hr at 08/09/24 0928    tamsulosin (Flomax) 24 hr capsule 0.4 mg  0.4 mg oral BID Kwabena Hui MD           OBJECTIVE:  Labs & Test Results  No intake/output data recorded.    Data Review:  Lab Results   Component Value Date    WBC 15.2 (H) 08/09/2024    HGB 12.7 (L) 08/09/2024    HCT 38.2 (L) 08/09/2024    MCV 87 08/09/2024     08/09/2024      Lab Results   Component Value Date    GLUCOSE 82 08/09/2024    CALCIUM 8.3 (L) 08/09/2024     (L) 08/09/2024    K 4.5 08/09/2024    CO2 21 08/09/2024    CL 99 08/09/2024    BUN 11 08/09/2024    CREATININE 0.83 08/09/2024        Physical Exam  General: seated comfortably in chair  Neuro: alert and oriented, no obvious focal deficit   Head/Neck: normocephalic, atraumatic  ENT: moist mucous membranes  CV: regular rate  Pulm: nonlabored breathing on room air, no conversational  dyspnea  Abd: soft, nondistended, nontender   : 16Fr llamas in place draining clear, yellow urine  MSK: EVANGELISTA, no gross deformities  Psych: mood and behavior normal    Imaging     CT CHEST ABDOMEN PELVIS WO CONTRAST;  8/9/2024 12:11 am      INDICATION:  Signs/Symptoms:fever/cough/abdominal pain.      COMPARISON:  PET/CT 01/17/2024.      ACCESSION NUMBER(S):  [id number]      ORDERING CLINICIAN:  CURTIS MAJOR      TECHNIQUE:  Axial CT of the chest, abdomen, and pelvis was performed. Coronal and  sagittal reconstructions were performed. No intravenous or oral  contrast agents were administered.      FINDINGS:  Please note that the study is limited without intravenous contrast.  There is motion artifact. There is streak artifact from the bilateral  hip prosthesis.      CHEST:          LUNG/PLEURA/LARGE AIRWAYS:  The trachea and central airways are patent.      The evaluation of the lungs is degraded by motion artifact. No  pleural effusion, consolidation or pneumothorax. There is a 4 mm  noncalcified pulmonary nodule along the pleura at the right upper  lobe (series 201, axial image 33), does not appear significantly  changed in the interval.      VESSELS:  No thoracic aortic aneurysm.  Atherosclerotic calcifications are  noted at the thoracic aorta. Limited evaluation for acute aortic  injury in the absence of intravenous contrast. The main pulmonary  artery is dilated.      HEART:  No pericardial effusion.  The heart is mildly enlarged. Coronary  artery calcifications are present.      MEDIASTINUM AND ROSELYN:  The evaluation for adenopathy suboptimal in the absence of  intravenous contrast.  No obvious pathologically enlarged lymph nodes  at the mediastinum.  No pneumomediastinum.      There is a small hiatal hernia.      CHEST WALL AND LOWER NECK:  The visualized thyroid gland is unremarkable.  The soft tissues of the chest wall are within normal limits.  The evaluation for acute fracture is degraded by motion  artifact.  Multilevel degenerative changes at the spine.          ABDOMEN:      LIVER:  Unremarkable unenhanced appearance.      BILE DUCTS:  No obvious dilation.      GALLBLADDER:  Present.      PANCREAS:  No peripancreatic fluid collection.      SPLEEN:  Unremarkable unenhanced appearance.      ADRENAL GLANDS:  Unremarkable.      KIDNEYS AND URETERS:  Evaluation degraded by motion artifact. There is mild bilateral  hydroureteronephrosis. No definite ureteral calculi are identified.  The distal ureters are obscured by streak artifact from the bilateral  hip prostheses.      PELVIS:  The pelvis is partially obscured by streak artifact from the  bilateral hip prosthesis.      BLADDER:  The urinary bladder is distended with wall thickening. Linear  lucencies are extending from the sigmoid colon to the superior aspect  of the urinary bladder, suggestive of fistulous tract.      REPRODUCTIVE ORGANS:  Radiation seeds are noted at the prostate.      BOWEL:  The evaluation of the bowel is degraded by motion artifact. The  stomach is decompressed. No evidence for bowel obstruction. There is  colonic diverticulosis. There is wall thickening with adjacent  inflammatory changes at the sigmoid colon. There is a 3.0 x 3.2 x 3.4  cm (AP by transverse by craniocaudal diameter) density posterior to  the urinary bladder and abutting the distal sigmoid colon.      VESSELS:  Atherosclerotic calcifications within the abdominal aorta and its  branches. There is an ectatic infrarenal abdominal aorta measuring  2.7 cm.      PERITONEUM/RETROPERITONEUM/LYMPH NODES:  Trace amount of ascites. No free air.  No retroperitoneal hemorrhage.  No pathologically enlarged lymph nodes are identified.      ABDOMINAL WALL:  There is a small fat containing umbilical hernia.      BONES:  Status post bilateral total hip arthroplasties. There is increased  lucency along the anterior aspect of the acetabular cup at the right  hip measuring 15 mm. Multilevel  degenerative changes in the spine.      IMPRESSION:  CHEST  1.  Study degraded by motion artifact. No consolidation or pleural  effusion.  2. Mild cardiomegaly. Coronary artery calcifications. Dilation of the  main pulmonary artery. Please correlate for pulmonary arterial  hypertension.  3. 4 mm pulmonary nodule at the right upper lobe. Follow-up CT chest  in 12 months recommended to re-evaluate.  4. Small hiatal hernia.      ABDOMEN-PELVIS  1.  Study degraded by motion artifact. Mild bilateral hydronephrosis.  No definite obstructing ureteral calculus is identified, although the  distal ureters are obscured by streak artifact from the bilateral hip  prostheses.  2. Wall thickening at the urinary bladder with a colovesical fistula  extending from the adjacent sigmoid colon. Please correlate with  urinalysis to evaluate for cystitis.  3. 3.0 x 3.2 x 3.4 cm density posterior to the urinary bladder and  abutting the distal sigmoid colon, may represent abscess or mass.  Clinical correlation recommended. Follow-up CT with intravenous and  oral contrast and/or PET/CT can be obtained for further evaluation.  4. Colonic diverticulosis. Wall thickening with inflammatory changes  at the sigmoid colon, may represent chronic changes versus colitis or  acute diverticulitis.  5. Trace amount of ascites.  6. Status post bilateral total hip arthroplasties. Increased lucency  along the anterior aspect of the acetabular cup at the right hip,  concerning for prosthetic loosening. Orthopedic consult recommended.      Assessment & Recommendations  Wade Machado is a 69 y.o. 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    Patient is currently improving from infectious standpoint with lack of fever, stable vitals and downtrending leukocytosis. Creatinine is stable without sign of RAMANDEEP though outside imaging mentioned mild bilateral hydronephrosis.     As mentioned by colorectal consult, imaging with contrast would better characterize any potential fistula, bladder thickening and/or abscess vs mass.     Furthermore, patient has chronic retention that has been worsening over past few weeks. He needs both definitive management of likely bladder outlet obstruction due to BPH and cystoscopic work-up of bladder thickening seen on CT.    Low suspicion for true, matured colovesical fistula based on lack of clinical symptoms and lack of gas even with non contrast CT.    Recommendation:  - Continue with antibiotics for likely UTI, narrowing pending culture results  - Llamas should remain in place until able to transition to oral antibiotics    -May consider trial of void at that time but patient will likely need indwelling catheter upon discharge given retention history  - Repeat renal bladder ultrasound 48-72 hours after llamas placement to ensure decompression   - Can follow up with established urologist for trial of void vs indwelling llamas exchange on discharge   - Continue home finasteride and tamsulosin if able   - Patient needs cystoscopy to evaluate bladder wall thickening seen on CT  - Urology to sign-off at this time, please re-engage with questions or concerns    Discussed with chief resident Dr. Boss and discussed with attending Dr. Sakshi Villar MD   Urology [place]  Adult Urology Pager: [phone]  Pediatric Urology Pager:  [phone]

## 2024-08-09 NOTE — PROGRESS NOTES
Pharmacy Admission Order Reconciliation Review    Wade Machado is a 69 y.o. male admitted for Pontine hemorrhage (Multi). Pharmacy reviewed the patient's unreconciled admission medications.    Prior to admission medications that were reviewed and acted on by the pharmacist include:  Acetaminophen   Vitamin C  Colace  These medications have been reconciled.     Any other unreconcilied medications have been addressed and will be ordered or held by the patient's medical team. Medications addressed by the pharmacist may be added or changed by the patient's medical team at any time.    Traci Chavez, PharmD  Transitions of Care Pharmacist  [facility]  Please reach out via Secure Chat for questions

## 2024-08-09 NOTE — CARE PLAN
The patient's goals for the shift include      The clinical goals for the shift include SBp will remian < 140 throughout shift.      Problem: General Stroke  Goal: Establish a mutual long term goal with patient by discharge  8/9/2024 0852 by Ary Casillas RN  Outcome: Progressing  8/9/2024 0852 by Ary Casillas RN  Outcome: Progressing  Goal: Demonstrate improvement in neurological exam throughout the shift  8/9/2024 0852 by Ary Casillas RN  Outcome: Progressing  8/9/2024 0852 by Ary Casillas RN  Outcome: Progressing  Goal: Maintain BP within ordered limits throughout shift  8/9/2024 0852 by Ary Casillas RN  Note: Sbp < 140    8/9/2024 0852 by Ary Casillas RN  Outcome: Progressing  8/9/2024 0852 by Ary Casillas RN  Outcome: Progressing  Goal: Participate in treatment (ie., meds, therapy) throughout shift  8/9/2024 0852 by Ary Casillas RN  Outcome: Progressing  8/9/2024 0852 by Ary Casillas RN  Outcome: Progressing  Goal: No symptoms of aspiration throughout shift  8/9/2024 0852 by Ary Casillas RN  Outcome: Progressing  8/9/2024 0852 by Ary Casillas RN  Outcome: Progressing  Goal: No symptoms of hemorrhage throughout shift  8/9/2024 0852 by Ary Casillas RN  Outcome: Progressing  8/9/2024 0852 by Ary Casillas RN  Outcome: Progressing  Goal: Tolerate enteral feeding throughout shift  8/9/2024 0852 by Ary Casillas RN  Outcome: Progressing  8/9/2024 0852 by Ary Casillas RN  Outcome: Progressing  Goal: Decreased nausea/vomiting throughout shift  8/9/2024 0852 by Ary Casillas RN  Outcome: Progressing  8/9/2024 0852 by Ary Casillas RN  Outcome: Progressing  Goal: Controlled blood glucose throughout shift  8/9/2024 0852 by Ary Casillas RN  Outcome: Progressing  8/9/2024 0852 by Ary Casillas RN  Outcome: Progressing  Goal: Out of bed three times today  8/9/2024 0852 by Ary Casillas, RN  Outcome: Progressing  8/9/2024 0852 by Ary Casillas RN  Outcome: Progressing     Problem: ICU  Stroke  Goal: Maintain ICP within ordered limits throughout shift  8/9/2024 0852 by Ary Casillas RN  Outcome: Progressing  8/9/2024 0852 by Ary Casillas RN  Outcome: Progressing  Goal: Tolerate EVD clamping trial throughout shift  8/9/2024 0852 by Ary Casillas RN  Outcome: Progressing  8/9/2024 0852 by Ary Casillas RN  Outcome: Progressing  Goal: Tolerate ventilator weaning trial during shift  8/9/2024 0852 by Ary Casillas RN  Outcome: Progressing  8/9/2024 0852 by Ary Casillas RN  Outcome: Progressing  Goal: Maintain patent airway throughout shift  8/9/2024 0852 by Ary Casillas RN  Outcome: Progressing  8/9/2024 0852 by Ary Casillas RN  Outcome: Progressing  Goal: Achieve/maintain targeted sodium level throughout shift  8/9/2024 0852 by Ary Casillas RN  Outcome: Progressing  8/9/2024 0852 by Ary Casillas RN  Outcome: Progressing     Problem: Pain  Goal: Takes deep breaths with improved pain control throughout the shift  8/9/2024 0852 by Ary Casillas RN  Outcome: Progressing  8/9/2024 0852 by Ary Casillas RN  Outcome: Progressing  Goal: Turns in bed with improved pain control throughout the shift  8/9/2024 0852 by Ary Casillas RN  Outcome: Progressing  8/9/2024 0852 by Ary Casillas RN  Outcome: Progressing  Goal: Walks with improved pain control throughout the shift  8/9/2024 0852 by Ary Casillas RN  Outcome: Progressing  8/9/2024 0852 by Ary Casillas RN  Outcome: Progressing  Goal: Performs ADL's with improved pain control throughout shift  8/9/2024 0852 by Ary Casillas RN  Outcome: Progressing  8/9/2024 0852 by Ary Casillas RN  Outcome: Progressing  Goal: Participates in PT with improved pain control throughout the shift  8/9/2024 0852 by Ary Casillas RN  Outcome: Progressing  8/9/2024 0852 by Ary Casillas RN  Outcome: Progressing  Goal: Free from opioid side effects throughout the shift  8/9/2024 0852 by Ary Casillas, RN  Outcome: Progressing  8/9/2024 0852 by Ary  CARMITA Casillas  Outcome: Progressing  Goal: Free from acute confusion related to pain meds throughout the shift  8/9/2024 0852 by Ary Casillas RN  Outcome: Progressing  8/9/2024 0852 by Ary Casillas RN  Outcome: Progressing     Problem: Skin  Goal: Decreased wound size/increased tissue granulation at next dressing change  8/9/2024 0852 by Ary Casillas RN  Flowsheets (Taken 8/9/2024 0852)  Decreased wound size/increased tissue granulation at next dressing change: Utilize specialty bed per algorithm  8/9/2024 0852 by Ary Casillas RN  Outcome: Progressing  Flowsheets (Taken 8/9/2024 0852)  Decreased wound size/increased tissue granulation at next dressing change: Utilize specialty bed per algorithm  Goal: Participates in plan/prevention/treatment measures  Outcome: Progressing  Goal: Prevent/manage excess moisture  Outcome: Progressing  Goal: Prevent/minimize sheer/friction injuries  Outcome: Progressing  Goal: Promote/optimize nutrition  Outcome: Progressing  Goal: Promote skin healing  Outcome: Progressing

## 2024-08-09 NOTE — PROGRESS NOTES
Occupational Therapy    Evaluation and Treatment    Patient Name: Wade Machado  MRN: [id number]  Today's Date: 8/9/2024  Room: 10/10  Time Calculation  Start Time: 10:42  Stop Time: 11:00  Start Time: 11:46  Stop Time: 1201  Time Calculation (min): 33 min    Assessment  IP OT Assessment  Prognosis: Good  Barriers to Discharge:  (impaired ADLs and mobility)  Evaluation/Treatment Tolerance: Patient tolerated treatment well  Medical Staff Made Aware: Yes  End of Session Communication: Bedside nurse  End of Session Patient Position: Up in chair, Alarm on  Plan:  Inpatient Plan  Treatment Interventions: ADL retraining, Functional transfer training, UE strengthening/ROM, Endurance training, Cognitive reorientation, Patient/family training, Equipment evaluation/education, Fine motor coordination activities, Neuromuscular reeducation, Compensatory technique education  OT Frequency: 4 times per week  OT Discharge Recommendations: High intensity level of continued care  Equipment Recommended upon Discharge: Wheeled walker  OT Recommended Transfer Status: Moderate assist  OT - OK to Discharge: Yes  OT Assessment  OT Assessment Results: Decreased ADL status, Decreased upper extremity strength, Decreased safe judgment during ADL, Decreased cognition, Decreased endurance, Decreased functional mobility, Decreased IADLs, Decreased gross motor control  Prognosis: Good  Barriers to Discharge:  (impaired ADLs and mobility)  Evaluation/Treatment Tolerance: Patient tolerated treatment well  Medical Staff Made Aware: Yes    Subjective   Current Problem:  1. Pontine hemorrhage (Multi)  Transthoracic Echo (TTE) Complete    Transthoracic Echo (TTE) Complete      2. Colovesical fistula  Transthoracic Echo (TTE) Complete    Transthoracic Echo (TTE) Complete      3. Presence of artificial hip, left  Transthoracic Echo (TTE) Complete    Transthoracic Echo (TTE) Complete      4. 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   Precautions:  Hearing/Visual Limitations: WFL  Medical Precautions: Fall precautions  Precautions Comment: -150  Vital Signs:  Heart Rate: 66  Resp: (!) 28  SpO2: 96 %  BP: 135/68 (post 102/81)  MAP (mmHg): 89  Pain:  Pain Assessment  Pain Assessment: 0-10  0-10 (Numeric) Pain Score: 0 - No pain  Lines/Tubes/Drains:  Urethral Catheter Coude 16 Fr. (Active)   Number of days: 0         Objective   Cognition:  Overall Cognitive Status: Within Functional Limits  Arousal/Alertness: Appropriate responses to stimuli  Orientation Level: Oriented X4  Following Commands: Follows one step commands with repetition  Safety Judgment: Decreased awareness of need for assistance  Cognition Comments: Decreased safety with mobility, requires cues for safety  Insight: Moderate  Impulsive: Moderately  Processing Speed: Within funtional limits  Cognition Test Scores  Cognition Tests: Cognition Test Performed  SBT Test Score: Pt scored 14/28 demonstrating impairment consistent with dementia (normal =0-4). Pt made error with stating month and time of day. Made erros stating months in reverse order and only able to  recall 3/5 parts of memory test.        Home Living:  Type of Home: House  Lives With: Spouse  Home Adaptive Equipment: Walker rolling or standard  Home Layout: One level, Laundry in basement  Home Access: Stairs to enter with rails  Entrance Stairs-Number of Steps: 3  Bathroom Shower/Tub:  (tub/shower on 1st floor, walk-in shower in basement)  Bathroom Toilet: Standard  Bathroom Equipment: Raised toilet seat with rails   Prior Function:  Level of [place]: Independent with ADLs and functional transfers  ADL Assistance: Independent  Homemaking Assistance:  (wife does all IADLS)  Ambulatory Assistance: Independent  Vocational: Retired  Hand Dominance: Right  Prior Function Comments: pt denies falls, (+) drives  IADL History:     ADL:  Eating Assistance: Independent  Grooming Assistance: Independent  Bathing Assistance: Moderate  UE Dressing Assistance: Minimal  LE Dressing Assistance: Maximal  Toileting Assistance with Device: Maximal  Activity Tolerance:  Endurance: Tolerates 10 - 20 min exercise with multiple rests  Early Mobility/Exercise Safety Screen: Proceed with mobilization - No exclusion criteria met  Balance:  Dynamic Sitting Balance  Dynamic Sitting-Comments: Mod-Max A  Dynamic Standing Balance  Dynamic Standing-Comments: Min A at FWW  Static Sitting Balance  Static Sitting-Level of Assistance:  (Mod-Max A,heavy posterior lean)  Static Standing Balance  Static Standing-Level of Assistance:  (CGA-Min A at FWW)  Bed Mobility/Transfers: Bed Mobility/Transfers: Bed Mobility  Bed Mobility: Yes  Bed Mobility 1  Bed Mobility 1: Supine to sitting  Level of Assistance 1: Moderate assistance  Bed Mobility Comments 1: HOB elevated,cues for sequencing and use of drawsheet   and Transfers  Transfer: Yes  Transfer 1  Technique 1: Sit to stand, Stand to sit  Transfer Device 1: Walker, Gait belt  Transfer Level of Assistance 1: Moderate assistance  Trials/Comments 1: cues for hand placement and sequencing  IADL's:       Vision: Vision - Basic Assessment  Current Vision: Wears glasses only for reading   and    Sensation:  Light Touch: No apparent deficits  Strength:     Perception:  Inattention/Neglect: Appears intact  Coordination:  Movements are Fluid and Coordinated: Yes   Hand Function:  Hand Function  Gross Grasp: Functional  Coordination: Functional  Extremities:   RUE   RUE : Exceptions to WFL  RUE AROM (degrees)  RUE AROM Comment: WFL  RUE Strength  R Shoulder Flexion: 3+/5  R Shoulder Extension: 4-/5  R Shoulder ABduction: 3+/5  R Elbow Flexion: 5/5  R Elbow Extension: 5/5, LUE   LUE: Exceptions to WFL  LUE AROM (degrees)  LUE AROM Comment: WFL  LUE Strength  L Shoulder Flexion: 3+/5  L Shoulder Extension: 3+/5  L Shoulder ABduction: 3+/5  L Elbow Flexion: 4/5  L Elbow Extension: 4/5, RLE   RLE : Within Functional Limits, and LLE   LLE : Within Functional Limits      Outcome Measures: [facility] Daily Activity  Putting on and taking off regular lower body clothing: A lot  Bathing (including washing, rinsing, drying): A lot  Putting on and taking off regular upper body clothing: A little  Toileting, which includes using toilet, bedpan or urinal: A lot  Taking care of personal grooming such as brushing teeth: None  Eating Meals: None  Daily Activity - Total Score: 17        ICU Mobility Screen  Early Mobility/Exercise Safety Screen: Proceed with mobilization - No exclusion criteria met  ICU Mobility Scale: Transferring bed to chair,          Education Documentation  Body Mechanics, taught by Meme Mae OT at 8/9/2024  6:01 PM.  Learner: Patient  Readiness: Eager  Method: Explanation, Demonstration  Response: Verbalizes Understanding, Demonstrated Understanding    Precautions, taught by Meme Mae OT at 8/9/2024  6:01 PM.  Learner: Patient  Readiness: Eager  Method: Explanation, Demonstration  Response: Verbalizes Understanding, Demonstrated Understanding    ADL Training, taught by Meme Mae OT at  8/9/2024  6:01 PM.  Learner: Patient  Readiness: Eager  Method: Explanation, Demonstration  Response: Verbalizes Understanding, Demonstrated Understanding    Education Comments  No comments found.        Goals:   Encounter Problems       Encounter Problems (Active)       ADLs       Patient will perform UB and LB bathing with Mod I.       Start:  08/09/24    Expected End:  08/23/24            Patient with complete upper body dressing IND.       Start:  08/09/24    Expected End:  08/23/24            Patient with complete lower body dressing with Mod I.       Start:  08/09/24    Expected End:  08/23/24            Patient will complete toileting including hygiene clothing management/hygiene with Mod I.       Start:  08/09/24    Expected End:  08/23/24            Pt will complete simulated IADL tasks IND, demonstrating appropriate cognitive functioning to attend to tasks, problem solve through difficulties, and demonstrate appropriate safety awareness and memory without cues.        Start:  08/09/24    Expected End:  08/23/24               BALANCE       Pt will tolerate dynamic sitting EOB > 20 min IND while completing ADL and functional tasks.         Start:  08/09/24    Expected End:  08/23/24               COGNITION/SAFETY       Patient will score WFL on standardized cognitive assessment within reasonable time frame       Start:  08/09/24    Expected End:  08/23/24               MOBILITY       Pt will perform functional mobility household distances with Mod I using LRAD without LOB and demonstrating good safety awareness.        Start:  08/09/24    Expected End:  08/23/24               TRANSFERS       Pt will perform bed mobility in simulated home environment with Mod I.         Start:  08/09/24    Expected End:  08/23/24            Pt will perform functional transfers on various surfaces with Mod I using LRAD with good safety awareness.        Start:  08/09/24    Expected End:  08/23/24                   Treatment  Completed on Evaluation       Therapy/Activity:     Therapeutic Activity  Therapeutic Activity Performed: Yes  Therapeutic Activity 1: Pt sat EOB x 15min with Mod-MaxA with heavy posterior lean, max cues for anterior weight-shifting. pt completed side steps with Min A x 1 at FWW.            08/09/24 at 6:04 PM   Meme Mae, OT   Rehab Office: [phone]

## 2024-08-09 NOTE — PROGRESS NOTES
Pharmacy Medication History Review    Wade Machado is a 69 y.o. male admitted for Pontine hemorrhage (Multi). Pharmacy reviewed the patient's [id number] medications and allergies for accuracy.    The list below reflects the updated PTA list. Comments regarding how patient may be taking medications differently can be found in the Admit Orders Activity  Prior to Admission Medications   Prescriptions Last Dose Informant Patient Reported? Taking?   acetaminophen (Tylenol) 325 mg tablet  Spouse/Significant Other Yes Yes   Sig: Take 1-2 tablets (325-650 mg) by mouth every 4 hours if needed for mild pain (1 - 3).   ascorbic acid, vitamin C, 100 mg chewable tablet  Spouse/Significant Other Yes No   Sig: Chew 4 tablets (400 mg) once daily.   finasteride (Proscar) 5 mg tablet  Spouse/Significant Other Yes No   Sig: Take 1 tablet (5 mg) by mouth once daily.   lisinopril 40 mg tablet  Spouse/Significant Other No No   Sig: TAKE ONE TABLET BY MOUTH once DAILY for blood pressure   methylPREDNISolone (Medrol Dospak) 4 mg tablets Not Taking Spouse/Significant Other No No   Sig: Follow schedule on package instructions over 6 days   Patient not taking: Reported on 8/9/2024   oxyCODONE-acetaminophen (Percocet) 5-325 mg tablet  Spouse/Significant Other Yes No   Sig: Take 1-2 tablets by mouth. Every 4 to 6 hours as needed for pain for 7 days   tamsulosin (Flomax) 0.4 mg 24 hr capsule  Spouse/Significant Other No No   Sig: TAKE ONE CAPSULE BY MOUTH EVERY DAY   Patient taking differently: Take 1 capsule (0.4 mg) by mouth 2 times a day.      Facility-Administered Medications: None        The list below reflects the updated allergy list. Please review each documented allergy for additional clarification and justification.  Allergies  Reviewed by Traci Chavez, PharmD on 8/9/2024   No Known Allergies         Patient accepts M2B at discharge. Pharmacy has been updated to [facility].    Sources used: Pharmacy dispense  history, OARRs, patient interview (pt is a poor historian, but spouse was at bedside and knew medications pt was taking), [facility] general note from 5/13    Below are additional concerns with the patient's PTA list.    Medications ADDED:  Colace  Medications CHANGED:  none  Medications REMOVED:   Albuterol- pt states that he's had it for a long time and never used it  Acular  Prevagen    Traci Chavez, PharmD, [facility]  Transitions of Care Pharmacist  [facility]  Please reach out via Secure Chat for questions, or if no response call [phone] or [facility]

## 2024-08-09 NOTE — CONSULTS
"Stroke Team Consult Note     Reason for consult: Pontine ICH     History Of Present Illness  Wade Machado is a 69 y.o. male with PMH notable for hypertension, obesity, recurrent right hip dislocation after hip replacement in October 2023, and hx of colloid cyst s/p resection in 2004 who presented to [facility] with AMS, fevers, and weakness. CTH showed pontine ICH with ventriculomegaly, for which he was transferred to [facility] for neurosurgery evaluation. Neurology was consulted for this finding.    Upon presentation to OSH, he was found to be febrile at 38.9, /77. NIH 0. Labs revealed leukocytosis to 17 and UTI on UA. 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 He was started on Zosyn. Colorectal surgery following. Of note, patient had a llamas catheter placed 2 weeks ago for urinary retention and was also treated for UTI at that time.     At time of interview, patient was A&Ox2 to self and year, overall a very poor historian. Attempted to call wife without any answer. Patient states he came to the hospital because he was \"stupid\". Endorsed falls and hitting his head. When asked to elaborate on previous falls, he described working with \"expensive equipment.\" States he believes he takes his blood pressure medication as prescribed. Also endorses feeling weak prior to presenting to OSH.     mRS: 0  Last known well: unknown  Had stroke symptoms resolved at time of presentation: yes  ICH score: 1    Platelets 314    Past Medical History  Past Medical History:   Diagnosis Date    Hypertension     Prostate enlargement     Unspecified osteoarthritis, unspecified site     Osteoarthritis     Surgical History  Past Surgical History:   Procedure Laterality Date    JOINT REPLACEMENT      OTHER SURGICAL HISTORY  05/09/2022    Retinal detachment repair    OTHER SURGICAL HISTORY  05/09/2022    Cataract surgery     Social History  Social History "     Tobacco Use    Smoking status: Every Day     Types: Cigarettes    Smokeless tobacco: Never   Substance Use Topics    Alcohol use: Yes     Alcohol/week: 15.0 standard drinks of alcohol     Types: 15 Standard drinks or equivalent per week     Comment: 12 pack weekly    Drug use: Yes     Frequency: 1.0 times per week     Types: Marijuana     Allergies  Patient has no known allergies.  Home Medications  Medications Prior to Admission   Medication Sig Dispense Refill Last Dose    acetaminophen (Tylenol) 325 mg tablet Take by mouth every 4 hours if needed.       albuterol 90 mcg/actuation inhaler Inhale every 6 hours.       ketorolac (Acular) 0.5 % ophthalmic solution INSTILL 1 DROP IN OPERATED EYE ONCE A DAY       ascorbic acid, vitamin C, 100 mg chewable tablet Vitamin C       finasteride (Proscar) 5 mg tablet Take 1 tablet (5 mg) by mouth once daily.       lisinopril 40 mg tablet TAKE ONE TABLET BY MOUTH once DAILY for blood pressure 90 tablet 3     methylPREDNISolone (Medrol Dospak) 4 mg tablets Follow schedule on package instructions over 6 days 21 tablet 0     oxyCODONE-acetaminophen (Percocet) 5-325 mg tablet Take 1-2 tablets by mouth. Every 4 to 6 hours as needed for pain for 7 days       sennosides-docusate sodium (Senokot-S) 8.6-50 mg tablet Take 1 tablet by mouth 2 times a day.       tamsulosin (Flomax) 0.4 mg 24 hr capsule TAKE ONE CAPSULE BY MOUTH EVERY DAY (Patient taking differently: Take 1 capsule (0.4 mg) by mouth 2 times a day.) 90 capsule 3     UNABLE TO FIND Take 50 mcg by mouth once daily. Med Name: Prevagen 50 mcg Capsule          Review of Systems  Neurological Exam  Mental Status  Awake. Oriented only to person and time. Orientation: Described location as [facility], month as June, and unable to describe why he is in the hospital.    Cranial Nerves  CN III, IV, VI: No nystagmus. Pupils equal round and reactive to light bilaterally. Miotic pupils bilaterally, left eye erythematous without  discharge.  CN V: Facial sensation is normal.  CN VII: Full and symmetric facial movement.  CN VIII: Hearing is normal.  CN XI: Shoulder shrug strength is normal.  CN XII: Tongue deviates to the right.    Motor  Normal muscle bulk throughout. No abnormal involuntary movements.                                               Right                     Left   Shoulder abduction               5                          5   Shoulder adduction               5                          5  Elbow flexion                         5                          5  Elbow extension                    5                          5  Finger flexion                         5                          5  Hip flexion                              4                          4+  Knee flexion                           5                          5  Knee extension                      5                          5  Plantarflexion                         5                          5  Dorsiflexion                            5                          5  Bilateral hip strength impaired by previous bilateral hip replacements and chronic pain.    Sensory  Light touch is normal in upper and lower extremities.     Reflexes                                            Right                      Left  Brachioradialis                    2+                         2+  Biceps                                 2+                         2+  Triceps                                2+                         2+  Patellar                                1+                         1+  Achilles                                2+                         2+    Right pathological reflexes: Ankle clonus absent.  Left pathological reflexes: Ankle clonus absent.    Coordination  Right: Finger-to-nose normal.Left: Finger-to-nose normal.    Gait    Deferred.    Physical Exam  Constitutional:       General: He is awake.   Eyes:      Extraocular Movements: No nystagmus.      Pupils:  "Pupils are equal, round, and reactive to light.   Neurological:      Deep Tendon Reflexes:      Reflex Scores:       Tricep reflexes are 2+ on the right side and 2+ on the left side.       Bicep reflexes are 2+ on the right side and 2+ on the left side.       Brachioradialis reflexes are 2+ on the right side and 2+ on the left side.       Patellar reflexes are 1+ on the right side and 1+ on the left side.       Achilles reflexes are 2+ on the right side and 2+ on the left side.        Last Recorded Vitals  Blood pressure 134/73, pulse 75, temperature 37.4 °C (99.3 °F), temperature source Temporal, resp. rate 20, height 1.676 m (5' 6\"), weight 109 kg (240 lb), SpO2 95%.          Relevant Results  Results for orders placed or performed during the hospital encounter of 08/09/24 (from the past 24 hour(s))   Blood Gas Venous Full Panel Unsolicited   Result Value Ref Range    POCT pH, Venous 7.38 7.33 - 7.43 pH    POCT pCO2, Venous 45 41 - 51 mm Hg    POCT pO2, Venous 22 (L) 35 - 45 mm Hg    POCT SO2, Venous 29 (L) 45 - 75 %    POCT Oxy Hemoglobin, Venous 28.0 (L) 45.0 - 75.0 %    POCT Hematocrit Calculated, Venous 39.0 (L) 41.0 - 52.0 %    POCT Sodium, Venous 133 (L) 136 - 145 mmol/L    POCT Potassium, Venous 3.9 3.5 - 5.3 mmol/L    POCT Chloride, Venous 100 98 - 107 mmol/L    POCT Ionized Calicum, Venous 1.12 1.10 - 1.33 mmol/L    POCT Glucose, Venous 102 (H) 74 - 99 mg/dL    POCT Lactate, Venous 1.2 0.4 - 2.0 mmol/L    POCT Base Excess, Venous 1.0 -2.0 - 3.0 mmol/L    POCT HCO3 Calculated, Venous 26.6 (H) 22.0 - 26.0 mmol/L    POCT Hemoglobin, Venous 13.0 (L) 13.5 - 17.5 g/dL    POCT Anion Gap, Venous 10.0 10.0 - 25.0 mmol/L    Patient Temperature 37.0 degrees Celsius        NIH Stroke Scale  1A. Level of Consciousness: Alert, Keenly Responsive  1B. Ask Month and Age: Both Questions Right  1C. Blink Eyes & Squeeze Hands: Performs Both Tasks  2. Best Gaze: Normal  3. Visual: No Visual Loss  4. Facial Palsy: Normal " Symmetrical Movements  5A. Motor - Left Arm: No Drift  5B. Motor - Right Arm: No Drift  6A. Motor - Left Leg: No Drift  6B. Motor - Right Leg: No Drift  7. Limb Ataxia: Absent  8. Sensory Loss: Normal  9. Best Language: No Aphasia  10. Dysarthria: Normal  11. Extinction and Inattention: No Abnormality  NIH Stroke Scale: 0           Gama Coma Scale  Best Eye Response: Spontaneous  Best Verbal Response: Oriented  Best Motor Response: Follows commands  Gama Coma Scale Score: 15  ICH Total Score : 1             No MRI head results found for the past 14 days    I have personally reviewed the following imaging results     CT chest abdomen pelvis wo IV contrast    Result Date: 8/9/2024  Interpreted By:  Ana Luisa Bradley, STUDY: CT CHEST ABDOMEN PELVIS WO CONTRAST;  8/9/2024 12:11 am   INDICATION: Signs/Symptoms:fever/cough/abdominal pain.   COMPARISON: PET/CT 01/17/2024.   ACCESSION NUMBER(S): [id number]   ORDERING CLINICIAN: CURTIS MAJOR   TECHNIQUE: Axial CT of the chest, abdomen, and pelvis was performed. Coronal and sagittal reconstructions were performed. No intravenous or oral contrast agents were administered.   FINDINGS: Please note that the study is limited without intravenous contrast. There is motion artifact. There is streak artifact from the bilateral hip prosthesis.   CHEST:     LUNG/PLEURA/LARGE AIRWAYS: The trachea and central airways are patent.   The evaluation of the lungs is degraded by motion artifact. No pleural effusion, consolidation or pneumothorax. There is a 4 mm noncalcified pulmonary nodule along the pleura at the right upper lobe (series 201, axial image 33), does not appear significantly changed in the interval.   VESSELS: No thoracic aortic aneurysm.  Atherosclerotic calcifications are noted at the thoracic aorta. Limited evaluation for acute aortic injury in the absence of intravenous contrast. The main pulmonary artery is dilated.   HEART: No pericardial effusion.  The heart is  mildly enlarged. Coronary artery calcifications are present.   MEDIASTINUM AND ROSELYN: The evaluation for adenopathy suboptimal in the absence of intravenous contrast.  No obvious pathologically enlarged lymph nodes at the mediastinum.  No pneumomediastinum.   There is a small hiatal hernia.   CHEST WALL AND LOWER NECK: The visualized thyroid gland is unremarkable. The soft tissues of the chest wall are within normal limits. The evaluation for acute fracture is degraded by motion artifact. Multilevel degenerative changes at the spine.     ABDOMEN:   LIVER: Unremarkable unenhanced appearance.   BILE DUCTS: No obvious dilation.   GALLBLADDER: Present.   PANCREAS: No peripancreatic fluid collection.   SPLEEN: Unremarkable unenhanced appearance.   ADRENAL GLANDS: Unremarkable.   KIDNEYS AND URETERS: Evaluation degraded by motion artifact. There is mild bilateral hydroureteronephrosis. No definite ureteral calculi are identified. The distal ureters are obscured by streak artifact from the bilateral hip prostheses.   PELVIS: The pelvis is partially obscured by streak artifact from the bilateral hip prosthesis.   BLADDER: The urinary bladder is distended with wall thickening. Linear lucencies are extending from the sigmoid colon to the superior aspect of the urinary bladder, suggestive of fistulous tract.   REPRODUCTIVE ORGANS: Radiation seeds are noted at the prostate.   BOWEL: The evaluation of the bowel is degraded by motion artifact. The stomach is decompressed. No evidence for bowel obstruction. There is colonic diverticulosis. There is wall thickening with adjacent inflammatory changes at the sigmoid colon. There is a 3.0 x 3.2 x 3.4 cm (AP by transverse by craniocaudal diameter) density posterior to the urinary bladder and abutting the distal sigmoid colon.   VESSELS: Atherosclerotic calcifications within the abdominal aorta and its branches. There is an ectatic infrarenal abdominal aorta measuring 2.7 cm.    PERITONEUM/RETROPERITONEUM/LYMPH NODES: Trace amount of ascites. No free air.  No retroperitoneal hemorrhage. No pathologically enlarged lymph nodes are identified.   ABDOMINAL WALL: There is a small fat containing umbilical hernia.   BONES: Status post bilateral total hip arthroplasties. There is increased lucency along the anterior aspect of the acetabular cup at the right hip measuring 15 mm. Multilevel degenerative changes in the spine.       CHEST 1.  Study degraded by motion artifact. No consolidation or pleural effusion. 2. Mild cardiomegaly. Coronary artery calcifications. Dilation of the main pulmonary artery. Please correlate for pulmonary arterial hypertension. 3. 4 mm pulmonary nodule at the right upper lobe. Follow-up CT chest in 12 months recommended to re-evaluate. 4. Small hiatal hernia.   ABDOMEN-PELVIS 1.  Study degraded by motion artifact. Mild bilateral hydronephrosis. No definite obstructing ureteral calculus is identified, although the distal ureters are obscured by streak artifact from the bilateral hip prostheses. 2. 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 Please correlate with urinalysis to evaluate for cystitis. 3. 3.0 x 3.2 x 3.4 cm density posterior to the urinary bladder and abutting the distal sigmoid colon, may represent abscess or mass. Clinical correlation recommended. Follow-up CT with intravenous and oral contrast and/or PET/CT can be obtained for further evaluation. 4. Colonic diverticulosis. Wall thickening with inflammatory changes at the sigmoid colon, may represent chronic changes versus colitis or acute diverticulitis. 5. Trace amount of ascites. 6. Status post bilateral total hip arthroplasties. Increased lucency along the anterior aspect of the acetabular cup at the right hip, concerning for prosthetic loosening. Orthopedic consult recommended.     MACRO:   Critical Finding:  There are multiple critical  findings. See findings. notification was initiated on 8/9/2024 at 1:27 am by  Ana Luisa Bradley.  (**-YCF-**)   Signed by: Ana Luisa Bradley 8/9/2024 1:36 AM Dictation workstation:   [id number]    CT head wo IV contrast    Result Date: 8/9/2024  Interpreted By:  Ana Luisa Bradley, STUDY: CT HEAD WO IV CONTRAST; CT CERVICAL SPINE WO IV CONTRAST;  8/9/2024 12:11 am   INDICATION: Signs/Symptoms:fall; Signs/Symptoms:head injury.   COMPARISON: None.   ACCESSION NUMBER(S): [id number]; [id number]   ORDERING CLINICIAN: CURTIS MAJOR   TECHNIQUE: Axial noncontrast images of the head  with coronal  and sagittal reconstructed images . Axial noncontrast images of the cervical spine with coronal and sagittal reconstructed images.   FINDINGS: BRAIN PARENCHYMA: There are periventricular white matter hypodensities, probably representing chronic microvascular ischemic changes. Otherwise, the gray-white matter interfaces are preserved. No acute territorial infarct. No midline shift. HEMORRHAGE: There is a 1.5 x 1.4 x 1.5 cm (transverse by AP by craniocaudal diameter) hyperdensity at the carmen, most consistent with acute hemorrhage. There is mass effect upon the 4th ventricle which is partially effaced. VENTRICLES and EXTRA-AXIAL SPACES: There is ventricular dilation more than expected for the degree of cortical atrophy. EXTRACRANIAL SOFT TISSUES: Within normal limits. CALVARIUM: No depressed calvarial fracture. Postsurgical changes of craniotomy are noted at the right frontal bone. PARANASAL SINUSES: There is complete opacification of the right maxillary sinus with hyperostosis. MASTOIDS: Within normal limits.   CERVICAL SPINE: ALIGNMENT: There is straightening of the cervical lordosis. There is mild retrolisthesis of C5 on C6, probably on a degenerative basis. No traumatic facet widening. VERTEBRAE: No acute fracture. DISCS: There is multilevel degenerative disc disease with osteophytosis, more pronounced at C5-C6. There is  multilevel facet arthropathy. PREVERTEBRAL SOFT TISSUES: No prevertebral soft tissue swelling. LUNG APICES: No acute findings at the visualized lung apices.       1. Acute pontine hemorrhage with mass effect upon the 4th ventricle which is partially effaced. Follow-up with contrast-enhanced MRI recommended to exclude underlying hemorrhagic lesion. 2. Ventriculomegaly, may be secondary to central parenchymal volume loss versus hydrocephalus. 3.  No acute fracture at the cervical spine. Degenerative changes. 4. Complete opacification of the right maxillary sinus with hyperostosis, suggestive of chronic sinusitis. Follow-up ENT consult recommended.     MACRO: Ana Luisa Bradley discussed the significance and urgency of this critical finding by telephone with  CURTIS MAJOR on 8/9/2024 at 1:01 am.  (**-RCF-**) Findings:  See findings.   Signed by: Ana Luisa Bradley 8/9/2024 1:13 AM Dictation workstation:   [id number]    CT cervical spine wo IV contrast    Result Date: 8/9/2024  Interpreted By:  Ana Luisa Bradley, STUDY: CT HEAD WO IV CONTRAST; CT CERVICAL SPINE WO IV CONTRAST;  8/9/2024 12:11 am   INDICATION: Signs/Symptoms:fall; Signs/Symptoms:head injury.   COMPARISON: None.   ACCESSION NUMBER(S): [id number]; [id number]   ORDERING CLINICIAN: CURTIS MAJOR   TECHNIQUE: Axial noncontrast images of the head  with coronal  and sagittal reconstructed images . Axial noncontrast images of the cervical spine with coronal and sagittal reconstructed images.   FINDINGS: BRAIN PARENCHYMA: There are periventricular white matter hypodensities, probably representing chronic microvascular ischemic changes. Otherwise, the gray-white matter interfaces are preserved. No acute territorial infarct. No midline shift. HEMORRHAGE: There is a 1.5 x 1.4 x 1.5 cm (transverse by AP by craniocaudal diameter) hyperdensity at the carmen, most consistent with acute hemorrhage. There is mass effect upon the 4th ventricle which is partially effaced.  VENTRICLES and EXTRA-AXIAL SPACES: There is ventricular dilation more than expected for the degree of cortical atrophy. EXTRACRANIAL SOFT TISSUES: Within normal limits. CALVARIUM: No depressed calvarial fracture. Postsurgical changes of craniotomy are noted at the right frontal bone. PARANASAL SINUSES: There is complete opacification of the right maxillary sinus with hyperostosis. MASTOIDS: Within normal limits.   CERVICAL SPINE: ALIGNMENT: There is straightening of the cervical lordosis. There is mild retrolisthesis of C5 on C6, probably on a degenerative basis. No traumatic facet widening. VERTEBRAE: No acute fracture. DISCS: There is multilevel degenerative disc disease with osteophytosis, more pronounced at C5-C6. There is multilevel facet arthropathy. PREVERTEBRAL SOFT TISSUES: No prevertebral soft tissue swelling. LUNG APICES: No acute findings at the visualized lung apices.       1. Acute pontine hemorrhage with mass effect upon the 4th ventricle which is partially effaced. Follow-up with contrast-enhanced MRI recommended to exclude underlying hemorrhagic lesion. 2. Ventriculomegaly, may be secondary to central parenchymal volume loss versus hydrocephalus. 3.  No acute fracture at the cervical spine. Degenerative changes. 4. Complete opacification of the right maxillary sinus with hyperostosis, suggestive of chronic sinusitis. Follow-up ENT consult recommended.     MACRO: Ana Luisa Bradley discussed the significance and urgency of this critical finding by telephone with  CURTIS MAJOR on 8/9/2024 at 1:01 am.  (**-RCF-**) Findings:  See findings.   Signed by: Ana Luisa Bradley 8/9/2024 1:13 AM Dictation workstation:   [id number]         Assessment/Plan   Principal Problem:    Pontine hemorrhage (Multi)    Mr. Machado is a 69M with PMHx notable for hypertension, obesity, recurrent right hip dislocation after hip replacement in October 2023, and hx of colloid cyst s/p resection in 2004 who presented to [facility] with  AMS, fevers, and weakness. CTH showed pontine ICH with ventriculomegaly, for which he was transferred to [facility] for neurosurgery evaluation. Neurology was consulted for this finding.    Further history is very limited due to waxing and waning mental status of patient. Examination reveals disorientation with potential tongue deviation to the right, but otherwise near normal strength throughout and no other cranial nerve abnormalities. Stability CTH showed similar appearance of pontine ICH with decreased dilation of ventricular system & decreased effacement of 4th ventricle. CTA H/N shows vascular calcifications of bilateral carotids without significant stenosis, and calcified and non-calcified plaques along L common carotid with moderate narrowing.     Etiologies for patient's ICH include hypertension vs. new primary brain tumor vs. metastatic disease secondary to possible bladder mass. In the ED here at , he is more hypertensive to SBP >200 at times, now requiring Cardene drip, making hypertensive hemorrhage the more likely etiology.    Type: ICH   BP goal: 130-150 systolic    IVH: No   ICH volume: 2.3 mL  Subtype/etiology: hypertensive vs. mass  Vessels involved: pontine perforators  Neurological manifestations: AMS, confusion  NIHSS (worst at presentation): 0  Diagnostic evaluation: CTH  Antiplatelet/antithrombotic plan for stroke prevention: deferred due to hemorrhage  VTE prophylaxis: deferred due to hemorrhage  Vascular Risk Factor modification goals:  Blood pressure goals: avoid hypotension SBP <100 that could worsen cerebral perfusion, Inpatient Intracerebral hemorrhage- -150 mmHg (if presenting SBP was 150-220 mmHg)   Lipid Goals: education on healthy diet and statin therapy to maintain or achieve goal LDL-cholesterol < 70mg  Glucose Goals: early treatment of hyperglycemia to goal glucose 140-180 mg/dl with long-term goal A1c < 7%   Smoking Cessation and Education  Assessment for Rehabilitation  needs   Patient and family education on signs and symptoms of stroke, calling 911, healthy strategies for stroke prevention.         RECOMMENDATIONS:  - MRI brain w/wo  - SBP goal 130-150  - Consider further malignancy work-up in collaboration with colorectal surgery  - PT/OT      Tamara Camejo MD  Neurology PGY-2    -----------------------------------------------------------------------------------------------------------------------------------------    Post-imaging/staffing update:  - MRI brain shows circumscribed pontine mass with intrinsic hemorrhagic signal c/f cavernous malformation vs malignancy  - Goal -150  - Consider repeat MRI brain w/wo in 4 weeks to help delineate   - No need for outpatient neuro-stroke follow-up  - No further inpatient stroke recs       We will sign off at this time. Please page [phone] with any further questions/concerns.    This patient was seen, discussed and examined with the attending, Dr. Varela, who agrees with the management plan.    Alex Amaral MD  PGY-3, Neurology  Stroke neurology: [id number]

## 2024-08-09 NOTE — PROGRESS NOTES
[facility]  NEUROSCIENCE INTENSIVE CARE UNIT  DAILY PROGRESS NOTE       Patient Name: Wade Machado   MRN: [id number]     Admit Date: 2024     : 1955 AGE: 69 y.o. GENDER: male        Subjective    69 y.o. male admitted on 2024 with H/o right crani for resection of colloid cyst , HTN, obesity, prostate cancer , EtOH, tobacco, p/w confusion, fevers, and weakness  CTH showing pontine ICH. CT A/P with concern for colovesicular fistula.     Interval Events: Patient transferred from [place] to [facility] for evaluation of pontine lesion and possible hemorrhage. Neurologically he is confused but aware of general situation. He is also been concerned with more recent development of urinary retention.            Objective   VITALS (24H):  Temp:  [35.5 °C (95.9 °F)-38.9 °C (102 °F)] 35.5 °C (95.9 °F)  Heart Rate:  [] 88  Resp:  [0-22] 19  BP: (102-170)/(54-98) 102/81  FiO2 (%):  [21 %] 21 %  INTAKE/OUTPUT:  Intake/Output Summary (Last 24 hours) at 2024 1312  Last data filed at 2024 1108  Gross per 24 hour   Intake 663.74 ml   Output 575 ml   Net 88.74 ml     VENT SETTINGS:  FiO2 (%):  [21 %] 21 %     PHYSICAL EXAM:  NEURO:  - Alert, oriented x2 follows simple commands  - EOS, PERRL, Some right abducens weakness but otherwise intact without nystagmus or clear gaze restriction.   - EVANGELISTA 5/5 strength, no drift, no ataxia  CV:  - RRR on telemetry, NSR  RESP:  - Regular, unlabored  - Oxygen: RA  :  - Urinary catheter in place  GI:  - Abdomen NT/ND, soft  SKIN:  - Intact    MEDICATIONS:  Scheduled: PRN: Continuous:   finasteride, 5 mg, oral, Daily  lisinopril, 40 mg, oral, Daily  piperacillin-tazobactam, 3.375 g, intravenous, q6h  polyethylene glycol, 17 g, oral, Daily  tamsulosin, 0.4 mg, oral, BID     PRN medications: acetaminophen, dextrose, dextrose, glucagon, glucagon, hydrALAZINE, labetaloL, niCARdipine niCARdipine, 1-15 mg/hr, Last Rate: Stopped (24 0645)  sodium chloride  0.9%, 100 mL/hr, Last Rate: 100 mL/hr (08/09/24 0928)         LAB RESULTS:  Results from last 72 hours   Lab Units 08/09/24 0534 08/08/24  2318   GLUCOSE mg/dL 82 123*   SODIUM mmol/L 135* 134*   POTASSIUM mmol/L 4.5 3.8   CHLORIDE mmol/L 99 101   CO2 mmol/L 21 20*   ANION GAP mmol/L 20 17   BUN mg/dL 11 12   CREATININE mg/dL 0.83 0.75   EGFR mL/min/1.73m*2 >90 >90   CALCIUM mg/dL 8.3* 8.7   PHOSPHORUS mg/dL 4.6  --    ALBUMIN g/dL 3.4 3.4   MAGNESIUM mg/dL 2.14  --       Results from last 72 hours   Lab Units 08/09/24 0534 08/08/24 2318   WBC AUTO x10*3/uL 15.2* 17.5*   NRBC AUTO /100 WBCs 0.0 0.0   RBC AUTO x10*6/uL 4.39* 4.11*   HEMOGLOBIN g/dL 12.7* 12.5*   HEMATOCRIT % 38.2* 36.5*   MCV fL 87 89   MCH pg 28.9 30.4   MCHC g/dL 33.2 34.2   RDW % 14.5 14.6*   PLATELETS AUTO x10*3/uL 384 314   NEUTROS PCT AUTO %  --  87.9   IG PCT AUTO %  --  0.6   LYMPHS PCT AUTO %  --  4.0   MONOS PCT AUTO %  --  7.0   EOS PCT AUTO %  --  0.2   BASOS PCT AUTO %  --  0.3   NEUTROS ABS x10*3/uL  --  15.35*   IG AUTO x10*3/uL  --  0.11   LYMPHS ABS AUTO x10*3/uL  --  0.70*   MONOS ABS AUTO x10*3/uL  --  1.22*   EOS ABS AUTO x10*3/uL  --  0.03   BASOS ABS AUTO x10*3/uL  --  0.05      Results from last 72 hours   Lab Units 08/09/24 0534   PROTIME seconds 15.2*   INR  1.3*   APTT seconds 30      Results from last 72 hours   Lab Units 08/09/24 0534 08/08/24  2318   ALK PHOS U/L  --  107   BILIRUBIN TOTAL mg/dL  --  0.4   PROTEIN TOTAL g/dL  --  7.4   ALT U/L  --  14   AST U/L  --  15   ALBUMIN g/dL 3.4 3.4        IMAGING RESULTS:  MR brain w and wo IV contrast   Final Result   1. Circumscribed dorsal pontine mass measuring 2.1 x 2.2 x 1.9 cm   with intrinsic hemorrhagic signal. There is no associated enhancement   or nodularity. This may reflect a de jose pontine hemorrhage possibly   secondary to underlying hypertension, hemorrhage secondary to an   underlying cavernoma or other etiology.   2. Suspect ex vacuo dilation of the  ventricles in the setting of   senescent atrophy. The possibility of underlying normal pressure   hydrocephalus is not excluded based on imaging. Clinical correlation   is recommended.   3. Complete opacification of the right paranasal sinus likely a   chronic process.        I personally reviewed the images/study and I agree with the findings   as stated. This study was interpreted at [place], [place].        MACRO:   None        Signed by: Kassandra Hull 8/9/2024 10:27 AM   Dictation workstation:   [id number]      CT angio head and neck w and wo IV contrast   Final Result   Non con CT head:   1. Similar appearance of a pontine intraparenchymal hemorrhage with   persistent dilation of the supratentorial ventricular system.        CTA head and neck:   1. Calcified and noncalcified atheromatous plaque along the mid left   common carotid artery with moderate focal luminal narrowing.   Calcified and noncalcified atheromatous plaque along the mid common   carotid artery with moderate luminal narrowing. Otherwise, no   evidence for significant stenosis of the cervical vessels.   2. Vascular calcifications of bilateral carotid siphons with   mild-to-moderate narrowing. No evidence for significant stenosis or   large branch vessel cutoffs of the intracranial vessels.             MACRO:   None        Signed by: Kassandra Hull 8/9/2024 7:53 AM   Dictation workstation:   [id number]            Assessment/Plan    NEURO:  #Acute encephalopathy  #Dorsal pontine mass  Assessment:  -Relatively stable neurologic exam with only minimal right sixth palsy that may be chronic, otherwise without prior head imaging to compare to at this time  - Potential for acute hemorrhage   -Etiology of encephalopathy may be multifactorial in the setting of acute infection  Plan:  - NSU  - Neuro Checks: Q1H  - Pain: acetaminophen PRN and oxycodone PRN  - Nausea: ondansetron  -Will treat as acute ICH at  this time with blood pressure goal 130-150  - Will continue to discuss with neurosurgery regarding plan/acute intervention. No clear developing hydrocephalus   - PT/OT/SLP    CARDIOVASCULAR:  #Hx HTN  Assessment:  - at goal, no immediate concerns  - ECHO: pending    Plan:  - Continue to monitor on telemetry  - BP goal: -150 mmHg    RESPIRATORY:  #No acute concerns  Assessment:  - stable  Plan:  - Continuous pulse oximetry   - O2 PRN to maintain SpO2 > 94%, wean as tolerated    RENAL/:  #BPH  #Acute urinary retention  #Possible colovesical fistula  #Hydronephrosis  #Question pelvic abscess  Assessment:  -Several week increasing urinary retention requiring temporary placement of Llamas catheter, requiring repeat Llamas catheter insertion 8/9 due to urinary retention greater than 800 cc on bladder scan  -Unclear etiology, possibly representative of BPH and history of prostate cancer 2015 s/p treatment    Results from last 72 hours   Lab Units 08/09/24  0534 08/08/24  2318   BUN mg/dL 11 12   CREATININE mg/dL 0.83 0.75       Plan:  - Monitor with daily RFP  - Maintain llamas catheter for: urinary retention/bladder outlet obstruction, acute or chronic  -Urology following, appreciate recommendations  -Urinary jaimie covered with antibiotics as discussed below    FEN/GI:  # Colovesical fistula  Assessment:  -    -Abdominal exam appears benign  - LFTs w/in normal limit  Plan:  - Monitor and replace electrolytes per protocol  - IVF: NS @ 75 mL/hr  -Resume regular diet no acute intervention by surgical teams is confirmed  - GI, ACS and Urology following, appreciate their recommendations   - Bowel Regimen: Miralax QD    ENDOCRINE:  #No acute concerns  Assessment:  Results from last 7 days   Lab Units 08/09/24  0534 08/08/24  2318   GLUCOSE mg/dL 82 123*    A1C 6.0  Plan:  - AC HS, transition to PRN if no concerns    HEMATOLOGY:  # Leukocytosis, likely infectious as discussed below  #Normocytic anemia,  mild  Assessment:    Results from last 7 days   Lab Units 24  0534 24  2318   HEMOGLOBIN g/dL 12.7* 12.5*   HEMATOCRIT % 38.2* 36.5*   PLATELETS AUTO x10*3/uL 384 314     Plan:  - Continue to monitor with daily CBC and Coag panel  - No signs of active bleeding    INFECTIOUS DISEASE:  #Question of bladder abscess  Assessment:  Results from last 7 days   Lab Units 24  0534 24  2318   WBC AUTO x10*3/uL 15.2* 17.5*    - Temp (24hrs), Av.4 °C (99.3 °F), Min:35.5 °C (95.9 °F), Max:38.9 °C (102 °F)   Fever on arrival is improving along with leukocytosis following Abx    -CT abdomen pelvis shows thickening of the urinary bladder with colovesical fistula as well as density posterior to the urinary bladder possibly representing intrapelvic abscess versus mass  Plan:  - Continue to monitor for s/sx of infection  - Monitor blood cultures, currently pending  - Continue Zosyn 3.375 every 6 hours, first dose   -Recommendations for ACS as well as urology and GI as noted above    MUSCULOSKELETAL:  - No acute issues    SKIN:  - No acute issues  - Turns and skin care per NSU protocol  ACCESS:  - PIV    PROPHYLAXIS:  - DVT Ppx: SCDs and SQH  - GI Ppx: Pantoprazole    RESTRAINTS:  Not indicated/Patient does not meet criteria for restraints    Nj Polanco MD   Neurocritical Care Fellow  PGY-6

## 2024-08-09 NOTE — H&P (VIEW-ONLY)
Consults      [facility]   [facility]  INITIAL CONSULT NOTE     Source of Information: The source of the history was patient.    Consult requested by: Service: NSU    Reason for Consult: endoscopic evaluation of colovesical fistula    Admission Chief Complaint: altered mental status      SUBJECTIVE     HPI: Wade Machado is a 69 y.o. male w/PMH of HTN, prostate cancer s/p brachy therapy, BPH, recurrent cystitis who originally presented to [place] ED for several days of confusion, fever, and generalized weakness. 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 He was [facility] to [facility] ED for admission and Neurosurgery evaluation. Gastroenterology has been consulted for endoscopic evaluation of colovesical fistula.    Today the patient reports feeling much improved from admission. The patient denies chronic diarrhea, constipation, abdominal pain, tenesmus, nausea, emesis, melena, hematochezia, unintentional weight loss, oral ulcers, eye pain, vision changes, novel joint pains. He has never had a colonoscopy before. No family history of GI cancers, advanced adenomas, or IBD to his knowledge. He has a significant smoking history and history of heavy alcohol use. No abdominal surgeries.     The patient does have a history of recurrent cystitis and UTI's due to ongoing urinary retention from BPH. He additionally has a history of prostate cancer now s/p brachy therapy. PET-CT in January 2024 was negative for recurrence and distant metastasis.    Labs notable for down trending leukocytosis and normocytic anemia. UA with  mild hematuria and significant pyuria. Imaging aforementioned. He did receive piperacillin/tazobactam for empiric coverage of sepsis with presumed urinary source.    ROS: Complete review of systems obtained, negative unless otherwise indicated above.     No Known Allergies  Past Medical History:   Diagnosis Date    Hypertension     Prostate enlargement     Unspecified osteoarthritis, unspecified site     Osteoarthritis     Past Surgical History:   Procedure Laterality Date    JOINT REPLACEMENT      OTHER SURGICAL HISTORY  05/09/2022    Retinal detachment repair    OTHER SURGICAL HISTORY  05/09/2022    Cataract surgery     No family history on file.  Social History     Social History Narrative    Not on file     [unfilled]    Medications:  Scheduled medications  finasteride, 5 mg, oral, Daily  lisinopril, 40 mg, oral, Daily  piperacillin-tazobactam, 3.375 g, intravenous, q6h  polyethylene glycol, 17 g, oral, Daily  psyllium, 1 packet, oral, Daily  tamsulosin, 0.4 mg, oral, BID      Continuous medications  niCARdipine, 1-15 mg/hr, Last Rate: Stopped (08/09/24 0645)  sodium chloride 0.9%, 100 mL/hr, Last Rate: 100 mL/hr (08/09/24 1200)      PRN medications  PRN medications: acetaminophen **OR** acetaminophen, dextrose, dextrose, glucagon, glucagon, hydrALAZINE, labetaloL, niCARdipine       EXAM     Vital signs:  Visit Vitals  /74   Pulse 78   Temp (!) 38.3 °C (100.9 °F)   Resp 20         Physical Exam  Constitutional:       General: He is not in acute distress.     Appearance: Normal appearance. He is obese. He is not toxic-appearing.   HENT:      Head: Normocephalic and atraumatic.      Nose: Nose normal.      Mouth/Throat:      Mouth: Mucous membranes are moist.      Pharynx: Oropharynx is clear.   Eyes:      General: No scleral icterus.  Cardiovascular:      Rate and Rhythm: Normal rate and regular rhythm.      Pulses: Normal pulses.   Pulmonary:      Effort: Pulmonary effort is normal. No respiratory distress.    Abdominal:      General: Abdomen is flat. Bowel sounds are normal. There is no distension.      Palpations: Abdomen is soft. There is no mass.      Tenderness: There is no abdominal tenderness. There is no guarding or rebound.   Skin:     General: Skin is warm and dry.   Neurological:      Mental Status: He is alert and oriented to person, place, and time. Mental status is at baseline.             DATA                                                                            Labs     Lab Results   Component Value Date    WBC 15.2 (H) 08/09/2024    WBC 17.5 (H) 08/08/2024    WBC 11.8 (H) 05/03/2024    HGB 12.7 (L) 08/09/2024    HGB 12.5 (L) 08/08/2024    HGB 13.1 (L) 05/03/2024    MCV 87 08/09/2024    MCV 89 08/08/2024    MCV 91 05/03/2024     08/09/2024     08/08/2024     05/03/2024       Lab Results   Component Value Date    GLUCOSE 82 08/09/2024    CALCIUM 8.3 (L) 08/09/2024     (L) 08/09/2024    K 4.5 08/09/2024    CO2 21 08/09/2024    CL 99 08/09/2024    BUN 11 08/09/2024    CREATININE 0.83 08/09/2024       Lab Results   Component Value Date    ALT 14 08/08/2024    ALT 11 05/03/2024    ALT 12 11/19/2023    AST 15 08/08/2024    AST 11 05/03/2024    AST 23 11/19/2023    ALKPHOS 107 08/08/2024    ALKPHOS 70 05/03/2024    ALKPHOS 92 11/19/2023    BILITOT 0.4 08/08/2024    BILITOT 0.6 05/03/2024    BILITOT 0.4 11/19/2023                                                                                  Imaging             === 08/09/24 ===    CT ANGIO HEAD AND NECK W AND WO IV CONTRAST    - Impression -  Non con CT head:  1. Similar appearance of a pontine intraparenchymal hemorrhage with  persistent dilation of the supratentorial ventricular system.    CTA head and neck:  1. Calcified and noncalcified atheromatous plaque along the mid left  common carotid artery with moderate focal luminal narrowing.  Calcified and noncalcified atheromatous plaque along the mid common  carotid artery with  moderate luminal narrowing. Otherwise, no  evidence for significant stenosis of the cervical vessels.  2. Vascular calcifications of bilateral carotid siphons with  mild-to-moderate narrowing. No evidence for significant stenosis or  large branch vessel cutoffs of the intracranial vessels.      MACRO:  None    Signed by: Kassandra Hull 8/9/2024 7:53 AM  Dictation workstation:   [id number]                                                                           GI Procedures     None prior.    ASSESSMENT / PLAN                  Assessment and Recommendations:   Wade Machado is a 69 y.o. male w/PMH of HTN, prostate cancer s/p brachy therapy, BPH, recurrent cystitis who originally presented to [facility] ED for several days of confusion, fever, and generalized weakness. 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 He was transferred to [facility] ED for admission and Neurosurgery evaluation. Gastroenterology has been consulted for endoscopic evaluation of colovesical fistula.     Etiology of fistula is unclear at this time though could be genitourinary from repeated cystitis likely driven by chronic urinary retention or colonic due to malignancy (no prior colonoscopy in setting of significant alcohol and tobacco use, perhaps chronic normocytic anemia? (No iron studies available)) vs less likely prior diverticulitis (patient did not endorse abdominal pain, abdominal exam benign) vs less likely inflammatory bowel disease (lack of characteristic chronic symptoms).    #Colovesical Fistula  #Colorectal Cancer Screening  #Normocytic Anemia    Recommendations:  -will plan for  inpatient diagnostic colonoscopy 8/12/24 to rule out malignancy given imaging findings  -patient may eat a low residue diet (avoiding seeds, nuts, whole grains, high-fiber fruits/vegetables) 8/9 and 8/10/24  -will defer antibiotics for possible daniel-vesicular abscess to primary team; patients may benefit from CT-guided drainage of abscesses > 3 cm due to complicated diverticulitis (especially if leukocytosis/fevers persist)    For Bowel Prep:  -Clear liquid diet for 8/11/24  -NPO at MN except for bowel prep  - Please administer 4L Golytely 8/11/24 starting at 5pm  - Patient must drink all 4 L prep. Prep tastes better cold, mixed with other clear/flavorful beverage such as crystal light or juice. Pt to drink 8 oz glass of Golytely mixture every 15 minutes until ALL liquid is gone. The goal is to drink the solution within 2 hours (i.e. 5PM- 7PM).  - Can mix Golytley with ice, juice (non-red), and drink with straw.  - Rectal output should be CLEAR (appearance of water or urine).  - If the patient's BMs are not clear by 4 AM, nursing should contact on-call primary team cross-cover to order 2 additional liters of Golytely.   - Pt must drink these 2 L by 6 AM, otherwise colonoscopy may need to be postponed    Gastroenterology will continue to follow.      EUGENIO per primary team.   ------------------------------------------------------------------------  Adrian Etienne MD   Gastroenterology Fellow    After 5PM and on Weekends, please page on-call fellow.    To be discussed with service attending Dr. Los Gramajo  Final recommendations pending attending attestation.

## 2024-08-09 NOTE — CONSULTS
Consults      [facility]   [facility] [place]  INITIAL CONSULT NOTE     Source of Information: The source of the history was patient.    Consult requested by: Service: [facility]    Reason for Consult: endoscopic evaluation of colovesical fistula    Admission Chief Complaint: altered mental status      SUBJECTIVE     HPI: Wade Machado is a 69 y.o. male w/PMH of HTN, prostate cancer s/p brachy therapy, BPH, recurrent cystitis who originally presented to [facility] ED for several days of confusion, fever, and generalized weakness. 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 He was transferred to [facility] ED for admission and Neurosurgery evaluation. Gastroenterology has been consulted for endoscopic evaluation of colovesical fistula.    Today the patient reports feeling much improved from admission. The patient denies chronic diarrhea, constipation, abdominal pain, tenesmus, nausea, emesis, melena, hematochezia, unintentional weight loss, oral ulcers, eye pain, vision changes, novel joint pains. He has never had a colonoscopy before. No family history of GI cancers, advanced adenomas, or IBD to his knowledge. He has a significant smoking history and history of heavy alcohol use. No abdominal surgeries.     The patient does have a history of recurrent cystitis and UTI's due to ongoing urinary retention from BPH. He additionally has a history of prostate cancer now s/p brachy therapy. PET-CT in January 2024 was negative for recurrence and distant metastasis.    Labs notable for down trending leukocytosis and normocytic anemia. UA with  mild hematuria and significant pyuria. Imaging aforementioned. He did receive piperacillin/tazobactam for empiric coverage of sepsis with presumed urinary source.    ROS: Complete review of systems obtained, negative unless otherwise indicated above.     No Known Allergies  Past Medical History:   Diagnosis Date    Hypertension     Prostate enlargement     Unspecified osteoarthritis, unspecified site     Osteoarthritis     Past Surgical History:   Procedure Laterality Date    JOINT REPLACEMENT      OTHER SURGICAL HISTORY  05/09/2022    Retinal detachment repair    OTHER SURGICAL HISTORY  05/09/2022    Cataract surgery     No family history on file.  Social History     Social History Narrative    Not on file     [unfilled]    Medications:  Scheduled medications  finasteride, 5 mg, oral, Daily  lisinopril, 40 mg, oral, Daily  piperacillin-tazobactam, 3.375 g, intravenous, q6h  polyethylene glycol, 17 g, oral, Daily  psyllium, 1 packet, oral, Daily  tamsulosin, 0.4 mg, oral, BID      Continuous medications  niCARdipine, 1-15 mg/hr, Last Rate: Stopped (08/09/24 0645)  sodium chloride 0.9%, 100 mL/hr, Last Rate: 100 mL/hr (08/09/24 1200)      PRN medications  PRN medications: acetaminophen **OR** acetaminophen, dextrose, dextrose, glucagon, glucagon, hydrALAZINE, labetaloL, niCARdipine       EXAM     Vital signs:  Visit Vitals  /74   Pulse 78   Temp (!) 38.3 °C (100.9 °F)   Resp 20         Physical Exam  Constitutional:       General: He is not in acute distress.     Appearance: Normal appearance. He is obese. He is not toxic-appearing.   HENT:      Head: Normocephalic and atraumatic.      Nose: Nose normal.      Mouth/Throat:      Mouth: Mucous membranes are moist.      Pharynx: Oropharynx is clear.   Eyes:      General: No scleral icterus.  Cardiovascular:      Rate and Rhythm: Normal rate and regular rhythm.      Pulses: Normal pulses.   Pulmonary:      Effort: Pulmonary effort is normal. No respiratory distress.    Abdominal:      General: Abdomen is flat. Bowel sounds are normal. There is no distension.      Palpations: Abdomen is soft. There is no mass.      Tenderness: There is no abdominal tenderness. There is no guarding or rebound.   Skin:     General: Skin is warm and dry.   Neurological:      Mental Status: He is alert and oriented to person, place, and time. Mental status is at baseline.             DATA                                                                            Labs     Lab Results   Component Value Date    WBC 15.2 (H) 08/09/2024    WBC 17.5 (H) 08/08/2024    WBC 11.8 (H) 05/03/2024    HGB 12.7 (L) 08/09/2024    HGB 12.5 (L) 08/08/2024    HGB 13.1 (L) 05/03/2024    MCV 87 08/09/2024    MCV 89 08/08/2024    MCV 91 05/03/2024     08/09/2024     08/08/2024     05/03/2024       Lab Results   Component Value Date    GLUCOSE 82 08/09/2024    CALCIUM 8.3 (L) 08/09/2024     (L) 08/09/2024    K 4.5 08/09/2024    CO2 21 08/09/2024    CL 99 08/09/2024    BUN 11 08/09/2024    CREATININE 0.83 08/09/2024       Lab Results   Component Value Date    ALT 14 08/08/2024    ALT 11 05/03/2024    ALT 12 11/19/2023    AST 15 08/08/2024    AST 11 05/03/2024    AST 23 11/19/2023    ALKPHOS 107 08/08/2024    ALKPHOS 70 05/03/2024    ALKPHOS 92 11/19/2023    BILITOT 0.4 08/08/2024    BILITOT 0.6 05/03/2024    BILITOT 0.4 11/19/2023                                                                                  Imaging             === 08/09/24 ===    CT ANGIO HEAD AND NECK W AND WO IV CONTRAST    - Impression -  Non con CT head:  1. Similar appearance of a pontine intraparenchymal hemorrhage with  persistent dilation of the supratentorial ventricular system.    CTA head and neck:  1. Calcified and noncalcified atheromatous plaque along the mid left  common carotid artery with moderate focal luminal narrowing.  Calcified and noncalcified atheromatous plaque along the mid common  carotid artery with  moderate luminal narrowing. Otherwise, no  evidence for significant stenosis of the cervical vessels.  2. Vascular calcifications of bilateral carotid siphons with  mild-to-moderate narrowing. No evidence for significant stenosis or  large branch vessel cutoffs of the intracranial vessels.      MACRO:  None    Signed by: Kassandra Hull 8/9/2024 7:53 AM  Dictation workstation:   [id number]                                                                           GI Procedures     None prior.    ASSESSMENT / PLAN                  Assessment and Recommendations:   Wade Machado is a 69 y.o. male w/PMH of HTN, prostate cancer s/p brachy therapy, BPH, recurrent cystitis who originally presented to [place] ED for several days of confusion, fever, and generalized weakness. 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 He was transferred to [facility] ED for admission and Neurosurgery evaluation. Gastroenterology has been consulted for endoscopic evaluation of colovesical fistula.     Etiology of fistula is unclear at this time though could be genitourinary from repeated cystitis likely driven by chronic urinary retention or colonic due to malignancy (no prior colonoscopy in setting of significant alcohol and tobacco use, perhaps chronic normocytic anemia? (No iron studies available)) vs less likely prior diverticulitis (patient did not endorse abdominal pain, abdominal exam benign) vs less likely inflammatory bowel disease (lack of characteristic chronic symptoms).    #Colovesical Fistula  #Colorectal Cancer Screening  #Normocytic Anemia    Recommendations:  -will plan for  inpatient diagnostic colonoscopy 8/12/24 to rule out malignancy given imaging findings  -patient may eat a low residue diet (avoiding seeds, nuts, whole grains, high-fiber fruits/vegetables) 8/9 and 8/10/24  -will defer antibiotics for possible daniel-vesicular abscess to primary team; patients may benefit from CT-guided drainage of abscesses > 3 cm due to complicated diverticulitis (especially if leukocytosis/fevers persist)    For Bowel Prep:  -Clear liquid diet for 8/11/24  -NPO at MN except for bowel prep  - Please administer 4L Golytely 8/11/24 starting at 5pm  - Patient must drink all 4 L prep. Prep tastes better cold, mixed with other clear/flavorful beverage such as crystal light or juice. Pt to drink 8 oz glass of Golytely mixture every 15 minutes until ALL liquid is gone. The goal is to drink the solution within 2 hours (i.e. 5PM- 7PM).  - Can mix Golytley with ice, juice (non-red), and drink with straw.  - Rectal output should be CLEAR (appearance of water or urine).  - If the patient's BMs are not clear by 4 AM, nursing should contact on-call primary team cross-cover to order 2 additional liters of Golytely.   - Pt must drink these 2 L by 6 AM, otherwise colonoscopy may need to be postponed    Gastroenterology will continue to follow.      EUGENIO per primary team.   ------------------------------------------------------------------------  Adrian Etienne MD   Gastroenterology Fellow    After 5PM and on Weekends, please page on-call fellow.    To be discussed with service attending Dr. Los Gramajo  Final recommendations pending attending attestation.

## 2024-08-10 ENCOUNTER — APPOINTMENT (OUTPATIENT)
Dept: CARDIOLOGY | Facility: HOSPITAL | Age: 69
End: 2024-08-10
Payer: COMMERCIAL

## 2024-08-10 LAB
ALBUMIN SERPL BCP-MCNC: 2.8 G/DL (ref 3.4–5)
ANION GAP SERPL CALC-SCNC: 16 MMOL/L (ref 10–20)
AORTIC VALVE MEAN GRADIENT: 8 MMHG
AORTIC VALVE PEAK VELOCITY: 2.03 M/S
APTT PPP: 30 SECONDS (ref 27–38)
AV PEAK GRADIENT: 16.5 MMHG
AVA (PEAK VEL): 2.52 CM2
AVA (VTI): 2.51 CM2
BACTERIA UR CULT: NO GROWTH
BUN SERPL-MCNC: 11 MG/DL (ref 6–23)
CALCIUM SERPL-MCNC: 7.6 MG/DL (ref 8.6–10.6)
CHLORIDE SERPL-SCNC: 102 MMOL/L (ref 98–107)
CO2 SERPL-SCNC: 21 MMOL/L (ref 21–32)
CREAT SERPL-MCNC: 0.74 MG/DL (ref 0.5–1.3)
EGFRCR SERPLBLD CKD-EPI 2021: >90 ML/MIN/1.73M*2
EJECTION FRACTION APICAL 4 CHAMBER: 72.5
EJECTION FRACTION: 68 %
ERYTHROCYTE [DISTWIDTH] IN BLOOD BY AUTOMATED COUNT: 14.3 % (ref 11.5–14.5)
GLUCOSE SERPL-MCNC: 93 MG/DL (ref 74–99)
HCT VFR BLD AUTO: 31.3 % (ref 41–52)
HGB BLD-MCNC: 10.7 G/DL (ref 13.5–17.5)
HOLD SPECIMEN: NORMAL
INR PPP: 1.4 (ref 0.9–1.1)
LEFT VENTRICLE INTERNAL DIMENSION DIASTOLE: 4.6 CM (ref 3.5–6)
LEFT VENTRICULAR OUTFLOW TRACT DIAMETER: 2.05 CM
MAGNESIUM SERPL-MCNC: 1.63 MG/DL (ref 1.6–2.4)
MCH RBC QN AUTO: 28.9 PG (ref 26–34)
MCHC RBC AUTO-ENTMCNC: 34.2 G/DL (ref 32–36)
MCV RBC AUTO: 85 FL (ref 80–100)
MITRAL VALVE E/A RATIO: 1.02
NRBC BLD-RTO: 0 /100 WBCS (ref 0–0)
PHOSPHATE SERPL-MCNC: 3.3 MG/DL (ref 2.5–4.9)
PLATELET # BLD AUTO: 311 X10*3/UL (ref 150–450)
POTASSIUM SERPL-SCNC: 3.6 MMOL/L (ref 3.5–5.3)
PROTHROMBIN TIME: 15.4 SECONDS (ref 9.8–12.8)
RBC # BLD AUTO: 3.7 X10*6/UL (ref 4.5–5.9)
RIGHT VENTRICLE FREE WALL PEAK S': 14.7 CM/S
SODIUM SERPL-SCNC: 135 MMOL/L (ref 136–145)
TRICUSPID ANNULAR PLANE SYSTOLIC EXCURSION: 1.9 CM
WBC # BLD AUTO: 16.2 X10*3/UL (ref 4.4–11.3)

## 2024-08-10 PROCEDURE — 2500000002 HC RX 250 W HCPCS SELF ADMINISTERED DRUGS (ALT 637 FOR MEDICARE OP, ALT 636 FOR OP/ED)

## 2024-08-10 PROCEDURE — 80069 RENAL FUNCTION PANEL: CPT | Performed by: STUDENT IN AN ORGANIZED HEALTH CARE EDUCATION/TRAINING PROGRAM

## 2024-08-10 PROCEDURE — 2500000001 HC RX 250 WO HCPCS SELF ADMINISTERED DRUGS (ALT 637 FOR MEDICARE OP)

## 2024-08-10 PROCEDURE — 83735 ASSAY OF MAGNESIUM: CPT | Performed by: STUDENT IN AN ORGANIZED HEALTH CARE EDUCATION/TRAINING PROGRAM

## 2024-08-10 PROCEDURE — 93306 TTE W/DOPPLER COMPLETE: CPT

## 2024-08-10 PROCEDURE — 2500000004 HC RX 250 GENERAL PHARMACY W/ HCPCS (ALT 636 FOR OP/ED): Performed by: STUDENT IN AN ORGANIZED HEALTH CARE EDUCATION/TRAINING PROGRAM

## 2024-08-10 PROCEDURE — 2060000001 HC INTERMEDIATE ICU ROOM DAILY

## 2024-08-10 PROCEDURE — 93306 TTE W/DOPPLER COMPLETE: CPT | Performed by: INTERNAL MEDICINE

## 2024-08-10 PROCEDURE — 85027 COMPLETE CBC AUTOMATED: CPT | Performed by: STUDENT IN AN ORGANIZED HEALTH CARE EDUCATION/TRAINING PROGRAM

## 2024-08-10 PROCEDURE — 2500000004 HC RX 250 GENERAL PHARMACY W/ HCPCS (ALT 636 FOR OP/ED): Performed by: NURSE PRACTITIONER

## 2024-08-10 PROCEDURE — 2500000002 HC RX 250 W HCPCS SELF ADMINISTERED DRUGS (ALT 637 FOR MEDICARE OP, ALT 636 FOR OP/ED): Performed by: NURSE PRACTITIONER

## 2024-08-10 PROCEDURE — 85610 PROTHROMBIN TIME: CPT | Performed by: STUDENT IN AN ORGANIZED HEALTH CARE EDUCATION/TRAINING PROGRAM

## 2024-08-10 PROCEDURE — 36415 COLL VENOUS BLD VENIPUNCTURE: CPT | Performed by: STUDENT IN AN ORGANIZED HEALTH CARE EDUCATION/TRAINING PROGRAM

## 2024-08-10 PROCEDURE — 2500000001 HC RX 250 WO HCPCS SELF ADMINISTERED DRUGS (ALT 637 FOR MEDICARE OP): Performed by: STUDENT IN AN ORGANIZED HEALTH CARE EDUCATION/TRAINING PROGRAM

## 2024-08-10 RX ORDER — POLYETHYLENE GLYCOL 3350, SODIUM CHLORIDE, SODIUM BICARBONATE, POTASSIUM CHLORIDE 420; 11.2; 5.72; 1.48 G/4L; G/4L; G/4L; G/4L
4000 POWDER, FOR SOLUTION ORAL ONCE
Status: DISPENSED | OUTPATIENT
Start: 2024-08-11

## 2024-08-10 RX ORDER — POTASSIUM CHLORIDE 20 MEQ/1
40 TABLET, EXTENDED RELEASE ORAL ONCE
Status: COMPLETED | OUTPATIENT
Start: 2024-08-10 | End: 2024-08-10

## 2024-08-10 RX ORDER — MAGNESIUM SULFATE HEPTAHYDRATE 40 MG/ML
2 INJECTION, SOLUTION INTRAVENOUS ONCE
Status: COMPLETED | OUTPATIENT
Start: 2024-08-10 | End: 2024-08-10

## 2024-08-10 ASSESSMENT — PAIN SCALES - GENERAL
PAINLEVEL_OUTOF10: 0 - NO PAIN

## 2024-08-10 ASSESSMENT — COGNITIVE AND FUNCTIONAL STATUS - GENERAL
DAILY ACTIVITIY SCORE: 13
STANDING UP FROM CHAIR USING ARMS: TOTAL
WALKING IN HOSPITAL ROOM: TOTAL
HELP NEEDED FOR BATHING: TOTAL
DRESSING REGULAR UPPER BODY CLOTHING: A LITTLE
CLIMB 3 TO 5 STEPS WITH RAILING: TOTAL
CLIMB 3 TO 5 STEPS WITH RAILING: TOTAL
EATING MEALS: A LITTLE
WALKING IN HOSPITAL ROOM: TOTAL
TOILETING: TOTAL
TURNING FROM BACK TO SIDE WHILE IN FLAT BAD: A LITTLE
STANDING UP FROM CHAIR USING ARMS: A LOT
DRESSING REGULAR UPPER BODY CLOTHING: A LITTLE
DRESSING REGULAR LOWER BODY CLOTHING: A LOT
DAILY ACTIVITIY SCORE: 15
DAILY ACTIVITIY SCORE: 14
TURNING FROM BACK TO SIDE WHILE IN FLAT BAD: A LITTLE
TOILETING: TOTAL
CLIMB 3 TO 5 STEPS WITH RAILING: TOTAL
HELP NEEDED FOR BATHING: A LOT
MOVING FROM LYING ON BACK TO SITTING ON SIDE OF FLAT BED WITH BEDRAILS: A LITTLE
STANDING UP FROM CHAIR USING ARMS: TOTAL
MOVING TO AND FROM BED TO CHAIR: A LOT
TURNING FROM BACK TO SIDE WHILE IN FLAT BAD: A LITTLE
DRESSING REGULAR LOWER BODY CLOTHING: TOTAL
MOVING FROM LYING ON BACK TO SITTING ON SIDE OF FLAT BED WITH BEDRAILS: A LITTLE
TOILETING: TOTAL
PERSONAL GROOMING: A LITTLE
MOBILITY SCORE: 11
DRESSING REGULAR LOWER BODY CLOTHING: TOTAL
HELP NEEDED FOR BATHING: A LOT
MOVING TO AND FROM BED TO CHAIR: A LOT
MOVING FROM LYING ON BACK TO SITTING ON SIDE OF FLAT BED WITH BEDRAILS: A LITTLE
MOVING TO AND FROM BED TO CHAIR: A LOT
DRESSING REGULAR UPPER BODY CLOTHING: A LOT
MOBILITY SCORE: 11
MOBILITY SCORE: 12
WALKING IN HOSPITAL ROOM: TOTAL

## 2024-08-10 ASSESSMENT — PAIN - FUNCTIONAL ASSESSMENT
PAIN_FUNCTIONAL_ASSESSMENT: CPOT (CRITICAL CARE PAIN OBSERVATION TOOL)
PAIN_FUNCTIONAL_ASSESSMENT: 0-10

## 2024-08-10 NOTE — CARE PLAN
The clinical goals for the shift include SBP <150      Problem: General Stroke  Goal: Establish a mutual long term goal with patient by discharge  8/9/2024 2330 by Kaye Valencia RN  Outcome: Progressing  8/9/2024 2329 by Kaye Valencia RN  Outcome: Progressing  Goal: Demonstrate improvement in neurological exam throughout the shift  8/9/2024 2330 by Kaye Valencia RN  Outcome: Progressing  8/9/2024 2329 by Kaye Valencia RN  Outcome: Progressing  Goal: Maintain BP within ordered limits throughout shift  8/9/2024 2330 by Kaye Valencia RN  Outcome: Progressing  8/9/2024 2329 by Kaye Valencia RN  Outcome: Progressing  Goal: Participate in treatment (ie., meds, therapy) throughout shift  8/9/2024 2330 by Kaye Valencia RN  Outcome: Progressing  8/9/2024 2329 by Kaye Valencia RN  Outcome: Progressing  Goal: No symptoms of aspiration throughout shift  8/9/2024 2330 by Kaye Valencia RN  Outcome: Progressing  8/9/2024 2329 by Kaye Valencia RN  Outcome: Progressing     Problem: Pain  Goal: Takes deep breaths with improved pain control throughout the shift  8/9/2024 2330 by Kaye Valencia RN  Outcome: Progressing  8/9/2024 2329 by Kaye Valencia RN  Outcome: Progressing  Goal: Turns in bed with improved pain control throughout the shift  8/9/2024 2330 by Kaye Valencia RN  Outcome: Progressing  8/9/2024 2329 by Kaye Valencia RN  Outcome: Progressing  Goal: Walks with improved pain control throughout the shift  8/9/2024 2330 by Kaye Valencia RN  Outcome: Progressing  8/9/2024 2329 by Kaye Valencia RN  Outcome: Progressing  Goal: Performs ADL's with improved pain control throughout shift  8/9/2024 2330 by Kaye Valencia RN  Outcome: Progressing  8/9/2024 2329 by Kaye Valencia RN  Outcome: Progressing  Goal: Free from opioid side effects throughout the shift  8/9/2024 2330 by Kaye Valencia RN  Outcome: Progressing  8/9/2024 2329 by Kaye E Valencia, RN  Outcome: Progressing  Goal: Free from acute  confusion related to pain meds throughout the shift  8/9/2024 2330 by Kaye Valencia RN  Outcome: Progressing  8/9/2024 2329 by Kaye Valencia RN  Outcome: Progressing     Problem: Skin  Goal: Participates in plan/prevention/treatment measures  8/9/2024 2330 by Kaye Valencia RN  Outcome: Progressing  8/9/2024 2329 by Kaye Valencia RN  Outcome: Progressing  Goal: Prevent/manage excess moisture  8/9/2024 2330 by Kaye Valencia RN  Outcome: Progressing  8/9/2024 2329 by Kaye Valencia RN  Outcome: Progressing  Goal: Prevent/minimize sheer/friction injuries  8/9/2024 2330 by Kaye Valencia RN  Outcome: Progressing  8/9/2024 2329 by Kaye Valencia RN  Outcome: Progressing  Goal: Promote/optimize nutrition  8/9/2024 2330 by Kaye Valencia RN  Outcome: Progressing  8/9/2024 2329 by Kaye Valencia RN  Outcome: Progressing  Goal: Promote skin healing  8/9/2024 2330 by Kaye Valencia RN  Outcome: Progressing  8/9/2024 2329 by Kaye Valencia RN  Outcome: Progressing     Problem: Fall/Injury  Goal: Not fall by end of shift  Outcome: Progressing  Goal: Be free from injury by end of the shift  Outcome: Progressing  Goal: Verbalize understanding of personal risk factors for fall in the hospital  Outcome: Progressing  Goal: Verbalize understanding of risk factor reduction measures to prevent injury from fall in the home  Outcome: Progressing  Goal: Use assistive devices by end of the shift  Outcome: Progressing  Goal: Pace activities to prevent fatigue by end of the shift  Outcome: Progressing

## 2024-08-10 NOTE — PROGRESS NOTES
"Wade Machado is a 69 y.o. male on day 1 of admission presenting with Pontine hemorrhage (Multi).    Subjective   Patient doing well. Llamas [facility]. Patient cont's to deny symptoms related to a possible fistula.        Objective     Physical Exam  General: seated comfortably in chair  Neuro: alert and oriented, no obvious focal deficit   Head/Neck: normocephalic, atraumatic  ENT: moist mucous membranes  CV: regular rate  Pulm: nonlabored breathing on room air, no conversational dyspnea  Abd: soft, nondistended, nontender   : 16Fr llamas in place draining clear, yellow urine  MSK: EVANGELISTA, no gross deformities  Psych: mood and behavior normal    Last Recorded Vitals  Blood pressure 125/68, pulse 81, temperature 37 °C (98.6 °F), temperature source Temporal, resp. rate 20, height 1.676 m (5' 6\"), weight 115 kg (252 lb 13.9 oz), SpO2 95%.  Intake/Output last 3 Shifts:  I/O last 3 completed shifts:  In: 3833.7 (33.4 mL/kg) [P.O.:1120; I.V.:2513.7 (21.9 mL/kg); IV Piggyback:200]  Out: 2715 (23.7 mL/kg) [Urine:2715 (0.7 mL/kg/hr)]  Weight: 114.7 kg     Relevant Results  Scheduled medications  finasteride, 5 mg, oral, Daily  lisinopril, 40 mg, oral, Daily  perflutren protein A microsphere, 0.5 mL, intravenous, Once in imaging  piperacillin-tazobactam, 3.375 g, intravenous, q6h  polyethylene glycol, 17 g, oral, Daily  psyllium, 1 packet, oral, Daily  sulfur hexafluoride microsphr, 2 mL, intravenous, Once in imaging  tamsulosin, 0.4 mg, oral, BID      Continuous medications  niCARdipine, 1-15 mg/hr, Last Rate: Stopped (08/09/24 0645)  sodium chloride 0.9%, 100 mL/hr, Last Rate: 100 mL/hr (08/10/24 1000)      PRN medications  PRN medications: acetaminophen **OR** acetaminophen, dextrose, dextrose, glucagon, glucagon, hydrALAZINE, labetaloL, niCARdipine    Results for orders placed or performed during the hospital encounter of 08/09/24 (from the past 24 hour(s))   Urinalysis with Reflex Culture and Microscopic   Result Value Ref " Range    Color, Urine Colorless (N) Light-Yellow, Yellow, Dark-Yellow    Appearance, Urine Turbid (N) Clear    Specific Gravity, Urine 1.019 1.005 - 1.035    pH, Urine 6.5 5.0, 5.5, 6.0, 6.5, 7.0, 7.5, 8.0    Protein, Urine 30 (1+) (A) NEGATIVE, 10 (TRACE), 20 (TRACE) mg/dL    Glucose, Urine Normal Normal mg/dL    Blood, Urine 0.2 (2+) (A) NEGATIVE    Ketones, Urine NEGATIVE NEGATIVE mg/dL    Bilirubin, Urine NEGATIVE NEGATIVE    Urobilinogen, Urine Normal Normal mg/dL    Nitrite, Urine NEGATIVE NEGATIVE    Leukocyte Esterase, Urine 500 Fortunato/µL (A) NEGATIVE   Extra Urine Gray Tube   Result Value Ref Range    Extra Tube Hold for add-ons.    Microscopic Only, Urine   Result Value Ref Range    WBC, Urine >50 (A) 1-5, NONE /HPF    WBC Clumps, Urine MANY Reference range not established. /HPF    RBC, Urine >20 (A) NONE, 1-2, 3-5 /HPF    Mucus, Urine FEW Reference range not established. /LPF   CBC   Result Value Ref Range    WBC 16.2 (H) 4.4 - 11.3 x10*3/uL    nRBC 0.0 0.0 - 0.0 /100 WBCs    RBC 3.70 (L) 4.50 - 5.90 x10*6/uL    Hemoglobin 10.7 (L) 13.5 - 17.5 g/dL    Hematocrit 31.3 (L) 41.0 - 52.0 %    MCV 85 80 - 100 fL    MCH 28.9 26.0 - 34.0 pg    MCHC 34.2 32.0 - 36.0 g/dL    RDW 14.3 11.5 - 14.5 %    Platelets 311 150 - 450 x10*3/uL   Coagulation Screen   Result Value Ref Range    Protime 15.4 (H) 9.8 - 12.8 seconds    INR 1.4 (H) 0.9 - 1.1    aPTT 30 27 - 38 seconds   Magnesium   Result Value Ref Range    Magnesium 1.63 1.60 - 2.40 mg/dL   Renal Function Panel   Result Value Ref Range    Glucose 93 74 - 99 mg/dL    Sodium 135 (L) 136 - 145 mmol/L    Potassium 3.6 3.5 - 5.3 mmol/L    Chloride 102 98 - 107 mmol/L    Bicarbonate 21 21 - 32 mmol/L    Anion Gap 16 10 - 20 mmol/L    Urea Nitrogen 11 6 - 23 mg/dL    Creatinine 0.74 0.50 - 1.30 mg/dL    eGFR >90 >60 mL/min/1.73m*2    Calcium 7.6 (L) 8.6 - 10.6 mg/dL    Phosphorus 3.3 2.5 - 4.9 mg/dL    Albumin 2.8 (L) 3.4 - 5.0 g/dL       MR brain w and wo IV  contrast    Result Date: 8/9/2024  Interpreted By:  Kassandra Hull and Hooper Grayson STUDY: MR BRAIN W AND WO IV CONTRAST;  8/9/2024 9:47 am   INDICATION: Signs/Symptoms:pontine hemorrhage vs mass.   COMPARISON: Same day noncontrast CT of the head and CT angiography of the head.   ACCESSION NUMBER(S): [id number]   ORDERING CLINICIAN: FRANCHESCA REESE   TECHNIQUE: MRI of the brain was obtained on a 3 nohemy magnet without and then with 20 mL IV Dotarem gadolinium contrast agent IV gadolinium. Axial diffusion-weighted imaging with ADC map, axial T1, T2 FLAIR, T2 star, T2 with fat saturation, as well as coronal and sagittal T1 imaging was provided. Postcontrast multiplanar T1 sequencing was provided.   FINDINGS: Status post remote right frontal craniotomy.   There is a well-defined rounded mass observed within the dorsal carmen, which measures 2.1 x 2.2 x 1.9 cm (AP x ML x CC). This mass demonstrates T2 signal void predominantly with concentric isointense and hyperintense layering noted. This lesion is uniformly T1 hyperintense. It does not restrict diffusion. Gradient sequencing demonstrates a circumferential band of variable thickness of signal void. There is minimal posterior effacement of the 4th ventricle observed.   There is cephalad bowing of the body of the corpus callosum noted on mid sagittal imaging. Remaining midline structures are otherwise intact and age-appropriate.   No territorial restricted diffusion to suggest acute intracranial infarct.   No additional masses are observed. No midline shift. Symmetric ventriculomegaly is seen with dilation of the temporal horns bilaterally. The basilar cisterns remain intact. Mild diffuse cortical volume loss is seen. Few scattered foci of FLAIR signal hyperintensity are seen in the subcortical white matter.   Vascular flow voids are unremarkable. Normal appearance of the vasculature on postcontrast imaging. No suspicious filling defects seen within the dural  venous sinuses.   Complete opacification of the right maxillary sinus is observed with T2 iso- and hyperintense material. Polypoid mucosal thickening seen within the left maxillary sinus. Otherwise, the paranasal sinuses are unremarkable. No mastoid effusion.       1. Circumscribed dorsal pontine mass measuring 2.1 x 2.2 x 1.9 cm with intrinsic hemorrhagic signal. There is no associated enhancement or nodularity. This may reflect a de jose pontine hemorrhage possibly secondary to underlying hypertension, hemorrhage secondary to an underlying cavernoma or other etiology. 2. Suspect ex vacuo dilation of the ventricles in the setting of senescent atrophy. The possibility of underlying normal pressure hydrocephalus is not excluded based on imaging. Clinical correlation is recommended. 3. Complete opacification of the right paranasal sinus likely a chronic process.   I personally reviewed the images/study and I agree with the findings as stated. This study was interpreted at [place], [place].   MACRO: None   Signed by: Kassandra Hull 8/9/2024 10:27 AM Dictation workstation:   [id number]    CT angio head and neck w and wo IV contrast    Result Date: 8/9/2024  Interpreted By:  Kassandra Hull and Kamau Nyokabi STUDY: CT ANGIO HEAD AND NECK W AND WO IV CONTRAST;  8/9/2024 4:57 am   INDICATION: Signs/Symptoms:pontine hemorrhage vs mass.   COMPARISON: None. CT head 08/09/2024   ACCESSION NUMBER(S): [id number]   ORDERING CLINICIAN: FRANCHESCA REESE   TECHNIQUE: Unenhanced CT images of the head were obtained. Subsequently, 75 ML of Omnipaque 350 was administered intravenously and axial images of the head and neck were acquired.  Coronal, sagittal, and 3-D reconstructions were provided for review.   FINDINGS: Non contrast CT head imaging:   Redemonstration focal pontine intraparenchymal hemorrhage measuring 2.0 x 1.9 cm in transaxial dimension (series 204, image 29), previously  measuring 1.9 x 1.7 cm when measured in similar fashion. There is mild effacement of the 4th ventricle. Cerebellar tonsils are above the foramen magnum. No midline shift. Stable prominence of the supratentorial ventricular system when compared to prior CT. No extra-axial fluid collections. Mild subcortical and periventricular white matter hypodensities which are nonspecific but can be seen in the setting of mild small-vessel ischemic disease given patient's age. Postoperative changes noted in the right frontal bone near the vertex.. Complete opacification of the right maxillary sinus with scattered mucosal thickening of the ethmoid air cells anteriorly.   CTA HEAD FINDINGS:   Anterior circulation: Vascular calcifications of bilateral carotid siphons with mild-to-moderate narrowing of bilateral carotid siphons, left greater than right. There is moderate narrowing of the left paraclinoid segment.. The  bilateral proximal anterior and middle cerebral arteries are normal.   Posterior circulation: Atherosclerotic calcifications are noted involving left intradural vertebral artery. The basilar artery and posterior cerebral arteries are unremarkable.   CTA NECK FINDINGS:   Right carotid vessels: Calcified and noncalcified atheromatous plaques throughout the mid to distal common carotid artery without hemodynamically significant stenosis. Vascular calcifications of the carotid bifurcation without hemodynamically significant stenosis. Atherosclerotic plaques involving proximal internal carotid artery with tortuosity. There is 0% stenosis  by NASCET criteria.   Left carotid vessels: Calcified and noncalcified atheromatous plaque along the mid common carotid artery with moderate luminal narrowing. Vascular calcifications of the carotid bifurcation without hemodynamically significant stenosis. Atherosclerotic plaques involving proximal internal carotid artery with tortuosity.  There is 0% stenosis  by NASCET criteria.    Vertebral vessels:  The visualized segments of the cervical vertebral arteries are normal in caliber.   Visualized upper lungs are clear. There are mild degenerative changes within the cervical spine.       Non con CT head: 1. Similar appearance of a pontine intraparenchymal hemorrhage with persistent dilation of the supratentorial ventricular system.   CTA head and neck: 1. Calcified and noncalcified atheromatous plaque along the mid left common carotid artery with moderate focal luminal narrowing. Calcified and noncalcified atheromatous plaque along the mid common carotid artery with moderate luminal narrowing. Otherwise, no evidence for significant stenosis of the cervical vessels. 2. Vascular calcifications of bilateral carotid siphons with mild-to-moderate narrowing. No evidence for significant stenosis or large branch vessel cutoffs of the intracranial vessels.     MACRO: None   Signed by: Kassandra Hull 8/9/2024 7:53 AM Dictation workstation:   [id number]    CT chest abdomen pelvis wo IV contrast    Result Date: 8/9/2024  Interpreted By:  Ana Luisa Bradley, STUDY: CT CHEST ABDOMEN PELVIS WO CONTRAST;  8/9/2024 12:11 am   INDICATION: Signs/Symptoms:fever/cough/abdominal pain.   COMPARISON: PET/CT 01/17/2024.   ACCESSION NUMBER(S): [id number]   ORDERING CLINICIAN: CURTIS MAJOR   TECHNIQUE: Axial CT of the chest, abdomen, and pelvis was performed. Coronal and sagittal reconstructions were performed. No intravenous or oral contrast agents were administered.   FINDINGS: Please note that the study is limited without intravenous contrast. There is motion artifact. There is streak artifact from the bilateral hip prosthesis.   CHEST:     LUNG/PLEURA/LARGE AIRWAYS: The trachea and central airways are patent.   The evaluation of the lungs is degraded by motion artifact. No pleural effusion, consolidation or pneumothorax. There is a 4 mm noncalcified pulmonary nodule along the pleura at the right upper lobe (series  201, axial image 33), does not appear significantly changed in the interval.   VESSELS: No thoracic aortic aneurysm.  Atherosclerotic calcifications are noted at the thoracic aorta. Limited evaluation for acute aortic injury in the absence of intravenous contrast. The main pulmonary artery is dilated.   HEART: No pericardial effusion.  The heart is mildly enlarged. Coronary artery calcifications are present.   MEDIASTINUM AND ROSELYN: The evaluation for adenopathy suboptimal in the absence of intravenous contrast.  No obvious pathologically enlarged lymph nodes at the mediastinum.  No pneumomediastinum.   There is a small hiatal hernia.   CHEST WALL AND LOWER NECK: The visualized thyroid gland is unremarkable. The soft tissues of the chest wall are within normal limits. The evaluation for acute fracture is degraded by motion artifact. Multilevel degenerative changes at the spine.     ABDOMEN:   LIVER: Unremarkable unenhanced appearance.   BILE DUCTS: No obvious dilation.   GALLBLADDER: Present.   PANCREAS: No peripancreatic fluid collection.   SPLEEN: Unremarkable unenhanced appearance.   ADRENAL GLANDS: Unremarkable.   KIDNEYS AND URETERS: Evaluation degraded by motion artifact. There is mild bilateral hydroureteronephrosis. No definite ureteral calculi are identified. The distal ureters are obscured by streak artifact from the bilateral hip prostheses.   PELVIS: The pelvis is partially obscured by streak artifact from the bilateral hip prosthesis.   BLADDER: The urinary bladder is distended with wall thickening. Linear lucencies are extending from the sigmoid colon to the superior aspect of the urinary bladder, suggestive of fistulous tract.   REPRODUCTIVE ORGANS: Radiation seeds are noted at the prostate.   BOWEL: The evaluation of the bowel is degraded by motion artifact. The stomach is decompressed. No evidence for bowel obstruction. There is colonic diverticulosis. There is wall thickening with adjacent  inflammatory changes at the sigmoid colon. There is a 3.0 x 3.2 x 3.4 cm (AP by transverse by craniocaudal diameter) density posterior to the urinary bladder and abutting the distal sigmoid colon.   VESSELS: Atherosclerotic calcifications within the abdominal aorta and its branches. There is an ectatic infrarenal abdominal aorta measuring 2.7 cm.   PERITONEUM/RETROPERITONEUM/LYMPH NODES: Trace amount of ascites. No free air.  No retroperitoneal hemorrhage. No pathologically enlarged lymph nodes are identified.   ABDOMINAL WALL: There is a small fat containing umbilical hernia.   BONES: Status post bilateral total hip arthroplasties. There is increased lucency along the anterior aspect of the acetabular cup at the right hip measuring 15 mm. Multilevel degenerative changes in the spine.       CHEST 1.  Study degraded by motion artifact. No consolidation or pleural effusion. 2. Mild cardiomegaly. Coronary artery calcifications. Dilation of the main pulmonary artery. Please correlate for pulmonary arterial hypertension. 3. 4 mm pulmonary nodule at the right upper lobe. Follow-up CT chest in 12 months recommended to re-evaluate. 4. Small hiatal hernia.   ABDOMEN-PELVIS 1.  Study degraded by motion artifact. Mild bilateral hydronephrosis. No definite obstructing ureteral calculus is identified, although the distal ureters are obscured by streak artifact from the bilateral hip prostheses. 2. 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 Please correlate with urinalysis to evaluate for cystitis. 3. 3.0 x 3.2 x 3.4 cm density posterior to the urinary bladder and abutting the distal sigmoid colon, may represent abscess or mass. Clinical correlation recommended. Follow-up CT with intravenous and oral contrast and/or PET/CT can be obtained for further evaluation. 4. Colonic diverticulosis. Wall thickening with inflammatory changes at the sigmoid colon, may represent  chronic changes versus colitis or acute diverticulitis. 5. Trace amount of ascites. 6. Status post bilateral total hip arthroplasties. Increased lucency along the anterior aspect of the acetabular cup at the right hip, concerning for prosthetic loosening. Orthopedic consult recommended.     MACRO:   Critical Finding:  There are multiple critical findings. See findings. notification was initiated on 8/9/2024 at 1:27 am by  Ana Luisa Bradley.  (**-YCF-**)   Signed by: Ana Luisa Bradley 8/9/2024 1:36 AM Dictation workstation:   [id number]    CT head wo IV contrast    Result Date: 8/9/2024  Interpreted By:  Ana Luisa Bradley, STUDY: CT HEAD WO IV CONTRAST; CT CERVICAL SPINE WO IV CONTRAST;  8/9/2024 12:11 am   INDICATION: Signs/Symptoms:fall; Signs/Symptoms:head injury.   COMPARISON: None.   ACCESSION NUMBER(S): [id number]; [id number]   ORDERING CLINICIAN: CURTIS MAJOR   TECHNIQUE: Axial noncontrast images of the head  with coronal  and sagittal reconstructed images . Axial noncontrast images of the cervical spine with coronal and sagittal reconstructed images.   FINDINGS: BRAIN PARENCHYMA: There are periventricular white matter hypodensities, probably representing chronic microvascular ischemic changes. Otherwise, the gray-white matter interfaces are preserved. No acute territorial infarct. No midline shift. HEMORRHAGE: There is a 1.5 x 1.4 x 1.5 cm (transverse by AP by craniocaudal diameter) hyperdensity at the carmen, most consistent with acute hemorrhage. There is mass effect upon the 4th ventricle which is partially effaced. VENTRICLES and EXTRA-AXIAL SPACES: There is ventricular dilation more than expected for the degree of cortical atrophy. EXTRACRANIAL SOFT TISSUES: Within normal limits. CALVARIUM: No depressed calvarial fracture. Postsurgical changes of craniotomy are noted at the right frontal bone. PARANASAL SINUSES: There is complete opacification of the right maxillary sinus with hyperostosis. MASTOIDS:  Within normal limits.   CERVICAL SPINE: ALIGNMENT: There is straightening of the cervical lordosis. There is mild retrolisthesis of C5 on C6, probably on a degenerative basis. No traumatic facet widening. VERTEBRAE: No acute fracture. DISCS: There is multilevel degenerative disc disease with osteophytosis, more pronounced at C5-C6. There is multilevel facet arthropathy. PREVERTEBRAL SOFT TISSUES: No prevertebral soft tissue swelling. LUNG APICES: No acute findings at the visualized lung apices.       1. Acute pontine hemorrhage with mass effect upon the 4th ventricle which is partially effaced. Follow-up with contrast-enhanced MRI recommended to exclude underlying hemorrhagic lesion. 2. Ventriculomegaly, may be secondary to central parenchymal volume loss versus hydrocephalus. 3.  No acute fracture at the cervical spine. Degenerative changes. 4. Complete opacification of the right maxillary sinus with hyperostosis, suggestive of chronic sinusitis. Follow-up ENT consult recommended.     MACRO: Ana Luisa Bradley discussed the significance and urgency of this critical finding by telephone with  CURTIS MAJOR on 8/9/2024 at 1:01 am.  (**-RCF-**) Findings:  See findings.   Signed by: Ana Luisa Bradley 8/9/2024 1:13 AM Dictation workstation:   [id number]    CT cervical spine wo IV contrast    Result Date: 8/9/2024  Interpreted By:  Ana Luisa Bradley, STUDY: CT HEAD WO IV CONTRAST; CT CERVICAL SPINE WO IV CONTRAST;  8/9/2024 12:11 am   INDICATION: Signs/Symptoms:fall; Signs/Symptoms:head injury.   COMPARISON: None.   ACCESSION NUMBER(S): [id number]; [id number]   ORDERING CLINICIAN: CURTIS MAJOR   TECHNIQUE: Axial noncontrast images of the head  with coronal  and sagittal reconstructed images . Axial noncontrast images of the cervical spine with coronal and sagittal reconstructed images.   FINDINGS: BRAIN PARENCHYMA: There are periventricular white matter hypodensities, probably representing chronic microvascular ischemic  changes. Otherwise, the gray-white matter interfaces are preserved. No acute territorial infarct. No midline shift. HEMORRHAGE: There is a 1.5 x 1.4 x 1.5 cm (transverse by AP by craniocaudal diameter) hyperdensity at the camren, most consistent with acute hemorrhage. There is mass effect upon the 4th ventricle which is partially effaced. VENTRICLES and EXTRA-AXIAL SPACES: There is ventricular dilation more than expected for the degree of cortical atrophy. EXTRACRANIAL SOFT TISSUES: Within normal limits. CALVARIUM: No depressed calvarial fracture. Postsurgical changes of craniotomy are noted at the right frontal bone. PARANASAL SINUSES: There is complete opacification of the right maxillary sinus with hyperostosis. MASTOIDS: Within normal limits.   CERVICAL SPINE: ALIGNMENT: There is straightening of the cervical lordosis. There is mild retrolisthesis of C5 on C6, probably on a degenerative basis. No traumatic facet widening. VERTEBRAE: No acute fracture. DISCS: There is multilevel degenerative disc disease with osteophytosis, more pronounced at C5-C6. There is multilevel facet arthropathy. PREVERTEBRAL SOFT TISSUES: No prevertebral soft tissue swelling. LUNG APICES: No acute findings at the visualized lung apices.       1. Acute pontine hemorrhage with mass effect upon the 4th ventricle which is partially effaced. Follow-up with contrast-enhanced MRI recommended to exclude underlying hemorrhagic lesion. 2. Ventriculomegaly, may be secondary to central parenchymal volume loss versus hydrocephalus. 3.  No acute fracture at the cervical spine. Degenerative changes. 4. Complete opacification of the right maxillary sinus with hyperostosis, suggestive of chronic sinusitis. Follow-up ENT consult recommended.     MACRO: Ana Luisa Bradley discussed the significance and urgency of this critical finding by telephone with  CURTIS MAJOR on 8/9/2024 at 1:01 am.  (**-RCF-**) Findings:  See findings.   Signed by: Ana Luisa Bradley  8/9/2024 1:13 AM Dictation workstation:   [id number]                             Assessment/Plan   Principal Problem:    Pontine hemorrhage (Multi)    Wade Machado is a 69 y.o. 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     Recommendation:  - Continue with antibiotics for likely UTI, narrowing pending culture results  - Llamas should remain in place until able to transition to oral antibiotics               -May consider trial of void at that time but patient will likely need indwelling catheter upon discharge given retention history  - Repeat renal bladder ultrasound 48-72 hours after llamas placement to ensure decompression   - Can follow up with established urologist for trial of void vs indwelling llamas exchange on discharge   - Continue home finasteride and tamsulosin if able   - Patient needs cystoscopy to evaluate bladder wall thickening seen on CT    Discussed with chief resident, Dr. Karyn Wilder MD  Urology - PGY2  Pager [phone]; Peds pager [phone]

## 2024-08-10 NOTE — PROGRESS NOTES
"Wade Machado is a 69 y.o. male on day 1 of admission presenting with Pontine hemorrhage (Multi).    Subjective   naeon       Objective     Physical Exam  Awake  Ox3  BUE 5/5  BLE 5/5    Last Recorded Vitals  Blood pressure 132/66, pulse 63, temperature 37 °C (98.6 °F), temperature source Temporal, resp. rate 24, height 1.676 m (5' 6\"), weight 115 kg (252 lb 13.9 oz), SpO2 94%.  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 ([facility], 2005), HTN, alcohol use, tobacco use, obesity, prostate cancer, BPH,  previous R total hip arthroplasty c/b recurrent R prosthetic hip dislocation 5/11 s/p R hip reduction, p/w AMS, fever and fatigue, recent UTI, possible falls, CTH pontine ICH w/ ventriculomegaly, incr'd vents compared to 2005 and 2007 MRI, CT CAP mild b/l hydronephrosis, colovesical fistula, CT C spine neg for fx, CTA H/N stable hemorrhage, neg for vasc abnormality, MRI brain pontine hemorrhage    ASSESSMENT  ROSY, poss downgrade  -150  ACS recs-colonoscopy Mon 8/12 (needs bowel prep Sun 8/11)  abx x14d,  needs rMRI in 1mo  urology recs  PTOT-ok to work with patient           Hakeem Torres MD      "

## 2024-08-10 NOTE — CARE PLAN
Problem: General Stroke  Goal: Establish a mutual long term goal with patient by discharge  Outcome: Progressing  Goal: Demonstrate improvement in neurological exam throughout the shift  Outcome: Progressing  Goal: Maintain BP within ordered limits throughout shift  Outcome: Progressing  Goal: Participate in treatment (ie., meds, therapy) throughout shift  Outcome: Progressing  Goal: No symptoms of aspiration throughout shift  Outcome: Progressing  Goal: No symptoms of hemorrhage throughout shift  Outcome: Progressing  Goal: Tolerate enteral feeding throughout shift  Outcome: Progressing  Goal: Decreased nausea/vomiting throughout shift  Outcome: Progressing  Goal: Controlled blood glucose throughout shift  Outcome: Progressing  Goal: Out of bed three times today  Outcome: Progressing     Problem: ICU Stroke  Goal: Maintain ICP within ordered limits throughout shift  Outcome: Progressing  Goal: Tolerate EVD clamping trial throughout shift  Outcome: Progressing  Goal: Tolerate ventilator weaning trial during shift  Outcome: Progressing  Goal: Maintain patent airway throughout shift  Outcome: Progressing  Goal: Achieve/maintain targeted sodium level throughout shift  Outcome: Progressing     Problem: Pain  Goal: Takes deep breaths with improved pain control throughout the shift  Outcome: Progressing  Goal: Turns in bed with improved pain control throughout the shift  Outcome: Progressing  Goal: Walks with improved pain control throughout the shift  Outcome: Progressing  Goal: Performs ADL's with improved pain control throughout shift  Outcome: Progressing  Goal: Participates in PT with improved pain control throughout the shift  Outcome: Progressing  Goal: Free from opioid side effects throughout the shift  Outcome: Progressing  Goal: Free from acute confusion related to pain meds throughout the shift  Outcome: Progressing     Problem: Skin  Goal: Decreased wound size/increased tissue granulation at next dressing  change  Outcome: Progressing  Goal: Participates in plan/prevention/treatment measures  Outcome: Progressing  Goal: Prevent/manage excess moisture  Outcome: Progressing  Goal: Prevent/minimize sheer/friction injuries  Outcome: Progressing  Goal: Promote/optimize nutrition  Outcome: Progressing  Goal: Promote skin healing  Outcome: Progressing     Problem: Fall/Injury  Goal: Not fall by end of shift  Outcome: Progressing  Goal: Be free from injury by end of the shift  Outcome: Progressing  Goal: Verbalize understanding of personal risk factors for fall in the hospital  Outcome: Progressing  Goal: Verbalize understanding of risk factor reduction measures to prevent injury from fall in the home  Outcome: Progressing  Goal: Use assistive devices by end of the shift  Outcome: Progressing  Goal: Pace activities to prevent fatigue by end of the shift  Outcome: Progressing   The patient's goals for the shift include      The clinical goals for the shift include SBP <150

## 2024-08-11 VITALS
OXYGEN SATURATION: 94 % | RESPIRATION RATE: 18 BRPM | TEMPERATURE: 98.2 F | HEART RATE: 65 BPM | BODY MASS INDEX: 41.17 KG/M2 | HEIGHT: 66 IN | WEIGHT: 256.17 LBS | SYSTOLIC BLOOD PRESSURE: 134 MMHG | DIASTOLIC BLOOD PRESSURE: 80 MMHG

## 2024-08-11 LAB
ALBUMIN SERPL BCP-MCNC: 2.7 G/DL (ref 3.4–5)
ANION GAP SERPL CALC-SCNC: 14 MMOL/L (ref 10–20)
APTT PPP: 29 SECONDS (ref 27–38)
BACTERIA BLD CULT: NORMAL
BACTERIA BLD CULT: NORMAL
BACTERIA UR CULT: NO GROWTH
BUN SERPL-MCNC: 10 MG/DL (ref 6–23)
CALCIUM SERPL-MCNC: 7.4 MG/DL (ref 8.6–10.6)
CHLORIDE SERPL-SCNC: 103 MMOL/L (ref 98–107)
CO2 SERPL-SCNC: 21 MMOL/L (ref 21–32)
CREAT SERPL-MCNC: 0.59 MG/DL (ref 0.5–1.3)
EGFRCR SERPLBLD CKD-EPI 2021: >90 ML/MIN/1.73M*2
ERYTHROCYTE [DISTWIDTH] IN BLOOD BY AUTOMATED COUNT: 14.4 % (ref 11.5–14.5)
GLUCOSE BLD MANUAL STRIP-MCNC: 110 MG/DL (ref 74–99)
GLUCOSE SERPL-MCNC: 85 MG/DL (ref 74–99)
HCT VFR BLD AUTO: 30.4 % (ref 41–52)
HGB BLD-MCNC: 10.5 G/DL (ref 13.5–17.5)
INR PPP: 1.2 (ref 0.9–1.1)
MAGNESIUM SERPL-MCNC: 1.72 MG/DL (ref 1.6–2.4)
MAGNESIUM SERPL-MCNC: 1.91 MG/DL (ref 1.6–2.4)
MCH RBC QN AUTO: 29 PG (ref 26–34)
MCHC RBC AUTO-ENTMCNC: 34.5 G/DL (ref 32–36)
MCV RBC AUTO: 84 FL (ref 80–100)
NRBC BLD-RTO: 0 /100 WBCS (ref 0–0)
PHOSPHATE SERPL-MCNC: 2.9 MG/DL (ref 2.5–4.9)
PLATELET # BLD AUTO: 298 X10*3/UL (ref 150–450)
POTASSIUM SERPL-SCNC: 3.9 MMOL/L (ref 3.5–5.3)
PROTHROMBIN TIME: 14 SECONDS (ref 9.8–12.8)
RBC # BLD AUTO: 3.62 X10*6/UL (ref 4.5–5.9)
SODIUM SERPL-SCNC: 134 MMOL/L (ref 136–145)
WBC # BLD AUTO: 14.3 X10*3/UL (ref 4.4–11.3)

## 2024-08-11 PROCEDURE — 84100 ASSAY OF PHOSPHORUS: CPT | Performed by: STUDENT IN AN ORGANIZED HEALTH CARE EDUCATION/TRAINING PROGRAM

## 2024-08-11 PROCEDURE — 2500000001 HC RX 250 WO HCPCS SELF ADMINISTERED DRUGS (ALT 637 FOR MEDICARE OP): Performed by: STUDENT IN AN ORGANIZED HEALTH CARE EDUCATION/TRAINING PROGRAM

## 2024-08-11 PROCEDURE — 99222 1ST HOSP IP/OBS MODERATE 55: CPT | Performed by: NEUROLOGICAL SURGERY

## 2024-08-11 PROCEDURE — 83735 ASSAY OF MAGNESIUM: CPT | Performed by: STUDENT IN AN ORGANIZED HEALTH CARE EDUCATION/TRAINING PROGRAM

## 2024-08-11 PROCEDURE — 85610 PROTHROMBIN TIME: CPT | Performed by: STUDENT IN AN ORGANIZED HEALTH CARE EDUCATION/TRAINING PROGRAM

## 2024-08-11 PROCEDURE — 36415 COLL VENOUS BLD VENIPUNCTURE: CPT

## 2024-08-11 PROCEDURE — 85027 COMPLETE CBC AUTOMATED: CPT | Performed by: STUDENT IN AN ORGANIZED HEALTH CARE EDUCATION/TRAINING PROGRAM

## 2024-08-11 PROCEDURE — 82947 ASSAY GLUCOSE BLOOD QUANT: CPT

## 2024-08-11 PROCEDURE — 36415 COLL VENOUS BLD VENIPUNCTURE: CPT | Performed by: STUDENT IN AN ORGANIZED HEALTH CARE EDUCATION/TRAINING PROGRAM

## 2024-08-11 PROCEDURE — 83735 ASSAY OF MAGNESIUM: CPT

## 2024-08-11 PROCEDURE — 2500000004 HC RX 250 GENERAL PHARMACY W/ HCPCS (ALT 636 FOR OP/ED): Performed by: STUDENT IN AN ORGANIZED HEALTH CARE EDUCATION/TRAINING PROGRAM

## 2024-08-11 PROCEDURE — 2060000001 HC INTERMEDIATE ICU ROOM DAILY

## 2024-08-11 PROCEDURE — 2500000004 HC RX 250 GENERAL PHARMACY W/ HCPCS (ALT 636 FOR OP/ED)

## 2024-08-11 PROCEDURE — 2500000001 HC RX 250 WO HCPCS SELF ADMINISTERED DRUGS (ALT 637 FOR MEDICARE OP)

## 2024-08-11 PROCEDURE — 2500000002 HC RX 250 W HCPCS SELF ADMINISTERED DRUGS (ALT 637 FOR MEDICARE OP, ALT 636 FOR OP/ED)

## 2024-08-11 PROCEDURE — 99233 SBSQ HOSP IP/OBS HIGH 50: CPT | Performed by: INTERNAL MEDICINE

## 2024-08-11 PROCEDURE — 2500000002 HC RX 250 W HCPCS SELF ADMINISTERED DRUGS (ALT 637 FOR MEDICARE OP, ALT 636 FOR OP/ED): Performed by: STUDENT IN AN ORGANIZED HEALTH CARE EDUCATION/TRAINING PROGRAM

## 2024-08-11 RX ORDER — ONDANSETRON HYDROCHLORIDE 2 MG/ML
4 INJECTION, SOLUTION INTRAVENOUS EVERY 8 HOURS PRN
Status: ACTIVE | OUTPATIENT
Start: 2024-08-11

## 2024-08-11 RX ORDER — MAGNESIUM SULFATE HEPTAHYDRATE 40 MG/ML
2 INJECTION, SOLUTION INTRAVENOUS ONCE
Status: COMPLETED | OUTPATIENT
Start: 2024-08-11 | End: 2024-08-11

## 2024-08-11 RX ORDER — ONDANSETRON HYDROCHLORIDE 2 MG/ML
INJECTION, SOLUTION INTRAVENOUS
Status: COMPLETED
Start: 2024-08-11 | End: 2024-08-11

## 2024-08-11 RX ORDER — HEPARIN SODIUM 5000 [USP'U]/ML
7500 INJECTION, SOLUTION INTRAVENOUS; SUBCUTANEOUS EVERY 8 HOURS SCHEDULED
Status: DISPENSED | OUTPATIENT
Start: 2024-08-11

## 2024-08-11 RX ORDER — POLYETHYLENE GLYCOL 3350, SODIUM CHLORIDE, SODIUM BICARBONATE, POTASSIUM CHLORIDE 420; 11.2; 5.72; 1.48 G/4L; G/4L; G/4L; G/4L
4000 POWDER, FOR SOLUTION ORAL ONCE
Status: COMPLETED | OUTPATIENT
Start: 2024-08-11 | End: 2024-08-11

## 2024-08-11 RX ORDER — ONDANSETRON 4 MG/1
4 TABLET, FILM COATED ORAL EVERY 8 HOURS PRN
Status: ACTIVE | OUTPATIENT
Start: 2024-08-11

## 2024-08-11 ASSESSMENT — PAIN - FUNCTIONAL ASSESSMENT
PAIN_FUNCTIONAL_ASSESSMENT: 0-10

## 2024-08-11 ASSESSMENT — PAIN SCALES - GENERAL
PAINLEVEL_OUTOF10: 0 - NO PAIN

## 2024-08-11 NOTE — CARE PLAN
Problem: General Stroke  Goal: Establish a mutual long term goal with patient by discharge  Outcome: Progressing  Goal: Demonstrate improvement in neurological exam throughout the shift  Outcome: Progressing  Goal: Maintain BP within ordered limits throughout shift  Outcome: Progressing  Goal: Participate in treatment (ie., meds, therapy) throughout shift  Outcome: Progressing  Goal: No symptoms of aspiration throughout shift  Outcome: Progressing  Goal: No symptoms of hemorrhage throughout shift  Outcome: Progressing  Goal: Tolerate enteral feeding throughout shift  Outcome: Progressing  Goal: Decreased nausea/vomiting throughout shift  Outcome: Progressing  Goal: Controlled blood glucose throughout shift  Outcome: Progressing  Goal: Out of bed three times today  Outcome: Progressing     Problem: Pain  Goal: Takes deep breaths with improved pain control throughout the shift  Outcome: Progressing  Goal: Turns in bed with improved pain control throughout the shift  Outcome: Progressing  Goal: Walks with improved pain control throughout the shift  Outcome: Progressing  Goal: Performs ADL's with improved pain control throughout shift  Outcome: Progressing  Goal: Participates in PT with improved pain control throughout the shift  Outcome: Progressing  Goal: Free from opioid side effects throughout the shift  Outcome: Progressing  Goal: Free from acute confusion related to pain meds throughout the shift  Outcome: Progressing     Problem: Skin  Goal: Decreased wound size/increased tissue granulation at next dressing change  Outcome: Progressing  Goal: Participates in plan/prevention/treatment measures  Outcome: Progressing  Goal: Prevent/manage excess moisture  Outcome: Progressing  Goal: Prevent/minimize sheer/friction injuries  Outcome: Progressing  Goal: Promote/optimize nutrition  Outcome: Progressing  Goal: Promote skin healing  Outcome: Progressing     Problem: Fall/Injury  Goal: Not fall by end of shift  Outcome:  Progressing  Goal: Be free from injury by end of the shift  Outcome: Progressing  Goal: Verbalize understanding of personal risk factors for fall in the hospital  Outcome: Progressing  Goal: Verbalize understanding of risk factor reduction measures to prevent injury from fall in the home  Outcome: Progressing  Goal: Use assistive devices by end of the shift  Outcome: Progressing  Goal: Pace activities to prevent fatigue by end of the shift  Outcome: Progressing

## 2024-08-11 NOTE — PROGRESS NOTES
"Wade Machado is a 69 y.o. male on day 2 of admission presenting with Pontine hemorrhage (Multi).    Subjective   C/o nausea, improved with zofran otherwise uneventful night    Objective     Physical Exam  Awake  Ox3  BUE 5/5  BLE 5/5  SILT    Last Recorded Vitals  Blood pressure 144/87, pulse 67, temperature 35.9 °C (96.6 °F), temperature source Temporal, resp. rate 15, height 1.676 m (5' 6\"), weight 116 kg (256 lb 2.8 oz), SpO2 93%.  Intake/Output last 3 Shifts:  I/O last 3 completed shifts:  In: 5380 (46.3 mL/kg) [P.O.:1430; I.V.:3650 (31.4 mL/kg); IV Piggyback:300]  Out: 4470 (38.5 mL/kg) [Urine:4470 (1.1 mL/kg/hr)]  Weight: 116.2 kg     Relevant Results  Lab Results   Component Value Date    WBC 14.3 (H) 08/11/2024    HGB 10.5 (L) 08/11/2024    HCT 30.4 (L) 08/11/2024    MCV 84 08/11/2024     08/11/2024     Lab Results   Component Value Date    GLUCOSE 85 08/11/2024    CALCIUM 7.4 (L) 08/11/2024     (L) 08/11/2024    K 3.9 08/11/2024    CO2 21 08/11/2024     08/11/2024    BUN 10 08/11/2024    CREATININE 0.59 08/11/2024       Assessment/Plan   Principal Problem:    Pontine hemorrhage (Multi)    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 ([facility], 2005), HTN, alcohol use, tobacco use, obesity, prostate cancer, BPH,  previous R total hip arthroplasty c/b recurrent R prosthetic hip dislocation 5/11 s/p R hip reduction, p/w AMS, fever and fatigue, recent UTI, possible falls, CTH pontine ICH w/ ventriculomegaly, incr'd vents compared to 2005 and 2007 MRI, CT CAP mild b/l hydronephrosis, colovesical fistula, CT C spine neg for fx, CTA H/N stable hemorrhage, neg for vasc abnormality, MRI brain pontine hemorrhage, 8/10 Ucx neg    ASSESSMENT  floor  -150  ACS recs-colonoscopy Mon 8/12 (needs bowel prep Sun 8/11)  abx x14d,  needs rMRI in 1mo  urology recs- keep llamas until on PO abx, likely dispo w llamas  PTOT  [facility]CD, [facility]           Gonzalez Acharya MD      "

## 2024-08-11 NOTE — PROGRESS NOTES
[facility]  [facility] [place]  CONSULT FOLLOW-UP         SUBJECTIVE     Reason for Consult: endoscopic evaluation of colovesical fistula     Interval Events/Subjective:   > Patient denies new complaints. Patient afebrile and hemodynamically stable.    ROS: Complete ROS obtained, negative unless otherwise indicated above.     Medications:  finasteride, 5 mg, oral, Daily  heparin (porcine), 7,500 Units, subcutaneous, q8h PEMA  lisinopril, 40 mg, oral, Daily  perflutren protein A microsphere, 0.5 mL, intravenous, Once in imaging  piperacillin-tazobactam, 3.375 g, intravenous, q6h  polyethylene glycol, 17 g, oral, Daily  polyethylene glycol-electrolytes, 4,000 mL, oral, Once  polyethylene glycol-electrolytes, 4,000 mL, oral, Once  psyllium, 1 packet, oral, Daily  sulfur hexafluoride microsphr, 2 mL, intravenous, Once in imaging  tamsulosin, 0.4 mg, oral, BID      PRN medications: acetaminophen **OR** acetaminophen, dextrose, dextrose, glucagon, glucagon, niCARdipine, ondansetron **OR** ondansetron      EXAM     Physical Exam   Visit Vitals  /69   Pulse 64   Temp 36.3 °C (97.3 °F)   Resp 20         General: NAD, AA&O x 3  Eyes: EOMI, PERRLA  ENT: MMM  Heart: RRR  Lungs: No respiratory distress  Abdomen: Soft, non tender, non distended  Skin: No jaundice   Neuro: Appropriately responds to questions/commands         DATA                                                                            Labs     Lab Results   Component Value Date    WBC 14.3 (H) 08/11/2024    HGB 10.5 (L) 08/11/2024    HCT 30.4 (L) 08/11/2024    MCV 84 08/11/2024     08/11/2024     Lab Results   Component Value Date    INR 1.2 (H) 08/11/2024    INR 1.4 (H) 08/10/2024    INR 1.3 (H) 08/09/2024    PROTIME 14.0 (H) 08/11/2024    PROTIME 15.4 (H) 08/10/2024    PROTIME 15.2 (H) 08/09/2024     Lab Results   Component Value Date    GLUCOSE 85 08/11/2024    CALCIUM 7.4 (L) 08/11/2024     (L)  08/11/2024    K 3.9 08/11/2024    CO2 21 08/11/2024     08/11/2024    BUN 10 08/11/2024    CREATININE 0.59 08/11/2024     Lab Results   Component Value Date    ALT 14 08/08/2024    AST 15 08/08/2024    ALKPHOS 107 08/08/2024    BILITOT 0.4 08/08/2024                                                                              Imaging           === 08/09/24 ===    CT ANGIO HEAD AND NECK W AND WO IV CONTRAST    - Impression -  Non con CT head:  1. Similar appearance of a pontine intraparenchymal hemorrhage with  persistent dilation of the supratentorial ventricular system.    CTA head and neck:  1. Calcified and noncalcified atheromatous plaque along the mid left  common carotid artery with moderate focal luminal narrowing.  Calcified and noncalcified atheromatous plaque along the mid common  carotid artery with moderate luminal narrowing. Otherwise, no  evidence for significant stenosis of the cervical vessels.  2. Vascular calcifications of bilateral carotid siphons with  mild-to-moderate narrowing. No evidence for significant stenosis or  large branch vessel cutoffs of the intracranial vessels.    === 08/09/24 ===    MR BRAIN W AND WO CONTRAST    - Impression -  1. Circumscribed dorsal pontine mass measuring 2.1 x 2.2 x 1.9 cm  with intrinsic hemorrhagic signal. There is no associated enhancement  or nodularity. This may reflect a de jose pontine hemorrhage possibly  secondary to underlying hypertension, hemorrhage secondary to an  underlying cavernoma or other etiology.  2. Suspect ex vacuo dilation of the ventricles in the setting of  senescent atrophy. The possibility of underlying normal pressure  hydrocephalus is not excluded based on imaging. Clinical correlation  is recommended.  3. Complete opacification of the right paranasal sinus likely a  chronic process.                                                                           GI Procedures     None.      ASSESSMENT / PLAN     Assessment and  Recommendations:   Wade Machado is a 69 y.o. male w/PMH of HTN, prostate cancer s/p brachy therapy, BPH, recurrent cystitis who originally presented to [place] ED for several days of confusion, fever, and generalized weakness. 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 He was transferred to [facility] ED for admission and Neurosurgery evaluation. Gastroenterology has been consulted for endoscopic evaluation of colovesical fistula.      Etiology of fistula is unclear at this time though could be genitourinary from repeated cystitis likely driven by chronic urinary retention or colonic due to malignancy (no prior colonoscopy in setting of significant alcohol and tobacco use, perhaps chronic normocytic anemia? (No iron studies available)) vs less likely prior diverticulitis (patient did not endorse abdominal pain, abdominal exam benign) vs less likely inflammatory bowel disease (lack of characteristic chronic symptoms).     #Colovesical Fistula  #Colorectal Cancer Screening  #Normocytic Anemia     Recommendations:  -will plan for inpatient diagnostic colonoscopy 8/12/24 to rule out malignancy given imaging findings  -will defer antibiotics for possible daniel-vesicular abscess to primary team; patients may benefit from CT-guided drainage of abscesses > 3 cm due to complicated diverticulitis (especially if leukocytosis/fevers persist)     For Bowel Prep:  -Clear liquid diet for 8/11/24  -NPO at MN except for bowel prep  - Please administer 4L Golytely 8/11/24 starting at 5pm (2L 5-9PM, 2L 4-6AM)  - Patient must drink all 4 L prep. Prep tastes better cold, mixed with other  clear/flavorful beverage such as crystal light or juice. Pt to drink 8 oz glass of Golytely mixture every 15 minutes until ALL liquid is gone.   - Can mix Golytley with ice, juice (non-red), and drink with straw.  - Rectal output should be CLEAR (appearance of water or urine).  - If the patient's BMs are not clear by 6 AM, nursing should contact on-call primary team cross-cover to order 2 additional liters of Golytely.   - Pt must drink these 2 L by 7 AM, otherwise colonoscopy may need to be postponed    EUGENIO per primary team     ------------------------------------------------------------------------  Adrian Etienne MD  Gastroenterology Fellow  After 5PM and on Weekends, please page on-call fellow.    To be discussed with service attending Dr. Los Gramajo  Final recommendations pending attending attestation.

## 2024-08-12 ENCOUNTER — ANESTHESIA EVENT (OUTPATIENT)
Dept: GASTROENTEROLOGY | Facility: HOSPITAL | Age: 69
End: 2024-08-12
Payer: COMMERCIAL

## 2024-08-12 ENCOUNTER — APPOINTMENT (OUTPATIENT)
Dept: GASTROENTEROLOGY | Facility: HOSPITAL | Age: 69
DRG: 065 | End: 2024-08-12
Payer: COMMERCIAL

## 2024-08-12 ENCOUNTER — APPOINTMENT (OUTPATIENT)
Dept: RADIOLOGY | Facility: HOSPITAL | Age: 69
DRG: 065 | End: 2024-08-12
Payer: COMMERCIAL

## 2024-08-12 ENCOUNTER — ANESTHESIA (OUTPATIENT)
Dept: GASTROENTEROLOGY | Facility: HOSPITAL | Age: 69
End: 2024-08-12
Payer: COMMERCIAL

## 2024-08-12 LAB
ALBUMIN SERPL BCP-MCNC: 3 G/DL (ref 3.4–5)
ANION GAP SERPL CALC-SCNC: 13 MMOL/L (ref 10–20)
APTT PPP: 29 SECONDS (ref 27–38)
BUN SERPL-MCNC: 4 MG/DL (ref 6–23)
CALCIUM SERPL-MCNC: 8.1 MG/DL (ref 8.6–10.6)
CHLORIDE SERPL-SCNC: 103 MMOL/L (ref 98–107)
CO2 SERPL-SCNC: 25 MMOL/L (ref 21–32)
CREAT SERPL-MCNC: 0.77 MG/DL (ref 0.5–1.3)
EGFRCR SERPLBLD CKD-EPI 2021: >90 ML/MIN/1.73M*2
ERYTHROCYTE [DISTWIDTH] IN BLOOD BY AUTOMATED COUNT: 14.5 % (ref 11.5–14.5)
GLUCOSE BLD MANUAL STRIP-MCNC: 91 MG/DL (ref 74–99)
GLUCOSE SERPL-MCNC: 86 MG/DL (ref 74–99)
HCT VFR BLD AUTO: 35.9 % (ref 41–52)
HGB BLD-MCNC: 11.5 G/DL (ref 13.5–17.5)
INR PPP: 1.4 (ref 0.9–1.1)
MAGNESIUM SERPL-MCNC: 1.67 MG/DL (ref 1.6–2.4)
MCH RBC QN AUTO: 28.8 PG (ref 26–34)
MCHC RBC AUTO-ENTMCNC: 32 G/DL (ref 32–36)
MCV RBC AUTO: 90 FL (ref 80–100)
NRBC BLD-RTO: 0 /100 WBCS (ref 0–0)
PHOSPHATE SERPL-MCNC: 2.6 MG/DL (ref 2.5–4.9)
PLATELET # BLD AUTO: 371 X10*3/UL (ref 150–450)
POTASSIUM SERPL-SCNC: 3.4 MMOL/L (ref 3.5–5.3)
PROTHROMBIN TIME: 15.3 SECONDS (ref 9.8–12.8)
RBC # BLD AUTO: 3.99 X10*6/UL (ref 4.5–5.9)
SODIUM SERPL-SCNC: 138 MMOL/L (ref 136–145)
WBC # BLD AUTO: 11.1 X10*3/UL (ref 4.4–11.3)

## 2024-08-12 PROCEDURE — 3700000002 HC GENERAL ANESTHESIA TIME - EACH INCREMENTAL 1 MINUTE

## 2024-08-12 PROCEDURE — 83735 ASSAY OF MAGNESIUM: CPT | Performed by: STUDENT IN AN ORGANIZED HEALTH CARE EDUCATION/TRAINING PROGRAM

## 2024-08-12 PROCEDURE — 82947 ASSAY GLUCOSE BLOOD QUANT: CPT

## 2024-08-12 PROCEDURE — 85027 COMPLETE CBC AUTOMATED: CPT | Performed by: STUDENT IN AN ORGANIZED HEALTH CARE EDUCATION/TRAINING PROGRAM

## 2024-08-12 PROCEDURE — 1100000001 HC PRIVATE ROOM DAILY

## 2024-08-12 PROCEDURE — 85610 PROTHROMBIN TIME: CPT | Performed by: STUDENT IN AN ORGANIZED HEALTH CARE EDUCATION/TRAINING PROGRAM

## 2024-08-12 PROCEDURE — 2500000004 HC RX 250 GENERAL PHARMACY W/ HCPCS (ALT 636 FOR OP/ED): Performed by: STUDENT IN AN ORGANIZED HEALTH CARE EDUCATION/TRAINING PROGRAM

## 2024-08-12 PROCEDURE — A45380 PR COLONOSCOPY,BIOPSY

## 2024-08-12 PROCEDURE — 2500000002 HC RX 250 W HCPCS SELF ADMINISTERED DRUGS (ALT 637 FOR MEDICARE OP, ALT 636 FOR OP/ED): Performed by: STUDENT IN AN ORGANIZED HEALTH CARE EDUCATION/TRAINING PROGRAM

## 2024-08-12 PROCEDURE — 7100000009 HC PHASE TWO TIME - INITIAL BASE CHARGE

## 2024-08-12 PROCEDURE — 0753T DGTZ GLS MCRSCP SLD LEVEL IV: CPT | Mod: TC,SUR | Performed by: INTERNAL MEDICINE

## 2024-08-12 PROCEDURE — 0DBN8ZX EXCISION OF SIGMOID COLON, VIA NATURAL OR ARTIFICIAL OPENING ENDOSCOPIC, DIAGNOSTIC: ICD-10-PCS | Performed by: INTERNAL MEDICINE

## 2024-08-12 PROCEDURE — 2500000004 HC RX 250 GENERAL PHARMACY W/ HCPCS (ALT 636 FOR OP/ED)

## 2024-08-12 PROCEDURE — 2500000005 HC RX 250 GENERAL PHARMACY W/O HCPCS

## 2024-08-12 PROCEDURE — 3700000001 HC GENERAL ANESTHESIA TIME - INITIAL BASE CHARGE

## 2024-08-12 PROCEDURE — 99233 SBSQ HOSP IP/OBS HIGH 50: CPT | Performed by: INTERNAL MEDICINE

## 2024-08-12 PROCEDURE — 84100 ASSAY OF PHOSPHORUS: CPT | Performed by: STUDENT IN AN ORGANIZED HEALTH CARE EDUCATION/TRAINING PROGRAM

## 2024-08-12 PROCEDURE — 76770 US EXAM ABDO BACK WALL COMP: CPT | Performed by: RADIOLOGY

## 2024-08-12 PROCEDURE — 2500000004 HC RX 250 GENERAL PHARMACY W/ HCPCS (ALT 636 FOR OP/ED): Performed by: NURSE PRACTITIONER

## 2024-08-12 PROCEDURE — 2500000001 HC RX 250 WO HCPCS SELF ADMINISTERED DRUGS (ALT 637 FOR MEDICARE OP): Performed by: STUDENT IN AN ORGANIZED HEALTH CARE EDUCATION/TRAINING PROGRAM

## 2024-08-12 PROCEDURE — A45380 PR COLONOSCOPY,BIOPSY: Performed by: ANESTHESIOLOGY

## 2024-08-12 PROCEDURE — 36415 COLL VENOUS BLD VENIPUNCTURE: CPT | Performed by: STUDENT IN AN ORGANIZED HEALTH CARE EDUCATION/TRAINING PROGRAM

## 2024-08-12 PROCEDURE — 76770 US EXAM ABDO BACK WALL COMP: CPT

## 2024-08-12 PROCEDURE — 45380 COLONOSCOPY AND BIOPSY: CPT | Performed by: INTERNAL MEDICINE

## 2024-08-12 PROCEDURE — 7100000010 HC PHASE TWO TIME - EACH INCREMENTAL 1 MINUTE

## 2024-08-12 RX ORDER — LIDOCAINE HCL/PF 100 MG/5ML
SYRINGE (ML) INTRAVENOUS AS NEEDED
Status: DISCONTINUED | OUTPATIENT
Start: 2024-08-12 | End: 2024-08-12

## 2024-08-12 RX ORDER — MIDAZOLAM HYDROCHLORIDE 1 MG/ML
INJECTION INTRAMUSCULAR; INTRAVENOUS AS NEEDED
Status: DISCONTINUED | OUTPATIENT
Start: 2024-08-12 | End: 2024-08-12

## 2024-08-12 RX ORDER — PROPOFOL 10 MG/ML
INJECTION, EMULSION INTRAVENOUS AS NEEDED
Status: DISCONTINUED | OUTPATIENT
Start: 2024-08-12 | End: 2024-08-12

## 2024-08-12 RX ORDER — AMLODIPINE BESYLATE 5 MG/1
5 TABLET ORAL DAILY
Status: DISCONTINUED | OUTPATIENT
Start: 2024-08-12 | End: 2024-08-12

## 2024-08-12 RX ORDER — ONDANSETRON HYDROCHLORIDE 2 MG/ML
4 INJECTION, SOLUTION INTRAVENOUS ONCE AS NEEDED
Status: DISCONTINUED | OUTPATIENT
Start: 2024-08-12 | End: 2024-08-14 | Stop reason: HOSPADM

## 2024-08-12 RX ORDER — MAGNESIUM SULFATE HEPTAHYDRATE 40 MG/ML
2 INJECTION, SOLUTION INTRAVENOUS ONCE
Status: COMPLETED | OUTPATIENT
Start: 2024-08-12 | End: 2024-08-12

## 2024-08-12 RX ORDER — SODIUM CHLORIDE, SODIUM LACTATE, POTASSIUM CHLORIDE, CALCIUM CHLORIDE 600; 310; 30; 20 MG/100ML; MG/100ML; MG/100ML; MG/100ML
100 INJECTION, SOLUTION INTRAVENOUS CONTINUOUS
Status: DISCONTINUED | OUTPATIENT
Start: 2024-08-12 | End: 2024-08-12

## 2024-08-12 RX ORDER — POTASSIUM CHLORIDE 14.9 MG/ML
20 INJECTION INTRAVENOUS ONCE
Status: COMPLETED | OUTPATIENT
Start: 2024-08-12 | End: 2024-08-12

## 2024-08-12 RX ORDER — LIDOCAINE HYDROCHLORIDE 10 MG/ML
0.1 INJECTION INFILTRATION; PERINEURAL ONCE
Status: DISCONTINUED | OUTPATIENT
Start: 2024-08-12 | End: 2024-08-14 | Stop reason: HOSPADM

## 2024-08-12 ASSESSMENT — PAIN - FUNCTIONAL ASSESSMENT
PAIN_FUNCTIONAL_ASSESSMENT: 0-10

## 2024-08-12 ASSESSMENT — PAIN SCALES - GENERAL
PAINLEVEL_OUTOF10: 3
PAINLEVEL_OUTOF10: 0 - NO PAIN
PAINLEVEL_OUTOF10: 3
PAINLEVEL_OUTOF10: 0 - NO PAIN
PAINLEVEL_OUTOF10: 0 - NO PAIN

## 2024-08-12 ASSESSMENT — PAIN DESCRIPTION - LOCATION: LOCATION: BACK

## 2024-08-12 NOTE — PROGRESS NOTES
"Wade Machado is a 69 y.o. male on day 3 of admission presenting with Pontine hemorrhage (Multi).    Subjective   No acute events overnight    Objective     Physical Exam  Awake  Ox3  BUE 5/5  BLE 5/5  SILT    Last Recorded Vitals  Blood pressure 154/87, pulse 73, temperature 37 °C (98.6 °F), temperature source Temporal, resp. rate 18, height 1.676 m (5' 6\"), weight 116 kg (256 lb 2.8 oz), SpO2 94%.  Intake/Output last 3 Shifts:  I/O last 3 completed shifts:  In: 6640 (57.1 mL/kg) [P.O.:3090; I.V.:3300 (28.4 mL/kg); IV Piggyback:250]  Out: 5630 (48.5 mL/kg) [Urine:5630 (1.3 mL/kg/hr)]  Weight: 116.2 kg     Relevant Results  Lab Results   Component Value Date    WBC 14.3 (H) 08/11/2024    HGB 10.5 (L) 08/11/2024    HCT 30.4 (L) 08/11/2024    MCV 84 08/11/2024     08/11/2024     Lab Results   Component Value Date    GLUCOSE 85 08/11/2024    CALCIUM 7.4 (L) 08/11/2024     (L) 08/11/2024    K 3.9 08/11/2024    CO2 21 08/11/2024     08/11/2024    BUN 10 08/11/2024    CREATININE 0.59 08/11/2024       Assessment/Plan   Principal Problem:    Pontine hemorrhage (Multi)    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 ([facility], 2005), HTN, alcohol use, tobacco use, obesity, prostate cancer, BPH,  previous R total hip arthroplasty c/b recurrent R prosthetic hip dislocation 5/11 s/p R hip reduction, p/w AMS, fever and fatigue, recent UTI, possible falls, CTH pontine ICH w/ ventriculomegaly, incr'd vents compared to 2005 and 2007 MRI, CT CAP mild b/l hydronephrosis, colovesical fistula, CT C spine neg for fx, CTA H/N stable hemorrhage, neg for vasc abnormality, MRI brain pontine hemorrhage, 8/10 Ucx neg    ASSESSMENT  floor  -150  ACS recs-colonoscopy Mon 8/12 (needs bowel prep Sun 8/11)  abx x14d,  needs rMRI in 1mo  urology recs- keep llamas until on PO abx, likely dispo w muriel  PTOT  SCD, [facility]           Alona Bethea MD      "

## 2024-08-12 NOTE — PROGRESS NOTES
Physical Therapy                 Therapy Communication Note    Patient Name: Wade Machado  MRN: [id number]  Today's Date: 8/12/2024     Discipline: Physical Therapy     Missed Visit Reason:  (@GI lab)    Missed Time: 1040 Attempt

## 2024-08-12 NOTE — CARE PLAN
The patient's goals for the shift include      The clinical goals for the shift include SBP <150    Patient progressed with:      Problem: General Stroke  Goal: Establish a mutual long term goal with patient by discharge  8/12/2024 0733 by Fabiloa Nixon RN  Outcome: Progressing  8/12/2024 0652 by Fabiola Nixon RN  Outcome: Progressing  Goal: Demonstrate improvement in neurological exam throughout the shift  8/12/2024 0733 by Fabiola Nixon RN  Outcome: Progressing  8/12/2024 0652 by Fabiola Nixon RN  Outcome: Progressing  Goal: Maintain BP within ordered limits throughout shift  8/12/2024 0733 by Fabiola Nixon RN  Outcome: Progressing  8/12/2024 0652 by Fabiola Nixon RN  Outcome: Progressing  Goal: Participate in treatment (ie., meds, therapy) throughout shift  8/12/2024 0733 by Fabiola Nixon RN  Outcome: Progressing  8/12/2024 0652 by Fabiola Nixon RN  Outcome: Progressing  Goal: No symptoms of aspiration throughout shift  8/12/2024 0733 by Fabiola Nixon RN  Outcome: Progressing  8/12/2024 0652 by Fabiola Nixon RN  Outcome: Progressing  Goal: No symptoms of hemorrhage throughout shift  8/12/2024 0733 by Fabiola Nixon RN  Outcome: Progressing  8/12/2024 0652 by Fabiola Nixon RN  Outcome: Progressing  Goal: Out of bed three times today  8/12/2024 0733 by Fabiola Nixon RN  Outcome: Progressing  8/12/2024 0652 by Fabiola Nixon RN  Outcome: Progressing     Problem: Pain  Goal: Turns in bed with improved pain control throughout the shift  Outcome: Progressing  Goal: Performs ADL's with improved pain control throughout shift  Outcome: Progressing     Problem: Skin  Goal: Participates in plan/prevention/treatment measures  Outcome: Progressing  Goal: Prevent/manage excess moisture  Outcome: Progressing  Goal: Prevent/minimize sheer/friction injuries  Outcome: Progressing  Goal: Promote skin healing  Outcome: Progressing     Problem: Fall/Injury  Goal: Not fall by end of shift  Outcome: Progressing  Goal: Be free from injury  by end of the shift  Outcome: Progressing  Goal: Verbalize understanding of personal risk factors for fall in the hospital  Outcome: Progressing  Goal: Verbalize understanding of risk factor reduction measures to prevent injury from fall in the home  Outcome: Progressing  Goal: Use assistive devices by end of the shift  Outcome: Progressing  Goal: Pace activities to prevent fatigue by end of the shift  Outcome: Progressing

## 2024-08-12 NOTE — PROGRESS NOTES
Patient educated repeatedly on importance of finishing entire 4L of bowel prep by 0600. Pt initially cooperative but eventually refused despite re-education.     0400: Patient began consuming bowel prep again.   0600: Patient states he is unable to finish final 600-800ml of bowel prep.

## 2024-08-12 NOTE — ANESTHESIA PREPROCEDURE EVALUATION
Patient: Wade Machado    Procedure Information       Date/Time: 08/12/24 0950    Scheduled providers: Alphonso Hoff MD; Jessica Max MD    Procedure: COLONOSCOPY    Location: [facility]            Relevant Problems   Anesthesia (within normal limits)  No family hx MH      Cardiac   (+) Benign essential hypertension      Pulmonary (within normal limits)      Neuro   (+) Pontine hemorrhage (Multi)      GI (within normal limits)      /Renal   (+) BPH (benign prostatic hyperplasia)      Liver (within normal limits)      Endocrine (within normal limits)      Hematology (within normal limits)      Musculoskeletal (within normal limits)   (+) Presence of artificial hip, left      HEENT (within normal limits)      ID (within normal limits)      Skin (within normal limits)      GYN (within normal limits)   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 ([facility], 2005), HTN, alcohol use, tobacco use, obesity, prostate cancer, BPH,  previous R total hip arthroplasty c/b recurrent R prosthetic hip dislocation 5/11 s/p R hip reduction, p/w AMS, fever and fatigue, recent UTI, possible falls, CTH pontine ICH w/ ventriculomegaly, incr'd vents compared to 2005 and 2007 MRI, CT CAP mild b/l hydronephrosis, colovesical fistula, CT C spine neg for fx, CTA H/N stable hemorrhage, neg for vasc abnormality, MRI brain pontine hemorrhage, 8/10 Ucx neg  Repeat MRI one month        Clinical information reviewed:   Tobacco  Allergies  Meds   Med Hx  Surg Hx   Fam Hx  Soc Hx        NPO Detail:  NPO/Void Status  Date of Last Liquid: 08/09/24  Time of Last Liquid: 2300  Date of Last Solid: 08/09/24  Time of Last Solid: 2300         Physical Exam    Airway  TM distance: >3 FB     Cardiovascular - normal exam     Dental   Comments: poor   Pulmonary - normal exam     Abdominal          Vitals:    08/12/24 1043   BP: 170/82   Pulse:    Resp:    Temp:    SpO2:        Past Surgical History:   Procedure  Laterality Date    JOINT REPLACEMENT      OTHER SURGICAL HISTORY  05/09/2022    Retinal detachment repair    OTHER SURGICAL HISTORY  05/09/2022    Cataract surgery     Past Medical History:   Diagnosis Date    Hypertension     Prostate enlargement     Unspecified osteoarthritis, unspecified site     Osteoarthritis       Current Facility-Administered Medications:     acetaminophen (Tylenol) oral liquid 650 mg, 650 mg, oral, q4h PRN **OR** acetaminophen (Tylenol) tablet 650 mg, 650 mg, oral, q4h PRN, Hakeem Torres MD, 650 mg at 08/12/24 0828    dextrose 50 % injection 12.5 g, 12.5 g, intravenous, q15 min PRN, Hakeem Torres MD    dextrose 50 % injection 25 g, 25 g, intravenous, q15 min PRN, Hakeem Torres MD    finasteride (Proscar) tablet 5 mg, 5 mg, oral, Daily, Hakeem Torres MD, 5 mg at 08/12/24 0828    glucagon (Glucagen) injection 1 mg, 1 mg, intramuscular, q15 min PRN, Hakeem Torres MD    glucagon (Glucagen) injection 1 mg, 1 mg, intramuscular, q15 min PRN, Hakeem Torres MD    heparin (porcine) injection 7,500 Units, 7,500 Units, subcutaneous, q8h PEMA, Hakeem Torres MD, 7,500 Units at 08/12/24 0149    lisinopril tablet 40 mg, 40 mg, oral, Daily, Hakeem Torres MD, 40 mg at 08/12/24 0828    niCARdipine (Cardene) 40 mg in sodium chloride 200 mL (0.2 mg/mL) infusion (premix), 1-15 mg/hr, intravenous, Continuous PRN, Hakeem Torres MD, Stopped at 08/09/24 0645    ondansetron (Zofran) tablet 4 mg, 4 mg, oral, q8h PRN **OR** ondansetron (Zofran) injection 4 mg, 4 mg, intravenous, q8h PRN, Hakeem Torres MD, 4 mg at 08/11/24 0212    perflutren protein A microsphere (Optison) injection 0.5 mL, 0.5 mL, intravenous, Once in imaging, Hkaeem Torres MD    piperacillin-tazobactam (Zosyn) 3.375 g in dextrose (iso) IV 50 mL, 3.375 g, intravenous, q6h, Hakeem Torres MD, Stopped at 08/12/24 0534    polyethylene glycol (Glycolax, Miralax) packet 17 g,  17 g, oral, Daily, Hakeem Torres MD    polyethylene glycol-electrolytes (Nulytely) solution 4,000 mL, 4,000 mL, oral, Once, Hakeem Torres MD    psyllium (Metamucil) 3.4 gram packet 1 packet, 1 packet, oral, Daily, Hakeem Torres MD, 1 packet at 08/09/24 2012    sodium chloride 0.9% infusion, 100 mL/hr, intravenous, Continuous, Hakeem Torres MD, Last Rate: 100 mL/hr at 08/12/24 0827, 100 mL/hr at 08/12/24 0827    sulfur hexafluoride microsphr (Lumason) injection 24.28 mg, 2 mL, intravenous, Once in imaging, Hakeem Torres MD    tamsulosin (Flomax) 24 hr capsule 0.4 mg, 0.4 mg, oral, BID, Hakeem Torres MD, 0.4 mg at 08/12/24 0828  Prior to Admission medications    Medication Sig Start Date End Date Taking? Authorizing Provider   acetaminophen (Tylenol) 325 mg tablet Take 1-2 tablets (325-650 mg) by mouth every 4 hours if needed for mild pain (1 - 3). 12/7/22  Yes Historical Provider, MD   albuterol 90 mcg/actuation inhaler Inhale every 6 hours. 12/7/22 8/9/24 Yes Historical Provider, MD   ketorolac (Acular) 0.5 % ophthalmic solution INSTILL 1 DROP IN OPERATED EYE ONCE A DAY 5/9/23 8/9/24 Yes Historical Provider, MD   ascorbic acid, vitamin C, 100 mg chewable tablet Chew 4 tablets (400 mg) once daily.    Historical Provider, MD   docusate sodium (Colace) 100 mg capsule Take 1 capsule (100 mg) by mouth once daily.    Historical Provider, MD   finasteride (Proscar) 5 mg tablet Take 1 tablet (5 mg) by mouth once daily. 1/22/24   Historical Provider, MD   lisinopril 40 mg tablet TAKE ONE TABLET BY MOUTH once DAILY for blood pressure 6/3/24   Eddie Crum DO   tamsulosin (Flomax) 0.4 mg 24 hr capsule TAKE ONE CAPSULE BY MOUTH EVERY DAY  Patient taking differently: Take 1 capsule (0.4 mg) by mouth 2 times a day. 5/27/23   Eddie Crum DO   methylPREDNISolone (Medrol Dospak) 4 mg tablets Follow schedule on package instructions over 6 days  Patient not taking: Reported on 8/9/2024  4/17/24 8/12/24  Eddie Crum DO   oxyCODONE-acetaminophen (Percocet) 5-325 mg tablet Take 1-2 tablets by mouth. Every 4 to 6 hours as needed for pain for 7 days 5/2/24 8/12/24  Historical Provider, MD   sennosides-docusate sodium (Senokot-S) 8.6-50 mg tablet Take 1 tablet by mouth 2 times a day. 5/2/24 8/9/24  Historical Provider, MD   UNABLE TO FIND Take 50 mcg by mouth once daily. Med Name: Prevagen 50 mcg Capsule  8/9/24  Historical Provider, MD     No Known Allergies  Social History     Tobacco Use    Smoking status: Every Day     Types: Cigarettes    Smokeless tobacco: Never   Substance Use Topics    Alcohol use: Yes     Alcohol/week: 15.0 standard drinks of alcohol     Types: 15 Standard drinks or equivalent per week     Comment: 12 pack weekly         Chemistry    Lab Results   Component Value Date/Time     08/12/2024 0853    K 3.4 (L) 08/12/2024 0853     08/12/2024 0853    CO2 25 08/12/2024 0853    BUN 4 (L) 08/12/2024 0853    CREATININE 0.77 08/12/2024 0853    Lab Results   Component Value Date/Time    CALCIUM 8.1 (L) 08/12/2024 0853    ALKPHOS 107 08/08/2024 2318    AST 15 08/08/2024 2318    ALT 14 08/08/2024 2318    BILITOT 0.4 08/08/2024 2318          Lab Results   Component Value Date/Time    WBC 11.1 08/12/2024 0853    HGB 11.5 (L) 08/12/2024 0853    HCT 35.9 (L) 08/12/2024 0853     08/12/2024 0853     Lab Results   Component Value Date/Time    PROTIME 15.3 (H) 08/12/2024 0853    INR 1.4 (H) 08/12/2024 0853     Encounter Date: 05/03/24   ECG 12 lead   Result Value    Ventricular Rate 63    Atrial Rate 63    MI Interval 168    QRS Duration 86    QT Interval 402    QTC Calculation(Bazett) 411    P Axis 51    R Axis -4    T Axis 37    QRS Count 11    Q Onset 221    P Onset 137    P Offset 194    T Offset 422    QTC Fredericia 408    Narrative    Sinus rhythm with marked sinus arrhythmia  Otherwise normal ECG  When compared with ECG of 28-JUL-2023 07:37,  No significant change was  found  See ED provider note for full interpretation and clinical correlation  Confirmed by Nyla Andrea ([id number]) on 5/7/2024 3:51:05 PM     No results found for this or any previous visit from the past 1095 days.       Anesthesia Plan    History of general anesthesia?: yes  History of complications of general anesthesia?: no    ASA 3     MAC     intravenous induction   Anesthetic plan and risks discussed with patient.  Use of blood products discussed with patient who consented to blood products.    Plan discussed with [facility]AA.

## 2024-08-12 NOTE — PROGRESS NOTES
[facility]  [facility] [place]  CONSULT FOLLOW-UP         SUBJECTIVE     Reason for Consult: endoscopic evaluation of colovesical fistula     Interval Events/Subjective:   > Patient denies new complaints.  > Colonoscopy performed today. See below.    ROS: Complete ROS obtained, negative unless otherwise indicated above.     Medications:  finasteride, 5 mg, oral, Daily  heparin (porcine), 7,500 Units, subcutaneous, q8h PEMA  lidocaine, 0.1 mL, subcutaneous, Once  lisinopril, 40 mg, oral, Daily  magnesium sulfate, 2 g, intravenous, Once  perflutren protein A microsphere, 0.5 mL, intravenous, Once in imaging  piperacillin-tazobactam, 3.375 g, intravenous, q6h  polyethylene glycol, 17 g, oral, Daily  polyethylene glycol-electrolytes, 4,000 mL, oral, Once  psyllium, 1 packet, oral, Daily  sulfur hexafluoride microsphr, 2 mL, intravenous, Once in imaging  tamsulosin, 0.4 mg, oral, BID      PRN medications: acetaminophen **OR** acetaminophen, dextrose, dextrose, glucagon, glucagon, ondansetron **OR** ondansetron, ondansetron, oxygen      EXAM     Physical Exam   Visit Vitals  /71   Pulse 68   Temp 36 °C (96.8 °F) (Temporal)   Resp 19         General: NAD, AA&O x 3  Eyes: EOMI, PERRLA  ENT: MMM  Heart: RRR  Lungs: No respiratory distress  Abdomen: Soft, non tender, non distended  Skin: No jaundice   Neuro: Appropriately responds to questions/commands         DATA                                                                            Labs     Lab Results   Component Value Date    WBC 11.1 08/12/2024    HGB 11.5 (L) 08/12/2024    HCT 35.9 (L) 08/12/2024    MCV 90 08/12/2024     08/12/2024     Lab Results   Component Value Date    INR 1.4 (H) 08/12/2024    INR 1.2 (H) 08/11/2024    INR 1.4 (H) 08/10/2024    PROTIME 15.3 (H) 08/12/2024    PROTIME 14.0 (H) 08/11/2024    PROTIME 15.4 (H) 08/10/2024     Lab Results   Component Value Date    GLUCOSE 86 08/12/2024     CALCIUM 8.1 (L) 08/12/2024     08/12/2024    K 3.4 (L) 08/12/2024    CO2 25 08/12/2024     08/12/2024    BUN 4 (L) 08/12/2024    CREATININE 0.77 08/12/2024     Lab Results   Component Value Date    ALT 14 08/08/2024    AST 15 08/08/2024    ALKPHOS 107 08/08/2024    BILITOT 0.4 08/08/2024                                                                              Imaging           === 08/09/24 ===    CT ANGIO HEAD AND NECK W AND WO IV CONTRAST    - Impression -  Non con CT head:  1. Similar appearance of a pontine intraparenchymal hemorrhage with  persistent dilation of the supratentorial ventricular system.    CTA head and neck:  1. Calcified and noncalcified atheromatous plaque along the mid left  common carotid artery with moderate focal luminal narrowing.  Calcified and noncalcified atheromatous plaque along the mid common  carotid artery with moderate luminal narrowing. Otherwise, no  evidence for significant stenosis of the cervical vessels.  2. Vascular calcifications of bilateral carotid siphons with  mild-to-moderate narrowing. No evidence for significant stenosis or  large branch vessel cutoffs of the intracranial vessels.    === 08/09/24 ===    MR BRAIN W AND WO CONTRAST    - Impression -  1. Circumscribed dorsal pontine mass measuring 2.1 x 2.2 x 1.9 cm  with intrinsic hemorrhagic signal. There is no associated enhancement  or nodularity. This may reflect a de jose pontine hemorrhage possibly  secondary to underlying hypertension, hemorrhage secondary to an  underlying cavernoma or other etiology.  2. Suspect ex vacuo dilation of the ventricles in the setting of  senescent atrophy. The possibility of underlying normal pressure  hydrocephalus is not excluded based on imaging. Clinical correlation  is recommended.  3. Complete opacification of the right paranasal sinus likely a  chronic process.                                                                           GI Procedures      Colonoscopy 8/12/24: Sigmoid diverticulosis, mild segmental colitis associated with diverticulosis, circumferential edema in sigmoid colon near suspected fistula site (biopsied, awaiting pathology), small internal hemorrhoids. Otherwise normal colonic and ileal mucosa.      ASSESSMENT / PLAN     Assessment and Recommendations:   Wade Machado is a 69 y.o. male w/PMH of HTN, prostate cancer s/p brachy therapy, BPH, recurrent cystitis who originally presented to [place] ED for several days of confusion, fever, and generalized weakness. 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 He was transferred to [facility] ED for admission and Neurosurgery evaluation. Gastroenterology has been consulted for endoscopic evaluation of colovesical fistula.      Etiology of fistula is unclear at this time though could be genitourinary from repeated cystitis likely driven by chronic urinary retention or colonic due prior diverticulitis (possibly an episode of abdominal pain occurred which was inadvertently attributed to UTI/LUTS). The patient did not have endoscopic evidence of colorectal cancer or characteristic Crohn's disease or ulcerative colitis.     #Colovesical Fistula  #Diverticulosis, Sigmoid     Recommendations:  -recommend further evaluation by Colorectal Surgery for colovesical fistula  -Gastroenterology will follow-up biopsy results  -recommend high-fiber diet and daily bowel movement with assistance of 17g of miralax daily if needed  -may repeat colonoscopy for colorectal cancer screening purposes in 7-10 years    Gastroenterology will sign off at this time. Please reach  out with further questions or concerns should they arise.     EUGENIO per primary team     ------------------------------------------------------------------------  Adrian Etienne MD  Gastroenterology Fellow  After 5PM and on Weekends, please page on-call fellow.    To be discussed with service attending Dr. Los Gramajo  Final recommendations pending attending attestation.

## 2024-08-12 NOTE — ANESTHESIA POSTPROCEDURE EVALUATION
Patient: Wade Machado    Procedure Summary       Date: 08/12/24 Room / Location: [facility] (formerly [facility])    Anesthesia Start: 1203 Anesthesia Stop: 1308    Procedure: COLONOSCOPY Diagnosis: Colovesical fistula    Scheduled Providers: Alphonso Hoff MD; Jessica Max MD Responsible Provider: Jessica Max MD    Anesthesia Type: MAC ASA Status: 3            Anesthesia Type: MAC    Vitals Value Taken Time   /71 08/12/24 1335   Temp 36 °C (96.8 °F) 08/12/24 1305   Pulse 68 08/12/24 1335   Resp 19 08/12/24 1335   SpO2 94 % 08/12/24 1335       Anesthesia Post Evaluation    Patient location during evaluation: PACU  Patient participation: complete - patient participated  Level of consciousness: awake  Pain management: adequate  Airway patency: patent  Cardiovascular status: acceptable  Respiratory status: acceptable  Hydration status: acceptable  Postoperative Nausea and Vomiting: none        There were no known notable events for this encounter.

## 2024-08-13 LAB
ALBUMIN SERPL BCP-MCNC: 3 G/DL (ref 3.4–5)
ANION GAP SERPL CALC-SCNC: 10 MMOL/L (ref 10–20)
BACTERIA BLD CULT: NORMAL
BACTERIA BLD CULT: NORMAL
BUN SERPL-MCNC: 6 MG/DL (ref 6–23)
CALCIUM SERPL-MCNC: 8.4 MG/DL (ref 8.6–10.6)
CHLORIDE SERPL-SCNC: 102 MMOL/L (ref 98–107)
CO2 SERPL-SCNC: 26 MMOL/L (ref 21–32)
CREAT SERPL-MCNC: 0.59 MG/DL (ref 0.5–1.3)
EGFRCR SERPLBLD CKD-EPI 2021: >90 ML/MIN/1.73M*2
ERYTHROCYTE [DISTWIDTH] IN BLOOD BY AUTOMATED COUNT: 14.4 % (ref 11.5–14.5)
GLUCOSE SERPL-MCNC: 85 MG/DL (ref 74–99)
HCT VFR BLD AUTO: 34.9 % (ref 41–52)
HGB BLD-MCNC: 11.4 G/DL (ref 13.5–17.5)
MAGNESIUM SERPL-MCNC: 1.72 MG/DL (ref 1.6–2.4)
MCH RBC QN AUTO: 28.9 PG (ref 26–34)
MCHC RBC AUTO-ENTMCNC: 32.7 G/DL (ref 32–36)
MCV RBC AUTO: 89 FL (ref 80–100)
NRBC BLD-RTO: 0 /100 WBCS (ref 0–0)
PHOSPHATE SERPL-MCNC: 2.6 MG/DL (ref 2.5–4.9)
PLATELET # BLD AUTO: 390 X10*3/UL (ref 150–450)
POTASSIUM SERPL-SCNC: 3.4 MMOL/L (ref 3.5–5.3)
RBC # BLD AUTO: 3.94 X10*6/UL (ref 4.5–5.9)
SODIUM SERPL-SCNC: 135 MMOL/L (ref 136–145)
WBC # BLD AUTO: 13.4 X10*3/UL (ref 4.4–11.3)

## 2024-08-13 PROCEDURE — 82947 ASSAY GLUCOSE BLOOD QUANT: CPT

## 2024-08-13 PROCEDURE — 85027 COMPLETE CBC AUTOMATED: CPT | Performed by: NURSE PRACTITIONER

## 2024-08-13 PROCEDURE — 84520 ASSAY OF UREA NITROGEN: CPT | Performed by: NURSE PRACTITIONER

## 2024-08-13 PROCEDURE — 36415 COLL VENOUS BLD VENIPUNCTURE: CPT | Performed by: NURSE PRACTITIONER

## 2024-08-13 PROCEDURE — 2500000002 HC RX 250 W HCPCS SELF ADMINISTERED DRUGS (ALT 637 FOR MEDICARE OP, ALT 636 FOR OP/ED): Performed by: STUDENT IN AN ORGANIZED HEALTH CARE EDUCATION/TRAINING PROGRAM

## 2024-08-13 PROCEDURE — 83735 ASSAY OF MAGNESIUM: CPT | Performed by: NURSE PRACTITIONER

## 2024-08-13 PROCEDURE — 97116 GAIT TRAINING THERAPY: CPT | Mod: GP | Performed by: PHYSICAL THERAPIST

## 2024-08-13 PROCEDURE — 2500000001 HC RX 250 WO HCPCS SELF ADMINISTERED DRUGS (ALT 637 FOR MEDICARE OP): Performed by: STUDENT IN AN ORGANIZED HEALTH CARE EDUCATION/TRAINING PROGRAM

## 2024-08-13 PROCEDURE — 2500000004 HC RX 250 GENERAL PHARMACY W/ HCPCS (ALT 636 FOR OP/ED): Performed by: STUDENT IN AN ORGANIZED HEALTH CARE EDUCATION/TRAINING PROGRAM

## 2024-08-13 PROCEDURE — 2500000002 HC RX 250 W HCPCS SELF ADMINISTERED DRUGS (ALT 637 FOR MEDICARE OP, ALT 636 FOR OP/ED): Performed by: NURSE PRACTITIONER

## 2024-08-13 PROCEDURE — 1100000001 HC PRIVATE ROOM DAILY

## 2024-08-13 PROCEDURE — 2500000001 HC RX 250 WO HCPCS SELF ADMINISTERED DRUGS (ALT 637 FOR MEDICARE OP): Performed by: PHYSICIAN ASSISTANT

## 2024-08-13 PROCEDURE — 97530 THERAPEUTIC ACTIVITIES: CPT | Mod: GP | Performed by: PHYSICAL THERAPIST

## 2024-08-13 RX ORDER — POTASSIUM CHLORIDE 20 MEQ/1
40 TABLET, EXTENDED RELEASE ORAL ONCE
Status: COMPLETED | OUTPATIENT
Start: 2024-08-13 | End: 2024-08-13

## 2024-08-13 RX ORDER — TAMSULOSIN HYDROCHLORIDE 0.4 MG/1
0.4 CAPSULE ORAL 2 TIMES DAILY
Start: 2024-08-13

## 2024-08-13 RX ORDER — AMOXICILLIN AND CLAVULANATE POTASSIUM 875; 125 MG/1; MG/1
2 TABLET, FILM COATED ORAL EVERY 12 HOURS SCHEDULED
Status: DISCONTINUED | OUTPATIENT
Start: 2024-08-13 | End: 2024-08-13

## 2024-08-13 RX ORDER — AMOXICILLIN AND CLAVULANATE POTASSIUM 875; 125 MG/1; MG/1
1 TABLET, FILM COATED ORAL EVERY 8 HOURS
Status: DISCONTINUED | OUTPATIENT
Start: 2024-08-13 | End: 2024-08-14 | Stop reason: HOSPADM

## 2024-08-13 RX ORDER — AMOXICILLIN AND CLAVULANATE POTASSIUM 875; 125 MG/1; MG/1
875 TABLET, FILM COATED ORAL 2 TIMES DAILY
Qty: 18 TABLET | Refills: 0 | Status: SHIPPED | OUTPATIENT
Start: 2024-08-13 | End: 2024-08-22

## 2024-08-13 ASSESSMENT — COGNITIVE AND FUNCTIONAL STATUS - GENERAL
MOBILITY SCORE: 23
STANDING UP FROM CHAIR USING ARMS: A LITTLE
WALKING IN HOSPITAL ROOM: A LITTLE
MOVING TO AND FROM BED TO CHAIR: A LITTLE
TURNING FROM BACK TO SIDE WHILE IN FLAT BAD: A LOT
MOVING FROM LYING ON BACK TO SITTING ON SIDE OF FLAT BED WITH BEDRAILS: A LITTLE
MOBILITY SCORE: 16
CLIMB 3 TO 5 STEPS WITH RAILING: A LITTLE
CLIMB 3 TO 5 STEPS WITH RAILING: A LOT

## 2024-08-13 ASSESSMENT — PAIN SCALES - GENERAL
PAINLEVEL_OUTOF10: 0 - NO PAIN
PAINLEVEL_OUTOF10: 0 - NO PAIN
PAINLEVEL_OUTOF10: 5 - MODERATE PAIN
PAINLEVEL_OUTOF10: 0 - NO PAIN
PAINLEVEL_OUTOF10: 0 - NO PAIN

## 2024-08-13 ASSESSMENT — PAIN - FUNCTIONAL ASSESSMENT
PAIN_FUNCTIONAL_ASSESSMENT: 0-10

## 2024-08-13 NOTE — CARE PLAN
Problem: General Stroke  Goal: No symptoms of aspiration throughout shift  Outcome: Progressing  Goal: No symptoms of hemorrhage throughout shift  Outcome: Progressing     Problem: Fall/Injury  Goal: Not fall by end of shift  Outcome: Progressing   The patient's goals for the shift include having no pain throughout the night.     The clinical goals for the shift include Pt will remain free of falls when ambulating to the restroom the entire shift.    Over the shift, the patient was HDS and no s/sx of aspiration were observed. The pt was resting comfortably throughout the shift with no pain noted.

## 2024-08-13 NOTE — PROGRESS NOTES
"Wade Machado is a 69 y.o. male on day 4 of admission presenting with Pontine hemorrhage (Multi).    Subjective   No acute events overnight    Objective     Physical Exam  Awake  Ox3  BUE 5/5  BLE 5/5  SILT    Last Recorded Vitals  Blood pressure 125/60, pulse 61, temperature 36.5 °C (97.7 °F), temperature source Temporal, resp. rate 18, height 1.676 m (5' 6\"), weight 112 kg (246 lb 14.6 oz), SpO2 93%.  Intake/Output last 3 Shifts:  I/O last 3 completed shifts:  In: 7160 (63.9 mL/kg) [P.O.:4510; I.V.:2050 (18.3 mL/kg); IV Piggyback:600]  Out: 6350 (56.7 mL/kg) [Urine:6350 (1.6 mL/kg/hr)]  Weight: 112 kg     Relevant Results  Lab Results   Component Value Date    WBC 11.1 08/12/2024    HGB 11.5 (L) 08/12/2024    HCT 35.9 (L) 08/12/2024    MCV 90 08/12/2024     08/12/2024     Lab Results   Component Value Date    GLUCOSE 86 08/12/2024    CALCIUM 8.1 (L) 08/12/2024     08/12/2024    K 3.4 (L) 08/12/2024    CO2 25 08/12/2024     08/12/2024    BUN 4 (L) 08/12/2024    CREATININE 0.77 08/12/2024       Assessment/Plan   Principal Problem:    Pontine hemorrhage (Multi)    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 ([facility], 2005), HTN, alcohol use, tobacco use, obesity, prostate cancer, BPH,  previous R total hip arthroplasty c/b recurrent R prosthetic hip dislocation 5/11 s/p R hip reduction, p/w AMS, fever and fatigue, recent UTI, possible falls, CTH pontine ICH w/ ventriculomegaly, incr'd vents compared to 2005 and 2007 MRI, CT CAP mild b/l hydronephrosis, colovesical fistula, CT C spine neg for fx, CTA H/N stable hemorrhage, neg for vasc abnormality, MRI brain pontine hemorrhage, 8/10 Ucx neg    8/10 TTE 65-70%  8/12 renal US bladder wall thickening and intraluminal debris  8/12 s/p colonoscopy by GI, c/f colo-vesical fistula and diverticulosis     ASSESSMENT  floor  -150  GI recs- defer to colorectal   Colorectal recs - abx x14 days, OP fu  needs rMRI in 1mo  urology recs- " keep llamas until on PO abx, dispo w llamas  PTOT  SCD, SQH    Patient medically ready for discharge pending PTOT karissa Bethea MD

## 2024-08-13 NOTE — PROGRESS NOTES
Physical Therapy    Physical Therapy Treatment    Patient Name: Wade Machado  MRN: [id number]  Today's Date: 8/13/2024  Time Calculation  Start Time: 0959  Stop Time: 1023  Time Calculation (min): 24 min    Assessment/Plan   PT Assessment  PT Assessment Results: Decreased strength, Decreased endurance, Impaired balance, Decreased mobility, Decreased coordination, Impaired judgement  Rehab Prognosis: Excellent  Evaluation/Treatment Tolerance: Patient tolerated treatment well  Medical Staff Made Aware: Yes  End of Session Communication: Bedside nurse  Assessment Comment: Pt participated in sit to stand transfer and ambulation this date. Pt appeared inattentive to R side, was given cues for spacial and body awareness during mobility. Pt would benefit from continued skilled physical therapy to improve overall functional mobility.  End of Session Patient Position: Bed, 3 rail up, Alarm on  PT Plan  Inpatient/Swing Bed or Outpatient: Inpatient  PT Plan  Treatment/Interventions: Bed mobility, Transfer training, Gait training, Stair training, Balance training, Neuromuscular re-education, Strengthening, Endurance training, Therapeutic exercise, Therapeutic activity, Home exercise program, Positioning, Postural re-education  PT Plan: Ongoing PT  PT Frequency: 5 times per week  PT Discharge Recommendations: High intensity level of continued care  Equipment Recommended upon Discharge: Wheeled walker  PT Recommended Transfer Status: Assist x2  PT - OK to Discharge: Yes (Once medically cleared)      General Visit Information:   PT  Visit  PT Received On: 08/13/24  Response to Previous Treatment: Patient with no complaints from previous session.  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  ([facility], 2005), HTN, alcohol use, tobacco use, obesity, prostate cancer, BPH, previous R total hip arthroplasty c/b recurrent R prosthetic hip dislocation 5/11 s/p R hip reduction, recent UTI  Family/Caregiver Present: Yes  Caregiver Feedback: wife present during session  Prior to Session Communication: Bedside nurse  Patient Position Received: Bed, 3 rail up, Alarm on  Preferred Learning Style: verbal  General Comment: Pt was found supine in bed and was agreeable to therapy    Subjective   Precautions:  Precautions  Medical Precautions: Fall precautions  Precautions Comment: -150  Vital Signs:       Objective   Pain:  Pain Assessment  0-10 (Numeric) Pain Score: 0 - No pain  Cognition:  Cognition  Safety Judgment: Decreased awareness of need for assistance  Coordination:     Postural Control:  Static Sitting Balance  Static Sitting-Comment/Number of Minutes: CGA  Dynamic Sitting Balance  Dynamic Sitting-Comments: CGA  Static Standing Balance  Static Standing-Comment/Number of Minutes: min A  Dynamic Standing Balance  Dynamic Standing-Comments: min A    Activity Tolerance:  Activity Tolerance  Endurance: Tolerates 10 - 20 min exercise with multiple rests  Early Mobility/Exercise Safety Screen: Proceed with mobilization - No exclusion criteria met  Treatments:  Bed Mobility  Bed Mobility: Yes  Bed Mobility 1  Bed Mobility 1: Supine to sitting, Sitting to supine  Level of Assistance 1: Moderate assistance  Bed Mobility Comments 1: Performed with HOB elevated.    Ambulation/Gait Training  Ambulation/Gait Training Performed: Yes  Ambulation/Gait Training 1  Surface 1: Level tile  Device 1: Rolling walker  Assistance 1: Minimum assistance  Quality of Gait 1: Shuffling gait, Inconsistent stride length, Decreased step length  Comments/Distance (ft) 1: 50 feet. Pt appeared inattentive to R leg during ambulation. Was given cues for safety and sequencing.  Transfers  Transfer: Yes  Transfer 1  Transfer From 1: Sit to,  Stand to  Transfer to 1: Stand, Sit  Technique 1: Sit to stand, Stand to sit  Transfer Device 1: Walker  Transfer Level of Assistance 1: Minimum assistance  Trials/Comments 1: Pt given cues for hand placement with stand.    Outcome Measures:  [facility] Basic Mobility  Turning from your back to your side while in a flat bed without using bedrails: A little  Moving from lying on your back to sitting on the side of a flat bed without using bedrails: A lot  Moving to and from bed to chair (including a wheelchair): A little  Standing up from a chair using your arms (e.g. wheelchair or bedside chair): A little  To walk in hospital room: A little  Climbing 3-5 steps with railing: A lot  Basic Mobility - Total Score: 16    Education Documentation  Precautions, taught by Lyly Brar, PT at 8/13/2024 12:09 PM.  Learner: Patient  Readiness: Acceptance  Method: Explanation  Response: Needs Reinforcement    Body Mechanics, taught by Lyly Brar, PT at 8/13/2024 12:09 PM.  Learner: Patient  Readiness: Acceptance  Method: Explanation  Response: Needs Reinforcement    Mobility Training, taught by Lyly Brar, PT at 8/13/2024 12:09 PM.  Learner: Patient  Readiness: Acceptance  Method: Explanation  Response: Needs Reinforcement    Education Comments  No comments found.        OP EDUCATION:       Encounter Problems       Encounter Problems (Active)       Balance       STG - Maintains dynamic standing balance with upper extremity support on LRAD with Mod I  (Progressing)       Start:  08/09/24    Expected End:  08/30/24            STG - Maintains dynamic sitting balance without upper extremity support independently  (Progressing)       Start:  08/09/24    Expected End:  08/30/24               Mobility       STG - Patient will ambulate x200 ft with Mod I using LRAD (Progressing)       Start:  08/09/24    Expected End:  08/30/24            STG - Patient will ascend and descend 3 stairs with rail vs LRAD with Mod I   (Progressing)       Start:  08/09/24    Expected End:  08/30/24               PT Transfers       STG - Patient will perform bed mobility independently  (Progressing)       Start:  08/09/24    Expected End:  08/30/24            STG - Patient will transfer sit to and from stand with Mod I using LRAD (Progressing)       Start:  08/09/24    Expected End:  08/30/24

## 2024-08-13 NOTE — PROGRESS NOTES
Met with patient and introduced myself as Care Coordinator and member of the discharge planning team.  Patient was admitted from [facility], with concern for pontine hemorrhage.   We discussed PT's recommendation for high intensity therapy. Patient said he doesn't need it and he is going home. He was independent in ADL's prior to hospitalization. He doesn't use a device at home. He declined need for home care and he declined outpatient therapy. He said he had the llamas catheter at home and just needs a leg bag. I spoke to patient's wife by phone who would like to speak to her  about home care. I will follow up with them tomorrow. Karen Prince RN

## 2024-08-13 NOTE — SIGNIFICANT EVENT
Wade Machado is a 69 y.o. 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     Repeat RBUS 8/12 showed no evidence of hydronephrosis and sufficiently decompressed bladder suggesting resolution of obstruction.     Recommendation:  - Discharge plan per primary, ready for discharge from a urology perspective   - Continue with antibiotics for likely UTI, narrowing pending culture results  - Moeller should remain in place on discharge, follow-up with outpatient urologist scheduled 8/28  - Continue home finasteride and tamsulosin if able   - Patient needs outpatient cystoscopy to evaluate bladder wall thickening seen on CT     Discussed with chief resident, Dr. Mike Villar MD  Urology  Pager [phone]; Peds pager [phone]

## 2024-08-14 VITALS
WEIGHT: 246.8 LBS | HEIGHT: 66 IN | RESPIRATION RATE: 21 BRPM | HEART RATE: 95 BPM | SYSTOLIC BLOOD PRESSURE: 149 MMHG | TEMPERATURE: 97.2 F | OXYGEN SATURATION: 96 % | DIASTOLIC BLOOD PRESSURE: 79 MMHG | BODY MASS INDEX: 39.66 KG/M2

## 2024-08-14 LAB
ALBUMIN SERPL BCP-MCNC: 3.2 G/DL (ref 3.4–5)
ANION GAP SERPL CALC-SCNC: 14 MMOL/L (ref 10–20)
BUN SERPL-MCNC: 5 MG/DL (ref 6–23)
CALCIUM SERPL-MCNC: 9 MG/DL (ref 8.6–10.6)
CHLORIDE SERPL-SCNC: 101 MMOL/L (ref 98–107)
CO2 SERPL-SCNC: 24 MMOL/L (ref 21–32)
CREAT SERPL-MCNC: 0.55 MG/DL (ref 0.5–1.3)
EGFRCR SERPLBLD CKD-EPI 2021: >90 ML/MIN/1.73M*2
GLUCOSE SERPL-MCNC: 88 MG/DL (ref 74–99)
PHOSPHATE SERPL-MCNC: 3.2 MG/DL (ref 2.5–4.9)
POTASSIUM SERPL-SCNC: 3.7 MMOL/L (ref 3.5–5.3)
SODIUM SERPL-SCNC: 135 MMOL/L (ref 136–145)

## 2024-08-14 PROCEDURE — 2500000002 HC RX 250 W HCPCS SELF ADMINISTERED DRUGS (ALT 637 FOR MEDICARE OP, ALT 636 FOR OP/ED): Performed by: STUDENT IN AN ORGANIZED HEALTH CARE EDUCATION/TRAINING PROGRAM

## 2024-08-14 PROCEDURE — 36415 COLL VENOUS BLD VENIPUNCTURE: CPT | Performed by: NURSE PRACTITIONER

## 2024-08-14 PROCEDURE — 80069 RENAL FUNCTION PANEL: CPT | Performed by: NURSE PRACTITIONER

## 2024-08-14 PROCEDURE — 99239 HOSP IP/OBS DSCHRG MGMT >30: CPT

## 2024-08-14 PROCEDURE — 2500000001 HC RX 250 WO HCPCS SELF ADMINISTERED DRUGS (ALT 637 FOR MEDICARE OP): Performed by: STUDENT IN AN ORGANIZED HEALTH CARE EDUCATION/TRAINING PROGRAM

## 2024-08-14 PROCEDURE — 2500000001 HC RX 250 WO HCPCS SELF ADMINISTERED DRUGS (ALT 637 FOR MEDICARE OP): Performed by: PHYSICIAN ASSISTANT

## 2024-08-14 PROCEDURE — 2500000004 HC RX 250 GENERAL PHARMACY W/ HCPCS (ALT 636 FOR OP/ED): Performed by: STUDENT IN AN ORGANIZED HEALTH CARE EDUCATION/TRAINING PROGRAM

## 2024-08-14 ASSESSMENT — PAIN - FUNCTIONAL ASSESSMENT: PAIN_FUNCTIONAL_ASSESSMENT: 0-10

## 2024-08-14 ASSESSMENT — PAIN SCALES - GENERAL: PAINLEVEL_OUTOF10: 0 - NO PAIN

## 2024-08-14 NOTE — DISCHARGE SUMMARY
Discharge Diagnosis  Pontine hemorrhage (Multi)    Issues Requiring Follow-Up  NA    Test Results Pending At Discharge  Pending Labs       Order Current Status    Renal Function Panel Collected (08/14/24 0918)    Surgical Pathology Exam In process            Hospital Course  Wade Machado is a 69 y.o. 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   CTH showing pontine ICH with ventriculomegaly, incr'd vents compared to 2005 and 2007. CT A/P with mild bilateral hydronephrosis, and concern for colovesicular fistula.   CT C spine neg for fx, CTA H/N stable hemorrhage, neg for vasc abnormality, MRI brain pontine hemorrhage.  8/9 Colorectal consulted--> Recommend colonoscopy to better evaluate sigmoid colon for possible colovesical fistula and to rule out colon cancer; antibiotics for 14 days for UTI and possible acute diverticulitis.  Urology consulted--> for possible colovesical fistula, recurrent cystitis and urinary retention (chronic).  Imaging shows mild bilateral hydronephrosis.  Will need management of likely bladder outlet obstruction due to BPH and cystoscopic work-up of bladder thickening seen on CT outpatient with his Urologist.  Continue home proscar and flomax.  Llamas placed. May need to be discharged with llamas and follow up with his Urologist for TOV.  GI consulted for eval for Colonoscopy.  8/12  Colonoscopy--> Sigmoid diverticulosis, mild segmental colitis associated with diverticulosis, circumferential edema in sigmoid colon near suspected fistula site (biopsied, awaiting pathology), small internal hemorrhoids.   Renal/Bladder US -->Limited sonographic assessment of the bladder, due to decompression. There is bladder wall thickening with  intraluminal debris.  No hydronephrosis bilaterally   8/13 Urology reviewed Renal, Bladder US, hydronephrosis resolved, ok for discharge.  Discussed with Colorectal Surgery--> they reviewed colonoscopy.  Think may be edema 2/2 inflammatory process and not true colo vesicular fistula; patient can follow up outpatient.    PT/OT goldenal recommend Rehab but patient and family request home with Home care  Discharged to home with llamas and follow up with Urology scheduled.  Requested follow up with Dr. Thomas Mcintyre Colorectal Surgery , PCP for HTN and Neurology Stroke.    Pertinent Physical Exam At Time of Discharge  Physical Exam  Eyes:      Pupils: Pupils are equal, round, and reactive to light.   Cardiovascular:      Pulses: Normal pulses.   Pulmonary:      Effort: Pulmonary effort is normal.   Abdominal:      Palpations: Abdomen is soft.   Musculoskeletal:      Comments: 5/5 x4   Neurological:      Mental Status: He is alert and oriented to person, place, and time.         Home Medications     Medication List      START taking these medications     amoxicillin-pot clavulanate 875-125 mg tablet; Commonly known as:   Augmentin; Take 1 tablet (875 mg) by mouth 2 times a day for 9 days.     CONTINUE taking these medications     acetaminophen 325 mg tablet; Commonly known as: Tylenol   ascorbic acid (vitamin C) 100 mg chewable tablet   docusate sodium 100 mg capsule; Commonly known as: Colace   finasteride 5 mg tablet; Commonly known as: Proscar   lisinopril 40 mg tablet; TAKE ONE TABLET BY MOUTH once DAILY for blood   pressure   tamsulosin 0.4 mg 24 hr capsule; Commonly known as: Flomax; Take 1   capsule (0.4 mg) by mouth 2 times a day.       Outpatient Follow-Up  Future Appointments   Date Time Provider Department Center   8/28/2024 11:00 AM Theron Briggs MD [id number] [place]       Amy Farias, APRN-CNP    Total face to face time spent with patient/family of 30 minutes, with >50% of the time spent discussing  plan of care/management, counseling/educating on disease processes, explaining results of diagnostic testing.

## 2024-08-14 NOTE — PROGRESS NOTES
"Wade Machado is a 69 y.o. male on day 5 of admission presenting with Pontine hemorrhage (Multi).    Subjective   No acute events overnight    Objective     Physical Exam  Awake  Ox3  BUE 5/5  BLE 5/5  SILT    Last Recorded Vitals  Blood pressure 149/79, pulse 95, temperature 36.2 °C (97.2 °F), temperature source Temporal, resp. rate 21, height 1.676 m (5' 6\"), weight 112 kg (246 lb 12.8 oz), SpO2 96%.  Intake/Output last 3 Shifts:  I/O last 3 completed shifts:  In: 480 (4.3 mL/kg) [P.O.:480]  Out: 4600 (41.1 mL/kg) [Urine:4600 (1.1 mL/kg/hr)]  Weight: 111.9 kg     Relevant Results  Lab Results   Component Value Date    WBC 13.4 (H) 08/13/2024    HGB 11.4 (L) 08/13/2024    HCT 34.9 (L) 08/13/2024    MCV 89 08/13/2024     08/13/2024     Lab Results   Component Value Date    GLUCOSE 85 08/13/2024    CALCIUM 8.4 (L) 08/13/2024     (L) 08/13/2024    K 3.4 (L) 08/13/2024    CO2 26 08/13/2024     08/13/2024    BUN 6 08/13/2024    CREATININE 0.59 08/13/2024       Assessment/Plan   Principal Problem:    Pontine hemorrhage (Multi)    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 ([facility], 2005), HTN, alcohol use, tobacco use, obesity, prostate cancer, BPH,  previous R total hip arthroplasty c/b recurrent R prosthetic hip dislocation 5/11 s/p R hip reduction, p/w AMS, fever and fatigue, recent UTI, possible falls, CTH pontine ICH w/ ventriculomegaly, incr'd vents compared to 2005 and 2007 MRI, CT CAP mild b/l hydronephrosis, colovesical fistula, CT C spine neg for fx, CTA H/N stable hemorrhage, neg for vasc abnormality, MRI brain pontine hemorrhage, 8/10 Ucx neg    8/10 TTE 65-70%  8/12 renal US bladder wall thickening and intraluminal debris  8/12 s/p colonoscopy by GI, c/f colo-vesical fistula and diverticulosis     ASSESSMENT  floor  -150  GI recs- defer to colorectal   Colorectal recs - abx x14 days, OP fu  needs rMRI in 1mo  urology recs- keep llamas until on PO abx, dispo w " muriel  PTOT  SCD, SQH    Patient wants to go home with HC vs OP. Will discharge today       Nilam Raymundo MD

## 2024-08-14 NOTE — PROGRESS NOTES
Spoke with patient and his wife. They confirmed they do not want home care or outpatient therapy. Neurosurgery team was informed. Karen Prince RN

## 2024-08-15 ENCOUNTER — PATIENT OUTREACH (OUTPATIENT)
Dept: CARE COORDINATION | Facility: CLINIC | Age: 69
End: 2024-08-15
Payer: COMMERCIAL

## 2024-08-15 NOTE — PROGRESS NOTES
[place]charge Diagnosis:Pontine Hemorrhage, Urinary Retention    Admission Date:8/9/2024  Discharge Date: 8/14/2024    PCP Appointment Date:JESS  Specialist Appointment Date:   8/28/2024 Urology/[facility] and Summary Linked: Yes  See discharge assessment below for further details  Engagement  Call Start Time: 1426 (8/15/2024  2:32 PM)    Medications  Medications reviewed with patient/caregiver?: Yes (8/15/2024  2:32 PM)  Is the patient having any side effects they believe may be caused by any medication additions or changes?: No (8/15/2024  2:32 PM)  Does the patient have all medications ordered at discharge?: Yes (8/15/2024  2:32 PM)  Care Management Interventions: No intervention needed (8/15/2024  2:32 PM)  Prescription Comments: -- (Augmentin 875/125 one bid x 9 days) (8/15/2024  2:32 PM)  Is the patient taking all medications as directed (includes completed medication regime)?: Yes (8/15/2024  2:32 PM)    Appointments  Does the patient have a primary care provider?: Yes (8/15/2024  2:32 PM)  Care Management Interventions: -- (Offered to schedule appointment, declined states wife does for him.) (8/15/2024  2:32 PM)  Has the patient kept scheduled appointments due by today?: Yes (8/15/2024  2:32 PM)    Self Management  What is the home health agency?: -- (N/A) (8/15/2024  2:32 PM)  What Durable Medical Equipment (DME) was ordered?: -- (N/A) (8/15/2024  2:32 PM)    Patient Teaching  Does the patient have access to their discharge instructions?: Yes (8/15/2024  2:32 PM)  What is the patient's perception of their health status since discharge?: Improving (8/15/2024  2:32 PM)  Is the patient/caregiver able to teach back the hierarchy of who to call/visit for symptoms/problems? PCP, Specialist, Home Health nurse, Urgent Care, ED, 911: Yes (8/15/2024  2:32 PM)    Wrap Up  Wrap Up Additional Comments: -- (Spoke to Wade, he states he is doing better but tired and  weak. We discussed can do exercises had from before from PT. He does use walker for stability.) (8/15/2024  2:32 PM)

## 2024-08-16 LAB
LABORATORY COMMENT REPORT: NORMAL
PATH REPORT.FINAL DX SPEC: NORMAL
PATH REPORT.GROSS SPEC: NORMAL
PATH REPORT.TOTAL CANCER: NORMAL

## 2024-08-19 PROCEDURE — 93005 ELECTROCARDIOGRAM TRACING: CPT

## 2024-08-21 LAB
ATRIAL RATE: 92 BPM
P AXIS: 54 DEGREES
P OFFSET: 200 MS
P ONSET: 148 MS
PR INTERVAL: 146 MS
Q ONSET: 221 MS
QRS COUNT: 15 BEATS
QRS DURATION: 78 MS
QT INTERVAL: 346 MS
QTC CALCULATION(BAZETT): 427 MS
QTC FREDERICIA: 399 MS
R AXIS: 1 DEGREES
T AXIS: 55 DEGREES
T OFFSET: 394 MS
VENTRICULAR RATE: 92 BPM

## 2024-08-22 LAB
GLUCOSE BLD MANUAL STRIP-MCNC: 106 MG/DL (ref 74–99)
GLUCOSE BLD MANUAL STRIP-MCNC: 120 MG/DL (ref 74–99)
GLUCOSE BLD MANUAL STRIP-MCNC: 122 MG/DL (ref 74–99)
GLUCOSE BLD MANUAL STRIP-MCNC: 157 MG/DL (ref 74–99)
GLUCOSE BLD MANUAL STRIP-MCNC: 99 MG/DL (ref 74–99)

## 2024-08-23 ENCOUNTER — PREP FOR PROCEDURE (OUTPATIENT)
Dept: GASTROENTEROLOGY | Facility: HOSPITAL | Age: 69
End: 2024-08-23
Payer: COMMERCIAL

## 2024-08-23 DIAGNOSIS — Z12.12 ENCOUNTER FOR COLORECTAL CANCER SCREENING: Primary | ICD-10-CM

## 2024-08-23 DIAGNOSIS — Z12.11 ENCOUNTER FOR COLORECTAL CANCER SCREENING: Primary | ICD-10-CM

## 2024-08-23 RX ORDER — POLYETHYLENE GLYCOL 3350, SODIUM SULFATE ANHYDROUS, SODIUM BICARBONATE, SODIUM CHLORIDE, POTASSIUM CHLORIDE 236; 22.74; 6.74; 5.86; 2.97 G/4L; G/4L; G/4L; G/4L; G/4L
4 POWDER, FOR SOLUTION ORAL ONCE
Qty: 4000 ML | Refills: 0 | Status: SHIPPED | OUTPATIENT
Start: 2024-08-23 | End: 2024-08-23

## 2024-08-27 NOTE — PROGRESS NOTES
HPI:  Wade Machado is a 69 y.o. 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    Overnight he woke to go to the restroom and noticed his left leg wasn't moving. He called 911 and was taken to [facility] where he was found to have a pontine hyperdensity.  He was found to have a fever on presentation and was treated for UTI. Workup while hospitalized included CTA head and neck, MRI brain with and without contrast. Workup differential included mass lesion vs cavernous malformation vs hypertensive ICH.     Was discharged home after he refused to go rehab.   His left leg strength is returning slowly since discharge but he still requires a walker at home. He is starting outpatient physical therapy next week.   He denies double vision.     Past Medical History:   Diagnosis Date    Hypertension     Prostate enlargement     Unspecified osteoarthritis, unspecified site     Osteoarthritis       Past Surgical History:   Procedure Laterality Date    JOINT REPLACEMENT      OTHER SURGICAL HISTORY  2022    Retinal detachment repair    OTHER SURGICAL HISTORY  2022    Cataract surgery       Current Outpatient Medications on File Prior to Visit   Medication Sig Dispense Refill    acetaminophen (Tylenol) 325 mg tablet Take 1-2 tablets (325-650 mg) by mouth every 4 hours if needed for mild pain (1 - 3).      [] amoxicillin-pot clavulanate (Augmentin) 875-125 mg tablet Take 1 tablet (875 mg) by mouth 2 times a day for 9 days. 18 tablet 0    ascorbic acid, vitamin C, 100 mg chewable tablet Chew 4 tablets (400 mg) once daily.      docusate sodium (Colace) 100 mg capsule Take 1 capsule (100 mg) by mouth once daily.       finasteride (Proscar) 5 mg tablet Take 1 tablet (5 mg) by mouth once daily.      lisinopril 40 mg tablet TAKE ONE TABLET BY MOUTH once DAILY for blood pressure 90 tablet 3    [] polyethylene glycol (GaviLyte-G) 236-22.74-6.74 -5.86 gram solution Take 4,000 mL by mouth 1 time for 1 dose. 4000 mL 0    tamsulosin (Flomax) 0.4 mg 24 hr capsule Take 1 capsule (0.4 mg) by mouth 2 times a day.       No current facility-administered medications on file prior to visit.     Social history:   Smokes about 1 pack every day for the last 40 years   Drinks occasionally  Smokes marijuana occasionally  Lives with his wife independent in ADLs      Exam:     GENERAL APPEARANCE:  No distress, alert, interactive and cooperative.        MENTAL STATE:   Orientation was normal to time, place and person. Recent and remote memory was intact.  Attention span and concentration were normal. Language testing was normal for comprehension and expression. General fund of knowledge was intact.    CRANIAL NERVES:   CN 2   Visual fields full to confrontation.   CN 3, 4, 6   Pupils round, 3 mm in diameter, equally reactive to light. Lids symmetric; no ptosis. EOMs normal alignment, full range. No nystagmus.   CN 5   Facial sensation intact bilaterally.   CN 7   Normal and symmetric facial strength. Nasolabial folds symmetric.   CN 8   Hearing intact to conversation  CN 9   Palate elevates symmetrically.   CN 11   Normal strength of shoulder shrug and neck turning.   CN 12   Tongue midline, with normal bulk and strength; no fasciculations.     MOTOR:   Muscle bulk was normal and tone was normal in both upper and lower extremities. No fasciculations, tremor or other abnormal movements were present.                 R  L   5  5 Shoulder abduction  5  5 Elbow flexion  5  5 Elbow extension    5  5 Hip flexion  5  5 Hip extension  5  5 Knee flexion  5  5 Knee extension      REFLEXES:     RIGHT UE   LEFT UE   BR:1        BR:1     Biceps:1      Biceps:1     Triceps:1     Triceps1      RIGHT LLE   LEFT LLE     Knee:1         Knee:1           SENSORY:   Sensory exam was normal. In both upper and lower extremities, sensation was intact to light touch; sharp/dull.    COORDINATION:    Coordination exam was normal. In both upper extremities, finger-nose-finger was intact without dysmetria or overshoot.     GAIT:   Was able to stand up on his own. Gait wide with minimal arm swing.     CT head wo IV contrast    Result Date: 8/9/2024  1. Acute pontine hemorrhage with mass effect upon the 4th ventricle which is partially effaced. Follow-up with contrast-enhanced MRI recommended to exclude underlying hemorrhagic lesion. 2. Ventriculomegaly, may be secondary to central parenchymal volume loss versus hydrocephalus. 3.  No acute fracture at the cervical spine. Degenerative changes. 4. Complete opacification of the right maxillary sinus with hyperostosis, suggestive of chronic sinusitis. Follow-up ENT consult recommended.     MACRO: Ana Luisa Bradley discussed the significance and urgency of this critical finding by telephone with  CURTIS MAJOR on 8/9/2024 at 1:01 am.  (**-RCF-**) Findings:  See findings.   Signed by: Ana Luisa Bradley 8/9/2024 1:13 AM Dictation workstation:   [id number]     MR brain w and wo IV contrast    Result Date: 8/9/2024  1. Circumscribed dorsal pontine mass measuring 2.1 x 2.2 x 1.9 cm with intrinsic hemorrhagic signal. There is no associated enhancement or nodularity. This may reflect a de jose pontine hemorrhage possibly secondary to underlying hypertension, hemorrhage secondary to an underlying cavernoma or other etiology. 2. Suspect ex vacuo dilation of the ventricles in the setting of senescent atrophy. The possibility of underlying normal pressure hydrocephalus is not excluded based on imaging. Clinical correlation is recommended. 3. Complete opacification of the right paranasal sinus likely a chronic process.   I personally reviewed the  images/study and I agree with the findings as stated. This study was interpreted at [facility], [place], [place].   MACRO: None   Signed by: Kassandra Hull 8/9/2024 10:27 AM Dictation workstation:   [id number]       Assessment:   Wade Machado is a 69 y.o. 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 Imaging findings concerning for a cavernoma. He will need a repeat MRI brain in 2-3 months to better visualize the mass after blood surrounding it is resorbed.       Plan:   - Repeat MRI brain with and without contrast in 3 months  - Blood pressure controlled on current medications. Goals is normotensive  - Avoid blood thinners   - Return to clinic in 4-5 months    Patient discussed and seen by attending physician Dr. Halina Klein MD  Neurology Resident PGY-3

## 2024-08-28 ENCOUNTER — OFFICE VISIT (OUTPATIENT)
Dept: NEUROLOGY | Facility: HOSPITAL | Age: 69
End: 2024-08-28
Payer: COMMERCIAL

## 2024-08-28 ENCOUNTER — LAB (OUTPATIENT)
Dept: LAB | Facility: LAB | Age: 69
End: 2024-08-28
Payer: COMMERCIAL

## 2024-08-28 ENCOUNTER — APPOINTMENT (OUTPATIENT)
Dept: UROLOGY | Facility: CLINIC | Age: 69
End: 2024-08-28
Payer: COMMERCIAL

## 2024-08-28 DIAGNOSIS — I63.9 CEREBRAL INFARCTION, UNSPECIFIED (MULTI): Primary | ICD-10-CM

## 2024-08-28 DIAGNOSIS — I61.3 PONTINE HEMORRHAGE (MULTI): Primary | ICD-10-CM

## 2024-08-28 DIAGNOSIS — D18.00 CAVERNOMA: ICD-10-CM

## 2024-08-28 LAB
ALBUMIN SERPL BCP-MCNC: 3.8 G/DL (ref 3.4–5)
ALP SERPL-CCNC: 116 U/L (ref 33–136)
ALT SERPL W P-5'-P-CCNC: 18 U/L (ref 10–52)
ANION GAP SERPL CALC-SCNC: 14 MMOL/L (ref 10–20)
AST SERPL W P-5'-P-CCNC: 12 U/L (ref 9–39)
BASOPHILS # BLD AUTO: 0.07 X10*3/UL (ref 0–0.1)
BASOPHILS NFR BLD AUTO: 0.7 %
BILIRUB SERPL-MCNC: 0.4 MG/DL (ref 0–1.2)
BUN SERPL-MCNC: 11 MG/DL (ref 6–23)
CALCIUM SERPL-MCNC: 9.7 MG/DL (ref 8.6–10.3)
CHLORIDE SERPL-SCNC: 98 MMOL/L (ref 98–107)
CHOLEST SERPL-MCNC: 149 MG/DL (ref 0–199)
CHOLESTEROL/HDL RATIO: 3.7
CO2 SERPL-SCNC: 29 MMOL/L (ref 21–32)
CREAT SERPL-MCNC: 0.72 MG/DL (ref 0.5–1.3)
EGFRCR SERPLBLD CKD-EPI 2021: >90 ML/MIN/1.73M*2
EOSINOPHIL # BLD AUTO: 0.15 X10*3/UL (ref 0–0.7)
EOSINOPHIL NFR BLD AUTO: 1.5 %
ERYTHROCYTE [DISTWIDTH] IN BLOOD BY AUTOMATED COUNT: 14.9 % (ref 11.5–14.5)
GLUCOSE SERPL-MCNC: 98 MG/DL (ref 74–99)
HCT VFR BLD AUTO: 41.9 % (ref 41–52)
HDLC SERPL-MCNC: 40.4 MG/DL
HGB BLD-MCNC: 13.7 G/DL (ref 13.5–17.5)
IMM GRANULOCYTES # BLD AUTO: 0.06 X10*3/UL (ref 0–0.7)
IMM GRANULOCYTES NFR BLD AUTO: 0.6 % (ref 0–0.9)
LDLC SERPL CALC-MCNC: 86 MG/DL
LYMPHOCYTES # BLD AUTO: 1.84 X10*3/UL (ref 1.2–4.8)
LYMPHOCYTES NFR BLD AUTO: 18.7 %
MCH RBC QN AUTO: 29.7 PG (ref 26–34)
MCHC RBC AUTO-ENTMCNC: 32.7 G/DL (ref 32–36)
MCV RBC AUTO: 91 FL (ref 80–100)
MONOCYTES # BLD AUTO: 0.93 X10*3/UL (ref 0.1–1)
MONOCYTES NFR BLD AUTO: 9.4 %
NEUTROPHILS # BLD AUTO: 6.81 X10*3/UL (ref 1.2–7.7)
NEUTROPHILS NFR BLD AUTO: 69.1 %
NON HDL CHOLESTEROL: 109 MG/DL (ref 0–149)
NRBC BLD-RTO: 0 /100 WBCS (ref 0–0)
PLATELET # BLD AUTO: 407 X10*3/UL (ref 150–450)
POTASSIUM SERPL-SCNC: 5.3 MMOL/L (ref 3.5–5.3)
PROT SERPL-MCNC: 7.7 G/DL (ref 6.4–8.2)
RBC # BLD AUTO: 4.62 X10*6/UL (ref 4.5–5.9)
SODIUM SERPL-SCNC: 136 MMOL/L (ref 136–145)
TRIGL SERPL-MCNC: 114 MG/DL (ref 0–149)
TSH SERPL-ACNC: 1.49 MIU/L (ref 0.44–3.98)
VLDL: 23 MG/DL (ref 0–40)
WBC # BLD AUTO: 9.9 X10*3/UL (ref 4.4–11.3)

## 2024-08-28 PROCEDURE — 84443 ASSAY THYROID STIM HORMONE: CPT

## 2024-08-28 PROCEDURE — 99214 OFFICE O/P EST MOD 30 MIN: CPT | Performed by: STUDENT IN AN ORGANIZED HEALTH CARE EDUCATION/TRAINING PROGRAM

## 2024-08-28 PROCEDURE — 80053 COMPREHEN METABOLIC PANEL: CPT

## 2024-08-28 PROCEDURE — 80061 LIPID PANEL: CPT

## 2024-08-28 PROCEDURE — 1111F DSCHRG MED/CURRENT MED MERGE: CPT | Performed by: STUDENT IN AN ORGANIZED HEALTH CARE EDUCATION/TRAINING PROGRAM

## 2024-08-28 PROCEDURE — 36415 COLL VENOUS BLD VENIPUNCTURE: CPT

## 2024-08-28 PROCEDURE — 99214 OFFICE O/P EST MOD 30 MIN: CPT | Mod: GC | Performed by: STUDENT IN AN ORGANIZED HEALTH CARE EDUCATION/TRAINING PROGRAM

## 2024-08-28 PROCEDURE — 85025 COMPLETE CBC W/AUTO DIFF WBC: CPT

## 2024-08-28 NOTE — PATIENT INSTRUCTIONS
Dear Wade,     You are here for follow up regarding recent hospital admission. 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 We will order another brain MRI in 3-4 month to monitor. We will follow up with you in clinic in 5 months.     Thank you,      Neurology

## 2024-08-29 ENCOUNTER — PATIENT OUTREACH (OUTPATIENT)
Dept: PRIMARY CARE | Facility: CLINIC | Age: 69
End: 2024-08-29
Payer: COMMERCIAL

## 2024-08-29 NOTE — PROGRESS NOTES
Call after hospitalization.  At time of outreach call the patient feels as if their condition has improved since last visit.  Michele is doing very well, no questions

## 2024-09-06 ENCOUNTER — TELEPHONE (OUTPATIENT)
Dept: NEUROLOGY | Facility: HOSPITAL | Age: 69
End: 2024-09-06
Payer: COMMERCIAL

## 2024-09-06 DIAGNOSIS — N32.89 BLADDER WALL THICKENING: Primary | ICD-10-CM

## 2024-09-06 NOTE — TELEPHONE ENCOUNTER
Spoke with the patient's wife this afternoon regarding Neurology clearance for a cystoscopy. She was told by her 's Urologist and Anesthesiologist at [facility] that he needs Neurology clearance before any planned procedure.     During his admission under Neurosurgery in August, he was seen by Urology regarding colovesicular fistula and bladder abscess vs extrinsic mass seen on CT AP done at [facility]. Per Urology note on 8/13 repeat RBUS 8/12 showed no evidence fo hydronephrosis and sufficiently decompressed bladder suggesting resolution of obstruction. Patient had a llamas placed and discharged with plan for outpatient cystoscopy.     When she told [facility] providers she saw a Neurology resident they did not accept that. They decided Mr. Machado's case is complicated and he needs to be seen at  Urology. I will placed a referral to Urology here at .     Plan from Neurology remains unchanged. Will repeat MRI brain in 2 months to evaluate his pontine lesion.       Patient discussed  by attending physician Dr. Meade.     Saud Klein MD  Neurology Resident PGY-3

## 2024-09-10 ENCOUNTER — APPOINTMENT (OUTPATIENT)
Dept: SURGERY | Facility: CLINIC | Age: 69
End: 2024-09-10
Payer: COMMERCIAL

## 2024-09-12 ENCOUNTER — APPOINTMENT (OUTPATIENT)
Dept: UROLOGY | Facility: CLINIC | Age: 69
End: 2024-09-12
Payer: COMMERCIAL

## 2024-09-16 NOTE — PROGRESS NOTES
HISTORY OF PRESENTING ILLNESS: Wade Machado is a 70yo male with possible incidental finding of colovesical fistula.      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  Plan to repeat MRI brain in 2 months.  He is here today to discuss possible colovesical fistula noted on CT imaging.      Colonoscopy 8/12/24 while IH  Impression  Scattered diverticulitis of moderate severity in the sigmoid colon  Moderate edematous mucosa in the sigmoid colon; performed cold forceps biopsies to rule out colitis and IBD  Small hemorrhoids  The terminal ileum, ileocecal valve, cecum, ascending colon, hepatic flexure, transverse colon, splenic flexure and descending colon appeared normal.  Purulence expressed during patient coughing episode near area of edema concerning for sigmoidal end of colovesical fistula.    No c/o any sediment in the urine, hematuria or passing air through the penis.  He now has a llamas catheter that was placed while he was admitted in August.  He has a little soreness from the insertion of the catheter but no other issues with the llamas.      He has constipation at times and takes Colace daily to keep the stools soft.  He just started taking Benefiber daily.   He will have 1 soft BM every day with rare straining.  No c/o any rectal bleeding.  No c/o any accidents or leakage of stool.  No c/o any abdominal/rectal pain.          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        Current Outpatient Medications:     acetaminophen (Tylenol) 325 mg tablet, Take 1-2 tablets (325-650 mg) by mouth every 4 hours if needed for mild pain (1 - 3)., Disp: , Rfl:     ascorbic acid, vitamin C, 100 mg chewable tablet, Chew 4 tablets (400 mg) once daily., Disp: , Rfl:     docusate sodium (Colace) 100 mg capsule, Take 1 capsule (100 mg) by mouth once daily., Disp: , Rfl:     finasteride (Proscar) 5 mg tablet, Take 1 tablet (5 mg) by mouth once daily., Disp: , Rfl:     lisinopril 40 mg tablet, TAKE ONE TABLET BY MOUTH once DAILY for blood pressure, Disp: 90 tablet, Rfl: 3    tamsulosin (Flomax) 0.4 mg 24 hr capsule, Take 1 capsule (0.4 mg) by mouth 2 times a day., Disp: , Rfl:        No Known Allergies      REVIEW OF SYSTEMS:  Review of Systems   All other systems reviewed and are negative.      Labs:       Imaging:  CT CAP, 8/9/24:   CHEST  1.  Study degraded by motion artifact. No consolidation or pleural effusion.  2. Mild cardiomegaly. Coronary artery calcifications. Dilation of the main pulmonary artery. Please correlate for pulmonary arterial hypertension.  3. 4 mm pulmonary nodule at the right upper lobe. Follow-up CT chest in 12 months recommended to re-evaluate.  4. Small hiatal hernia.  ABDOMEN-PELVIS  1.  Study degraded by motion artifact. Mild bilateral hydronephrosis. No definite obstructing ureteral calculus is identified, although the distal ureters are obscured by streak artifact from the bilateral hip prostheses.  2. 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  Please correlate with urinalysis to evaluate for cystitis.  3. 3.0 x 3.2 x 3.4 cm density posterior to the urinary bladder and abutting the distal sigmoid colon, may represent abscess or mass.Clinical correlation recommended. Follow-up CT with intravenous and oral contrast and/or PET/CT can be obtained for further evaluation.  4. Colonic diverticulosis. Wall thickening with inflammatory changes at the sigmoid colon, may represent chronic changes versus colitis or acute diverticulitis.  5. Trace amount of ascites.  6. Status post bilateral total hip arthroplasties. Increased lucency along the anterior aspect of the acetabular cup at the right hip, concerning for prosthetic loosening. Orthopedic consult recommended.    Colonoscopy, 8/12/24, with Dr. Alphonso Hoff: Few medium, scattered diverticula of moderate severity with associated peridiverticular inflammation in the sigmoid colon. Moderate, localized edematous mucosa in the sigmoid colon 40 cm from the anal verge; no bleeding was identified; performed 4 cold forceps biopsies to rule out colitis and IBD. No fistula or mass was visualized.  There did appear to be a small amount of pus draining in this area. Internal small hemorrhoids. The terminal ileum, ileocecal valve, cecum, ascending colon, hepatic flexure, transverse colon, splenic flexure and descending colon appeared normal.       Physical Exam:  Physical Exam  Vitals reviewed. Exam conducted with a chaperone present.   HENT:      Head: Normocephalic.   Cardiovascular:      Rate and Rhythm: Normal rate and regular rhythm.   Pulmonary:      Effort: Pulmonary effort is normal.      Breath sounds: Normal breath sounds.   Abdominal:      General: Abdomen is flat. Bowel sounds are normal.      Palpations: Abdomen is soft.   Genitourinary:     Rectum: Normal.   Musculoskeletal:         General: Normal range of motion.   Skin:     General: Skin is warm and dry.   Neurological:      General: No focal deficit  present.   Psychiatric:         Mood and Affect: Mood normal.         Behavior: Behavior normal.         Thought Content: Thought content normal.         Judgment: Judgment normal.     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  At this time patient is not very symptomatic from this however he does have prostatic hypertrophy causing obstruction and needing Moeller catheter drainage.  I had a discussion with the patient about surgery and its complications.  I think this patient is at high risk for morbidity from this operation given his recent hospitalization and overall frailty.  He wants to proceed with surgical intervention, in many respects while this procedure has the potential to be morbid and not sure that he has many options given the risk of sepsis from colovesicular fistula.  Patient has an upcoming MRI of the brain.  Will wait for this to result and once confirmed normality we will proceed with sigmoid resection and takedown of fistula.    MD Traci Aguilar, CARMITA   9/16/2024

## 2024-09-17 ENCOUNTER — OFFICE VISIT (OUTPATIENT)
Dept: SURGERY | Facility: CLINIC | Age: 69
End: 2024-09-17
Payer: COMMERCIAL

## 2024-09-17 VITALS
HEART RATE: 93 BPM | BODY MASS INDEX: 36.96 KG/M2 | TEMPERATURE: 98.1 F | DIASTOLIC BLOOD PRESSURE: 73 MMHG | HEIGHT: 66 IN | SYSTOLIC BLOOD PRESSURE: 109 MMHG | WEIGHT: 230 LBS

## 2024-09-17 DIAGNOSIS — N32.1 COLOVESICAL FISTULA: ICD-10-CM

## 2024-09-17 DIAGNOSIS — I10 BENIGN ESSENTIAL HYPERTENSION: Primary | ICD-10-CM

## 2024-09-17 DIAGNOSIS — I61.3 PONTINE HEMORRHAGE (MULTI): ICD-10-CM

## 2024-09-17 DIAGNOSIS — N40.1 BENIGN PROSTATIC HYPERPLASIA WITH URINARY OBSTRUCTION: ICD-10-CM

## 2024-09-17 DIAGNOSIS — N13.8 BENIGN PROSTATIC HYPERPLASIA WITH URINARY OBSTRUCTION: ICD-10-CM

## 2024-09-17 PROCEDURE — 1159F MED LIST DOCD IN RCRD: CPT | Performed by: COLON & RECTAL SURGERY

## 2024-09-17 PROCEDURE — 3074F SYST BP LT 130 MM HG: CPT | Performed by: COLON & RECTAL SURGERY

## 2024-09-17 PROCEDURE — 99215 OFFICE O/P EST HI 40 MIN: CPT | Performed by: COLON & RECTAL SURGERY

## 2024-09-17 PROCEDURE — 3078F DIAST BP <80 MM HG: CPT | Performed by: COLON & RECTAL SURGERY

## 2024-09-17 PROCEDURE — 3008F BODY MASS INDEX DOCD: CPT | Performed by: COLON & RECTAL SURGERY

## 2024-09-17 RX ORDER — METOPROLOL SUCCINATE 25 MG/1
25 TABLET, EXTENDED RELEASE ORAL
COMMUNITY
Start: 2024-08-27

## 2024-09-18 ENCOUNTER — APPOINTMENT (OUTPATIENT)
Dept: UROLOGY | Facility: CLINIC | Age: 69
End: 2024-09-18
Payer: COMMERCIAL

## 2024-09-18 VITALS
SYSTOLIC BLOOD PRESSURE: 128 MMHG | DIASTOLIC BLOOD PRESSURE: 81 MMHG | HEART RATE: 91 BPM | TEMPERATURE: 97.3 F | WEIGHT: 231.2 LBS | BODY MASS INDEX: 37.32 KG/M2

## 2024-09-18 DIAGNOSIS — N32.89 BLADDER WALL THICKENING: ICD-10-CM

## 2024-09-18 DIAGNOSIS — N40.0 ENLARGED PROSTATE: ICD-10-CM

## 2024-09-18 DIAGNOSIS — R39.9 UTI SYMPTOMS: ICD-10-CM

## 2024-09-18 PROCEDURE — 99204 OFFICE O/P NEW MOD 45 MIN: CPT | Performed by: STUDENT IN AN ORGANIZED HEALTH CARE EDUCATION/TRAINING PROGRAM

## 2024-09-18 PROCEDURE — 1160F RVW MEDS BY RX/DR IN RCRD: CPT | Performed by: STUDENT IN AN ORGANIZED HEALTH CARE EDUCATION/TRAINING PROGRAM

## 2024-09-18 PROCEDURE — 3079F DIAST BP 80-89 MM HG: CPT | Performed by: STUDENT IN AN ORGANIZED HEALTH CARE EDUCATION/TRAINING PROGRAM

## 2024-09-18 PROCEDURE — 1159F MED LIST DOCD IN RCRD: CPT | Performed by: STUDENT IN AN ORGANIZED HEALTH CARE EDUCATION/TRAINING PROGRAM

## 2024-09-18 PROCEDURE — 87086 URINE CULTURE/COLONY COUNT: CPT

## 2024-09-18 PROCEDURE — 3074F SYST BP LT 130 MM HG: CPT | Performed by: STUDENT IN AN ORGANIZED HEALTH CARE EDUCATION/TRAINING PROGRAM

## 2024-09-18 PROCEDURE — 87186 SC STD MICRODIL/AGAR DIL: CPT

## 2024-09-18 NOTE — PROGRESS NOTES
"Scribed for Dr. Gil Brown by Delonte Cazares. I, Dr. Gil Brown have personally reviewed and agreed with the information entered by the Virtual Scribe. 09/18/24.    ASSESSMENT:  Problem List Items Addressed This Visit    None  Visit Diagnoses       Enlarged prostate        Relevant Orders    POCT UA Automated manually resulted    Post-Void Residual    Bladder wall thickening        Relevant Orders    POCT UA Automated manually resulted    Post-Void Residual           PLAN:  #BPH with urinary retention  #Indwelling Moeller  #Colovesical fistula  Discussed options.   Until fistula repair in Nov 2024 with colorectal surgery (Dr. Mcintyre);  Opts to continue with an indwelling Moeller for now;  With subsequent routine monthly exchanges.   Once recovered from colectomy will consider trial of void, UDS, and/or surgical options for his retention and LUTS. BPH surgery is high risk for incontinence following radiation therapy and we will proceed with this only as necessary. Work-up will need to be delayed until fistula is resolved.     #Prostate cancer  Refer to medical oncology following his colorectal surgery.   S/p brachytherapy (2015)  PSA ~ 4.8 (July 2023)  NM PET CT prostate (01/17/24)  Negative for pelvic or distant metastatic disease.     #Tobacco abuse  We discussed smoking cessation for >3 minutes.  I advised this is the best thing the patient can do for their health.  Advised that cessation of tobacco can improve healing following surgery.   Patient acknowledged understanding.    All questions were answered to the patient’s satisfaction.  Patient agrees with the plan and wishes to proceed.  Continue follow-up for ongoing care of his chronic medical conditions.       History of Present Illness (HPI):  Wade presents as a new patient for an evaluation.  The patient’s EMR has been reviewed.  Lives in [place], [place] with his Wife (\"Phuong\")  Occupation: Retired.     Hx of prostate cancer s/p brachytherapy (2015), BPH with " retention, and Jackelyn's.   Recent admission in Aug 2024; see below.   Dx with colovesical fistula in setting of colonic diverticulosis.   Followed by colorectal surgery for continued care.   Continues with an indwelling Llamas.   Presents for outpatient follow up.     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 CT AP without contrast was also done at outside hospital and showed possible colovesicular fistula and bladder abscess vs extrinsic mass. Urology consulted for possible colovesical fistula, recurrent cystitis and urinary retention.     Indwelling Llamas placed.   Repeat RBUS (08/12) showed resolution of hydronephrosis.   Discharged on finasteride and tamsulosin.   Needs outpatient cystoscope for bladder wall thickening.     TODAY: (09/18/24)  Reports he has been doing well overall.   Continues with an indwelling llamas.   Draining clear, yellow urine, no recent infections.   No recent fevers, chills, or gross hematuria.     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    CTH showing pontine ICH with ventriculomegaly, incr'd vents compared to 2005 and 2007.   CT A/P with mild bilateral hydronephrosis, and concern for colovesicular fistula.     CT C spine neg for fx,   CTA H/N stable hemorrhage, neg for vasc abnormality,   MRI brain pontine hemorrhage.    8/9 Colorectal consulted--> Recommend colonoscopy to better evaluate sigmoid colon for possible colovesical fistula and to rule out colon cancer; antibiotics for 14 days for UTI and possible acute diverticulitis. GI consulted for eval for Colonoscopy.    Urology consulted-->   Seen for possible  colovesical fistula, recurrent cystitis and urinary retention (chronic).    Imaging shows mild bilateral hydronephrosis.  Continue home proscar and flomax.    Llamas placed.   Will need management of likely bladder outlet obstruction due to BPH and cystoscopic work-up of bladder thickening seen on CT outpatient with his Urologist.      8/12  Colonoscopy--> Sigmoid diverticulosis, mild segmental colitis associated with diverticulosis, circumferential edema in sigmoid colon near suspected fistula site (biopsied, awaiting pathology), small internal hemorrhoids.     Renal/Bladder US -->  Limited sonographic assessment of the bladder, due to decompression.   There is bladder wall thickening with intraluminal debris.  No hydronephrosis bilaterally.    Discussed with Colorectal Surgery--> they reviewed colonoscopy.    Think may be edema 2/2 inflammatory process and not true colo vesicular fistula;   patient can follow up outpatient.     8/13 Urology ~ RBUS  hydronephrosis resolved,   ok for discharge.    PT/OT eval recommend Rehab;  but patient and family request home with Home care.  Discharged to home with llamas and follow up with Urology scheduled.    Requested follow up with Dr. Thomas Mcintyre Colorectal Surgery ,  PCP for HTN and Neurology Stroke.    PMH//PSH: ventricular colloid cyst s/p R craniectomy for resection ([facility], 2005), R total hip arthroplasty c/b recurrent R prosthetic hip dislocation (5/11) s/p R hip reduction, HTN, alcohol use, tobacco use, obesity,  FH: Denies FH of  malignancy.   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  Social History     Tobacco Use   Smoking Status Every Day    Current packs/day: 1.00    Average packs/day: 1 pack/day for 51.7 years (51.7 ttl pk-yrs)    Types: Cigarettes    Start date: 1973   Smokeless Tobacco Never     Current Outpatient Medications   Medication Sig Dispense Refill    acetaminophen (Tylenol) 325 mg tablet Take 1-2 tablets (325-650 mg) by mouth every 4 hours if needed for mild pain (1 - 3).      ascorbic acid, vitamin C, 100 mg chewable tablet Chew 4 tablets (400 mg) once daily.      docusate sodium (Colace) 100 mg capsule Take 1 capsule (100 mg) by mouth once daily.      finasteride (Proscar) 5 mg tablet Take 1 tablet (5 mg) by mouth once daily.      lisinopril 40 mg tablet TAKE ONE TABLET BY MOUTH once DAILY for blood pressure 90 tablet 3    metoprolol succinate XL (Toprol-XL) 25 mg 24 hr tablet 1 tablet (25 mg).      tamsulosin (Flomax) 0.4 mg 24 hr capsule Take 1 capsule (0.4 mg) by mouth 2 times a day.       No current facility-administered medications for this visit.     No Known Allergies  Past medical, surgical, family and social history in the chart was reviewed and is accurate including any additions to what is in this HPI.    REVIEW OF SYSTEMS (ROS):   Constitutional: denies any unintentional weight loss or change in strength.  Integumentary: denies any rashes or pruritus.  Eyes: denies any double vision or eye pain.  Ear/Nose/Mouth/Throat: denies any nosebleeds or gum bleeds.  Cardiovascular: denies any chest pain or syncope.  Respiratory: denies hemoptysis.  Gastrointestinal: denies nausea or vomiting.  Musculoskeletal: denies muscle cramping or weakness.  Neurologic: denies convulsions or seizures.  Hematologic/Lymphatic: denies bleeding tendencies.  Endocrine: denies heat/cold intolerance.  All other systems have been reviewed and are negative unless otherwise noted in the  HPI.     OBJECTIVE:  There were no vitals taken for this visit.  PHYSICAL EXAM:  Constitutional: No obvious distress.  Eyes: Non-injected conjunctiva, sclera clear, EOMI.  Ears/Nose/Mouth/Throat: No obvious drainage per ears or nose.  Cardiovascular: Extremities are warm and well perfused. No edema, cyanosis or pallor.  Respiratory: No audible wheezing/stridor; respirations do not appear labored.  Gastrointestinal: Abdomen soft, not distended.  Musculoskeletal: Normal ROM of extremities.  Skin: No obvious rashes or open sores.  Neurologic: Alert and oriented, CN 2-12 grossly intact.  Psychiatric: Answers questions appropriately with normal affect.  Hematologic/Lymphatic/Immunologic: No obvious bruises or sites of spontaneous bleeding.  Genitourinary: No CVA tenderness, bladder not palpable.     LABS & IMAGING:  Lab Results   Component Value Date    WBC 9.9 08/28/2024    HGB 13.7 08/28/2024    HCT 41.9 08/28/2024     08/28/2024    CHOL 149 08/28/2024    TRIG 114 08/28/2024    HDL 40.4 08/28/2024    ALT 18 08/28/2024    AST 12 08/28/2024     08/28/2024    K 5.3 08/28/2024    CL 98 08/28/2024    CREATININE 0.72 08/28/2024    BUN 11 08/28/2024    CO2 29 08/28/2024    TSH 1.49 08/28/2024    PSA 4.8 (H) 07/17/2023    INR 1.4 (H) 08/12/2024    HGBA1C 6.0 (H) 08/09/2024     Scribed for Dr. Gil Brown by Delonte Cazares.  I, Dr. Gil Brown have personally reviewed and agreed with the information entered by the Virtual Scribe. 09/18/24.

## 2024-09-21 LAB — BACTERIA UR CULT: ABNORMAL

## 2024-09-23 ENCOUNTER — OFFICE VISIT (OUTPATIENT)
Dept: UROLOGY | Facility: CLINIC | Age: 69
End: 2024-09-23
Payer: COMMERCIAL

## 2024-09-23 VITALS
WEIGHT: 231 LBS | HEART RATE: 100 BPM | TEMPERATURE: 96.7 F | HEIGHT: 66 IN | SYSTOLIC BLOOD PRESSURE: 121 MMHG | BODY MASS INDEX: 37.12 KG/M2 | DIASTOLIC BLOOD PRESSURE: 77 MMHG

## 2024-09-23 DIAGNOSIS — L98.8 FISTULA: ICD-10-CM

## 2024-09-23 DIAGNOSIS — R39.9 UTI SYMPTOMS: Primary | ICD-10-CM

## 2024-09-23 DIAGNOSIS — Z85.46 HISTORY OF PROSTATE CANCER: ICD-10-CM

## 2024-09-23 LAB
APPEARANCE UR: ABNORMAL
BACTERIA #/AREA URNS AUTO: ABNORMAL /HPF
BILIRUB UR STRIP.AUTO-MCNC: NEGATIVE MG/DL
COLOR UR: YELLOW
GLUCOSE UR STRIP.AUTO-MCNC: NEGATIVE MG/DL
KETONES UR STRIP.AUTO-MCNC: NEGATIVE MG/DL
LEUKOCYTE ESTERASE UR QL STRIP.AUTO: ABNORMAL
NITRITE UR QL STRIP.AUTO: ABNORMAL
PH UR STRIP.AUTO: 6 [PH]
PROT UR STRIP.AUTO-MCNC: ABNORMAL MG/DL
RBC # UR STRIP.AUTO: ABNORMAL /UL
RBC #/AREA URNS AUTO: ABNORMAL /HPF
SP GR UR STRIP.AUTO: 1.02
UROBILINOGEN UR STRIP.AUTO-MCNC: ABNORMAL MG/DL
WBC #/AREA URNS AUTO: >50 /HPF

## 2024-09-23 PROCEDURE — 87086 URINE CULTURE/COLONY COUNT: CPT

## 2024-09-23 PROCEDURE — G2211 COMPLEX E/M VISIT ADD ON: HCPCS | Performed by: NURSE PRACTITIONER

## 2024-09-23 PROCEDURE — 3074F SYST BP LT 130 MM HG: CPT | Performed by: NURSE PRACTITIONER

## 2024-09-23 PROCEDURE — 3078F DIAST BP <80 MM HG: CPT | Performed by: NURSE PRACTITIONER

## 2024-09-23 PROCEDURE — 99213 OFFICE O/P EST LOW 20 MIN: CPT | Performed by: NURSE PRACTITIONER

## 2024-09-23 PROCEDURE — 87186 SC STD MICRODIL/AGAR DIL: CPT

## 2024-09-23 PROCEDURE — 1159F MED LIST DOCD IN RCRD: CPT | Performed by: NURSE PRACTITIONER

## 2024-09-23 PROCEDURE — 81001 URINALYSIS AUTO W/SCOPE: CPT

## 2024-09-23 PROCEDURE — 3008F BODY MASS INDEX DOCD: CPT | Performed by: NURSE PRACTITIONER

## 2024-09-23 RX ORDER — SULFAMETHOXAZOLE AND TRIMETHOPRIM 800; 160 MG/1; MG/1
1 TABLET ORAL 2 TIMES DAILY
Qty: 14 TABLET | Refills: 0 | Status: SHIPPED | OUTPATIENT
Start: 2024-09-23 | End: 2024-09-30

## 2024-09-23 NOTE — PROGRESS NOTES
Subjective   Patient ID: Wade Machado is a 69 y.o. male who presents for leaking catheter.  Hx of prostate cancer s/p brachytherapy (2015), BPH with retention, and Jackelyn's.   Recent admission in Aug 2024; see below.   Dx with colovesical fistula in setting of colonic diverticulosis.   Followed by colorectal surgery for continued care.   Continues with an indwelling Llamas.   Presents today as he established last week with Dr. Brown and has had leaking from around meatus since then as well as decrease in appetite and chills. Believes there is pus draining around llamas           Review of Systems    Objective   Physical Exam  Vitals reviewed.   Genitourinary:     Penis: Circumcised. Hypospadias and discharge present.       Comments: Mild traumatic hypospadias, pus draining around meatus     Alert and oriented x3  Moist mucous membranes  Breathes easily on room air  Abdomen soft, nondistended. Obese  No edema  No scleral icterus  No focal neurological deficits  Appears stated age, no acute distress    Patient ID: Wade Machado is a 69 y.o. male.    Bladder Catheterization    Date/Time: 9/23/2024 5:21 PM    Performed by: DARIA Peng  Authorized by: DARIA Peng    Procedure Details    Procedure: catheter change      Position: supine    Catheter insertion: indwelling      Catheter type: coudé      Catheter size: 18 Fr    Complexity: complex      Urine characteristics: cloudy, yellow and foul-smelling    Balloon inflation amount (mL): 10    Leg bag attached: yes      Post-Procedure Details     Outcome: patient tolerated procedure well with no complications      Post-procedure interventions: post-procedure education provided      Disposition: discharged home in satisfactory condition            Assessment/Plan   Diagnoses and all orders for this visit:  UTI symptoms  -     Urinalysis with Reflex Culture and Microscopic; Future  History of prostate cancer  Fistula    Cath changed  without difficulty. Very cloudy/pus like urine to be sent for culture. Will be started on Bactrim DS given symptoms. Patient is in agreement with plan.          COLTON Peng-CNP 09/23/24 5:18 PM

## 2024-09-24 LAB — HOLD SPECIMEN: NORMAL

## 2024-09-26 LAB — BACTERIA UR CULT: ABNORMAL

## 2024-09-30 ENCOUNTER — APPOINTMENT (OUTPATIENT)
Dept: UROLOGY | Facility: CLINIC | Age: 69
End: 2024-09-30
Payer: COMMERCIAL

## 2024-10-01 ENCOUNTER — TELEPHONE (OUTPATIENT)
Dept: UROLOGY | Facility: HOSPITAL | Age: 69
End: 2024-10-01
Payer: COMMERCIAL

## 2024-10-01 NOTE — TELEPHONE ENCOUNTER
"Returned call to patient's wife. Patient states he is just having the discharge. No s/s of infection. Discussed with COLTON Stallworth:    \"If he is just having the discharge then no. I dont want to treat him unless he is having systemic symptoms of infection. Thanks\"    Reviewed concerned s/s and when to call. Patient's wife verbalized understanding and all questions were answered.    Meme Andrews RN    "

## 2024-10-08 ENCOUNTER — PREP FOR PROCEDURE (OUTPATIENT)
Dept: SURGERY | Facility: CLINIC | Age: 69
End: 2024-10-08
Payer: COMMERCIAL

## 2024-10-08 DIAGNOSIS — N32.1 COLOVESICAL FISTULA: Primary | ICD-10-CM

## 2024-10-08 RX ORDER — CEFAZOLIN SODIUM 2 G/100ML
2 INJECTION, SOLUTION INTRAVENOUS ONCE
OUTPATIENT
Start: 2024-10-08 | End: 2024-10-08

## 2024-10-08 RX ORDER — SODIUM CHLORIDE, SODIUM LACTATE, POTASSIUM CHLORIDE, CALCIUM CHLORIDE 600; 310; 30; 20 MG/100ML; MG/100ML; MG/100ML; MG/100ML
20 INJECTION, SOLUTION INTRAVENOUS CONTINUOUS
OUTPATIENT
Start: 2024-10-08

## 2024-10-08 RX ORDER — METRONIDAZOLE 500 MG/100ML
500 INJECTION, SOLUTION INTRAVENOUS ONCE
OUTPATIENT
Start: 2024-10-08 | End: 2024-10-08

## 2024-10-16 ENCOUNTER — PATIENT OUTREACH (OUTPATIENT)
Dept: PRIMARY CARE | Facility: CLINIC | Age: 69
End: 2024-10-16
Payer: COMMERCIAL

## 2024-10-19 ENCOUNTER — HOSPITAL ENCOUNTER (INPATIENT)
Facility: HOSPITAL | Age: 69
DRG: 698 | End: 2024-10-19
Attending: INTERNAL MEDICINE | Admitting: INTERNAL MEDICINE
Payer: COMMERCIAL

## 2024-10-19 ENCOUNTER — APPOINTMENT (OUTPATIENT)
Dept: CARDIOLOGY | Facility: HOSPITAL | Age: 69
DRG: 698 | End: 2024-10-19
Payer: COMMERCIAL

## 2024-10-19 ENCOUNTER — APPOINTMENT (OUTPATIENT)
Dept: RADIOLOGY | Facility: HOSPITAL | Age: 69
DRG: 698 | End: 2024-10-19
Payer: COMMERCIAL

## 2024-10-19 DIAGNOSIS — R53.81 DEBILITY: ICD-10-CM

## 2024-10-19 DIAGNOSIS — R41.82 ALTERED MENTAL STATUS, UNSPECIFIED ALTERED MENTAL STATUS TYPE: ICD-10-CM

## 2024-10-19 DIAGNOSIS — N32.89 BLADDER SPASM: ICD-10-CM

## 2024-10-19 DIAGNOSIS — A41.9 SEPSIS, DUE TO UNSPECIFIED ORGANISM, UNSPECIFIED WHETHER ACUTE ORGAN DYSFUNCTION PRESENT (MULTI): Primary | ICD-10-CM

## 2024-10-19 DIAGNOSIS — N39.0 ACUTE UTI: ICD-10-CM

## 2024-10-19 LAB
BASOPHILS # BLD AUTO: 0.05 X10*3/UL (ref 0–0.1)
BASOPHILS NFR BLD AUTO: 0.5 %
EOSINOPHIL # BLD AUTO: 0.02 X10*3/UL (ref 0–0.7)
EOSINOPHIL NFR BLD AUTO: 0.2 %
ERYTHROCYTE [DISTWIDTH] IN BLOOD BY AUTOMATED COUNT: 17 % (ref 11.5–14.5)
HCT VFR BLD AUTO: 40.7 % (ref 41–52)
HGB BLD-MCNC: 13.7 G/DL (ref 13.5–17.5)
IMM GRANULOCYTES # BLD AUTO: 0.11 X10*3/UL (ref 0–0.7)
IMM GRANULOCYTES NFR BLD AUTO: 1.1 % (ref 0–0.9)
LYMPHOCYTES # BLD AUTO: 1.22 X10*3/UL (ref 1.2–4.8)
LYMPHOCYTES NFR BLD AUTO: 12 %
MCH RBC QN AUTO: 29.7 PG (ref 26–34)
MCHC RBC AUTO-ENTMCNC: 33.7 G/DL (ref 32–36)
MCV RBC AUTO: 88 FL (ref 80–100)
MONOCYTES # BLD AUTO: 0.97 X10*3/UL (ref 0.1–1)
MONOCYTES NFR BLD AUTO: 9.6 %
NEUTROPHILS # BLD AUTO: 7.76 X10*3/UL (ref 1.2–7.7)
NEUTROPHILS NFR BLD AUTO: 76.6 %
NRBC BLD-RTO: 0 /100 WBCS (ref 0–0)
PLATELET # BLD AUTO: 306 X10*3/UL (ref 150–450)
RBC # BLD AUTO: 4.62 X10*6/UL (ref 4.5–5.9)
WBC # BLD AUTO: 10.1 X10*3/UL (ref 4.4–11.3)

## 2024-10-19 PROCEDURE — 83605 ASSAY OF LACTIC ACID: CPT | Performed by: INTERNAL MEDICINE

## 2024-10-19 PROCEDURE — 93005 ELECTROCARDIOGRAM TRACING: CPT

## 2024-10-19 PROCEDURE — 80053 COMPREHEN METABOLIC PANEL: CPT | Performed by: INTERNAL MEDICINE

## 2024-10-19 PROCEDURE — 71045 X-RAY EXAM CHEST 1 VIEW: CPT | Mod: FOREIGN READ | Performed by: RADIOLOGY

## 2024-10-19 PROCEDURE — 70450 CT HEAD/BRAIN W/O DYE: CPT

## 2024-10-19 PROCEDURE — 71045 X-RAY EXAM CHEST 1 VIEW: CPT

## 2024-10-19 PROCEDURE — 51702 INSERT TEMP BLADDER CATH: CPT

## 2024-10-19 PROCEDURE — 36415 COLL VENOUS BLD VENIPUNCTURE: CPT | Performed by: INTERNAL MEDICINE

## 2024-10-19 PROCEDURE — 81001 URINALYSIS AUTO W/SCOPE: CPT | Performed by: INTERNAL MEDICINE

## 2024-10-19 PROCEDURE — 99291 CRITICAL CARE FIRST HOUR: CPT | Performed by: INTERNAL MEDICINE

## 2024-10-19 PROCEDURE — 96365 THER/PROPH/DIAG IV INF INIT: CPT | Mod: 59

## 2024-10-19 PROCEDURE — 85025 COMPLETE CBC W/AUTO DIFF WBC: CPT | Performed by: INTERNAL MEDICINE

## 2024-10-19 PROCEDURE — 2500000005 HC RX 250 GENERAL PHARMACY W/O HCPCS: Performed by: INTERNAL MEDICINE

## 2024-10-19 PROCEDURE — 87040 BLOOD CULTURE FOR BACTERIA: CPT | Mod: PARLAB | Performed by: INTERNAL MEDICINE

## 2024-10-19 PROCEDURE — 87086 URINE CULTURE/COLONY COUNT: CPT | Mod: PARLAB | Performed by: INTERNAL MEDICINE

## 2024-10-19 PROCEDURE — 2500000004 HC RX 250 GENERAL PHARMACY W/ HCPCS (ALT 636 FOR OP/ED): Performed by: INTERNAL MEDICINE

## 2024-10-19 RX ORDER — FENTANYL CITRATE 50 UG/ML
50 INJECTION, SOLUTION INTRAMUSCULAR; INTRAVENOUS ONCE
Status: COMPLETED | OUTPATIENT
Start: 2024-10-20 | End: 2024-10-20

## 2024-10-19 RX ORDER — CEFTRIAXONE 2 G/50ML
2 INJECTION, SOLUTION INTRAVENOUS ONCE
Status: COMPLETED | OUTPATIENT
Start: 2024-10-19 | End: 2024-10-20

## 2024-10-19 RX ORDER — ACETAMINOPHEN 325 MG/1
975 TABLET ORAL ONCE
Status: COMPLETED | OUTPATIENT
Start: 2024-10-20 | End: 2024-10-20

## 2024-10-19 RX ORDER — LIDOCAINE HYDROCHLORIDE 20 MG/ML
1 JELLY TOPICAL ONCE
Status: COMPLETED | OUTPATIENT
Start: 2024-10-19 | End: 2024-10-19

## 2024-10-19 RX ADMIN — LIDOCAINE HYDROCHLORIDE 1 APPLICATION: 20 JELLY TOPICAL at 23:54

## 2024-10-19 RX ADMIN — CEFTRIAXONE SODIUM 2 G: 2 INJECTION, SOLUTION INTRAVENOUS at 23:48

## 2024-10-19 ASSESSMENT — PAIN DESCRIPTION - DESCRIPTORS: DESCRIPTORS: BURNING

## 2024-10-19 ASSESSMENT — PAIN - FUNCTIONAL ASSESSMENT: PAIN_FUNCTIONAL_ASSESSMENT: 0-10

## 2024-10-19 ASSESSMENT — PAIN DESCRIPTION - LOCATION: LOCATION: PENIS

## 2024-10-19 ASSESSMENT — PAIN DESCRIPTION - PAIN TYPE: TYPE: ACUTE PAIN

## 2024-10-19 ASSESSMENT — PAIN SCALES - GENERAL: PAINLEVEL_OUTOF10: 7

## 2024-10-20 ENCOUNTER — APPOINTMENT (OUTPATIENT)
Dept: RADIOLOGY | Facility: HOSPITAL | Age: 69
DRG: 698 | End: 2024-10-20
Payer: COMMERCIAL

## 2024-10-20 PROBLEM — A41.9 SEPSIS, DUE TO UNSPECIFIED ORGANISM, UNSPECIFIED WHETHER ACUTE ORGAN DYSFUNCTION PRESENT (MULTI): Status: ACTIVE | Noted: 2024-10-20

## 2024-10-20 LAB
ALBUMIN SERPL BCP-MCNC: 3.4 G/DL (ref 3.4–5)
ALBUMIN SERPL BCP-MCNC: 3.5 G/DL (ref 3.4–5)
ALP SERPL-CCNC: 101 U/L (ref 33–136)
ALT SERPL W P-5'-P-CCNC: 19 U/L (ref 10–52)
ANION GAP SERPL CALC-SCNC: 15 MMOL/L (ref 10–20)
ANION GAP SERPL CALC-SCNC: 16 MMOL/L (ref 10–20)
APPEARANCE UR: ABNORMAL
AST SERPL W P-5'-P-CCNC: 16 U/L (ref 9–39)
BACTERIA #/AREA URNS AUTO: ABNORMAL /HPF
BACTERIA BLD CULT: NORMAL
BACTERIA BLD CULT: NORMAL
BILIRUB SERPL-MCNC: 0.4 MG/DL (ref 0–1.2)
BILIRUB UR STRIP.AUTO-MCNC: NEGATIVE MG/DL
BUN SERPL-MCNC: 10 MG/DL (ref 6–23)
BUN SERPL-MCNC: 9 MG/DL (ref 6–23)
CALCIUM SERPL-MCNC: 8.4 MG/DL (ref 8.6–10.3)
CALCIUM SERPL-MCNC: 8.9 MG/DL (ref 8.6–10.3)
CHLORIDE SERPL-SCNC: 94 MMOL/L (ref 98–107)
CHLORIDE SERPL-SCNC: 96 MMOL/L (ref 98–107)
CO2 SERPL-SCNC: 21 MMOL/L (ref 21–32)
CO2 SERPL-SCNC: 22 MMOL/L (ref 21–32)
COLOR UR: YELLOW
CREAT SERPL-MCNC: 0.65 MG/DL (ref 0.5–1.3)
CREAT SERPL-MCNC: 0.67 MG/DL (ref 0.5–1.3)
EGFRCR SERPLBLD CKD-EPI 2021: >90 ML/MIN/1.73M*2
EGFRCR SERPLBLD CKD-EPI 2021: >90 ML/MIN/1.73M*2
ERYTHROCYTE [DISTWIDTH] IN BLOOD BY AUTOMATED COUNT: 16.9 % (ref 11.5–14.5)
GLUCOSE SERPL-MCNC: 113 MG/DL (ref 74–99)
GLUCOSE SERPL-MCNC: 91 MG/DL (ref 74–99)
GLUCOSE UR STRIP.AUTO-MCNC: NEGATIVE MG/DL
HCT VFR BLD AUTO: 39.1 % (ref 41–52)
HGB BLD-MCNC: 12.7 G/DL (ref 13.5–17.5)
HOLD SPECIMEN: NORMAL
HYALINE CASTS #/AREA URNS AUTO: ABNORMAL /LPF
KETONES UR STRIP.AUTO-MCNC: NEGATIVE MG/DL
LACTATE SERPL-SCNC: 1.6 MMOL/L (ref 0.4–2)
LEUKOCYTE ESTERASE UR QL STRIP.AUTO: ABNORMAL
MAGNESIUM SERPL-MCNC: 1.77 MG/DL (ref 1.6–2.4)
MCH RBC QN AUTO: 29.3 PG (ref 26–34)
MCHC RBC AUTO-ENTMCNC: 32.5 G/DL (ref 32–36)
MCV RBC AUTO: 90 FL (ref 80–100)
MUCOUS THREADS #/AREA URNS AUTO: ABNORMAL /LPF
NITRITE UR QL STRIP.AUTO: NEGATIVE
NRBC BLD-RTO: 0 /100 WBCS (ref 0–0)
PH UR STRIP.AUTO: 6.5 [PH]
PHOSPHATE SERPL-MCNC: 3.9 MG/DL (ref 2.5–4.9)
PLATELET # BLD AUTO: 298 X10*3/UL (ref 150–450)
POTASSIUM SERPL-SCNC: 3.9 MMOL/L (ref 3.5–5.3)
POTASSIUM SERPL-SCNC: 3.9 MMOL/L (ref 3.5–5.3)
PROT SERPL-MCNC: 7.7 G/DL (ref 6.4–8.2)
PROT UR STRIP.AUTO-MCNC: ABNORMAL MG/DL
RBC # BLD AUTO: 4.34 X10*6/UL (ref 4.5–5.9)
RBC # UR STRIP.AUTO: ABNORMAL /UL
RBC #/AREA URNS AUTO: ABNORMAL /HPF
SODIUM SERPL-SCNC: 128 MMOL/L (ref 136–145)
SODIUM SERPL-SCNC: 128 MMOL/L (ref 136–145)
SP GR UR STRIP.AUTO: 1.02
TRANS CELLS #/AREA UR COMP ASSIST: ABNORMAL /HPF
UROBILINOGEN UR STRIP.AUTO-MCNC: ABNORMAL MG/DL
WBC # BLD AUTO: 8.6 X10*3/UL (ref 4.4–11.3)
WBC #/AREA URNS AUTO: >50 /HPF

## 2024-10-20 PROCEDURE — 51702 INSERT TEMP BLADDER CATH: CPT

## 2024-10-20 PROCEDURE — 36415 COLL VENOUS BLD VENIPUNCTURE: CPT | Performed by: INTERNAL MEDICINE

## 2024-10-20 PROCEDURE — 2500000004 HC RX 250 GENERAL PHARMACY W/ HCPCS (ALT 636 FOR OP/ED): Performed by: INTERNAL MEDICINE

## 2024-10-20 PROCEDURE — 2500000002 HC RX 250 W HCPCS SELF ADMINISTERED DRUGS (ALT 637 FOR MEDICARE OP, ALT 636 FOR OP/ED): Performed by: INTERNAL MEDICINE

## 2024-10-20 PROCEDURE — 96375 TX/PRO/DX INJ NEW DRUG ADDON: CPT | Mod: 59

## 2024-10-20 PROCEDURE — 74177 CT ABD & PELVIS W/CONTRAST: CPT

## 2024-10-20 PROCEDURE — 2500000001 HC RX 250 WO HCPCS SELF ADMINISTERED DRUGS (ALT 637 FOR MEDICARE OP): Performed by: INTERNAL MEDICINE

## 2024-10-20 PROCEDURE — 2550000001 HC RX 255 CONTRASTS: Performed by: INTERNAL MEDICINE

## 2024-10-20 PROCEDURE — 80069 RENAL FUNCTION PANEL: CPT | Performed by: INTERNAL MEDICINE

## 2024-10-20 PROCEDURE — 70450 CT HEAD/BRAIN W/O DYE: CPT | Performed by: RADIOLOGY

## 2024-10-20 PROCEDURE — 1200000002 HC GENERAL ROOM WITH TELEMETRY DAILY

## 2024-10-20 PROCEDURE — 74177 CT ABD & PELVIS W/CONTRAST: CPT | Performed by: STUDENT IN AN ORGANIZED HEALTH CARE EDUCATION/TRAINING PROGRAM

## 2024-10-20 PROCEDURE — 99221 1ST HOSP IP/OBS SF/LOW 40: CPT | Performed by: NEUROLOGICAL SURGERY

## 2024-10-20 PROCEDURE — 99223 1ST HOSP IP/OBS HIGH 75: CPT | Performed by: INTERNAL MEDICINE

## 2024-10-20 PROCEDURE — 96374 THER/PROPH/DIAG INJ IV PUSH: CPT | Mod: 59

## 2024-10-20 PROCEDURE — 85027 COMPLETE CBC AUTOMATED: CPT | Performed by: INTERNAL MEDICINE

## 2024-10-20 PROCEDURE — 99233 SBSQ HOSP IP/OBS HIGH 50: CPT

## 2024-10-20 PROCEDURE — 83735 ASSAY OF MAGNESIUM: CPT | Performed by: INTERNAL MEDICINE

## 2024-10-20 RX ORDER — POLYETHYLENE GLYCOL 3350 17 G/17G
17 POWDER, FOR SOLUTION ORAL 2 TIMES DAILY
Status: DISCONTINUED | OUTPATIENT
Start: 2024-10-20 | End: 2024-10-21 | Stop reason: HOSPADM

## 2024-10-20 RX ORDER — LISINOPRIL 40 MG/1
40 TABLET ORAL DAILY
Status: DISCONTINUED | OUTPATIENT
Start: 2024-10-20 | End: 2024-10-21 | Stop reason: HOSPADM

## 2024-10-20 RX ORDER — AMOXICILLIN 250 MG
2 CAPSULE ORAL 2 TIMES DAILY
Status: DISCONTINUED | OUTPATIENT
Start: 2024-10-20 | End: 2024-10-21 | Stop reason: HOSPADM

## 2024-10-20 RX ORDER — POLYETHYLENE GLYCOL 3350 17 G/17G
17 POWDER, FOR SOLUTION ORAL DAILY PRN
Status: DISCONTINUED | OUTPATIENT
Start: 2024-10-20 | End: 2024-10-20

## 2024-10-20 RX ORDER — ACETAMINOPHEN 325 MG/1
650 TABLET ORAL EVERY 4 HOURS PRN
Status: DISCONTINUED | OUTPATIENT
Start: 2024-10-20 | End: 2024-10-20

## 2024-10-20 RX ORDER — CEFEPIME 1 G/50ML
2 INJECTION, SOLUTION INTRAVENOUS EVERY 12 HOURS
Status: DISCONTINUED | OUTPATIENT
Start: 2024-10-20 | End: 2024-10-21 | Stop reason: HOSPADM

## 2024-10-20 RX ORDER — POLYETHYLENE GLYCOL 3350 17 G/17G
17 POWDER, FOR SOLUTION ORAL DAILY
Status: DISCONTINUED | OUTPATIENT
Start: 2024-10-20 | End: 2024-10-20

## 2024-10-20 RX ORDER — TAMSULOSIN HYDROCHLORIDE 0.4 MG/1
0.4 CAPSULE ORAL DAILY
Status: DISCONTINUED | OUTPATIENT
Start: 2024-10-20 | End: 2024-10-21 | Stop reason: HOSPADM

## 2024-10-20 RX ORDER — MORPHINE SULFATE 4 MG/ML
4 INJECTION, SOLUTION INTRAMUSCULAR; INTRAVENOUS ONCE
Status: COMPLETED | OUTPATIENT
Start: 2024-10-20 | End: 2024-10-20

## 2024-10-20 RX ORDER — ACETAMINOPHEN 325 MG/1
650 TABLET ORAL EVERY 4 HOURS PRN
Status: DISCONTINUED | OUTPATIENT
Start: 2024-10-20 | End: 2024-10-21 | Stop reason: HOSPADM

## 2024-10-20 RX ORDER — CEFTRIAXONE 1 G/50ML
1 INJECTION, SOLUTION INTRAVENOUS EVERY 24 HOURS
Status: DISCONTINUED | OUTPATIENT
Start: 2024-10-20 | End: 2024-10-20

## 2024-10-20 RX ORDER — TALC
3 POWDER (GRAM) TOPICAL NIGHTLY PRN
Status: DISCONTINUED | OUTPATIENT
Start: 2024-10-20 | End: 2024-10-21 | Stop reason: HOSPADM

## 2024-10-20 RX ORDER — BISACODYL 5 MG
5 TABLET, DELAYED RELEASE (ENTERIC COATED) ORAL DAILY PRN
Status: DISCONTINUED | OUTPATIENT
Start: 2024-10-20 | End: 2024-10-21 | Stop reason: HOSPADM

## 2024-10-20 RX ORDER — ENOXAPARIN SODIUM 100 MG/ML
40 INJECTION SUBCUTANEOUS EVERY 24 HOURS
Status: DISCONTINUED | OUTPATIENT
Start: 2024-10-20 | End: 2024-10-21 | Stop reason: HOSPADM

## 2024-10-20 RX ORDER — FINASTERIDE 5 MG/1
5 TABLET, FILM COATED ORAL DAILY
Status: DISCONTINUED | OUTPATIENT
Start: 2024-10-20 | End: 2024-10-21 | Stop reason: HOSPADM

## 2024-10-20 RX ORDER — METOPROLOL SUCCINATE 25 MG/1
25 TABLET, EXTENDED RELEASE ORAL DAILY
Status: DISCONTINUED | OUTPATIENT
Start: 2024-10-20 | End: 2024-10-21 | Stop reason: HOSPADM

## 2024-10-20 RX ORDER — MAGNESIUM SULFATE HEPTAHYDRATE 40 MG/ML
2 INJECTION, SOLUTION INTRAVENOUS ONCE
Status: COMPLETED | OUTPATIENT
Start: 2024-10-20 | End: 2024-10-20

## 2024-10-20 RX ADMIN — ACETAMINOPHEN 975 MG: 325 TABLET, FILM COATED ORAL at 00:03

## 2024-10-20 RX ADMIN — LISINOPRIL 40 MG: 40 TABLET ORAL at 10:18

## 2024-10-20 RX ADMIN — SENNOSIDES AND DOCUSATE SODIUM 2 TABLET: 50; 8.6 TABLET ORAL at 21:33

## 2024-10-20 RX ADMIN — IOHEXOL 75 ML: 350 INJECTION, SOLUTION INTRAVENOUS at 03:12

## 2024-10-20 RX ADMIN — TAMSULOSIN HYDROCHLORIDE 0.4 MG: 0.4 CAPSULE ORAL at 10:19

## 2024-10-20 RX ADMIN — MORPHINE SULFATE 4 MG: 4 INJECTION, SOLUTION INTRAMUSCULAR; INTRAVENOUS at 01:24

## 2024-10-20 RX ADMIN — ENOXAPARIN SODIUM 40 MG: 40 INJECTION SUBCUTANEOUS at 04:56

## 2024-10-20 RX ADMIN — CEFEPIME 2 G: 1 INJECTION, SOLUTION INTRAVENOUS at 10:36

## 2024-10-20 RX ADMIN — FINASTERIDE 5 MG: 5 TABLET, FILM COATED ORAL at 10:18

## 2024-10-20 RX ADMIN — CEFEPIME 2 G: 1 INJECTION, SOLUTION INTRAVENOUS at 21:33

## 2024-10-20 RX ADMIN — POLYETHYLENE GLYCOL 3350 17 G: 17 POWDER, FOR SOLUTION ORAL at 21:33

## 2024-10-20 RX ADMIN — MAGNESIUM SULFATE HEPTAHYDRATE 2 G: 40 INJECTION, SOLUTION INTRAVENOUS at 15:23

## 2024-10-20 RX ADMIN — SODIUM CHLORIDE 500 ML: 9 INJECTION, SOLUTION INTRAVENOUS at 00:14

## 2024-10-20 RX ADMIN — POLYETHYLENE GLYCOL 3350 17 G: 17 POWDER, FOR SOLUTION ORAL at 10:26

## 2024-10-20 RX ADMIN — FENTANYL CITRATE 50 MCG: 50 INJECTION INTRAMUSCULAR; INTRAVENOUS at 00:03

## 2024-10-20 SDOH — ECONOMIC STABILITY: FOOD INSECURITY: WITHIN THE PAST 12 MONTHS, THE FOOD YOU BOUGHT JUST DIDN'T LAST AND YOU DIDN'T HAVE MONEY TO GET MORE.: NEVER TRUE

## 2024-10-20 SDOH — ECONOMIC STABILITY: TRANSPORTATION INSECURITY: IN THE PAST 12 MONTHS, HAS LACK OF TRANSPORTATION KEPT YOU FROM MEDICAL APPOINTMENTS OR FROM GETTING MEDICATIONS?: NO

## 2024-10-20 SDOH — SOCIAL STABILITY: SOCIAL INSECURITY: HAS ANYONE EVER THREATENED TO HURT YOUR FAMILY OR YOUR PETS?: NO

## 2024-10-20 SDOH — SOCIAL STABILITY: SOCIAL INSECURITY
WITHIN THE LAST YEAR, HAVE YOU BEEN RAPED OR FORCED TO HAVE ANY KIND OF SEXUAL ACTIVITY BY YOUR PARTNER OR EX-PARTNER?: NO

## 2024-10-20 SDOH — SOCIAL STABILITY: SOCIAL INSECURITY: WITHIN THE LAST YEAR, HAVE YOU BEEN HUMILIATED OR EMOTIONALLY ABUSED IN OTHER WAYS BY YOUR PARTNER OR EX-PARTNER?: NO

## 2024-10-20 SDOH — HEALTH STABILITY: MENTAL HEALTH: HOW OFTEN DO YOU HAVE SIX OR MORE DRINKS ON ONE OCCASION?: NEVER

## 2024-10-20 SDOH — ECONOMIC STABILITY: FOOD INSECURITY: WITHIN THE PAST 12 MONTHS, YOU WORRIED THAT YOUR FOOD WOULD RUN OUT BEFORE YOU GOT THE MONEY TO BUY MORE.: NEVER TRUE

## 2024-10-20 SDOH — SOCIAL STABILITY: SOCIAL INSECURITY
WITHIN THE LAST YEAR, HAVE YOU BEEN KICKED, HIT, SLAPPED, OR OTHERWISE PHYSICALLY HURT BY YOUR PARTNER OR EX-PARTNER?: NO

## 2024-10-20 SDOH — ECONOMIC STABILITY: INCOME INSECURITY: IN THE PAST 12 MONTHS HAS THE ELECTRIC, GAS, OIL, OR WATER COMPANY THREATENED TO SHUT OFF SERVICES IN YOUR HOME?: NO

## 2024-10-20 SDOH — SOCIAL STABILITY: SOCIAL INSECURITY: HAVE YOU HAD ANY THOUGHTS OF HARMING ANYONE ELSE?: NO

## 2024-10-20 SDOH — ECONOMIC STABILITY: HOUSING INSECURITY: IN THE PAST 12 MONTHS, HOW MANY TIMES HAVE YOU MOVED WHERE YOU WERE LIVING?: 0

## 2024-10-20 SDOH — ECONOMIC STABILITY: FOOD INSECURITY: HOW HARD IS IT FOR YOU TO PAY FOR THE VERY BASICS LIKE FOOD, HOUSING, MEDICAL CARE, AND HEATING?: NOT VERY HARD

## 2024-10-20 SDOH — ECONOMIC STABILITY: HOUSING INSECURITY: IN THE LAST 12 MONTHS, WAS THERE A TIME WHEN YOU WERE NOT ABLE TO PAY THE MORTGAGE OR RENT ON TIME?: NO

## 2024-10-20 SDOH — HEALTH STABILITY: MENTAL HEALTH: HOW MANY DRINKS CONTAINING ALCOHOL DO YOU HAVE ON A TYPICAL DAY WHEN YOU ARE DRINKING?: 1 OR 2

## 2024-10-20 SDOH — SOCIAL STABILITY: SOCIAL INSECURITY: WITHIN THE LAST YEAR, HAVE YOU BEEN AFRAID OF YOUR PARTNER OR EX-PARTNER?: NO

## 2024-10-20 SDOH — HEALTH STABILITY: PHYSICAL HEALTH: ON AVERAGE, HOW MANY DAYS PER WEEK DO YOU ENGAGE IN MODERATE TO STRENUOUS EXERCISE (LIKE A BRISK WALK)?: 0 DAYS

## 2024-10-20 SDOH — ECONOMIC STABILITY: HOUSING INSECURITY: AT ANY TIME IN THE PAST 12 MONTHS, WERE YOU HOMELESS OR LIVING IN A SHELTER (INCLUDING NOW)?: NO

## 2024-10-20 SDOH — SOCIAL STABILITY: SOCIAL INSECURITY: ARE THERE ANY APPARENT SIGNS OF INJURIES/BEHAVIORS THAT COULD BE RELATED TO ABUSE/NEGLECT?: NO

## 2024-10-20 SDOH — SOCIAL STABILITY: SOCIAL INSECURITY: ARE YOU OR HAVE YOU BEEN THREATENED OR ABUSED PHYSICALLY, EMOTIONALLY, OR SEXUALLY BY ANYONE?: NO

## 2024-10-20 SDOH — HEALTH STABILITY: MENTAL HEALTH: HOW OFTEN DO YOU HAVE A DRINK CONTAINING ALCOHOL?: MONTHLY OR LESS

## 2024-10-20 SDOH — SOCIAL STABILITY: SOCIAL INSECURITY: DO YOU FEEL UNSAFE GOING BACK TO THE PLACE WHERE YOU ARE LIVING?: NO

## 2024-10-20 SDOH — HEALTH STABILITY: PHYSICAL HEALTH: ON AVERAGE, HOW MANY MINUTES DO YOU ENGAGE IN EXERCISE AT THIS LEVEL?: 0 MIN

## 2024-10-20 SDOH — SOCIAL STABILITY: SOCIAL INSECURITY: ABUSE: ADULT

## 2024-10-20 SDOH — SOCIAL STABILITY: SOCIAL INSECURITY: DOES ANYONE TRY TO KEEP YOU FROM HAVING/CONTACTING OTHER FRIENDS OR DOING THINGS OUTSIDE YOUR HOME?: NO

## 2024-10-20 SDOH — SOCIAL STABILITY: SOCIAL INSECURITY: HAVE YOU HAD THOUGHTS OF HARMING ANYONE ELSE?: NO

## 2024-10-20 SDOH — SOCIAL STABILITY: SOCIAL INSECURITY: WERE YOU ABLE TO COMPLETE ALL THE BEHAVIORAL HEALTH SCREENINGS?: YES

## 2024-10-20 SDOH — SOCIAL STABILITY: SOCIAL INSECURITY: DO YOU FEEL ANYONE HAS EXPLOITED OR TAKEN ADVANTAGE OF YOU FINANCIALLY OR OF YOUR PERSONAL PROPERTY?: NO

## 2024-10-20 ASSESSMENT — ACTIVITIES OF DAILY LIVING (ADL)
ADEQUATE_TO_COMPLETE_ADL: YES
HEARING - RIGHT EAR: FUNCTIONAL
JUDGMENT_ADEQUATE_SAFELY_COMPLETE_DAILY_ACTIVITIES: NO
TOILETING: INDEPENDENT
WALKS IN HOME: INDEPENDENT
LACK_OF_TRANSPORTATION: NO
PATIENT'S MEMORY ADEQUATE TO SAFELY COMPLETE DAILY ACTIVITIES?: NO
DRESSING YOURSELF: INDEPENDENT
BATHING: INDEPENDENT
LACK_OF_TRANSPORTATION: NO
FEEDING YOURSELF: INDEPENDENT
HEARING - LEFT EAR: FUNCTIONAL
GROOMING: INDEPENDENT

## 2024-10-20 ASSESSMENT — COGNITIVE AND FUNCTIONAL STATUS - GENERAL
CLIMB 3 TO 5 STEPS WITH RAILING: A LOT
WALKING IN HOSPITAL ROOM: A LOT
STANDING UP FROM CHAIR USING ARMS: A LOT
WALKING IN HOSPITAL ROOM: A LOT
HELP NEEDED FOR BATHING: A LITTLE
CLIMB 3 TO 5 STEPS WITH RAILING: A LOT
DRESSING REGULAR UPPER BODY CLOTHING: A LITTLE
MOBILITY SCORE: 15
MOVING TO AND FROM BED TO CHAIR: A LITTLE
PERSONAL GROOMING: A LITTLE
TOILETING: A LITTLE
DAILY ACTIVITIY SCORE: 19
HELP NEEDED FOR BATHING: A LITTLE
DRESSING REGULAR UPPER BODY CLOTHING: A LITTLE
DRESSING REGULAR UPPER BODY CLOTHING: A LITTLE
MOVING TO AND FROM BED TO CHAIR: A LITTLE
MOVING FROM LYING ON BACK TO SITTING ON SIDE OF FLAT BED WITH BEDRAILS: A LITTLE
MOBILITY SCORE: 15
DAILY ACTIVITIY SCORE: 19
MOBILITY SCORE: 15
TURNING FROM BACK TO SIDE WHILE IN FLAT BAD: A LITTLE
MOVING FROM LYING ON BACK TO SITTING ON SIDE OF FLAT BED WITH BEDRAILS: A LITTLE
PERSONAL GROOMING: A LITTLE
STANDING UP FROM CHAIR USING ARMS: A LOT
DRESSING REGULAR LOWER BODY CLOTHING: A LITTLE
HELP NEEDED FOR BATHING: A LITTLE
PERSONAL GROOMING: A LITTLE
MOVING TO AND FROM BED TO CHAIR: A LITTLE
DRESSING REGULAR LOWER BODY CLOTHING: A LITTLE
DAILY ACTIVITIY SCORE: 19
PATIENT BASELINE BEDBOUND: NO
TOILETING: A LITTLE
WALKING IN HOSPITAL ROOM: A LOT
TURNING FROM BACK TO SIDE WHILE IN FLAT BAD: A LITTLE
STANDING UP FROM CHAIR USING ARMS: A LOT
TURNING FROM BACK TO SIDE WHILE IN FLAT BAD: A LITTLE
TOILETING: A LITTLE
DRESSING REGULAR LOWER BODY CLOTHING: A LITTLE
CLIMB 3 TO 5 STEPS WITH RAILING: A LOT
MOVING FROM LYING ON BACK TO SITTING ON SIDE OF FLAT BED WITH BEDRAILS: A LITTLE

## 2024-10-20 ASSESSMENT — LIFESTYLE VARIABLES
SKIP TO QUESTIONS 9-10: 1
AUDIT-C TOTAL SCORE: 1
HOW OFTEN DO YOU HAVE A DRINK CONTAINING ALCOHOL: MONTHLY OR LESS
AUDIT-C TOTAL SCORE: 1
HOW MANY STANDARD DRINKS CONTAINING ALCOHOL DO YOU HAVE ON A TYPICAL DAY: 1 OR 2
AUDIT-C TOTAL SCORE: 1
HOW OFTEN DO YOU HAVE 6 OR MORE DRINKS ON ONE OCCASION: NEVER
SKIP TO QUESTIONS 9-10: 1

## 2024-10-20 ASSESSMENT — ENCOUNTER SYMPTOMS
EYES NEGATIVE: 1
CONFUSION: 1
ACTIVITY CHANGE: 1
ENDOCRINE NEGATIVE: 1
FATIGUE: 1
DIAPHORESIS: 1
APPETITE CHANGE: 1
DIFFICULTY URINATING: 1
WEAKNESS: 1
GASTROINTESTINAL NEGATIVE: 1
ALLERGIC/IMMUNOLOGIC NEGATIVE: 1
HEMATOLOGIC/LYMPHATIC NEGATIVE: 1
RESPIRATORY NEGATIVE: 1
AGITATION: 1
CHILLS: 1
FEVER: 1
CARDIOVASCULAR NEGATIVE: 1

## 2024-10-20 ASSESSMENT — PATIENT HEALTH QUESTIONNAIRE - PHQ9
SUM OF ALL RESPONSES TO PHQ9 QUESTIONS 1 & 2: 0
1. LITTLE INTEREST OR PLEASURE IN DOING THINGS: NOT AT ALL
2. FEELING DOWN, DEPRESSED OR HOPELESS: NOT AT ALL

## 2024-10-20 ASSESSMENT — PAIN SCALES - GENERAL
PAINLEVEL_OUTOF10: 0 - NO PAIN

## 2024-10-20 ASSESSMENT — PAIN - FUNCTIONAL ASSESSMENT
PAIN_FUNCTIONAL_ASSESSMENT: 0-10

## 2024-10-20 ASSESSMENT — PAIN SCALES - WONG BAKER: WONGBAKER_NUMERICALRESPONSE: NO HURT

## 2024-10-20 NOTE — ED TRIAGE NOTES
Patient presents by EMS from home reporting fever, fatigue and burning with urination. Upon initial assessment patient has indwelling llamas catheter in place with copious amount of green drainage at urethra. Patient states he has been unable to empty llamas bag, which is near-bursting on arrival. RN emptied malodorous, cloudy belinda urine  on arrival with sediment. Patient states catheter has been in for several weeks. Denies nausea/vomiting/diarrhea or any sick contacts

## 2024-10-20 NOTE — CARE PLAN
The patient's goals for the shift include      The clinical goals for the shift include comfort and safety

## 2024-10-20 NOTE — CONSULTS
Neurological Surgery  Consult Note      Referral Diagnosis:   1. Sepsis, due to unspecified organism, unspecified whether acute organ dysfunction present (Multi)        2. Acute UTI        3. Altered mental status, unspecified altered mental status type         Acute UTI [N39.0]  Altered mental status, unspecified altered mental status type [R41.82]  Sepsis, due to unspecified organism, unspecified whether acute organ dysfunction present (Multi) [A41.9]    Consulting Physician: Briseida Lovelace DO    History Of Present Illness  Wade Machado is a 69 y.o. male who presented to the emergency department yesterday for evaluation of generalized weakness, fever and confusion.  He was accompanied by his wife, who provided most of the history at that time.  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  He also has a history of a pontine hemorrhage, for which she was hospitalized at [facility] in August 2024.  His UA was consistent with recurrent UTI, and he has been started on ceftriaxone with improvement in his mental status.  As part of his workup, he also underwent a CT of the head without contrast on October 20, 2024.  I personally reviewed and interpreted the study.  This shows a stable hemorrhagic lesion within the carmen.  There is also stable enlargement of the lateral and third ventricles when compared to the prior studies in August.  At this time, the patient denies any headaches, double or blurry vision, new onset weakness or sensory changes.  He states that he has a history of some numbness and tingling in his hands and feet that he thinks may be due to neuropathy.    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 (at [facility])    JOINT REPLACEMENT      OTHER SURGICAL HISTORY  05/09/2022    Retinal detachment repair    OTHER SURGICAL HISTORY  05/09/2022    Cataract surgery       Social History  Social History     Tobacco Use    Smoking status: Every Day     Current packs/day: 1.00     Average packs/day: 1 pack/day for 51.8 years (51.8 ttl pk-yrs)     Types: Cigarettes     Start date: 1973    Smokeless tobacco: Never   Substance Use Topics    Alcohol use: Yes     Alcohol/week: 3.0 standard drinks of alcohol     Types: 3 Standard drinks or equivalent per week     Comment: 12 pack weekly, now down to 2-3 drinks per week    Drug use: Yes     Frequency: 1.0 times per week     Types: Marijuana       Allergies  Patient has no known allergies.  Medications Prior to Admission   Medication Sig Dispense Refill Last Dose/Taking    lisinopril 40 mg tablet TAKE ONE TABLET BY MOUTH once DAILY for blood pressure 90 tablet 3 10/19/2024    metoprolol succinate XL (Toprol-XL) 25 mg 24 hr tablet 1 tablet (25 mg).   10/19/2024    acetaminophen (Tylenol) 325 mg tablet Take 1-2 tablets (325-650 mg) by mouth every 4 hours if needed for mild pain (1 - 3).   Unknown    ascorbic acid, vitamin C, 100 mg chewable tablet Chew 4 tablets (400 mg) once daily.   10/17/2024    docusate sodium (Colace) 100 mg capsule Take 1 capsule (100 mg) by mouth once daily.   10/17/2024    finasteride (Proscar) 5 mg tablet Take 1 tablet (5 mg) by mouth once daily.   10/17/2024    tamsulosin (Flomax) 0.4 mg 24 hr capsule Take 1 capsule (0.4 mg) by mouth 2 times a day.   10/18/2024       Review of Systems  14/14 systems reviewed and negative other than what is listed in the history of present illness    Physical Exam  General: well developed, awake/alert/oriented x3, no distress, mild cognitive slowing, cooperative  Head/Neck: neck Supple, no apparent injury  ENMT: mucous membranes  "moist, no apparent injury, no lesions seen  Cardiovascular: no pitting edema, no JVD  Respiratory/Thorax: Normal breath sounds with good chest expansion, thorax symmetric  Skin: warm and dry, no lesions, no rashes    Neurological/Musculoskeletal:    - Motor Strength: 5/5 throughout all extremities, no drift    - Sensation: Subjectively decreased in the hands and feet      Last Recorded Vitals  Blood pressure 126/66, pulse 78, temperature 36.8 °C (98.2 °F), resp. rate 16, height 1.803 m (5' 11\"), weight 105 kg (231 lb 8 oz), SpO2 96%.    Relevant Results  Scheduled medications  cefepime, 2 g, intravenous, q12h  [Held by provider] enoxaparin, 40 mg, subcutaneous, q24h  finasteride, 5 mg, oral, Daily  lisinopril, 40 mg, oral, Daily  magnesium sulfate, 2 g, intravenous, Once  [Held by provider] metoprolol succinate XL, 25 mg, oral, Daily  polyethylene glycol, 17 g, oral, BID  sennosides-docusate sodium, 2 tablet, oral, BID  tamsulosin, 0.4 mg, oral, Daily      Continuous medications     PRN medications  PRN medications: acetaminophen, bisacodyl, melatonin    Results for orders placed or performed during the hospital encounter of 10/19/24 (from the past 96 hours)   CBC and Auto Differential   Result Value Ref Range    WBC 10.1 4.4 - 11.3 x10*3/uL    nRBC 0.0 0.0 - 0.0 /100 WBCs    RBC 4.62 4.50 - 5.90 x10*6/uL    Hemoglobin 13.7 13.5 - 17.5 g/dL    Hematocrit 40.7 (L) 41.0 - 52.0 %    MCV 88 80 - 100 fL    MCH 29.7 26.0 - 34.0 pg    MCHC 33.7 32.0 - 36.0 g/dL    RDW 17.0 (H) 11.5 - 14.5 %    Platelets 306 150 - 450 x10*3/uL    Neutrophils % 76.6 40.0 - 80.0 %    Immature Granulocytes %, Automated 1.1 (H) 0.0 - 0.9 %    Lymphocytes % 12.0 13.0 - 44.0 %    Monocytes % 9.6 2.0 - 10.0 %    Eosinophils % 0.2 0.0 - 6.0 %    Basophils % 0.5 0.0 - 2.0 %    Neutrophils Absolute 7.76 (H) 1.20 - 7.70 x10*3/uL    Immature Granulocytes Absolute, Automated 0.11 0.00 - 0.70 x10*3/uL    Lymphocytes Absolute 1.22 1.20 - 4.80 x10*3/uL    " Monocytes Absolute 0.97 0.10 - 1.00 x10*3/uL    Eosinophils Absolute 0.02 0.00 - 0.70 x10*3/uL    Basophils Absolute 0.05 0.00 - 0.10 x10*3/uL   Comprehensive Metabolic Panel   Result Value Ref Range    Glucose 113 (H) 74 - 99 mg/dL    Sodium 128 (L) 136 - 145 mmol/L    Potassium 3.9 3.5 - 5.3 mmol/L    Chloride 94 (L) 98 - 107 mmol/L    Bicarbonate 22 21 - 32 mmol/L    Anion Gap 16 10 - 20 mmol/L    Urea Nitrogen 10 6 - 23 mg/dL    Creatinine 0.67 0.50 - 1.30 mg/dL    eGFR >90 >60 mL/min/1.73m*2    Calcium 8.9 8.6 - 10.3 mg/dL    Albumin 3.5 3.4 - 5.0 g/dL    Alkaline Phosphatase 101 33 - 136 U/L    Total Protein 7.7 6.4 - 8.2 g/dL    AST 16 9 - 39 U/L    Bilirubin, Total 0.4 0.0 - 1.2 mg/dL    ALT 19 10 - 52 U/L   Lactate   Result Value Ref Range    Lactate 1.6 0.4 - 2.0 mmol/L   Blood Culture    Specimen: Peripheral Venipuncture; Blood culture   Result Value Ref Range    Blood Culture Loaded on Instrument - Culture in progress    Urinalysis with Reflex Culture and Microscopic   Result Value Ref Range    Color, Urine Yellow Straw, Yellow    Appearance, Urine Cloudy (N) Clear    Specific Gravity, Urine 1.025 1.005 - 1.035    pH, Urine 6.5 5.0, 5.5, 6.0, 6.5, 7.0, 7.5, 8.0    Protein, Urine 300 (3+) (A) NEGATIVE, 10 (TRACE) mg/dL    Glucose, Urine NEGATIVE NEGATIVE mg/dL    Blood, Urine 0.2 (2+) (A) NEGATIVE    Ketones, Urine NEGATIVE NEGATIVE mg/dL    Bilirubin, Urine NEGATIVE NEGATIVE    Urobilinogen, Urine NORM NORM mg/dL    Nitrite, Urine NEGATIVE NEGATIVE    Leukocyte Esterase, Urine 500 Fortunato/µL (A) NEGATIVE   Extra Urine Gray Tube   Result Value Ref Range    Extra Tube Hold for add-ons.    Microscopic Only, Urine   Result Value Ref Range    WBC, Urine >50 (A) 1-5, NONE /HPF    RBC, Urine 6-10 (A) NONE, 1-2, 3-5 /HPF    Transitional Epithelial Cells, Urine 1-2 (FEW) Reference range not established. /HPF    Bacteria, Urine 1+ (A) NONE SEEN /HPF    Mucus, Urine 1+ Reference range not established. /LPF    Hyaline  Casts, Urine OCCASIONAL (A) NONE /LPF   Blood Culture    Specimen: Peripheral Venipuncture; Blood culture   Result Value Ref Range    Blood Culture Loaded on Instrument - Culture in progress    CBC   Result Value Ref Range    WBC 8.6 4.4 - 11.3 x10*3/uL    nRBC 0.0 0.0 - 0.0 /100 WBCs    RBC 4.34 (L) 4.50 - 5.90 x10*6/uL    Hemoglobin 12.7 (L) 13.5 - 17.5 g/dL    Hematocrit 39.1 (L) 41.0 - 52.0 %    MCV 90 80 - 100 fL    MCH 29.3 26.0 - 34.0 pg    MCHC 32.5 32.0 - 36.0 g/dL    RDW 16.9 (H) 11.5 - 14.5 %    Platelets 298 150 - 450 x10*3/uL   Renal Function Panel   Result Value Ref Range    Glucose 91 74 - 99 mg/dL    Sodium 128 (L) 136 - 145 mmol/L    Potassium 3.9 3.5 - 5.3 mmol/L    Chloride 96 (L) 98 - 107 mmol/L    Bicarbonate 21 21 - 32 mmol/L    Anion Gap 15 10 - 20 mmol/L    Urea Nitrogen 9 6 - 23 mg/dL    Creatinine 0.65 0.50 - 1.30 mg/dL    eGFR >90 >60 mL/min/1.73m*2    Calcium 8.4 (L) 8.6 - 10.3 mg/dL    Phosphorus 3.9 2.5 - 4.9 mg/dL    Albumin 3.4 3.4 - 5.0 g/dL   Magnesium   Result Value Ref Range    Magnesium 1.77 1.60 - 2.40 mg/dL         Intake/Output Summary (Last 24 hours) at 10/20/2024 1534  Last data filed at 10/20/2024 1110  Gross per 24 hour   Intake 649.5 ml   Output 900 ml   Net -250.5 ml       Imaging  See HPI    Assessment and Plan  Assessment/Plan     Assessment & Plan  Sepsis, due to unspecified organism, unspecified whether acute organ dysfunction present (Multi)      The patient is a 69-year-old male who presented to the emergency department yesterday with altered mental status, fever and generalized weakness.  He was found to have a recurrent UTI and was started on ceftriaxone with improvement in his mental status.  He denies any new complaints at this time.  His head CT shows a stable hemorrhagic lesion within the carmen as well as stable enlargement of the lateral and third ventricles.  He has some cognitive slowing, which may be due to infection but also could be related to  hydrocephalus.  Fortunately, he has stable ventriculomegaly.  There is no indication for neurosurgical intervention at this time.  The patient should follow-up in neurosurgery clinic with Dr. Doroteo Navarro for further evaluation and potential management of hydrocephalus.         Attestation  I have reviewed all prior documentation and reviewed the electronic medical record since admission. I have personally have reviewed all advanced imaging not just the reports and used my interpretation as documented as the relevant findings. I have reviewed the risks and benefits of all treatment recommendations listed in this note with the patient and family. I spent a total of 35 minutes in service to this patient's care during this date of service.    Signature    Sean More MD PhD  Attending Neurosurgeon  [facility]   of Neurological Surgery  [facility] [employer]  Office: [phone]  Fax: [phone]

## 2024-10-20 NOTE — PROGRESS NOTES
"  Wade Machado is a 69 y.o. male on day 0 of admission presenting with Sepsis, due to unspecified organism, unspecified whether acute organ dysfunction present (Multi).      Assessment / Plan        Wade Machado is a 69-year-old male with PMHx of colovesical fistula and recurrent UTIs who presented on 10/20 for weakness, fever, and confusion.  He is being treated for UTI and weakness.    #UTI, in setting of chronic indwelling llamas  #Cystitis/urethritis  #Acute metabolic encephalopathy 2/2 above-resolved  Plan:  -Patient received Rocephin in ED, this was changed to cefepime 2g q12h based off past urine cultures.  Urine culture pending, de-escalate antibiotic regimen appropriately.  Llamas was replaced on admission.  - On examination this morning patient's mentation is notably improved compared to history provided per his wife.  He claims he does not understand why he was brought to the hospital in the first place and does not believe that he was confused.  - Continue Tylenol for fever.    #Chronic hyponatremia, suspected 2/2 poor oral intake  Plan:  -Patient does not exhibit any symptoms of hyponatremia at this time.  Continue to monitor for neurological manifestations.  - Patient states that he does not have much of an appetite, and believes that it has been reduced for \"a few months.\" Per the patient's wife, he did not eat or drink anything the day prior to admission. Continue to monitor Na, consider nutrition consult.    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  Neurosurgery consulted, appreciate recommendations.    #Constipation  Plan:  -Patient states that he has not had a bowel movement in a few days.  He also reports that his bowel movements used to be regular but now occur more variably.  This may be due " "in part to his poor oral intake.  - Scheduled Miralax and Dulcolax tablet PRN added.    #Weakness  Plan:  -PT/OT eval ordered.  Patient was also receiving PT and OT at home before this admission.      Chronic Conditions  #HTN  #BPH with urinary retention  #Indwelling Moeller  #Colovesicular fistula  #Tobacco abuse  #Prostate cancer s/p brachytherapy  Plan:  -Continue lisinopril, metoprolol held.  - Patient has a fistula repair scheduled for 11/2024, primary team will reach out to surgeon regarding current admission.  - Continue tamsulosin and finasteride.      DVT ppx: Lovenox held due to stable pontine hemorrhage  IVF: -  Diet: Adult regular  Code: Full code  Consults: Neurosurgery       Silviano Disla MD   PGY-1, Internal Medicine  This is a preliminary note, please await attending attestation for final A/P    Subjective     Patient seen and examined. No acute overnight events.  Patient is more alert and oriented on examination this morning. However, he is uncertain why he was brought here.      Objective       Physical Exam:  General:  Pleasant and cooperative. No apparent distress.  HEENT:  Normocephalic, atraumatic  Chest:  Clear to auscultation bilaterally. No wheezes, rales, or rhonchi.  CV:  Regular rate and rhythm. No murmurs    Abdomen: Abdomen is soft, non-tender, non-distended. BS +   Extremities:  No lower extremity edema or cyanosis.   Neurological:  AAOx3. No focal deficits.  Skin:  Warm and dry.    Last Recorded Vitals  Blood pressure 126/66, pulse 78, temperature 36.8 °C (98.2 °F), resp. rate 16, height 1.778 m (5' 10\"), weight 113 kg (250 lb), SpO2 96%.  Intake/Output last 3 Shifts:  I/O last 3 completed shifts:  In: 549.5 (4.8 mL/kg) [IV Piggyback:549.5]  Out: - (0 mL/kg)   Weight: 113.4 kg     Last CBC & BMP  Lab Results   Component Value Date    GLUCOSE 91 10/20/2024    CALCIUM 8.4 (L) 10/20/2024     (L) 10/20/2024    K 3.9 10/20/2024    CO2 21 10/20/2024    CL 96 (L) 10/20/2024    BUN 9 " 10/20/2024    CREATININE 0.65 10/20/2024     Lab Results   Component Value Date    WBC 8.6 10/20/2024    HGB 12.7 (L) 10/20/2024    HCT 39.1 (L) 10/20/2024    MCV 90 10/20/2024     10/20/2024

## 2024-10-20 NOTE — H&P
Chief Complaint   Patient presents with    Fever    Fatigue    Difficulty Urinating       HPI    Wade Machado is a 69 y.o. 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  The patient is accompanied by his wife, which provides most of the history as the patient was confused earlier.  She stated that she went to work in the morning and had an event afterwards, he was away from home for 12 hours, when she came back, her  was weak, was not able to ambulate, warm and took his shirt off. He told her that he didn't eat or drink anything the whole day. He was also confused, wasn't able to answer her questions.  She stated that he was having mild fever.  He was also noticed leaking of urine around his urinary catheter.  He denies any headaches, change in vision, difficulty swallowing, change in hearing,  chest pain, SOB, palpitation, cough, abdominal pain, weight change, change in bowel habits, joint pain/swelling, insomnia or any symptoms of depression.  He lives with his wife  and feels safe at home. He still smokes 1-1.5 PPD. According to his wife, he didn't take his meds in the last 2 days.  In the ED, he was febrile, disoriented, there was thick purulent urine in the cath    ROS: 10 point review of systems negative with the exception of above.    ED Course:  Patient presenting for evaluation of generalized weakness and altered mental status. Patient is confused today. Patient is significant drainage around his Moeller catheter. Likely source given appearance of the Moeller catheter and discharge from the urethra.  Septic protocol started in the ED including broad-spectrum antibiotics. Patient does have a history of previous pontine mass versus intracranial hemorrhage. CT repeated and unchanged. No new focal neurodeficit. Will continue antibiotics. Patient admitted for further evaluation   ED Triage Vitals   Temperature Heart Rate Respirations BP   10/19/24 2310 10/19/24 2310 10/19/24 2310 10/19/24 2310   (!) 38.1 °C (100.6 °F) 90 20 135/73      Pulse Ox Temp Source Heart Rate Source Patient Position   10/19/24 2310 10/20/24 0015 -- --   (!) 93 % Temporal        BP Location FiO2 (%)     -- --               Labs:  Abnormal Labs Reviewed   CBC WITH AUTO DIFFERENTIAL - Abnormal; Notable for the following components:       Result Value    Hematocrit 40.7 (*)     RDW 17.0 (*)     Immature Granulocytes %, Automated 1.1 (*)     Neutrophils Absolute 7.76 (*)     All other components within normal limits   COMPREHENSIVE METABOLIC PANEL - Abnormal; Notable for the following components:    Glucose 113 (*)     Sodium 128 (*)     Chloride 94 (*)     All other components within normal limits   URINALYSIS WITH REFLEX CULTURE AND MICROSCOPIC - Abnormal; Notable for the following components:    Appearance, Urine Cloudy (*)     Protein, Urine 300 (3+) (*)     Blood, Urine 0.2 (2+) (*)     Leukocyte Esterase, Urine 500 Fortunato/µL (*)     All other components within normal limits   MICROSCOPIC ONLY, URINE - Abnormal; Notable for the following components:    WBC, Urine >50 (*)     RBC, Urine 6-10 (*)     Bacteria, Urine 1+ (*)     Hyaline Casts, Urine OCCASIONAL (*)     All other components within normal limits        No orders to display       CT head wo IV contrast   Final Result   No acute intracranial abnormality.        Stable hyperdense pontine mass.        Stable prominence of the lateral and 3rd ventricles, out of   proportion to sulcal prominence, suggesting disproportionate central   atrophy or communicating hydrocephalus.        MACRO:    None        Signed by: Mirian Hernandez 10/20/2024 1:54 AM   Dictation workstation:   [id number]      XR chest 1 view   Final Result   Normal AP chest.   Signed by Emory Sánchez MD      CT abdomen pelvis w IV contrast    (Results Pending)     CT head wo IV contrast   Final Result   No acute intracranial abnormality.        Stable hyperdense pontine mass.        Stable prominence of the lateral and 3rd ventricles, out of   proportion to sulcal prominence, suggesting disproportionate central   atrophy or communicating hydrocephalus.        MACRO:   None        Signed by: Mirian Hernandez 10/20/2024 1:54 AM   Dictation workstation:   [id number]      XR chest 1 view   Final Result   Normal AP chest.   Signed by Emory Sánchez MD              Intervention: In ED, patient received   Medications   enoxaparin (Lovenox) syringe 40 mg (has no administration in time range)   polyethylene glycol (Glycolax, Miralax) packet 17 g (has no administration in time range)   cefTRIAXone (Rocephin) 2 g in dextrose (iso) IV 50 mL (0 g intravenous Stopped 10/20/24 0009)   lidocaine 2 % mucosal jelly (Uro-Jet) 1 Application (1 Application urethral Given 10/19/24 2354)   fentaNYL PF (Sublimaze) injection 50 mcg (50 mcg intravenous Given 10/20/24 0003)   acetaminophen (Tylenol) tablet 975 mg (975 mg oral Given 10/20/24 0003)   sodium chloride 0.9 % bolus 500 mL (0 mL intravenous Stopped 10/20/24 0044)   morphine injection 4 mg (4 mg intravenous Given 10/20/24 0124)   iohexol (OMNIPaque) 350 mg iodine/mL solution 75 mL (75 mL intravenous Given 10/20/24 0312)      Patient was then transferred to the floor for further management      Meds:   Modified Medications    No medications on file       Follows up with Dr. 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  Social History     Tobacco Use: High Risk (9/18/2024)    Patient History     Smoking Tobacco Use: Every Day     Smokeless Tobacco Use: Never     Passive Exposure: Not on file      Social History     Substance and Sexual Activity   Alcohol Use Yes    Alcohol/week: 3.0 standard drinks of alcohol    Types: 3 Standard drinks or equivalent per week    Comment: 12 pack weekly, now down to 2-3 drinks per week      Allergies   No Known Allergies   Meds    Scheduled medications  enoxaparin, 40 mg, subcutaneous, q24h      Continuous medications     PRN medications  PRN medications: polyethylene glycol   Objective     Vitals  Visit Vitals  /58   Pulse 74   Temp 37.1 °C (98.8 °F) (Temporal)   Resp 20   Ht 1.778 m (5' 10\")   Wt 113 kg (250 lb)   SpO2 95%   BMI 35.87 kg/m²   Smoking Status Every Day   BSA 2.36 m²        Review of Systems   Constitutional:  Positive for activity change, appetite change, chills, diaphoresis, fatigue and fever.   HENT: Negative.     Eyes: Negative.    Respiratory: Negative.     Cardiovascular: Negative.    Gastrointestinal: Negative.    Endocrine: Negative.    Genitourinary:  Positive for difficulty urinating and penile discharge.   Musculoskeletal:  Positive for gait problem.   Skin: Negative.    Allergic/Immunologic: Negative.    Neurological:  Positive for weakness.   Hematological: Negative.    Psychiatric/Behavioral:  Positive for agitation and confusion.      Temperature:  [37.1 °C (98.8 °F)-38.1 °C (100.6 °F)] 37.1 °C (98.8 °F)  Heart Rate:  [74-91] 74  Respirations:  [20] 20  BP: (105-135)/(58-73) 105/58  No " intake/output data recorded.  I/O this shift:  In: 549.5 [IV Piggyback:549.5]  Out: -   Physical Exam  Constitutional:       General: He is not in acute distress.     Appearance: He is obese. He is ill-appearing.   HENT:      Head: Normocephalic and atraumatic.      Nose: Nose normal.      Mouth/Throat:      Mouth: Mucous membranes are moist.      Pharynx: Oropharynx is clear.   Eyes:      Extraocular Movements: Extraocular movements intact.      Conjunctiva/sclera: Conjunctivae normal.      Pupils: Pupils are equal, round, and reactive to light.   Cardiovascular:      Rate and Rhythm: Normal rate and regular rhythm.      Pulses: Normal pulses.      Heart sounds: Normal heart sounds.   Pulmonary:      Effort: Pulmonary effort is normal. No respiratory distress.      Breath sounds: Normal breath sounds.   Abdominal:      General: Abdomen is flat. Bowel sounds are normal.      Palpations: Abdomen is soft.   Genitourinary:     Comments: Moeller in place  Musculoskeletal:         General: No swelling or tenderness. Normal range of motion.      Cervical back: Normal range of motion and neck supple.   Skin:     General: Skin is warm and dry.   Neurological:      General: No focal deficit present.      Mental Status: He is alert and oriented to person, place, and time. Mental status is at baseline.   Psychiatric:      Comments: irritated       Principal Problem:    Sepsis, due to unspecified organism, unspecified whether acute organ dysfunction present (Multi)          I/Os    Intake/Output Summary (Last 24 hours) at 10/20/2024 0313  Last data filed at 10/20/2024 0044  Gross per 24 hour   Intake 549.5 ml   Output --   Net 549.5 ml         Labs:   Results from last 72 hours   Lab Units 10/19/24  2333   SODIUM mmol/L 128*   POTASSIUM mmol/L 3.9   CHLORIDE mmol/L 94*   CO2 mmol/L 22   BUN mg/dL 10   CREATININE mg/dL 0.67   GLUCOSE mg/dL 113*   CALCIUM mg/dL 8.9   ANION GAP mmol/L 16   EGFR mL/min/1.73m*2 >90      Results from  "last 72 hours   Lab Units 10/19/24  2333   WBC AUTO x10*3/uL 10.1   HEMOGLOBIN g/dL 13.7   HEMATOCRIT % 40.7*   PLATELETS AUTO x10*3/uL 306   NEUTROS PCT AUTO % 76.6   LYMPHS PCT AUTO % 12.0   MONOS PCT AUTO % 9.6   EOS PCT AUTO % 0.2      Lab Results   Component Value Date    CALCIUM 8.9 10/19/2024    PHOS 3.2 08/14/2024      Lab Results   Component Value Date    CRP 20.88 (H) 08/09/2024      [unfilled]     Micro/ID:   Susceptibility data from last 90 days.  Collected Specimen Info Organism Amoxicillin/Clavulanate Ampicillin Ampicillin/Sulbactam Cefazolin Cefazolin (uncomplicated UTIs only) Cefepime Ciprofloxacin Gentamicin Nitrofurantoin Piperacillin/Tazobactam   09/23/24 Urine from Indwelling (Moeller) Catheter Escherichia coli   S   S  S   S  S  S  S   09/18/24 Urine from Indwelling (Moeller) Catheter Enterobacter cloacae complex  R  R  R  R   S  S  S  S  S     Collected Specimen Info Organism Trimethoprim/Sulfamethoxazole   09/23/24 Urine from Indwelling (Moeller) Catheter Escherichia coli  S   09/18/24 Urine from Indwelling (Moeller) Catheter Enterobacter cloacae complex  S                    No lab exists for component: \"AGALPCRNB\"   .ID  Lab Results   Component Value Date    URINECULTURE >100,000 Escherichia coli (A) 09/23/2024    BLOODCULT No growth at 4 days -  FINAL REPORT 08/08/2024    BLOODCULT No growth at 4 days -  FINAL REPORT 08/08/2024     Images    CT head wo IV contrast  Narrative: Interpreted By:  Mirian Hernandez,   STUDY:  CT HEAD WO IV CONTRAST;  10/20/2024 12:39 am      INDICATION:  Signs/Symptoms:ams.      COMPARISON:  Head CT 08/09/2024, MRI brain 08/09/2024      ACCESSION NUMBER(S):  [id number]      ORDERING CLINICIAN:  ADOLFO MEDELLIN      TECHNIQUE:  Axial noncontrast CT images of the head.      FINDINGS:  BRAIN PARENCHYMA: Stable hyperdense mass in the dorsal carmen measuring  2 x 1.6 x 12 cm. Gray-white matter interfaces are preserved. No mass  effect or midline shift.      HEMORRHAGE: No " acute intracranial hemorrhage.  VENTRICLES and EXTRA-AXIAL SPACES: Stable enlargement of the lateral  and 3rd ventricles, out of proportion to sulcal prominence. No  abnormal extraaxial fluid collection. EXTRACRANIAL SOFT TISSUES:  Within normal limits. PARANASAL SINUSES/MASTOIDS: Chronic  opacification of the right maxillary sinus. The remainder of the  visualized paranasal sinuses and mastoid air cells are aerated.  CALVARIUM: No depressed skull fracture. Right frontal craniotomy.      OTHER FINDINGS: None.      Impression: No acute intracranial abnormality.      Stable hyperdense pontine mass.      Stable prominence of the lateral and 3rd ventricles, out of  proportion to sulcal prominence, suggesting disproportionate central  atrophy or communicating hydrocephalus.      MACRO:  None      Signed by: Mirian Hernandez 10/20/2024 1:54 AM  Dictation workstation:   [id number]  XR chest 1 view  Narrative: INDICATION:  Altered mental status.  COMPARISON:  5/3/2024.  TECHNIQUE: AP chest, 23:30 hours.  FINDINGS: The heart and mediastinum are normal.  The lungs are without  infiltrates, masses or pulmonary vascular congestion.  No pneumothorax  or pleural effusion are seen. No acute osseous changes.  Impression: Normal AP chest.  Signed by Emory Sánchez MD    Assessment and Plan    Wade Machado is a 69 y.o. male admitted for UTI     Acute Medical Issues   #UTI  #Cystitis/Ureteritis:  -Patient presented with fever, urinary leakage around catheter, thick urine as well as confusion.  -CT is indicative of cystitis as well as ureteritis, but no pyelonephritis,  LE,>50 WBCs, .  -Patient with a known history of recurrent UTI, last culture was showing pansensitive E. Coli, colovesical fistula.   -Will continue ceftriaxone, follow-up on cultures and adjust antibiotics accordingly, Tylenol for fever.    #Hyponatremia:  -On admission, , likely secondary to decreased intake.  -At the time of examination the patient was  neurologically intact.  -Will continue to monitor, patient received 500 cc NS in the ED, repeat RFP; pending.    #Acute metabolic encephalopathy(resolved):  -Patient was disoriented to his wife and the ED provider, he received ceftriaxone and 500 cc NS.  -On my examination he was AOx3.  -Will continue to monitor avoid benzos, treatment of infection.    #Weakness:  -PT/OT when possible.  -Patient is getting PT OT at home before admission.      Chronic Medical Issues   # HTN:  -Continue lisinopril 40 mg, unclear if he was taking metoprolol 25 mg or not(temporarily on hold) .    #BPH with urinary retention  #Indwelling Moeller  #Colovesical fistula  - fistula repair in Nov 2024 with colorectal surgery (Dr. Mcintyre)   - Moeller changed in the ED  - Continue tamsulosin and finasteride.    #Tobacco abuse  -Counseling the patient about the importance of quitting and risk factors of smoking.  -Patient denied nicotine patch.    #Prostate cancer:  -Refused to medical oncology following his surgery.    #Pontine hemorrhage:  -Stable, recommendation related to avoid blood thinners and to control blood pressure.      F: PO intake & IVF PRN  E: Replete as needed  N: Regular diet  GI ppx: None  DVT ppx: Lovenox subcutaneous on hold due to stable pontine hemorrhage.  Antibiotics: Ceftriaxone  Oxygenation: RA    Code Status: Full Code   Emergency Contact: Extended Emergency Contact Information  Primary Emergency Contact: TEVIN GUTIÉRREZ  Address: [address] [place] [address]            [place] [address] [place]  Home Phone: [phone]  Mobile Phone: [phone]  Relation: Spouse     Disposition: 69 y.o.male admitted for UTI and acute metabolic encephalopathy(resolved) anticipate LOS < 2 midnights.         Shannan Espinal  Internal Medicine, Hospitalist   [facility]

## 2024-10-20 NOTE — PROGRESS NOTES
10/20/24 1202   Discharge Planning   Living Arrangements Spouse/significant other   Support Systems Spouse/significant other;Family members   Type of Residence Private residence   Number of Stairs to Enter Residence 3   Number of Stairs Within Residence 0   Do you have animals or pets at home? No   Expected Discharge Disposition Home     LSW met with the PT and his wife Phuong at bedside. PT is alert and oriented x2. LSW made introduction and explained role. PT resides in the home with his spouse, Phuong. PT is reported to be very independent. PT states he has access wo both a cane and walker but ambulates without devices. No reported fall history. Additional supports include his daughter, Hillary and 13 y/o grandson, Chuy. PT is followed by  providers in the community. His last PCP visit took place last month with an upcoming scheduled appointment in January. In addition the PT is engaged with a prostate and colon specialist. PT's plans are to dispo home. No needs reported at this time.

## 2024-10-20 NOTE — ED PROVIDER NOTES
HPI   Chief Complaint   Patient presents with    Fever    Fatigue    Difficulty Urinating       Patient presented for evaluation of fatigue and weakness.  Patient's wife indicates that he is having difficulty standing earlier today.  She notes that he appears increasingly fatigued.  Patient has had decreased appetite.  Patient is febrile in the ED.  Patient is disoriented.  Patient's wife indicates that is new for the patient.  Patient cannot indicate as to why there is a Moeller catheter in place.      History provided by:  Patient and spouse  History limited by:  Mental status change          Patient History   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    Prostate enlargement     Unspecified osteoarthritis, unspecified site     Osteoarthritis     Past Surgical History:   Procedure Laterality Date    BRAIN SURGERY  2005    third 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 (at [facility])    JOINT REPLACEMENT      OTHER SURGICAL HISTORY  05/09/2022    Retinal detachment repair    OTHER SURGICAL HISTORY  05/09/2022    Cataract surgery     No family history on file.  Social History     Tobacco Use    Smoking status: Every Day     Current packs/day: 1.00     Average packs/day: 1 pack/day for 51.8 years (51.8 ttl pk-yrs)     Types: Cigarettes     Start date: 1973    Smokeless tobacco: Never   Substance Use Topics    Alcohol use: Yes     Alcohol/week: 3.0 standard drinks of alcohol     Types: 3 Standard drinks or equivalent per week     Comment: 12 pack weekly, now down to 2-3 drinks per week    Drug use: Yes     Frequency: 1.0 times per week     Types: Marijuana       Physical Exam   ED Triage Vitals [10/19/24 2310]   Temperature Heart Rate Respirations BP   (!) 38.1 °C (100.6 °F) 90 20 135/73      Pulse Ox Temp src Heart Rate Source Patient Position   (!) 93 % -- -- --      BP Location FiO2 (%)     -- --       Physical Exam  Vitals and nursing note reviewed.    Constitutional:       Appearance: He is ill-appearing.   HENT:      Head: Atraumatic.      Right Ear: External ear normal.      Left Ear: External ear normal.      Nose: Nose normal.      Mouth/Throat:      Mouth: Mucous membranes are moist.      Pharynx: Oropharynx is clear.   Eyes:      Extraocular Movements: Extraocular movements intact.      Pupils: Pupils are equal, round, and reactive to light.   Cardiovascular:      Rate and Rhythm: Normal rate and regular rhythm.      Pulses: Normal pulses.   Pulmonary:      Effort: Pulmonary effort is normal.      Breath sounds: Normal breath sounds.   Abdominal:      Palpations: Abdomen is soft.      Tenderness: There is no abdominal tenderness.   Genitourinary:     Comments: Moeller catheter in place with purulent drainage coming from the urethra  Musculoskeletal:         General: No tenderness or signs of injury. Normal range of motion.      Cervical back: Normal range of motion and neck supple. No rigidity or tenderness.   Skin:     General: Skin is warm and dry.   Neurological:      General: No focal deficit present.      Mental Status: He is alert. Mental status is at baseline. He is disoriented.      Cranial Nerves: Cranial nerves 2-12 are intact.   Psychiatric:         Speech: Speech is delayed.         Cognition and Memory: Cognition is impaired.           ED Course & MDM   ED Course as of 10/20/24 0442   [place] Oct 20, 2024   0209 Updated the patient and family with findings.  They agree with plan of admission. [JA]   0237 Discussed with the hospitalist and he accepts the patient to his service.  Recommends CT of the abdomen [JA]      ED Course User Index  [JA] Lamine Torres DO         Diagnoses as of 10/20/24 0442   Sepsis, due to unspecified organism, unspecified whether acute organ dysfunction present (Multi)   Acute UTI   Altered mental status, unspecified altered mental status type                 No data recorded                                 Medical  Decision Making  Differential diagnosis: Sepsis, UTI, intracranial hemorrhage, other    Patient presenting for evaluation of generalized weakness and altered mental status.  Patient is confused today.  Patient is significant drainage around his Moeller catheter.  Likely source given appearance of the Moeller catheter and discharge from the urethra.  Septic protocol started in the ED including broad-spectrum antibiotics.  Patient does have a history of previous pontine mass versus intracranial hemorrhage.  CT repeated and unchanged.  No new focal neurodeficit.  Will continue antibiotics.  Patient admitted for further evaluation        Procedure  Critical Care    Performed by: Lamine Torres DO  Authorized by: Lamine Torres DO    Critical care provider statement:     Critical care time (minutes):  35    Critical care time was exclusive of:  Separately billable procedures and treating other patients    Critical care was necessary to treat or prevent imminent or life-threatening deterioration of the following conditions:  Sepsis    Critical care was time spent personally by me on the following activities:  Ordering and performing treatments and interventions, ordering and review of laboratory studies, ordering and review of radiographic studies, pulse oximetry, re-evaluation of patient's condition, review of old charts, evaluation of patient's response to treatment and development of treatment plan with patient or surrogate  ECG 12 lead    Performed by: Lamine Torers DO  Authorized by: Lamine Torres DO    ECG interpreted by ED Physician in the absence of a cardiologist: yes    Comments:      10/19/2024 23: 22 sinus rhythm, rate 92, normal axis, ST segments normal, T waves normal, normal EKG.  EKG interpreted by myself       Lamine Torres DO  10/20/24 5528

## 2024-10-21 ENCOUNTER — DOCUMENTATION (OUTPATIENT)
Dept: HOME HEALTH SERVICES | Facility: HOME HEALTH | Age: 69
End: 2024-10-21
Payer: COMMERCIAL

## 2024-10-21 VITALS
HEIGHT: 71 IN | HEART RATE: 78 BPM | BODY MASS INDEX: 32.41 KG/M2 | RESPIRATION RATE: 16 BRPM | SYSTOLIC BLOOD PRESSURE: 116 MMHG | WEIGHT: 231.5 LBS | TEMPERATURE: 97.5 F | OXYGEN SATURATION: 92 % | DIASTOLIC BLOOD PRESSURE: 66 MMHG

## 2024-10-21 VITALS
HEART RATE: 95 BPM | DIASTOLIC BLOOD PRESSURE: 77 MMHG | SYSTOLIC BLOOD PRESSURE: 119 MMHG | WEIGHT: 231.5 LBS | HEIGHT: 71 IN | TEMPERATURE: 97.5 F | BODY MASS INDEX: 32.41 KG/M2 | RESPIRATION RATE: 20 BRPM | OXYGEN SATURATION: 94 %

## 2024-10-21 PROBLEM — R53.81 DEBILITY: Status: ACTIVE | Noted: 2024-10-21

## 2024-10-21 PROBLEM — N32.89 BLADDER SPASM: Status: ACTIVE | Noted: 2024-10-21

## 2024-10-21 LAB
ALBUMIN SERPL BCP-MCNC: 3.3 G/DL (ref 3.4–5)
ANION GAP SERPL CALC-SCNC: 11 MMOL/L (ref 10–20)
BACTERIA UR CULT: NO GROWTH
BUN SERPL-MCNC: 10 MG/DL (ref 6–23)
CALCIUM SERPL-MCNC: 8.5 MG/DL (ref 8.6–10.3)
CHLORIDE SERPL-SCNC: 98 MMOL/L (ref 98–107)
CO2 SERPL-SCNC: 23 MMOL/L (ref 21–32)
CREAT SERPL-MCNC: 0.6 MG/DL (ref 0.5–1.3)
EGFRCR SERPLBLD CKD-EPI 2021: >90 ML/MIN/1.73M*2
ERYTHROCYTE [DISTWIDTH] IN BLOOD BY AUTOMATED COUNT: 16.8 % (ref 11.5–14.5)
GLUCOSE SERPL-MCNC: 90 MG/DL (ref 74–99)
HCT VFR BLD AUTO: 38.1 % (ref 41–52)
HGB BLD-MCNC: 12.5 G/DL (ref 13.5–17.5)
MAGNESIUM SERPL-MCNC: 2.25 MG/DL (ref 1.6–2.4)
MCH RBC QN AUTO: 28.9 PG (ref 26–34)
MCHC RBC AUTO-ENTMCNC: 32.8 G/DL (ref 32–36)
MCV RBC AUTO: 88 FL (ref 80–100)
NRBC BLD-RTO: 0 /100 WBCS (ref 0–0)
PHOSPHATE SERPL-MCNC: 3.6 MG/DL (ref 2.5–4.9)
PLATELET # BLD AUTO: 290 X10*3/UL (ref 150–450)
POTASSIUM SERPL-SCNC: 4 MMOL/L (ref 3.5–5.3)
RBC # BLD AUTO: 4.32 X10*6/UL (ref 4.5–5.9)
SODIUM SERPL-SCNC: 128 MMOL/L (ref 136–145)
WBC # BLD AUTO: 9.2 X10*3/UL (ref 4.4–11.3)

## 2024-10-21 PROCEDURE — 97165 OT EVAL LOW COMPLEX 30 MIN: CPT | Mod: GO

## 2024-10-21 PROCEDURE — 83735 ASSAY OF MAGNESIUM: CPT | Performed by: INTERNAL MEDICINE

## 2024-10-21 PROCEDURE — 2500000001 HC RX 250 WO HCPCS SELF ADMINISTERED DRUGS (ALT 637 FOR MEDICARE OP): Performed by: INTERNAL MEDICINE

## 2024-10-21 PROCEDURE — 99239 HOSP IP/OBS DSCHRG MGMT >30: CPT

## 2024-10-21 PROCEDURE — 2500000002 HC RX 250 W HCPCS SELF ADMINISTERED DRUGS (ALT 637 FOR MEDICARE OP, ALT 636 FOR OP/ED): Performed by: INTERNAL MEDICINE

## 2024-10-21 PROCEDURE — 85027 COMPLETE CBC AUTOMATED: CPT | Performed by: INTERNAL MEDICINE

## 2024-10-21 PROCEDURE — 2500000001 HC RX 250 WO HCPCS SELF ADMINISTERED DRUGS (ALT 637 FOR MEDICARE OP)

## 2024-10-21 PROCEDURE — 36415 COLL VENOUS BLD VENIPUNCTURE: CPT | Performed by: INTERNAL MEDICINE

## 2024-10-21 PROCEDURE — 2500000004 HC RX 250 GENERAL PHARMACY W/ HCPCS (ALT 636 FOR OP/ED): Mod: JZ | Performed by: INTERNAL MEDICINE

## 2024-10-21 PROCEDURE — 2500000002 HC RX 250 W HCPCS SELF ADMINISTERED DRUGS (ALT 637 FOR MEDICARE OP, ALT 636 FOR OP/ED): Performed by: PHYSICIAN ASSISTANT

## 2024-10-21 PROCEDURE — 84100 ASSAY OF PHOSPHORUS: CPT | Performed by: INTERNAL MEDICINE

## 2024-10-21 PROCEDURE — 97161 PT EVAL LOW COMPLEX 20 MIN: CPT | Mod: GP

## 2024-10-21 RX ORDER — CIPROFLOXACIN 500 MG/1
500 TABLET ORAL 2 TIMES DAILY
Qty: 10 TABLET | Refills: 0 | Status: SHIPPED | OUTPATIENT
Start: 2024-10-21 | End: 2024-10-26

## 2024-10-21 RX ORDER — LACTULOSE 10 G/15ML
20 SOLUTION ORAL ONCE
Status: COMPLETED | OUTPATIENT
Start: 2024-10-21 | End: 2024-10-21

## 2024-10-21 RX ORDER — OXYBUTYNIN CHLORIDE 5 MG/1
5 TABLET ORAL DAILY
Qty: 4 TABLET | Refills: 0 | Status: SHIPPED | OUTPATIENT
Start: 2024-10-22 | End: 2024-10-25 | Stop reason: SDUPTHER

## 2024-10-21 RX ORDER — OXYBUTYNIN CHLORIDE 5 MG/1
5 TABLET ORAL DAILY
Status: DISCONTINUED | OUTPATIENT
Start: 2024-10-21 | End: 2024-10-21 | Stop reason: HOSPADM

## 2024-10-21 RX ADMIN — TAMSULOSIN HYDROCHLORIDE 0.4 MG: 0.4 CAPSULE ORAL at 10:09

## 2024-10-21 RX ADMIN — LISINOPRIL 40 MG: 40 TABLET ORAL at 10:08

## 2024-10-21 RX ADMIN — FINASTERIDE 5 MG: 5 TABLET, FILM COATED ORAL at 10:08

## 2024-10-21 RX ADMIN — OXYBUTYNIN CHLORIDE 5 MG: 5 TABLET ORAL at 12:14

## 2024-10-21 RX ADMIN — POLYETHYLENE GLYCOL 3350 17 G: 17 POWDER, FOR SOLUTION ORAL at 10:08

## 2024-10-21 RX ADMIN — SENNOSIDES AND DOCUSATE SODIUM 2 TABLET: 50; 8.6 TABLET ORAL at 10:08

## 2024-10-21 RX ADMIN — LACTULOSE 20 G: 20 SOLUTION ORAL at 12:14

## 2024-10-21 RX ADMIN — CEFEPIME 2 G: 1 INJECTION, SOLUTION INTRAVENOUS at 10:09

## 2024-10-21 ASSESSMENT — COGNITIVE AND FUNCTIONAL STATUS - GENERAL
DAILY ACTIVITIY SCORE: 15
DRESSING REGULAR UPPER BODY CLOTHING: A LITTLE
CLIMB 3 TO 5 STEPS WITH RAILING: TOTAL
TOILETING: TOTAL
PERSONAL GROOMING: A LITTLE
DRESSING REGULAR LOWER BODY CLOTHING: A LOT
MOVING TO AND FROM BED TO CHAIR: A LOT
HELP NEEDED FOR BATHING: A LOT
STANDING UP FROM CHAIR USING ARMS: A LOT
MOVING FROM LYING ON BACK TO SITTING ON SIDE OF FLAT BED WITH BEDRAILS: A LITTLE
WALKING IN HOSPITAL ROOM: A LOT
TURNING FROM BACK TO SIDE WHILE IN FLAT BAD: A LITTLE
MOBILITY SCORE: 13

## 2024-10-21 ASSESSMENT — PAIN SCALES - GENERAL
PAINLEVEL_OUTOF10: 0 - NO PAIN
PAINLEVEL_OUTOF10: 0 - NO PAIN

## 2024-10-21 ASSESSMENT — ACTIVITIES OF DAILY LIVING (ADL): ADL_ASSISTANCE: INDEPENDENT

## 2024-10-21 ASSESSMENT — PAIN - FUNCTIONAL ASSESSMENT
PAIN_FUNCTIONAL_ASSESSMENT: 0-10
PAIN_FUNCTIONAL_ASSESSMENT: 0-10

## 2024-10-21 NOTE — DISCHARGE INSTRUCTIONS
-You have been prescribed oral ciprofloxacin 500 mg to be taken twice a day for the next 5 days (or until prescription runs out).  -You have been prescribed oral oxybutynin 5 mg to be taken daily for the next 5 days.    These 2 medications have been sent to your pharmacy on file.    -Please remember to keep all appointments with your scheduled specialists including your colorectal surgeon and neurologist.

## 2024-10-21 NOTE — DISCHARGE SUMMARY
Discharge diagnosis:  #UTI, in setting of chronic indwelling llamas  #Cystitis/urethritis  #Acute metabolic encephalopathy 2/2 above, resolved  #Chronic hyponatremia, stable  #H/o recent pontine hemorrhage     Hospital course:  Wade Machado is a 69 y.o. 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  Reportedly, patient was still confused in the ED however mentation seemed to improve after antibiotic initiation.  Of note patient is a very poor historian and relies heavily upon wife to corroborate history.  In the ED it was noted that patient's chronic Llamas had marked sediment and purulent urine.  Llamas was exchanged and patient admitted for IV ABX.  There was concerns on whether or not patient would be allowed to start DVT prophylaxis given recent pontine hemorrhage, therefore, neurosurgery was consulted to weigh in.  Urology was later consulted for leaky Llamas catheter and recommended antibladder spasmodic medication for which patient will be discharged home.  PT evaluated patient and are recommending skilled nursing facility for further rehabilitation however patient declined.  Patient they were informed of the risks of not receiving adequate rehabilitation for his weakness and they finally agreed upon home health care.  Patient and wife were updated on medical management plan and patient discharged home with home care on 10/21 with a prescription for oral ciprofloxacin to complete full antibiotic course.    Scheduled Medications  cefepime, 2 g, intravenous, q12h  [Held by provider] enoxaparin, 40 mg, subcutaneous,  q24h  finasteride, 5 mg, oral, Daily  influenza, 0.5 mL, intramuscular, During hospitalization  lisinopril, 40 mg, oral, Daily  [Held by provider] metoprolol succinate XL, 25 mg, oral, Daily  oxybutynin, 5 mg, oral, Daily  pneumococcal polysaccharide, 0.5 mL, intramuscular, During hospitalization  polyethylene glycol, 17 g, oral, BID  sennosides-docusate sodium, 2 tablet, oral, BID  tamsulosin, 0.4 mg, oral, Daily       Vitals:    10/21/24 1157   BP: 119/77   Pulse: 95   Resp: 20   Temp: 36.4 °C (97.5 °F)   SpO2: 94%      Physical Exam:    General:  Pleasant and cooperative. No apparent distress.  HEENT:  Normocephalic, atraumatic  Chest:  Clear to auscultation bilaterally. No wheezes, rales, or rhonchi.  CV:  Regular rate and rhythm. No murmurs    Abdomen: Abdomen is soft, non-tender, non-distended. BS +   Extremities:  No lower extremity edema or cyanosis.   Neurological:  AAOx3. No focal deficits.  Skin:  Warm and dry.    Outpatient follow-up instructions and medication changes:  -Patient should keep scheduled appointments with neurosurgery and colorectal surgeon.  -Patient should keep appointment for MRI brain set for 10/30  -She was prescribed oral ciprofloxacin to complete antibiotic course  -Urology prescribed patient oxybutynin to complete 5-day course  -Patient will go home with home health care to receive physical therapy for generalized debility    Gil Nicholson DO  PGY-2, Internal Medicine  Please SecureChat for any further questions

## 2024-10-21 NOTE — PROGRESS NOTES
Spoke with pt and his spouse,  dc planning discussed therapy recommend moderate care, refusing skilled care, wife told me that pt is having bowel surgery Nov 11th and then will need prostate surgery, plan is for home and possible HHC  with [facility] .  Will follow.  Bea Kulkarni RN TCC    [phone]  Spoke with DR Nicholson and plan is to send pt home today, pt/spouse agreeable to home, preference is [facility]  referral placed in [facility].  Bea Kulkarni RN TCC    1500  [facility] rders sent .  Bea Kulkarni RN TCC

## 2024-10-21 NOTE — CONSULTS
"Reason For Consult  Chronic llamas catheter and bladder spasms    History Of Present Illness  Wade Machado is a 69 y.o. 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 He had a Llamas placed over a month ago and was exchange during this admission. He started having bladder spasms and leaking around the llamas. He denies any pain or discomfort     Past Medical History  He has a past medical history of Hypertension, Pontine hemorrhage (Multi) (09/2024), Prostate enlargement, and Unspecified osteoarthritis, unspecified site.    Surgical History  He has a past surgical history that includes Other surgical history (05/09/2022); Other surgical history (05/09/2022); Joint replacement; and Brain surgery (2005).     Social History  He reports that he has been smoking cigarettes. He started smoking about 51 years ago. He has a 51.8 pack-year smoking history. He has never used smokeless tobacco. He reports current alcohol use of about 3.0 standard drinks of alcohol per week. He reports current drug use. Frequency: 1.00 time per week. Drug: Marijuana.    Family History  No family history on file.     Allergies  Patient has no known allergies.    Review of Systems  I have reviewed the H&P and there has been no changes     Physical Exam  A&Ox3 no distress   Abd soft non-tender  Llamas to CD with urine clear     Last Recorded Vitals  Blood pressure 131/71, pulse 78, temperature 36.3 °C (97.3 °F), temperature source Temporal, resp. rate 20, height 1.803 m (5' 11\"), weight 105 kg (231 lb 8 oz), SpO2 93%.    Relevant " Results      .  Results for orders placed or performed during the hospital encounter of 10/19/24 (from the past 24 hours)   Renal Function Panel   Result Value Ref Range    Glucose 90 74 - 99 mg/dL    Sodium 128 (L) 136 - 145 mmol/L    Potassium 4.0 3.5 - 5.3 mmol/L    Chloride 98 98 - 107 mmol/L    Bicarbonate 23 21 - 32 mmol/L    Anion Gap 11 10 - 20 mmol/L    Urea Nitrogen 10 6 - 23 mg/dL    Creatinine 0.60 0.50 - 1.30 mg/dL    eGFR >90 >60 mL/min/1.73m*2    Calcium 8.5 (L) 8.6 - 10.3 mg/dL    Phosphorus 3.6 2.5 - 4.9 mg/dL    Albumin 3.3 (L) 3.4 - 5.0 g/dL   Magnesium   Result Value Ref Range    Magnesium 2.25 1.60 - 2.40 mg/dL   CBC   Result Value Ref Range    WBC 9.2 4.4 - 11.3 x10*3/uL    nRBC 0.0 0.0 - 0.0 /100 WBCs    RBC 4.32 (L) 4.50 - 5.90 x10*6/uL    Hemoglobin 12.5 (L) 13.5 - 17.5 g/dL    Hematocrit 38.1 (L) 41.0 - 52.0 %    MCV 88 80 - 100 fL    MCH 28.9 26.0 - 34.0 pg    MCHC 32.8 32.0 - 36.0 g/dL    RDW 16.8 (H) 11.5 - 14.5 %    Platelets 290 150 - 450 x10*3/uL         Assessment/Plan     69 Urinary retention and Bladder spasms  -Maintain the llamas catheter and next exchange or TOV in 1 month as outpatient  -Ditropan for 5 days for bladder spasms    Thank you for allowing us to participate in his care    I spent 30 minutes in the professional and overall care of this patient.      Foster Dang PA-C

## 2024-10-21 NOTE — HH CARE COORDINATION
This referral has been made a Non Admit with  Home Care due to  Patient discharging with alternate agency. [facility] . If you have further questions, feel free to reach out to our office at [phone]. Thank you, [facility] Intake.

## 2024-10-21 NOTE — PROGRESS NOTES
Physical Therapy    Physical Therapy Evaluation    Patient Name: Wade Machado  MRN: [id number]  Today's Date: 10/21/2024   Time Calculation  Start Time: 0931  Stop Time: 0958  Time Calculation (min): 27 min  908/908-B    Assessment/Plan   PT Assessment  PT Assessment Results: Decreased strength, Impaired balance, Decreased mobility, Decreased safety awareness  Rehab Prognosis: Good  Barriers to Discharge: does not have 24hr support  Evaluation/Treatment Tolerance: Patient limited by fatigue  End of Session Communication: Bedside nurse  Assessment Comment: At baseline, pt independent w/ mobility, now requires mod assist x1-2. Continued skilled PT intervention indicated to facilitate increased strength, balance & gait stability for return to PLOF  End of Session Patient Position: Up in chair, Alarm on (call light in reach)  IP OR SWING BED PT PLAN  Inpatient or Swing Bed: Inpatient  PT Plan  Treatment/Interventions: Bed mobility, Transfer training, Gait training, Balance training, Therapeutic exercise, Therapeutic activity  PT Plan: Ongoing PT  PT Frequency: 3 times per week  PT Discharge Recommendations: Moderate intensity level of continued care  PT Recommended Transfer Status: Assist x2  PT - OK to Discharge: Yes (when cleared by medical team)    Subjective     Current Problem:  1. Sepsis, due to unspecified organism, unspecified whether acute organ dysfunction present (Multi)        2. Acute UTI        3. Altered mental status, unspecified altered mental status type          Patient Active Problem List   Diagnosis    Arthritis of left hip    Arthritis of right hip    Benign essential hypertension    BPH (benign prostatic hyperplasia)    History of prostate cancer    Presence of artificial hip, left    Cerebral cysts    Pontine hemorrhage (Multi)    Colovesical fistula    Sepsis, due to unspecified organism, unspecified whether acute organ dysfunction present (Multi)       General Visit  Information:  General  Reason for Referral: PT eval & treat/impaired mobility; DX: sepsis, acute UTI, acute metabolic encephalopathy; 10/19/24 c/o generalized weakness, fever, confusion  Referred By: Briseida Lovelace DO  Caregiver Feedback: Per conference w/ RN patient stable to participate in therapy  Co-Treatment: OT  Co-Treatment Reason: to maximize pt safety & mobility  Patient Position Received: Bed, 3 rail up, Alarm off, not on at start of session  General Comment: Pleasant & cooperative, receptive to mobility& instructions, diminished balance & attention to task, high fall risk    Home Living:  Home Living  Home Living Comments: Lives w/ spouse who works FT; 3steps w/ rail to enter home; 1st fl bedroom & tub shower w/o dme; basement level laundry room & pt's preferred stall shower w/ hand held shower hose; toilet riser w/ rails; standard bed    Prior Level of Function:  Prior Function Per Pt/Caregiver Report  Hand Dominance: Right  Prior Function Comments: independent mobility w/o use of device, denies h/o falls in at least 6months; owns ww, cane & crutches; independent ald; peforms light iadl otherwise spouse manages; pt has not driven in past 6-8noths, spouse drives    Precautions:  Precautions  Precautions Comment: fall, alarm, scd's, O2, llamas, corrective lenses  Pain:  Pain Assessment  Pain Assessment:  (denies c/o pain)    Cognition:  Cognition  Overall Cognitive Status:  (a&ox3, questionable historian, distractible)    General Assessments:    Sensation  Sensation Comment: neuropathy fingertips bilat hands x6-8 months         Static Sitting Balance  Static Sitting-Balance Support:  (retropulsive & leans rt , repeated cues for forward wt shift & midline position)       Functional Assessments:     Bed Mobility  Bed Mobility:  (min assist x1 supine>sit)  Transfers  Transfer:  (min assist x1 sit>stand, cga stand>sit)  Ambulation/Gait Training  Ambulation/Gait Training Performed:  (mod assist x2 hand held  assits x3ft, unstable, fall risk; mod assist x1 w/ ww 10ft, assist to manage ww  & repeated cues for safe distance to ww particularly w/ changes in direction; wide base of support w/ decreased heel strike/push off)     Extremity/Trunk Assessments:        RLE   RLE :  (arom wfl, mmt hip deferred 2/2 h/o hip dislocation; distally strength grossly 4/5)  LLE   LLE :  (arom wfl, mmt hip deferred 2/2 h/o hip dislocation; distally strength grossly 4/5)    Outcome Measures:     [facility] Basic Mobility  Turning from your back to your side while in a flat bed without using bedrails: A little  Moving from lying on your back to sitting on the side of a flat bed without using bedrails: A little  Moving to and from bed to chair (including a wheelchair): A lot  Standing up from a chair using your arms (e.g. wheelchair or bedside chair): A lot  To walk in hospital room: A lot  Climbing 3-5 steps with railing: Total  Basic Mobility - Total Score: 13   Goals:  Encounter Problems       Encounter Problems (Active)       PT Problem       STG - Pt will transition supine <> sitting with sba  (Progressing)       Start:  10/21/24    Expected End:  11/04/24            STG - Pt will transfer STS with sba  (Progressing)       Start:  10/21/24    Expected End:  11/04/24            STG - Pt will amb >=40' using LRD with sba  (Progressing)       Start:  10/21/24    Expected End:  11/04/24            STG - Pt will perform a B LE ther ex program of 2-3 sets of 10  (Progressing)       Start:  10/21/24    Expected End:  11/04/24               Pain - Adult            Education Documentation  Mobility Training, taught by Tish Clancy, PT at 10/21/2024  1:45 PM.  Learner: Patient  Readiness: Acceptance  Method: Explanation  Response: Verbalizes Understanding, Needs Reinforcement  Comment: safety, activity progression, use of ww

## 2024-10-21 NOTE — PROGRESS NOTES
Occupational Therapy    Evaluation    Patient Name: aWde Machado  MRN: [id number]  Today's Date: 10/21/2024  Time In: 0931  Time Out: 0958  908/908-B    Kimberlee Rae OTR/L was present throughout this session to supervise this patient's care    Assessment  IP OT Assessment  OT Assessment: This hospitilization on 10/19/2024 due to #UTI, #Cystitis/Ureteritis, #Hyponatremia, #Acute metabolic encephalopathy(resolved) and #Weakness. Patient would benefit from continued OT services to promote patient prior level of function.  Prognosis: Good  Evaluation/Treatment Tolerance: Patient tolerated treatment well  Medical Staff Made Aware: Yes  End of Session Communication: Bedside nurse  End of Session Patient Position: Up in chair, Alarm on, call-light within reach     Plan:  Treatment Interventions: ADL retraining, Functional transfer training, Endurance training, Patient/family training, Equipment evaluation/education, Compensatory technique education  OT Frequency: 3 times per week  OT Discharge Recommendations: Moderate intensity level of continued care  OT - OK to Discharge: Yes to next level of care when medically cleared by physician/medical team     Subjective   Current Problem:  1. Sepsis, due to unspecified organism, unspecified whether acute organ dysfunction present (Multi)        2. Acute UTI  ciprofloxacin (Cipro) 500 mg tablet      3. Altered mental status, unspecified altered mental status type        4. Bladder spasm  oxybutynin (Ditropan) 5 mg tablet      5. 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 Patient able coopertive, however easily distractable throughout session and required cueing throughout to ensure safety. Patient reports ~10 dislocations in hip within the last several years.     Precautions:  Medical Precautions: Fall precautions  Precautions Comment: UE IV, O2, SCDs, Moeller    Pain:  Pain Assessment  Pain Assessment: 0-10  0-10 (Numeric) Pain Score: 0 - No pain    Objective   Cognition:  Overall Cognitive Status: Within Functional Limits  Orientation Level: Oriented X4 (questionable historian)     Home Living:  Type of Home: House ([place])  Lives With: Spouse (wife)  Home Adaptive Equipment: Walker rolling or standard, Cane, Crutches, Reacher, Sock aid, Long-handled shoehorn  Home Layout: One level  Home Access:  (3 TITUS with rails)  Bathroom Shower/Tub: Tub/shower unit  Bathroom Toilet: Standard  Bathroom Equipment: Hand-held shower hose, Raised toilet seat with rails  Home Living Comments: sleeps in regular bed     Prior Function:  Receives Help From:  (Wife)  ADL Assistance: Independent  Homemaking Assistance:  (wife completes IADLs)  Ambulatory Assistance: Independent (without device)  Hand Dominance: Right  Prior Function Comments: drives and reports no falls in the last 6 months    ADL:  ADL Comments: anticipated assist for ADL completion    Bed Mobility/Transfers:   Bed Mobility  Bed Mobility: Yes  Bed Mobility 1  Bed Mobility 1: Supine to sitting  Level of Assistance 1: Minimum assistance  Bed Mobility Comments 1: HOB elevated  Transfers  Transfer: Yes  Transfer 1  Transfer  From 1: Bed to  Technique 1: Sit to stand, Stand to sit  Transfer Device 1: Walker  Transfer Level of Assistance 1: Minimum assistance  Transfers 2  Transfer to 2: Chair with arms  Technique 2: Stand to sit  Transfer Device 2: Walker  Transfer Level of Assistance 2: Minimum assistance  Trials/Comments 2: cueing for hand placement and body positioning    Ambulation/Gait Training:  Functional Mobility  Functional Mobility Performed: Yes  Functional Mobility 1  Device 1: No device  Assistance 1: Moderate assistance (x2)  Comments 1: initial ambulation within room w/o device  Functional Mobility 2  Device 2: Rolling walker  Assistance 2: Contact guard  Comments 2:  (integrates walker due to unsteadiness, patient ambulates within room CGA)    Sitting Balance:  Static Sitting Balance  Static Sitting-Level of Assistance: Moderate assistance  Static Sitting-Comment/Number of Minutes: due to retropulsion    Sensation:  Sensation Comment: BUE neuropathy    Hand Function:  Hand Function  Gross Grasp: Functional (R hand strength 4-/5; L hand strength 3+/5)  Coordination: Functional    Extremities: RUE   RUE : Within Functional Limits (Shoulder: AROM WFL and strength 4/5. Elbow: AROM WFL and strength 4+/5) and LUE   LUE: Within Functional Limits (Shoulder: AROM WFL and strength 4-/5. Elbow: AROM WFL and strength 4+/5)    Outcome Measures: [facility] Daily Activity  Putting on and taking off regular lower body clothing: A lot  Bathing (including washing, rinsing, drying): A lot  Putting on and taking off regular upper body clothing: A little  Toileting, which includes using toilet, bedpan or urinal: Total  Taking care of personal grooming such as brushing teeth: A little  Eating Meals: None  Daily Activity - Total Score: 15    EDUCATION:  Education Documentation  Mobility Training, taught by Hannah-Grace Knobloch, S-OT at 10/21/2024  2:05 PM.  Learner: Patient  Readiness: Acceptance  Method: Explanation, Demonstration  Response:  Verbalizes Understanding, Demonstrated Understanding, Needs Reinforcement    Goals:   Encounter Problems       Encounter Problems (Active)       OT Goals       Patient will perform bed mobility and progress to functional transfers with supervision using DME as needed  (Progressing)       Start:  10/21/24    Expected End:  11/04/24            Patient will complete 4 minutes of sitting with SBA during functional task/ADL completion.  (Progressing)       Start:  10/21/24    Expected End:  11/04/24            Patient will complete lower body dressing with SBA and adaptive equipment with as needed  (Progressing)       Start:  10/21/24    Expected End:  11/04/24

## 2024-10-22 ENCOUNTER — PATIENT OUTREACH (OUTPATIENT)
Dept: PRIMARY CARE | Facility: CLINIC | Age: 69
End: 2024-10-22
Payer: COMMERCIAL

## 2024-10-22 LAB
ATRIAL RATE: 92 BPM
P AXIS: 50 DEGREES
P OFFSET: 198 MS
P ONSET: 146 MS
PR INTERVAL: 150 MS
Q ONSET: 221 MS
QRS COUNT: 15 BEATS
QRS DURATION: 76 MS
QT INTERVAL: 342 MS
QTC CALCULATION(BAZETT): 422 MS
QTC FREDERICIA: 394 MS
R AXIS: -5 DEGREES
T AXIS: 53 DEGREES
T OFFSET: 392 MS
VENTRICULAR RATE: 92 BPM

## 2024-10-22 NOTE — PROGRESS NOTES
Phone Call: Spoke with Wade Baca's wife. She states he is doing ok, cath was leaking a bit last night, wearing depends. He will follow up with urology. She informed me that he is no longer going to Dr Crum office and has changed PCP. Changed in epic to new provider.

## 2024-10-24 ENCOUNTER — APPOINTMENT (OUTPATIENT)
Dept: UROLOGY | Facility: CLINIC | Age: 69
End: 2024-10-24
Payer: COMMERCIAL

## 2024-10-24 LAB
BACTERIA BLD CULT: NORMAL
BACTERIA BLD CULT: NORMAL

## 2024-10-25 DIAGNOSIS — N32.89 BLADDER SPASM: ICD-10-CM

## 2024-10-25 RX ORDER — OXYBUTYNIN CHLORIDE 5 MG/1
5 TABLET ORAL 3 TIMES DAILY PRN
Qty: 90 TABLET | Refills: 0 | Status: SHIPPED | OUTPATIENT
Start: 2024-10-25

## 2024-10-25 NOTE — PROGRESS NOTES
Subjective   Patient ID: Wade Machado is a 68 y.o. male who presents for Annual Exam (He is fasting) and Surgery clearance (Right hip.).    HPI comes in for presurgical clearance and Medicare wellness visit.  Chronic right hip pain left hip was replaced 4 years ago    Review of Systems  Constitutional: NO F, chills, or sweats  Eyes: no blurred vision or visual disturbance  ENT: no hearing loss, no congestion, no nasal discharge, no hoarseness and no sore throat.   Cardiovascular: no chest pain, no edema, no palps and no syncope.   Respiratory: no cough,no s.o.b. and no wheezing  Gastrointestinal: no abdominal pain, No C/D no N/V, no blood in stools  Genitourinary: no dysuria, no change in urinary frequency, no urinary hesitancy and no feelings of urinary urgency.   Musculoskeletal: Chronic right hip pain  Integumentary: no new skin lesions and no rashes.   Neurological: no difficulty walking, no headache, no limb weakness, no numbness and no tingling.   Psychiatric: no anxiety, no depression, no anhedonia and no substance use disorders.   Endocrine: no recent weight gain and no recent weight loss.   Hematologic/Lymphatic: no tendency for easy bruising and no swollen glands.  Objective   /82 (BP Location: Right arm, Patient Position: Sitting, BP Cuff Size: Large adult)   Pulse 85   Wt 126 kg (278 lb)   SpO2 96%   BMI 39.89 kg/m²     Physical Exam  gen- a & o x 3, nad, pleasant  heent- eomi, perrla, ear canals patent, TM's non-erythematous, no fluid, frontal and maxillary sinus's nontender  neck- supple, nontender, no palpable or enlarged nodes, no thyromegaly  heart- rrr, no murmurs  lungs- cta b/l , no w/r/r  chest- symmetric, nontender  ab- soft, nontender, no palpable organomegaly, postive bowel sounds  ex's- no c/c/e  neuro- CNs 2-12 grossly intact, full sensation and strength in all extremities    Assessment/Plan     1.  Wellness visit.  Medicare wellness.  Also needs preoperative clearance.  No  concerns on exam.  Screening labs ordered today.  EKG today in clinic shows no concerns.  He is up-to-date with pneumonia shots.  Defers on colon cancer screening.    2.  History of hypertension, continue current med.    3.  History of BPH, prostate cancer.  Continue Flomax.    4.  Chronic right hip pain.  Cleared for orthopedic surgery next month.      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

## 2024-10-30 ENCOUNTER — HOSPITAL ENCOUNTER (OUTPATIENT)
Dept: RADIOLOGY | Facility: HOSPITAL | Age: 69
Discharge: HOME | End: 2024-10-30
Payer: COMMERCIAL

## 2024-10-30 DIAGNOSIS — I61.3 PONTINE HEMORRHAGE (MULTI): ICD-10-CM

## 2024-10-30 PROCEDURE — 70551 MRI BRAIN STEM W/O DYE: CPT | Performed by: RADIOLOGY

## 2024-10-30 PROCEDURE — 70551 MRI BRAIN STEM W/O DYE: CPT

## 2024-11-04 ENCOUNTER — OFFICE VISIT (OUTPATIENT)
Dept: UROLOGY | Facility: CLINIC | Age: 69
End: 2024-11-04
Payer: COMMERCIAL

## 2024-11-04 DIAGNOSIS — N32.1 COLOVESICAL FISTULA: ICD-10-CM

## 2024-11-04 DIAGNOSIS — L98.8 FISTULA: Primary | ICD-10-CM

## 2024-11-04 DIAGNOSIS — R39.9 UTI SYMPTOMS: ICD-10-CM

## 2024-11-04 LAB
APPEARANCE UR: CLEAR
BACTERIA #/AREA URNS AUTO: ABNORMAL /HPF
BILIRUB UR STRIP.AUTO-MCNC: NEGATIVE MG/DL
COLOR UR: ABNORMAL
GLUCOSE UR STRIP.AUTO-MCNC: NORMAL MG/DL
KETONES UR STRIP.AUTO-MCNC: NEGATIVE MG/DL
LEUKOCYTE ESTERASE UR QL STRIP.AUTO: ABNORMAL
NITRITE UR QL STRIP.AUTO: NEGATIVE
PH UR STRIP.AUTO: 5.5 [PH]
PROT UR STRIP.AUTO-MCNC: NEGATIVE MG/DL
RBC # UR STRIP.AUTO: ABNORMAL /UL
RBC #/AREA URNS AUTO: ABNORMAL /HPF
SP GR UR STRIP.AUTO: 1.01
UROBILINOGEN UR STRIP.AUTO-MCNC: NORMAL MG/DL
WBC #/AREA URNS AUTO: ABNORMAL /HPF
WBC CLUMPS #/AREA URNS AUTO: ABNORMAL /HPF

## 2024-11-04 PROCEDURE — 87086 URINE CULTURE/COLONY COUNT: CPT

## 2024-11-04 PROCEDURE — 81001 URINALYSIS AUTO W/SCOPE: CPT

## 2024-11-04 PROCEDURE — 1111F DSCHRG MED/CURRENT MED MERGE: CPT | Performed by: NURSE PRACTITIONER

## 2024-11-04 NOTE — PROGRESS NOTES
Patient ID: Wade Machado is a 69 y.o. male.    Bladder Catheterization    Date/Time: 11/4/2024 2:35 PM    Performed by: DARIA Peng  Authorized by: DARIA Peng    Procedure Details    Procedure: catheter change      Position: supine    Catheter insertion: indwelling      Catheter type: Moeller      Catheter size: 16 Fr    Urine characteristics: clear, bloody and yellow    Balloon inflation amount (mL): 10    Leg bag attached: yes      Post-Procedure Details     Outcome: patient tolerated procedure well with no complications      Post-procedure interventions: post-procedure education provided      Disposition: discharged home in satisfactory condition      Additional Details      Urine sent for culture, new onset blood this AM. Anticipated surgery next week with colorectal. Keep appt for later this month with Dr. Brown.

## 2024-11-05 ENCOUNTER — CLINICAL SUPPORT (OUTPATIENT)
Dept: PREADMISSION TESTING | Facility: HOSPITAL | Age: 69
End: 2024-11-05
Payer: COMMERCIAL

## 2024-11-05 LAB — HOLD SPECIMEN: NORMAL

## 2024-11-05 NOTE — CPM/PAT NURSE NOTE
CPM/PAT Nurse Note      Name: Wade Machado (Wade Machado)  /Age: 1955/69 y.o.       Past Medical History:   Diagnosis Date    Alcohol use     BPH (benign prostatic hyperplasia)     Colloid cyst of brain (Multi)     s/p R crani for resection ([facility], )    Colovesical fistula     Diverticulitis     Hypertension     Indwelling Moeller catheter present     catheter change on 24 16 Fr    Obesity     Pontine hemorrhage (Multi) 2024    Prostate cancer (Multi)     Tobacco use     Unspecified osteoarthritis, unspecified site     Osteoarthritis       Past Surgical History:   Procedure Laterality Date    BRAIN SURGERY      third 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 (at [facility])    COLONOSCOPY  2024    HIP ARTHROPLASTY Right     HIP SURGERY      R hip reduction    JOINT REPLACEMENT      OTHER SURGICAL HISTORY  2022    Retinal detachment repair    OTHER SURGICAL HISTORY  2022    Cataract surgery       Patient  reports that he is not currently sexually active.    No family history on file.    No Known Allergies    Prior to Admission medications    Medication Sig Start Date End Date Taking? Authorizing Provider   acetaminophen (Tylenol) 325 mg tablet Take 1-2 tablets (325-650 mg) by mouth every 4 hours if needed for mild pain (1 - 3). 22   Historical Provider, MD   ascorbic acid, vitamin C, 100 mg chewable tablet Chew 4 tablets (400 mg) once daily.    Historical Provider, MD   docusate sodium (Colace) 100 mg capsule Take 1 capsule (100 mg) by mouth once daily.    Historical Provider, MD   finasteride (Proscar) 5 mg tablet Take 1 tablet (5 mg) by mouth once daily. 24   Historical Provider, MD   lisinopril 40 mg tablet TAKE ONE TABLET BY MOUTH once DAILY for blood pressure 6/3/24   Eddie Crum,    metoprolol succinate XL (Toprol-XL) 25 mg 24 hr tablet 1 tablet (25 mg). 24   Historical Provider, MD   oxybutynin (Ditropan) 5 mg tablet Take 1 tablet (5 mg) by  mouth 3 times a day as needed (bladder spasm). 10/25/24   Gil Brown MD   tamsulosin (Flomax) 0.4 mg 24 hr capsule Take 1 capsule (0.4 mg) by mouth 2 times a day. 8/13/24   Dorita Merida, APRN-CNP        PAT ROS     DASI Risk Score    No data to display       Caprini DVT Assessment      Flowsheet Row ED to Hosp-Admission (Discharged) from 10/19/2024 in [facility] 9 with Briseida Lovelace DO and Shannan Espinal DO ED to Hosp-Admission (Discharged) from 8/9/2024 in [facility] [place] 4 with Doroteo Navarro MD and Juan Toledo MD   DVT Score 7 filed at 10/20/2024 0310 5 filed at 08/09/2024 0438   Medical Factors Medical patient at bedrest, Sepsis <1 month filed at 10/20/2024 0310 --   BMI 31-40 (Obesity) filed at 10/20/2024 0310 31-40 (Obesity) filed at 08/09/2024 0438   RETIRED: Age -- 60-75 years filed at 08/09/2024 0438          Modified Frailty Index    No data to display       CHADS2 Stroke Risk  Current as of about an hour ago        N/A 3 to 100%: High Risk   2 to < 3%: Medium Risk   0 to < 2%: Low Risk     Last Change: N/A          This score determines the patient's risk of having a stroke if the patient has atrial fibrillation.        This score is not applicable to this patient. Components are not calculated.          Revised Cardiac Risk Index    No data to display       Apfel Simplified Score    No data to display       Risk Analysis Index Results This Encounter    No data found in the last 10 encounters.       Prodigy: High Risk  Total Score: 16              Prodigy Age Score      Prodigy Gender Score          ARISCAT Score for Postoperative Pulmonary Complications    No data to display       Nava Perioperative Risk for Myocardial Infarction or Cardiac Arrest (ROSETTE)    No data to display       Scheduled for resection laparoscopy sigmoid colon with takedown of colovesical fistula with Dr. Mcintyre on 11/11/24.  PCP: Changing PCP providers  Urology:  COLTON Peng-CNP LOV 11/4/24 seen for bladder catheter change.  General Surgery: Thomas Mcintyre MD LOV 9/17/24 seen for possible incidental finding of colovesical fistula.   Neurology: Saud Klein MD (resident) LOV 8/28/24 presenting to neurology clinic for post hospital follow up after he was found to have a pontine mass.   Plan:   - Repeat MRI brain with and without contrast in 3 months  - Blood pressure controlled on current medications. Goals is normotensive  - Avoid blood thinners   - Return to clinic in 4-5 months    - MR BRAIN WO IV CONTRAST; 10/30/2024   IMPRESSION:      Postoperative changes are again identified compatible with a previous  right frontal craniotomy.      There is again evidence of a nonspecific mixed signal  intraparenchymal hemorrhage centered within the carmen which is similar  in size and configuration when compared with the prior MRI dated  08/09/2024. Specifically, using similar points of measurement on the  current and prior study, it currently measures approximately 20 mm x  20 mm in transaxial dimensions by 20 mm in the craniocaudal dimension  compared with the prior study dated 08/09/2024 where it measured  approximately 20 mm x 20 mm in transaxial dimensions by 20 mm in  craniocaudal dimension. No post gadolinium imaging was performed on  the current study limiting further evaluation for possible  superimposed pathologic enhancement. Again, no significant edema  within the surrounding brain parenchyma. There is mild mass effect  upon the ventral 4th ventricle similar when compared with the prior  study.      There is again evidence of similar ventriculomegaly involving the  lateral ventricles and 3rd ventricle with the lateral ventricles  again demonstrating disproportionate prominence when compared with  the sulci within the cerebral convexities. This disproportionate  ventriculomegaly may be related to more pronounced central volume  loss versus a component of  stable communicating hydrocephalus.      Scattered nonspecific white matter changes are again noted within  cerebral hemispheres bilaterally which while nonspecific, given the  patient's age, likely represent sequelae of more remote small-vessel  ischemic change. Additional small foci of bright signal on the T2  weighted images are again noted within the subinsular regions, basal  ganglia, and thalami bilaterally suggesting incidental mildly  prominent perivascular spaces and/or small scattered more remote  lacunar infarctions.      There is near-complete opacification of the right maxillary sinus.    -CT Abdomen Pelvis; 10/20/24  IMPRESSION:  1. Somewhat limited evaluation of the bladder due to extensive beam  hardening artifact from bilateral hip arthroplasties. Within  limitations of the study, of the bladder contains a Moeller catheter,  but is not decompressed, and demonstrates somewhat thickened and  indistinct wall suggestive of underlying cystitis. Additionally,  there is redemonstration of focus of loculated air along the superior  aspect of the bladder, somewhat more consolidated in appearance  compared to the scattered foci seen on previous exam on 08/09/2024.  This focus of air appears to be tethered between the bladder in the  adjacent sigmoid colon, although no discrete fistulous tract is  identified, and is not entirely excluded on current study.  2. Additional focus of air is present in the lower pelvis/perineum  along the left lateral aspect of the prostate, new from prior exam on  08/09/2024, but difficult to definitely characterized due to  extensive artifact from hip arthroplasties, although it is favored to  be infectious/inflammatory, and possibly part of the same process  affecting the bladder.  3. Mild fullness of the of the ureters bilaterally with slight  mucosal enhancement possibly representing component of ureteritis.  There is no imaging evidence of pyelonephritis however.  4. Scattered  diverticula are present in the descending and sigmoid  colon, without definite evidence of acute diverticulitis.  5. Large volume of solid stool is present throughout the colon,  correlate with symptoms of constipation.  6. Extensive atherosclerotic plaques with likely some stenosis  present in the common and external iliac arteries bilaterally.    -ECG; 10/19/24  Normal sinus rhythm with sinus arrhythmia  Normal ECG    -Transthoracic Echo; 8/10/24  CONCLUSIONS:   1. Poorly visualized anatomical structures due to suboptimal image quality.   2. Left ventricular ejection fraction is normal, by visual estimate at 65-70%.   3. There is normal right ventricular global systolic function.    Nurse Plan of Action:   Medication Instructions:  Instructed to hold vitamins, supplements, and NSAIDs 7 days prior to surgery.      Mayelin Benites RN  Pre Admission Testing

## 2024-11-06 ENCOUNTER — DOCUMENTATION (OUTPATIENT)
Dept: NEUROLOGY | Facility: HOSPITAL | Age: 69
End: 2024-11-06
Payer: COMMERCIAL

## 2024-11-06 LAB — BACTERIA UR CULT: NORMAL

## 2024-11-06 NOTE — PROGRESS NOTES
To whom it may concern,     We were contacted by patient's wife Phuong regarding clearance for colorectal surgery on 11/11.     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 Mass looks stable on repeat MRI. From Neurology stand point there is no contraindication for surgery. We recommend avoiding perioperative anticoagulation use given concern of hemorrhage on initial scans.     Case discussed with attending Dr. Gilmore    Thank you,   Saud Klein MD  Neurology Resident PGY-3

## 2024-11-07 ENCOUNTER — PRE-ADMISSION TESTING (OUTPATIENT)
Dept: PREADMISSION TESTING | Facility: HOSPITAL | Age: 69
End: 2024-11-07
Payer: COMMERCIAL

## 2024-11-07 ENCOUNTER — LAB (OUTPATIENT)
Dept: LAB | Facility: LAB | Age: 69
End: 2024-11-07
Payer: COMMERCIAL

## 2024-11-07 ENCOUNTER — LAB REQUISITION (OUTPATIENT)
Dept: LAB | Facility: HOSPITAL | Age: 69
End: 2024-11-07
Payer: COMMERCIAL

## 2024-11-07 VITALS
BODY MASS INDEX: 35.68 KG/M2 | WEIGHT: 235.45 LBS | RESPIRATION RATE: 16 BRPM | HEIGHT: 68 IN | HEART RATE: 89 BPM | SYSTOLIC BLOOD PRESSURE: 123 MMHG | DIASTOLIC BLOOD PRESSURE: 72 MMHG | OXYGEN SATURATION: 95 % | TEMPERATURE: 98.2 F

## 2024-11-07 DIAGNOSIS — Z01.818 PRE-OP TESTING: ICD-10-CM

## 2024-11-07 DIAGNOSIS — Z01.818 PRE-OP TESTING: Primary | ICD-10-CM

## 2024-11-07 DIAGNOSIS — Z01.818 ENCOUNTER FOR OTHER PREPROCEDURAL EXAMINATION: ICD-10-CM

## 2024-11-07 LAB
ALBUMIN SERPL BCP-MCNC: 3.8 G/DL (ref 3.4–5)
ALP SERPL-CCNC: 92 U/L (ref 33–136)
ALT SERPL W P-5'-P-CCNC: 11 U/L (ref 10–52)
ANION GAP SERPL CALC-SCNC: 12 MMOL/L (ref 10–20)
AST SERPL W P-5'-P-CCNC: 12 U/L (ref 9–39)
BASOPHILS # BLD AUTO: 0.05 X10*3/UL (ref 0–0.1)
BASOPHILS NFR BLD AUTO: 0.5 %
BILIRUB SERPL-MCNC: 0.3 MG/DL (ref 0–1.2)
BUN SERPL-MCNC: 16 MG/DL (ref 6–23)
CALCIUM SERPL-MCNC: 8.9 MG/DL (ref 8.6–10.3)
CHLORIDE SERPL-SCNC: 99 MMOL/L (ref 98–107)
CO2 SERPL-SCNC: 27 MMOL/L (ref 21–32)
CREAT SERPL-MCNC: 0.65 MG/DL (ref 0.5–1.3)
EGFRCR SERPLBLD CKD-EPI 2021: >90 ML/MIN/1.73M*2
EOSINOPHIL # BLD AUTO: 0.13 X10*3/UL (ref 0–0.7)
EOSINOPHIL NFR BLD AUTO: 1.3 %
ERYTHROCYTE [DISTWIDTH] IN BLOOD BY AUTOMATED COUNT: 17.6 % (ref 11.5–14.5)
GLUCOSE SERPL-MCNC: 80 MG/DL (ref 74–99)
HCT VFR BLD AUTO: 41.4 % (ref 41–52)
HGB BLD-MCNC: 13.1 G/DL (ref 13.5–17.5)
IMM GRANULOCYTES # BLD AUTO: 0.04 X10*3/UL (ref 0–0.7)
IMM GRANULOCYTES NFR BLD AUTO: 0.4 % (ref 0–0.9)
LYMPHOCYTES # BLD AUTO: 2.25 X10*3/UL (ref 1.2–4.8)
LYMPHOCYTES NFR BLD AUTO: 21.6 %
MCH RBC QN AUTO: 28.7 PG (ref 26–34)
MCHC RBC AUTO-ENTMCNC: 31.6 G/DL (ref 32–36)
MCV RBC AUTO: 91 FL (ref 80–100)
MONOCYTES # BLD AUTO: 0.85 X10*3/UL (ref 0.1–1)
MONOCYTES NFR BLD AUTO: 8.2 %
NEUTROPHILS # BLD AUTO: 7.08 X10*3/UL (ref 1.2–7.7)
NEUTROPHILS NFR BLD AUTO: 68 %
NRBC BLD-RTO: 0 /100 WBCS (ref 0–0)
PLATELET # BLD AUTO: 328 X10*3/UL (ref 150–450)
POTASSIUM SERPL-SCNC: 5.2 MMOL/L (ref 3.5–5.3)
PROT SERPL-MCNC: 7.4 G/DL (ref 6.4–8.2)
RBC # BLD AUTO: 4.57 X10*6/UL (ref 4.5–5.9)
SODIUM SERPL-SCNC: 133 MMOL/L (ref 136–145)
WBC # BLD AUTO: 10.4 X10*3/UL (ref 4.4–11.3)

## 2024-11-07 PROCEDURE — 36415 COLL VENOUS BLD VENIPUNCTURE: CPT

## 2024-11-07 PROCEDURE — 86901 BLOOD TYPING SEROLOGIC RH(D): CPT

## 2024-11-07 PROCEDURE — 86850 RBC ANTIBODY SCREEN: CPT

## 2024-11-07 PROCEDURE — 85025 COMPLETE CBC W/AUTO DIFF WBC: CPT

## 2024-11-07 PROCEDURE — 80053 COMPREHEN METABOLIC PANEL: CPT

## 2024-11-07 PROCEDURE — 86900 BLOOD TYPING SEROLOGIC ABO: CPT

## 2024-11-07 PROCEDURE — 87081 CULTURE SCREEN ONLY: CPT | Mod: AHULAB | Performed by: NURSE PRACTITIONER

## 2024-11-07 PROCEDURE — 99204 OFFICE O/P NEW MOD 45 MIN: CPT | Performed by: NURSE PRACTITIONER

## 2024-11-07 RX ORDER — CHLORHEXIDINE GLUCONATE ORAL RINSE 1.2 MG/ML
SOLUTION DENTAL
Qty: 475 ML | Refills: 0 | Status: SHIPPED | OUTPATIENT
Start: 2024-11-07

## 2024-11-07 ASSESSMENT — ENCOUNTER SYMPTOMS
MUSCULOSKELETAL NEGATIVE: 1
DIARRHEA: 0
DYSPNEA WITH EXERTION: 1
BRUISES/BLEEDS EASILY: 0
RESPIRATORY NEGATIVE: 1
NECK NEGATIVE: 1
CONSTITUTIONAL NEGATIVE: 1
NAUSEA: 0
PALPITATIONS: 0
ENDOCRINE NEGATIVE: 1
CONSTIPATION: 1
NEUROLOGICAL NEGATIVE: 1
VOMITING: 0
ABDOMINAL PAIN: 0

## 2024-11-07 NOTE — PREPROCEDURE INSTRUCTIONS
Medication List            Accurate as of November 7, 2024  4:09 PM. Always use your most recent med list.                acetaminophen 325 mg tablet  Commonly known as: Tylenol  Notes to patient: May take on the day of surgery if needed     ascorbic acid (vitamin C) 100 mg chewable tablet  Additional Medication Adjustments for Surgery: Take last dose 7 days before surgery     calcium carbonate 300 mg (750 mg) chewable tablet  Commonly known as: Tums Extra Strength  Medication Adjustments for Surgery: Do Not take on the morning of surgery     docusate sodium 100 mg capsule  Commonly known as: Colace  Medication Adjustments for Surgery: Do Not take on the morning of surgery     finasteride 5 mg tablet  Commonly known as: Proscar  Medication Adjustments for Surgery: Take on the morning of surgery     lisinopril 40 mg tablet  TAKE ONE TABLET BY MOUTH once DAILY for blood pressure  Notes to patient: Hold any evening dose the night before the day of surgery. Hold the day of surgery.     metoprolol succinate XL 25 mg 24 hr tablet  Commonly known as: Toprol-XL  Medication Adjustments for Surgery: Take on the morning of surgery     oxybutynin 5 mg tablet  Commonly known as: Ditropan  Take 1 tablet (5 mg) by mouth 3 times a day as needed (bladder spasm).  Medication Adjustments for Surgery: Do Not take on the morning of surgery     tamsulosin 0.4 mg 24 hr capsule  Commonly known as: Flomax  Take 1 capsule (0.4 mg) by mouth 2 times a day.  Medication Adjustments for Surgery: Take on the morning of surgery                        **Concerning above medication instructions, if medication is normally taken at night, continue normal schedule.**  **DO NOT TAKE NIGHT PRIOR AND MORNING OF SURGERY**    CONTACT SURGEON'S OFFICE IF YOU DEVELOP:  * Fever = 100.4 F   * New respiratory symptoms (e.g. cough, shortness of breath, respiratory distress, sore throat)  * Recent loss of taste or smell  *Flu like symptoms such as headache,  fatigue or gastrointestinal symptoms  * You develop any open sores, shingles, burning or painful urination   AND/OR:  * You no longer wish to have the surgery.  * Any other personal circumstances change that may lead to the need to cancel or defer this surgery.  *You were admitted to any hospital within one week of your planned procedure.    SMOKING:  *Quitting smoking can make a huge difference to your health and recovery from surgery.    *If you need help with quitting, call 6-091-QUIT-NOW.    THE DAY BEFORE SURGERY:  *Do not eat any food after midnight the night before surgery.   *You are permitted to drink clear liquids (i.e. water, black coffee (no milk or cream), tea, apple juice and electrolyte drinks (gatorade)) up to 13.5 ounces, up to 2 hours before your arrival time.  *You may chew gum until 2 hours before your surgery    SURGICAL TIME  *You will be contacted between 2 p.m. and 6 p.m. the business day before your surgery with your arrival time.  *If you haven't received a call by 6pm, call [phone].  *Scheduled surgery times may change and you will be notified if this occurs-check your personal voicemail for any updates.    ON THE MORNING OF SURGERY:  *Wear comfortable, loose fitting clothing.   *Do not use moisturizers, creams, lotions or perfume.  *All jewelry and valuables should be left at home.  *Prosthetic devices such as contact lenses, hearing aids, dentures, eyelash extensions, hairpins and body piercing must be removed before surgery.    BRING WITH YOU:  *Photo ID and insurance card  *Current list of medicines and allergies  *Pacemaker/Defibrillator/Heart stent cards  *CPAP machine and mask  *Slings/splints/crutches  *Copy of your complete Advanced Directive/DHPOA-if applicable  *Neurostimulator implant remote    PARKING AND ARRIVAL:  *Check in at the Main Entrance desk and let them know you are here for surgery.  *You will be directed to the 2nd floor surgical waiting area.    AFTER  OUTPATIENT SURGERY:  *A responsible adult MUST accompany you at the time of discharge and stay with you for 24 hours after your surgery.  *You may NOT drive yourself home after surgery.  *You may use a taxi or ride sharing service (Mover, Uber) to return home ONLY if you are accompanied by a friend or family member.  *Instructions for resuming your medications will be provided by your surgeon.         Patient Information: Oral/Dental Rinse  **This is a prescription; pick it up at your preferred local pharmacy **  What is oral/dental rinse?   It is a mouthwash. It is a way of cleaning the mouth with a germ killing solution before your surgery.  The solution contains chlorhexidine, commonly known as CHG.   It is used inside the mouth to kill a bacteria known as Staphylococcus aureus.  Let your doctor know if you are allergic to Chlorhexidine.    Why do I need to use CHG oral/dental rinse?  The CHG oral/dental rinse helps to kill a bacteria in your mouth known a Staphylococcus aureus.     This reduces the risk of infection at the surgical site.      Using your CHG oral/dental rinse  STEPS:  Use your CHG oral/dental rinse after you brush your teeth the night before (at bedtime) and the morning of your surgery.  Follow all directions on your prescription label.    Use the cap on the container to measure 15ml (fill cap to fill line)  Swish (gargle if you can) the mouthwash in your mouth for at least 30 seconds, (do not to swallow) spit out  After you use your CHG rinse, do not rinse your mouth with water, drink or eat.  Please refer to prescription label for the appropriate time to resume oral intake  Dental rinse comes in one size bottle: 473ml ~16oz.  You will have leftover    rinse, discard after this use.    What side effects might I have using the CHG oral/dental rinse?  CHG rinse will stick to plaque on the teeth.  Brush and floss just before use.  Teeth brushing will help avoid staining of plaque during use.    Who  should I contact if I have questions about the CHG oral/dental rinse?  Please call [facility], Preadmission Testing at [phone] if you have any questions    Home Preoperative Antibacterial Shower     What is a home preoperative antibacterial shower?  This shower is a way of cleaning the skin with a germ killing soap before surgery.  The soap contains chlorhexidine, commonly known as CHG.  CHG is a soap for your skin with germ killing ability.  Let your doctor know if you are allergic to chlorhexidine.    Why do I need to take a preoperative antibacterial shower?  Skin is not sterile.  It is best to try to make your skin as free of germs as possible before surgery.  Proper cleansing with a germ killing soap before surgery can lower the number of germs on your skin.  This helps to reduce the risk of infection at the surgical site.  Following the instructions listed below will help you prepare your skin for surgery.      How do I use the CHG skin cleanser?  Steps:  Begin using your CHG soap five days before your scheduled surgery on ________________________.    Days 1-4 Shower before bed:  Wash your face and genitals with your normal soap and rinse.    Wash and rinse your hair using the CHG soap. Rinse completely, do not condition your   Hair.          3.    Apply the CHG soap to a clean wet washcloth.  Turn the water off or move away                From the water spray to avoid premature rinsing of the CHG soap as you are applying.     4.   Lather your entire body from the neck down.  Do not use on your face or genitals.   Pay special attention to the area(s) where your incision(s) will be located unless they are on your face.  Avoid scrubbing your skin too hard.  The important point is to have the CHG soap sit on your skin for 3 minutes.    When the 3 minutes are up, turn on the water and rinse the CHG soap off your body completely.   Pat yourself dry with a clean, freshly-laundered  towel.  Dress in clean, freshly laundered night clothes.    Be sure to sleep with clean, freshly laundered sheets.  Day 5:  Last shower is the morning before surgery: Follow above Instructions.    NOTE:    *Hair extensions should be removed    *Keep CHG soap out of eyes and ear canals   *DO NOT wash with regular soap on your body after you have used the CHG        soap solution  *DO NOT apply powders, lotions, or perfume.  *Deodorant may be used days 1-4, BUT NOT the day of surgery    Who should I contact if I have any questions regarding the use of CHG soap?  Call the [facility], Preadmission Testing at [phone] if you have any questions.              Patient Information: Pre-Operative Infection Prevention Measures     Why did I have my nose, under my arms and groin swabbed?  The purpose of the swab is to identify Staphylococcus aureus inside your nose or on your skin.  The swab was sent to the laboratory for culture.  A positive swab/culture for Staphylococcus aureus is called colonization or carriage.      What is Staphylococcus aureus?  Staphylococcus aureus, also known as “staph”, is a germ found on the skin or in the nose of healthy people.  Sometimes Staphylococcus aureus can get into the body and cause an infection.  This can be minor (such as pimples, boils or other skin problems).  It might also be serious (such as blood infection, pneumonia or a surgical site infection).    What is Staphylococcus aureus colonization or carriage?  Colonization or carriage means that a person has the germ but is not sick from it.  These bacteria can be spread on the hands or when breathing or sneezing.    How is Staphylococcus aureus spread?  It is most often spread by close contact with a person or item that carries it.    What happens if my culture is positive for Staphylococcus aureus?  Your doctor/medical team will use this information to guide any antibiotic treatment which may be  necessary.  Regardless of the culture results, we will clean the inside of your nose with a betadine swab just before you have your surgery.      Will I get an infection if I have Staphylococcus aureus in my nose or on my skin?  Anyone can get an infection with Staphylococcus aureus.  However, the best way to reduce your risk of infection is to follow the instructions provided to you for the use of your CHG soap and dental rinse.        Who should I contact if I have any questions?  Call the [facility], Preadmission Testing at [phone] if you have any questions.

## 2024-11-07 NOTE — CPM/PAT NURSE NOTE
CPM/PAT Nurse Note      Name: Wade Machado (Wade Machado)  /Age: 1955/69 y.o.       Past Medical History:   Diagnosis Date    Alcohol use     BPH (benign prostatic hyperplasia)     Colloid cyst of brain (Multi)     s/p R crani for resection ([facility], )    Colovesical fistula     Diverticulitis     Hypertension     Indwelling Moeller catheter present     catheter change on 24 16 Fr    Obesity     Pontine hemorrhage (Multi) 2024    Prostate cancer (Multi)     Tobacco use     Unspecified osteoarthritis, unspecified site     Osteoarthritis       Past Surgical History:   Procedure Laterality Date    BRAIN SURGERY      third 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 (at [facility])    COLONOSCOPY  2024    HIP ARTHROPLASTY Right     HIP SURGERY      R hip reduction    JOINT REPLACEMENT      OTHER SURGICAL HISTORY  2022    Retinal detachment repair    OTHER SURGICAL HISTORY  2022    Cataract surgery       Patient  reports that he is not currently sexually active.    No family history on file.    No Known Allergies    Prior to Admission medications    Medication Sig Start Date End Date Taking? Authorizing Provider   acetaminophen (Tylenol) 325 mg tablet Take 1-2 tablets (325-650 mg) by mouth every 4 hours if needed for mild pain (1 - 3). 22  Yes Historical Provider, MD   ascorbic acid, vitamin C, 100 mg chewable tablet Chew 4 tablets (400 mg) once daily.   Yes Historical Provider, MD   docusate sodium (Colace) 100 mg capsule Take 1 capsule (100 mg) by mouth once daily.   Yes Historical Provider, MD   finasteride (Proscar) 5 mg tablet Take 1 tablet (5 mg) by mouth once daily. 24  Yes Historical Provider, MD   lisinopril 40 mg tablet TAKE ONE TABLET BY MOUTH once DAILY for blood pressure 6/3/24  Yes Eddie Crum,    metoprolol succinate XL (Toprol-XL) 25 mg 24 hr tablet 1 tablet (25 mg). 24  Yes Historical Provider, MD   oxybutynin (Ditropan) 5 mg tablet Take 1  tablet (5 mg) by mouth 3 times a day as needed (bladder spasm). 10/25/24  Yes Gil Brown MD   tamsulosin (Flomax) 0.4 mg 24 hr capsule Take 1 capsule (0.4 mg) by mouth 2 times a day. 8/13/24  Yes COLTON Duke-CNP   calcium carbonate (Tums Extra Strength) 300 mg (750 mg) chewable tablet Chew 300 mg 3 times a day as needed for indigestion or heartburn.    Historical Provider, MD   chlorhexidine (Peridex) 0.12 % solution Swish for 30 seconds and spit 15mL of solution the night before and morning of surgery 11/7/24   COLTON Jean-Baptiste-ELDER        PAT ROS     DASI Risk Score    No data to display       Caprini DVT Assessment      Flowsheet Row ED to Hosp-Admission (Discharged) from 10/19/2024 in [facility] 9 with Briseida Lovelace DO and Shannan Espinal DO ED to Hosp-Admission (Discharged) from 8/9/2024 in [facility] [place] 4 with Doroteo Navarro MD and Juan Toledo MD   DVT Score 7 filed at 10/20/2024 0310 5 filed at 08/09/2024 0438   Medical Factors Medical patient at bedrest, Sepsis <1 month filed at 10/20/2024 0310 --   BMI 31-40 (Obesity) filed at 10/20/2024 0310 31-40 (Obesity) filed at 08/09/2024 0438   RETIRED: Age -- 60-75 years filed at 08/09/2024 0438          Modified Frailty Index    No data to display       CHADS2 Stroke Risk  Current as of 7 hours ago        N/A 3 to 100%: High Risk   2 to < 3%: Medium Risk   0 to < 2%: Low Risk     Last Change: N/A          This score determines the patient's risk of having a stroke if the patient has atrial fibrillation.        This score is not applicable to this patient. Components are not calculated.          Revised Cardiac Risk Index    No data to display       Apfel Simplified Score    No data to display       Risk Analysis Index Results This Encounter    No data found in the last 10 encounters.       Prodigy: High Risk  Total Score: 16              Prodigy Age Score      Prodigy Gender Score           ARISCAT Score for Postoperative Pulmonary Complications    No data to display       Nava Perioperative Risk for Myocardial Infarction or Cardiac Arrest (ROSETTE)    No data to display         Nurse Plan of Action:     After Visit Summary (AVS) reviewed and patient verbalized good understanding of medications and NPO instructions.  Pre-op infection prevention measures:  CHG showers and mouthwash reviewed, understanding voiced.  CHG soap given and patient verbalized need to pick CHG mouthwash at their preferred local pharmacy.

## 2024-11-07 NOTE — CPM/PAT H&P
[facility] Evaluation       Name: Wade Machado (Wade Machado)  /Age: 1955/69 y.o.       Date of Consult: 24    Referring Provider: Dr. Thomas Mcintyre    Surgery, Date, and Length: Resection Laparoscopy Sigmoid Colon with takedown of colovesical fistula, 24, 340 min    Wade Machado is a 69 year-old male who presents to the [facility] for perioperative risk assessment prior to surgery.    Patient presents with a primary diagnosis of Colovesical fistula.     Wade Machado is a 70yo male with possible incidental finding of colovesical fistula.       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  Plan to repeat MRI brain in 2 months.  He is here today to discuss possible colovesical fistula noted on CT imaging.       Colonoscopy 24 while IH  Impression  Scattered diverticulitis of moderate severity in the sigmoid colon  Moderate edematous mucosa in the sigmoid colon; performed cold forceps biopsies to rule out colitis and IBD  Small hemorrhoids  The terminal ileum, ileocecal valve, cecum, ascending colon, hepatic flexure, transverse colon, splenic flexure and descending colon appeared normal.  Purulence expressed during patient coughing episode near area of edema concerning for sigmoidal end of colovesical fistula.     No c/o any sediment in the urine, hematuria or passing air through the penis.  He now has a llamas catheter that was placed while he was admitted in August.  He has a little soreness from the insertion of the catheter but no other issues with the llamas.       He has constipation at times and takes Colace daily to keep the stools soft.  He just started taking Benefiber daily.   He will have 1 soft BM every day with rare straining.  No c/o any rectal bleeding.  No c/o any accidents or leakage of stool.  No c/o any abdominal/rectal pain.    This note was created in part upon personal review of patient's medical  records.      Patient is scheduled to have a Resection Laparoscopy Sigmoid Colon with takedown of colovesical fistula.      Pt denies any past history of anesthetic complications such as PONV, awareness, prolonged sedation, dental damage, aspiration, cardiac arrest, difficult intubation, difficult I.V. access or unexpected hospital admissions.  NO malignant hyperthermia and or pseudocholinesterase deficiency.  No history of blood transfusions     The patient is not a Denominational and will accept blood and blood products if medically indicated.   Type and screen sent.     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    Prostate cancer (Multi)     Tobacco use     Unspecified osteoarthritis, unspecified site     Osteoarthritis       Past Surgical History:   Procedure Laterality Date    BRAIN SURGERY  2005    third 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 (at [facility])    COLONOSCOPY  08/12/2024    HIP ARTHROPLASTY Right     HIP SURGERY      R hip reduction    JOINT REPLACEMENT      OTHER SURGICAL HISTORY  05/09/2022    Retinal detachment repair    OTHER SURGICAL HISTORY  05/09/2022    Cataract surgery     Social History     Socioeconomic History    Marital status:    Tobacco Use    Smoking status: Every Day     Current packs/day: 1.00     Average packs/day: 1 pack/day for 51.8 years (51.8 ttl pk-yrs)     Types: Cigarettes     Start date: 1973    Smokeless tobacco: Never   Substance and Sexual Activity    Alcohol use: Yes     Alcohol/week: 3.0 standard drinks of alcohol     Types: 3 Standard drinks or equivalent per week     Comment: 12 pack weekly, now down to 2-3 drinks per week    Drug use: Yes      Frequency: 1.0 times per week     Types: Marijuana    Sexual activity: Not Currently     Social Drivers of Health     Financial Resource Strain: Low Risk  (10/20/2024)    Overall Financial Resource Strain (CARDIA)     Difficulty of Paying Living Expenses: Not very hard   Food Insecurity: No Food Insecurity (10/20/2024)    Hunger Vital Sign     Worried About Running Out of Food in the Last Year: Never true     Ran Out of Food in the Last Year: Never true   Transportation Needs: No Transportation Needs (10/20/2024)    PRAPARE - Transportation     Lack of Transportation (Medical): No     Lack of Transportation (Non-Medical): No   Physical Activity: Inactive (10/20/2024)    Exercise Vital Sign     Days of Exercise per Week: 0 days     Minutes of Exercise per Session: 0 min   Intimate Partner Violence: Not At Risk (10/20/2024)    Humiliation, Afraid, Rape, and Kick questionnaire     Fear of Current or Ex-Partner: No     Emotionally Abused: No     Physically Abused: No     Sexually Abused: No   Housing Stability: Low Risk  (10/20/2024)    Housing Stability Vital Sign     Unable to Pay for Housing in the Last Year: No     Number of Times Moved in the Last Year: 0     Homeless in the Last Year: No        No Known Allergies      Current Outpatient Medications:     acetaminophen (Tylenol) 325 mg tablet, Take 1-2 tablets (325-650 mg) by mouth every 4 hours if needed for mild pain (1 - 3)., Disp: , Rfl:     ascorbic acid, vitamin C, 100 mg chewable tablet, Chew 4 tablets (400 mg) once daily., Disp: , Rfl:     docusate sodium (Colace) 100 mg capsule, Take 1 capsule (100 mg) by mouth once daily., Disp: , Rfl:     finasteride (Proscar) 5 mg tablet, Take 1 tablet (5 mg) by mouth once daily., Disp: , Rfl:     lisinopril 40 mg tablet, TAKE ONE TABLET BY MOUTH once DAILY for blood pressure, Disp: 90 tablet, Rfl: 3    metoprolol succinate XL (Toprol-XL) 25 mg 24 hr tablet, 1 tablet (25 mg)., Disp: , Rfl:     oxybutynin (Ditropan) 5  "mg tablet, Take 1 tablet (5 mg) by mouth 3 times a day as needed (bladder spasm)., Disp: 90 tablet, Rfl: 0    tamsulosin (Flomax) 0.4 mg 24 hr capsule, Take 1 capsule (0.4 mg) by mouth 2 times a day., Disp: , Rfl:     calcium carbonate (Tums Extra Strength) 300 mg (750 mg) chewable tablet, Chew 300 mg 3 times a day as needed for indigestion or heartburn., Disp: , Rfl:     chlorhexidine (Peridex) 0.12 % solution, Swish for 30 seconds and spit 15mL of solution the night before and morning of surgery, Disp: 475 mL, Rfl: 0      PAT ROS:   Constitutional:   neg    Neuro/Psych:   neg    Eyes:    use of corrective lenses (readers)  Ears:    no hearing aides  Nose:   Mouth:    Missing teeth  Throat:   neg    Neck:   neg    Cardio:    Functional 3Mets. Patient denies SOB walking up 2 flights of stairs /has stairs at home/ was able to walk in from the front door/uses a cane for long distance walking.   no chest pain   no palpitations   PEREZ  Respiratory:   neg    Endocrine:   neg    GI:    no abdominal pain   constipation   no diarrhea   no nausea   no vomiting  :    Pain around catheter site  Musculoskeletal:   neg    Hematologic:    does not bruise/bleed easily   no history of blood transfusion  Skin:  neg        Physical Exam  Physical exam within normal limits.   Genitourinary:     Comments: Moeller catheter present         PAT AIRWAY:   Airway:     Mallampati::  IV    Neck ROM::  Full   No broken teeth, no dentures and x 4 missing teeth          Visit Vitals  /72   Pulse 89   Temp 36.8 °C (98.2 °F)   Resp 16   Ht 1.727 m (5' 8\")   Wt 107 kg (235 lb 7.2 oz)   SpO2 95%   BMI 35.80 kg/m²   Smoking Status Every Day   BSA 2.27 m²       Plan    Neuro:  Pontine hemorrhage (08/09/24)  We saw the patient in clinic for pontine mass concerning for cavernoma. Mass looks stable on repeat MRI. From Neurology stand point there is no contraindication for surgery. We recommend avoiding perioperative anticoagulation use given concern " of hemorrhage on initial scans.      Case discussed with attending Dr. Gilmore     Thank you,   Saud Klein MD  Neurology Resident PGY-3         Electronically signed by Saud Klein MD at 11/6/2024  4:39 PM  Electronically signed by Ashkan Gilmore MD at 11/7/2024 11:03 AM         Cardiovascular:    TRANSTHORACIC ECHOCARDIOGRAM REPORT 8/10/2024   CONCLUSIONS:   1. Poorly visualized anatomical structures due to suboptimal image quality.   2. Left ventricular ejection fraction is normal, by visual estimate at 65-70%.   3. There is normal right ventricular global systolic function.       RCRI: 2  Risk of Mace: 10.1%    HTN:  Lisinopril-hold any evening dose the night before the day of surgery. Hold the day of surgery  Metoprolol succinate-take on dos    EKG in PAT   Encounter Date: 10/19/24   ECG 12 lead   Result Value    Ventricular Rate 92    Atrial Rate 92    SC Interval 150    QRS Duration 76    QT Interval 342    QTC Calculation(Bazett) 422    P Axis 50    R Axis -5    T Axis 53    QRS Count 15    Q Onset 221    P Onset 146    P Offset 198    T Offset 392    QTC Fredericia 394    Narrative    Normal sinus rhythm with sinus arrhythmia  Normal ECG  When compared with ECG of 08-AUG-2024 23:18,  No significant change was found  See ED provider note for full interpretation and clinical correlation  Confirmed by Yolanda Mercedes ([id number]) on 10/29/2024 12:47:12 PM         Pulm:  Denies any shortness of breath or activity intolerance    Stop Bang= 4 Intermediate (htn, bmi, age, male)    GI/:  GERD-tums-hold dos    BPH-  Finasteride-take on dos  Oxybutynin-hold on dos  Tamsulosin-take on dos      Heme:  Patient instructed to ambulate as soon as possible postoperatively to decrease thromboembolic risk.    Initiate mechanical DVT prophylaxis as soon as possible and initiate chemical prophylaxis when deemed safe from a bleeding standpoint post surgery.    Caprini=11    Risk assessment complete.  Patient is scheduled for an  INTERMEDIATE/HIGH surgical risk procedure.          Labs/testing obtained in PAT on 11/07/24  CBC, CMP, T&S, MRSA    Lab Results   Component Value Date    WBC 10.4 11/07/2024    HGB 13.1 (L) 11/07/2024    HCT 41.4 11/07/2024    MCV 91 11/07/2024     11/07/2024     Lab Results   Component Value Date    GLUCOSE 80 11/07/2024    CALCIUM 8.9 11/07/2024     (L) 11/07/2024    K 5.2 11/07/2024    CO2 27 11/07/2024    CL 99 11/07/2024    BUN 16 11/07/2024    CREATININE 0.65 11/07/2024     Lab Results   Component Value Date    ALT 11 11/07/2024    AST 12 11/07/2024    ALKPHOS 92 11/07/2024    BILITOT 0.3 11/07/2024     Urine Culture 11/04/24-normal    Labs reviewed, unremarkable.    Follow up/communication: MRSA pending      Preoperative medication instructions were provided and reviewed with the patient.  Any additional testing or evaluation was explained to the patient.  Nothing by mouth instructions were discussed and patient's questions were answered prior to conclusion to this encounter.  Patient verbalized understanding of preoperative instructions given in preadmission testing; discharge instructions available in EMR.    This note was dictated with speech recognition.  Minor errors may have been detected during use of speech recognition.

## 2024-11-08 DIAGNOSIS — N32.1 COLOVESICAL FISTULA: ICD-10-CM

## 2024-11-08 LAB
ABO GROUP (TYPE) IN BLOOD: NORMAL
ANTIBODY SCREEN: NORMAL
RH FACTOR (ANTIGEN D): NORMAL

## 2024-11-08 RX ORDER — GABAPENTIN 100 MG/1
CAPSULE ORAL
Qty: 3 CAPSULE | Refills: 0 | Status: ON HOLD | OUTPATIENT
Start: 2024-11-08

## 2024-11-08 RX ORDER — NEOMYCIN SULFATE 500 MG/1
TABLET ORAL
Qty: 6 TABLET | Refills: 0 | Status: ON HOLD | OUTPATIENT
Start: 2024-11-08

## 2024-11-08 RX ORDER — METRONIDAZOLE 250 MG/1
TABLET ORAL
Qty: 3 TABLET | Refills: 0 | Status: ON HOLD | OUTPATIENT
Start: 2024-11-08

## 2024-11-08 NOTE — PROGRESS NOTES
Pharmacy Medication History Review    Wade Machado is a 69 y.o. male who is planned to be admitted for Colovesical fistula. Pharmacy called the patient prior to their scheduled procedure and reviewed the patient's [id number] medications for accuracy.    Medications ADDED:  none  Medications CHANGED:  Metoprolol succinate 25mg - added directions  Medications REMOVED:   none    Please review updated prior to admission medication list and comments regarding how patient may be taking medications differently by going to Admission tab --> Admission Orders --> Admit Orders / Review prior to admission medications.     Preferred pharmacy, last doses of medications, and allergies to be confirmed with patient by nursing the day of procedure.     Sources used to complete the med history include spoke with patient and spouse (jhoana), medication dispense report, oarrs, care everywhere    Below are additional concerns with the patient's PTA list.  Talked to patient briefly, they did not know their medication names or dosages and asked for me to call their spouse. Called and talked to spouse; they stated they confirmed everything with PAT and everything was correct. But they did not add directions to the metoprolol so I got that from patient's spouse and updated directions. Everything else matches up with their fill history and the directions that are on file.    Namita Lopez    [facility]  Please reach out via Secure Chat for questions

## 2024-11-09 LAB — STAPHYLOCOCCUS SPEC CULT: ABNORMAL

## 2024-11-10 ENCOUNTER — ANESTHESIA EVENT (OUTPATIENT)
Dept: OPERATING ROOM | Facility: HOSPITAL | Age: 69
End: 2024-11-10
Payer: COMMERCIAL

## 2024-11-11 ENCOUNTER — HOSPITAL ENCOUNTER (INPATIENT)
Facility: HOSPITAL | Age: 69
DRG: 674 | End: 2024-11-11
Attending: COLON & RECTAL SURGERY | Admitting: COLON & RECTAL SURGERY
Payer: COMMERCIAL

## 2024-11-11 ENCOUNTER — ANESTHESIA (OUTPATIENT)
Dept: OPERATING ROOM | Facility: HOSPITAL | Age: 69
End: 2024-11-11
Payer: COMMERCIAL

## 2024-11-11 DIAGNOSIS — N32.1 COLOVESICAL FISTULA: Primary | ICD-10-CM

## 2024-11-11 PROBLEM — C61 PROSTATE CANCER (MULTI): Status: ACTIVE | Noted: 2024-11-11

## 2024-11-11 PROBLEM — E66.9 OBESITY: Status: ACTIVE | Noted: 2024-11-11

## 2024-11-11 PROBLEM — K21.9 GASTROESOPHAGEAL REFLUX DISEASE: Status: ACTIVE | Noted: 2024-11-11

## 2024-11-11 LAB
ABO GROUP (TYPE) IN BLOOD: NORMAL
ANTIBODY SCREEN: NORMAL
RH FACTOR (ANTIGEN D): NORMAL

## 2024-11-11 PROCEDURE — 3600000009 HC OR TIME - EACH INCREMENTAL 1 MINUTE - PROCEDURE LEVEL FOUR: Performed by: COLON & RECTAL SURGERY

## 2024-11-11 PROCEDURE — A44204 PR LAP,SURG,COLECTOMY, PARTIAL, W/ANAST: Performed by: STUDENT IN AN ORGANIZED HEALTH CARE EDUCATION/TRAINING PROGRAM

## 2024-11-11 PROCEDURE — 44204 LAPARO PARTIAL COLECTOMY: CPT | Performed by: COLON & RECTAL SURGERY

## 2024-11-11 PROCEDURE — 44310 ILEOSTOMY/JEJUNOSTOMY: CPT | Performed by: COLON & RECTAL SURGERY

## 2024-11-11 PROCEDURE — 0DTN4ZZ RESECTION OF SIGMOID COLON, PERCUTANEOUS ENDOSCOPIC APPROACH: ICD-10-PCS | Performed by: COLON & RECTAL SURGERY

## 2024-11-11 PROCEDURE — 2720000007 HC OR 272 NO HCPCS: Performed by: COLON & RECTAL SURGERY

## 2024-11-11 PROCEDURE — 3600000004 HC OR TIME - INITIAL BASE CHARGE - PROCEDURE LEVEL FOUR: Performed by: COLON & RECTAL SURGERY

## 2024-11-11 PROCEDURE — 0DJD8ZZ INSPECTION OF LOWER INTESTINAL TRACT, VIA NATURAL OR ARTIFICIAL OPENING ENDOSCOPIC: ICD-10-PCS | Performed by: COLON & RECTAL SURGERY

## 2024-11-11 PROCEDURE — 88307 TISSUE EXAM BY PATHOLOGIST: CPT | Mod: TC,SUR,PARLAB | Performed by: COLON & RECTAL SURGERY

## 2024-11-11 PROCEDURE — 86900 BLOOD TYPING SEROLOGIC ABO: CPT | Performed by: COLON & RECTAL SURGERY

## 2024-11-11 PROCEDURE — 99223 1ST HOSP IP/OBS HIGH 75: CPT

## 2024-11-11 PROCEDURE — 2500000004 HC RX 250 GENERAL PHARMACY W/ HCPCS (ALT 636 FOR OP/ED)

## 2024-11-11 PROCEDURE — 7100000002 HC RECOVERY ROOM TIME - EACH INCREMENTAL 1 MINUTE: Performed by: COLON & RECTAL SURGERY

## 2024-11-11 PROCEDURE — 3700000002 HC GENERAL ANESTHESIA TIME - EACH INCREMENTAL 1 MINUTE: Performed by: COLON & RECTAL SURGERY

## 2024-11-11 PROCEDURE — 2500000004 HC RX 250 GENERAL PHARMACY W/ HCPCS (ALT 636 FOR OP/ED): Performed by: STUDENT IN AN ORGANIZED HEALTH CARE EDUCATION/TRAINING PROGRAM

## 2024-11-11 PROCEDURE — 88307 TISSUE EXAM BY PATHOLOGIST: CPT | Performed by: STUDENT IN AN ORGANIZED HEALTH CARE EDUCATION/TRAINING PROGRAM

## 2024-11-11 PROCEDURE — 0D1B0Z4 BYPASS ILEUM TO CUTANEOUS, OPEN APPROACH: ICD-10-PCS | Performed by: COLON & RECTAL SURGERY

## 2024-11-11 PROCEDURE — 2500000005 HC RX 250 GENERAL PHARMACY W/O HCPCS

## 2024-11-11 PROCEDURE — 2500000001 HC RX 250 WO HCPCS SELF ADMINISTERED DRUGS (ALT 637 FOR MEDICARE OP): Performed by: STUDENT IN AN ORGANIZED HEALTH CARE EDUCATION/TRAINING PROGRAM

## 2024-11-11 PROCEDURE — 2500000005 HC RX 250 GENERAL PHARMACY W/O HCPCS: Performed by: COLON & RECTAL SURGERY

## 2024-11-11 PROCEDURE — 7100000001 HC RECOVERY ROOM TIME - INITIAL BASE CHARGE: Performed by: COLON & RECTAL SURGERY

## 2024-11-11 PROCEDURE — 2500000004 HC RX 250 GENERAL PHARMACY W/ HCPCS (ALT 636 FOR OP/ED): Performed by: COLON & RECTAL SURGERY

## 2024-11-11 PROCEDURE — 36415 COLL VENOUS BLD VENIPUNCTURE: CPT | Performed by: COLON & RECTAL SURGERY

## 2024-11-11 PROCEDURE — 3700000001 HC GENERAL ANESTHESIA TIME - INITIAL BASE CHARGE: Performed by: COLON & RECTAL SURGERY

## 2024-11-11 PROCEDURE — 1170000001 HC PRIVATE ONCOLOGY ROOM DAILY

## 2024-11-11 PROCEDURE — P9045 ALBUMIN (HUMAN), 5%, 250 ML: HCPCS | Mod: JZ

## 2024-11-11 PROCEDURE — 36620 INSERTION CATHETER ARTERY: CPT

## 2024-11-11 PROCEDURE — 7100000024 HC EXTENDED STAY RECOVERY PER MINUTE- PACU: Performed by: COLON & RECTAL SURGERY

## 2024-11-11 RX ORDER — LIDOCAINE HYDROCHLORIDE 20 MG/ML
INJECTION, SOLUTION INFILTRATION; PERINEURAL AS NEEDED
Status: DISCONTINUED | OUTPATIENT
Start: 2024-11-11 | End: 2024-11-11

## 2024-11-11 RX ORDER — FINASTERIDE 5 MG/1
5 TABLET, FILM COATED ORAL DAILY
Status: DISCONTINUED | OUTPATIENT
Start: 2024-11-12 | End: 2024-11-19 | Stop reason: HOSPADM

## 2024-11-11 RX ORDER — NALOXONE HYDROCHLORIDE 0.4 MG/ML
0.2 INJECTION, SOLUTION INTRAMUSCULAR; INTRAVENOUS; SUBCUTANEOUS EVERY 5 MIN PRN
Status: DISCONTINUED | OUTPATIENT
Start: 2024-11-11 | End: 2024-11-19 | Stop reason: HOSPADM

## 2024-11-11 RX ORDER — GABAPENTIN 100 MG/1
100 CAPSULE ORAL 3 TIMES DAILY
Status: DISCONTINUED | OUTPATIENT
Start: 2024-11-11 | End: 2024-11-16

## 2024-11-11 RX ORDER — CEFAZOLIN 1 G/1
INJECTION, POWDER, FOR SOLUTION INTRAVENOUS AS NEEDED
Status: DISCONTINUED | OUTPATIENT
Start: 2024-11-11 | End: 2024-11-11

## 2024-11-11 RX ORDER — METRONIDAZOLE 500 MG/100ML
500 INJECTION, SOLUTION INTRAVENOUS ONCE
Status: COMPLETED | OUTPATIENT
Start: 2024-11-11 | End: 2024-11-11

## 2024-11-11 RX ORDER — OXYCODONE HYDROCHLORIDE 5 MG/1
10 TABLET ORAL EVERY 4 HOURS PRN
Status: DISCONTINUED | OUTPATIENT
Start: 2024-11-11 | End: 2024-11-13

## 2024-11-11 RX ORDER — LIDOCAINE HYDROCHLORIDE 10 MG/ML
0.1 INJECTION, SOLUTION EPIDURAL; INFILTRATION; INTRACAUDAL; PERINEURAL ONCE
Status: DISCONTINUED | OUTPATIENT
Start: 2024-11-11 | End: 2024-11-11 | Stop reason: HOSPADM

## 2024-11-11 RX ORDER — HYDRALAZINE HYDROCHLORIDE 20 MG/ML
5 INJECTION INTRAMUSCULAR; INTRAVENOUS EVERY 30 MIN PRN
Status: DISCONTINUED | OUTPATIENT
Start: 2024-11-11 | End: 2024-11-11 | Stop reason: HOSPADM

## 2024-11-11 RX ORDER — ACETAMINOPHEN 325 MG/1
975 TABLET ORAL EVERY 6 HOURS SCHEDULED
Status: DISCONTINUED | OUTPATIENT
Start: 2024-11-11 | End: 2024-11-13

## 2024-11-11 RX ORDER — ESMOLOL HYDROCHLORIDE 10 MG/ML
INJECTION INTRAVENOUS AS NEEDED
Status: DISCONTINUED | OUTPATIENT
Start: 2024-11-11 | End: 2024-11-11

## 2024-11-11 RX ORDER — PROPOFOL 10 MG/ML
INJECTION, EMULSION INTRAVENOUS AS NEEDED
Status: DISCONTINUED | OUTPATIENT
Start: 2024-11-11 | End: 2024-11-11

## 2024-11-11 RX ORDER — ONDANSETRON HYDROCHLORIDE 2 MG/ML
INJECTION, SOLUTION INTRAVENOUS AS NEEDED
Status: DISCONTINUED | OUTPATIENT
Start: 2024-11-11 | End: 2024-11-11

## 2024-11-11 RX ORDER — METHOCARBAMOL 100 MG/ML
1000 INJECTION, SOLUTION INTRAMUSCULAR; INTRAVENOUS ONCE
Status: COMPLETED | OUTPATIENT
Start: 2024-11-11 | End: 2024-11-11

## 2024-11-11 RX ORDER — HYDROMORPHONE HYDROCHLORIDE 0.2 MG/ML
0.2 INJECTION INTRAMUSCULAR; INTRAVENOUS; SUBCUTANEOUS EVERY 5 MIN PRN
Status: DISCONTINUED | OUTPATIENT
Start: 2024-11-11 | End: 2024-11-11 | Stop reason: HOSPADM

## 2024-11-11 RX ORDER — ROCURONIUM BROMIDE 10 MG/ML
INJECTION, SOLUTION INTRAVENOUS AS NEEDED
Status: DISCONTINUED | OUTPATIENT
Start: 2024-11-11 | End: 2024-11-11

## 2024-11-11 RX ORDER — ROPIVACAINE HYDROCHLORIDE 5 MG/ML
INJECTION, SOLUTION EPIDURAL; INFILTRATION; PERINEURAL AS NEEDED
Status: DISCONTINUED | OUTPATIENT
Start: 2024-11-11 | End: 2024-11-11

## 2024-11-11 RX ORDER — OXYBUTYNIN CHLORIDE 5 MG/1
5 TABLET ORAL 3 TIMES DAILY PRN
Status: DISCONTINUED | OUTPATIENT
Start: 2024-11-11 | End: 2024-11-19 | Stop reason: HOSPADM

## 2024-11-11 RX ORDER — FENTANYL CITRATE 50 UG/ML
INJECTION, SOLUTION INTRAMUSCULAR; INTRAVENOUS AS NEEDED
Status: DISCONTINUED | OUTPATIENT
Start: 2024-11-11 | End: 2024-11-11

## 2024-11-11 RX ORDER — ALBUTEROL SULFATE 0.83 MG/ML
2.5 SOLUTION RESPIRATORY (INHALATION) ONCE AS NEEDED
Status: DISCONTINUED | OUTPATIENT
Start: 2024-11-11 | End: 2024-11-11 | Stop reason: HOSPADM

## 2024-11-11 RX ORDER — ROPIVACAINE IN 0.9% SOD CHL/PF 0.2 %
14 PLASTIC BAG, INJECTION (ML) EPIDURAL CONTINUOUS
Status: DISCONTINUED | OUTPATIENT
Start: 2024-11-11 | End: 2024-11-13

## 2024-11-11 RX ORDER — CEFAZOLIN SODIUM 2 G/100ML
2 INJECTION, SOLUTION INTRAVENOUS ONCE
Status: DISCONTINUED | OUTPATIENT
Start: 2024-11-11 | End: 2024-11-11 | Stop reason: HOSPADM

## 2024-11-11 RX ORDER — ALBUMIN HUMAN 50 G/1000ML
SOLUTION INTRAVENOUS AS NEEDED
Status: DISCONTINUED | OUTPATIENT
Start: 2024-11-11 | End: 2024-11-11

## 2024-11-11 RX ORDER — CEFTRIAXONE 1 G/50ML
1 INJECTION, SOLUTION INTRAVENOUS EVERY 24 HOURS
Status: COMPLETED | OUTPATIENT
Start: 2024-11-11 | End: 2024-11-15

## 2024-11-11 RX ORDER — SODIUM CHLORIDE, SODIUM LACTATE, POTASSIUM CHLORIDE, CALCIUM CHLORIDE 600; 310; 30; 20 MG/100ML; MG/100ML; MG/100ML; MG/100ML
100 INJECTION, SOLUTION INTRAVENOUS CONTINUOUS
Status: ACTIVE | OUTPATIENT
Start: 2024-11-11 | End: 2024-11-11

## 2024-11-11 RX ORDER — ONDANSETRON 4 MG/1
4 TABLET, ORALLY DISINTEGRATING ORAL EVERY 8 HOURS PRN
Status: DISCONTINUED | OUTPATIENT
Start: 2024-11-11 | End: 2024-11-19 | Stop reason: HOSPADM

## 2024-11-11 RX ORDER — ONDANSETRON HYDROCHLORIDE 2 MG/ML
4 INJECTION, SOLUTION INTRAVENOUS EVERY 8 HOURS PRN
Status: DISCONTINUED | OUTPATIENT
Start: 2024-11-11 | End: 2024-11-19 | Stop reason: HOSPADM

## 2024-11-11 RX ORDER — TAMSULOSIN HYDROCHLORIDE 0.4 MG/1
0.4 CAPSULE ORAL DAILY
Status: DISCONTINUED | OUTPATIENT
Start: 2024-11-12 | End: 2024-11-19 | Stop reason: HOSPADM

## 2024-11-11 RX ORDER — LABETALOL HYDROCHLORIDE 5 MG/ML
INJECTION, SOLUTION INTRAVENOUS AS NEEDED
Status: DISCONTINUED | OUTPATIENT
Start: 2024-11-11 | End: 2024-11-11

## 2024-11-11 RX ORDER — HYDROMORPHONE HYDROCHLORIDE 1 MG/ML
INJECTION, SOLUTION INTRAMUSCULAR; INTRAVENOUS; SUBCUTANEOUS AS NEEDED
Status: DISCONTINUED | OUTPATIENT
Start: 2024-11-11 | End: 2024-11-11

## 2024-11-11 RX ORDER — OXYCODONE HYDROCHLORIDE 5 MG/1
5 TABLET ORAL EVERY 4 HOURS PRN
Status: DISCONTINUED | OUTPATIENT
Start: 2024-11-11 | End: 2024-11-11 | Stop reason: HOSPADM

## 2024-11-11 RX ORDER — LISINOPRIL 20 MG/1
40 TABLET ORAL DAILY
Status: DISCONTINUED | OUTPATIENT
Start: 2024-11-11 | End: 2024-11-19 | Stop reason: HOSPADM

## 2024-11-11 RX ORDER — SODIUM CHLORIDE 9 MG/ML
40 INJECTION, SOLUTION INTRAVENOUS CONTINUOUS
Status: ACTIVE | OUTPATIENT
Start: 2024-11-11 | End: 2024-11-13

## 2024-11-11 RX ORDER — WATER 1 ML/ML
IRRIGANT IRRIGATION AS NEEDED
Status: DISCONTINUED | OUTPATIENT
Start: 2024-11-11 | End: 2024-11-11 | Stop reason: HOSPADM

## 2024-11-11 RX ORDER — OXYCODONE HYDROCHLORIDE 5 MG/1
10 TABLET ORAL EVERY 4 HOURS PRN
Status: DISCONTINUED | OUTPATIENT
Start: 2024-11-11 | End: 2024-11-11 | Stop reason: HOSPADM

## 2024-11-11 RX ORDER — OXYCODONE HYDROCHLORIDE 5 MG/1
5 TABLET ORAL EVERY 4 HOURS PRN
Status: DISCONTINUED | OUTPATIENT
Start: 2024-11-11 | End: 2024-11-13

## 2024-11-11 RX ORDER — ACETAMINOPHEN 325 MG/1
650 TABLET ORAL EVERY 4 HOURS PRN
Status: DISCONTINUED | OUTPATIENT
Start: 2024-11-11 | End: 2024-11-11 | Stop reason: HOSPADM

## 2024-11-11 RX ORDER — LABETALOL HYDROCHLORIDE 5 MG/ML
5 INJECTION, SOLUTION INTRAVENOUS ONCE AS NEEDED
Status: DISCONTINUED | OUTPATIENT
Start: 2024-11-11 | End: 2024-11-11 | Stop reason: HOSPADM

## 2024-11-11 RX ORDER — METOPROLOL SUCCINATE 25 MG/1
25 TABLET, EXTENDED RELEASE ORAL DAILY
Status: DISCONTINUED | OUTPATIENT
Start: 2024-11-12 | End: 2024-11-19 | Stop reason: HOSPADM

## 2024-11-11 RX ORDER — ONDANSETRON HYDROCHLORIDE 2 MG/ML
4 INJECTION, SOLUTION INTRAVENOUS ONCE AS NEEDED
Status: DISCONTINUED | OUTPATIENT
Start: 2024-11-11 | End: 2024-11-11 | Stop reason: HOSPADM

## 2024-11-11 RX ORDER — SODIUM CHLORIDE, SODIUM LACTATE, POTASSIUM CHLORIDE, CALCIUM CHLORIDE 600; 310; 30; 20 MG/100ML; MG/100ML; MG/100ML; MG/100ML
20 INJECTION, SOLUTION INTRAVENOUS CONTINUOUS
Status: DISCONTINUED | OUTPATIENT
Start: 2024-11-11 | End: 2024-11-11

## 2024-11-11 SDOH — SOCIAL STABILITY: SOCIAL INSECURITY: WITHIN THE LAST YEAR, HAVE YOU BEEN HUMILIATED OR EMOTIONALLY ABUSED IN OTHER WAYS BY YOUR PARTNER OR EX-PARTNER?: NO

## 2024-11-11 SDOH — SOCIAL STABILITY: SOCIAL INSECURITY: ARE YOU OR HAVE YOU BEEN THREATENED OR ABUSED PHYSICALLY, EMOTIONALLY, OR SEXUALLY BY ANYONE?: NO

## 2024-11-11 SDOH — SOCIAL STABILITY: SOCIAL INSECURITY: DOES ANYONE TRY TO KEEP YOU FROM HAVING/CONTACTING OTHER FRIENDS OR DOING THINGS OUTSIDE YOUR HOME?: NO

## 2024-11-11 SDOH — SOCIAL STABILITY: SOCIAL INSECURITY: WERE YOU ABLE TO COMPLETE ALL THE BEHAVIORAL HEALTH SCREENINGS?: YES

## 2024-11-11 SDOH — ECONOMIC STABILITY: HOUSING INSECURITY: AT ANY TIME IN THE PAST 12 MONTHS, WERE YOU HOMELESS OR LIVING IN A SHELTER (INCLUDING NOW)?: NO

## 2024-11-11 SDOH — SOCIAL STABILITY: SOCIAL NETWORK
DO YOU BELONG TO ANY CLUBS OR ORGANIZATIONS SUCH AS CHURCH GROUPS, UNIONS, FRATERNAL OR ATHLETIC GROUPS, OR SCHOOL GROUPS?: NO

## 2024-11-11 SDOH — ECONOMIC STABILITY: HOUSING INSECURITY: IN THE LAST 12 MONTHS, WAS THERE A TIME WHEN YOU WERE NOT ABLE TO PAY THE MORTGAGE OR RENT ON TIME?: NO

## 2024-11-11 SDOH — ECONOMIC STABILITY: TRANSPORTATION INSECURITY: IN THE PAST 12 MONTHS, HAS LACK OF TRANSPORTATION KEPT YOU FROM MEDICAL APPOINTMENTS OR FROM GETTING MEDICATIONS?: NO

## 2024-11-11 SDOH — SOCIAL STABILITY: SOCIAL INSECURITY: POSSIBLE ABUSE REPORTED TO:: OTHER (COMMENT)

## 2024-11-11 SDOH — HEALTH STABILITY: PHYSICAL HEALTH
HOW OFTEN DO YOU NEED TO HAVE SOMEONE HELP YOU WHEN YOU READ INSTRUCTIONS, PAMPHLETS, OR OTHER WRITTEN MATERIAL FROM YOUR DOCTOR OR PHARMACY?: NEVER

## 2024-11-11 SDOH — SOCIAL STABILITY: SOCIAL INSECURITY: DO YOU FEEL UNSAFE GOING BACK TO THE PLACE WHERE YOU ARE LIVING?: NO

## 2024-11-11 SDOH — SOCIAL STABILITY: SOCIAL INSECURITY: ARE THERE ANY APPARENT SIGNS OF INJURIES/BEHAVIORS THAT COULD BE RELATED TO ABUSE/NEGLECT?: NO

## 2024-11-11 SDOH — SOCIAL STABILITY: SOCIAL INSECURITY: ARE YOU MARRIED, WIDOWED, DIVORCED, SEPARATED, NEVER MARRIED, OR LIVING WITH A PARTNER?: MARRIED

## 2024-11-11 SDOH — ECONOMIC STABILITY: INCOME INSECURITY: IN THE PAST 12 MONTHS HAS THE ELECTRIC, GAS, OIL, OR WATER COMPANY THREATENED TO SHUT OFF SERVICES IN YOUR HOME?: NO

## 2024-11-11 SDOH — HEALTH STABILITY: MENTAL HEALTH: HOW OFTEN DO YOU HAVE A DRINK CONTAINING ALCOHOL?: 2-3 TIMES A WEEK

## 2024-11-11 SDOH — ECONOMIC STABILITY: FOOD INSECURITY: WITHIN THE PAST 12 MONTHS, THE FOOD YOU BOUGHT JUST DIDN'T LAST AND YOU DIDN'T HAVE MONEY TO GET MORE.: NEVER TRUE

## 2024-11-11 SDOH — HEALTH STABILITY: MENTAL HEALTH: EXPERIENCED ANY OF THE FOLLOWING LIFE EVENTS: OTHER (COMMENT)

## 2024-11-11 SDOH — HEALTH STABILITY: PHYSICAL HEALTH: ON AVERAGE, HOW MANY MINUTES DO YOU ENGAGE IN EXERCISE AT THIS LEVEL?: 30 MIN

## 2024-11-11 SDOH — SOCIAL STABILITY: SOCIAL NETWORK: HOW OFTEN DO YOU ATTEND CHURCH OR RELIGIOUS SERVICES?: NEVER

## 2024-11-11 SDOH — SOCIAL STABILITY: SOCIAL NETWORK: IN A TYPICAL WEEK, HOW MANY TIMES DO YOU TALK ON THE PHONE WITH FAMILY, FRIENDS, OR NEIGHBORS?: TWICE A WEEK

## 2024-11-11 SDOH — SOCIAL STABILITY: SOCIAL INSECURITY: HAS ANYONE EVER THREATENED TO HURT YOUR FAMILY OR YOUR PETS?: NO

## 2024-11-11 SDOH — HEALTH STABILITY: MENTAL HEALTH: HOW MANY DRINKS CONTAINING ALCOHOL DO YOU HAVE ON A TYPICAL DAY WHEN YOU ARE DRINKING?: 1 OR 2

## 2024-11-11 SDOH — HEALTH STABILITY: MENTAL HEALTH: HOW OFTEN DO YOU HAVE SIX OR MORE DRINKS ON ONE OCCASION?: LESS THAN MONTHLY

## 2024-11-11 SDOH — ECONOMIC STABILITY: FOOD INSECURITY: WITHIN THE PAST 12 MONTHS, YOU WORRIED THAT YOUR FOOD WOULD RUN OUT BEFORE YOU GOT THE MONEY TO BUY MORE.: NEVER TRUE

## 2024-11-11 SDOH — SOCIAL STABILITY: SOCIAL NETWORK: HOW OFTEN DO YOU ATTEND MEETINGS OF THE CLUBS OR ORGANIZATIONS YOU BELONG TO?: NEVER

## 2024-11-11 SDOH — SOCIAL STABILITY: SOCIAL INSECURITY: HAVE YOU HAD ANY THOUGHTS OF HARMING ANYONE ELSE?: NO

## 2024-11-11 SDOH — SOCIAL STABILITY: SOCIAL INSECURITY: WITHIN THE LAST YEAR, HAVE YOU BEEN AFRAID OF YOUR PARTNER OR EX-PARTNER?: NO

## 2024-11-11 SDOH — SOCIAL STABILITY: SOCIAL INSECURITY: ABUSE: ADULT

## 2024-11-11 SDOH — SOCIAL STABILITY: SOCIAL INSECURITY: HAVE YOU HAD THOUGHTS OF HARMING ANYONE ELSE?: NO

## 2024-11-11 SDOH — HEALTH STABILITY: PHYSICAL HEALTH: ON AVERAGE, HOW MANY DAYS PER WEEK DO YOU ENGAGE IN MODERATE TO STRENUOUS EXERCISE (LIKE A BRISK WALK)?: 2 DAYS

## 2024-11-11 SDOH — HEALTH STABILITY: MENTAL HEALTH
DO YOU FEEL STRESS - TENSE, RESTLESS, NERVOUS, OR ANXIOUS, OR UNABLE TO SLEEP AT NIGHT BECAUSE YOUR MIND IS TROUBLED ALL THE TIME - THESE DAYS?: ONLY A LITTLE

## 2024-11-11 SDOH — ECONOMIC STABILITY: HOUSING INSECURITY: IN THE PAST 12 MONTHS, HOW MANY TIMES HAVE YOU MOVED WHERE YOU WERE LIVING?: 0

## 2024-11-11 SDOH — SOCIAL STABILITY: SOCIAL INSECURITY: DO YOU FEEL ANYONE HAS EXPLOITED OR TAKEN ADVANTAGE OF YOU FINANCIALLY OR OF YOUR PERSONAL PROPERTY?: NO

## 2024-11-11 SDOH — ECONOMIC STABILITY: FOOD INSECURITY: HOW HARD IS IT FOR YOU TO PAY FOR THE VERY BASICS LIKE FOOD, HOUSING, MEDICAL CARE, AND HEATING?: NOT HARD AT ALL

## 2024-11-11 SDOH — SOCIAL STABILITY: SOCIAL NETWORK: HOW OFTEN DO YOU GET TOGETHER WITH FRIENDS OR RELATIVES?: MORE THAN THREE TIMES A WEEK

## 2024-11-11 ASSESSMENT — ACTIVITIES OF DAILY LIVING (ADL)
HEARING - RIGHT EAR: FUNCTIONAL
JUDGMENT_ADEQUATE_SAFELY_COMPLETE_DAILY_ACTIVITIES: YES
DRESSING YOURSELF: NEEDS ASSISTANCE
LACK_OF_TRANSPORTATION: NO
WALKS IN HOME: INDEPENDENT
HEARING - LEFT EAR: FUNCTIONAL
BATHING: NEEDS ASSISTANCE
PATIENT'S MEMORY ADEQUATE TO SAFELY COMPLETE DAILY ACTIVITIES?: YES
TOILETING: NEEDS ASSISTANCE
GROOMING: NEEDS ASSISTANCE
ADEQUATE_TO_COMPLETE_ADL: YES
FEEDING YOURSELF: INDEPENDENT

## 2024-11-11 ASSESSMENT — ENCOUNTER SYMPTOMS
DYSURIA: 0
VOMITING: 0
LIGHT-HEADEDNESS: 0
ABDOMINAL PAIN: 0
HEMATURIA: 0
FEVER: 0
CHILLS: 0
COLOR CHANGE: 0
NUMBNESS: 0
DIARRHEA: 0
TROUBLE SWALLOWING: 0
ARTHRALGIAS: 0
DIFFICULTY URINATING: 0
DIZZINESS: 0
AGITATION: 0
SHORTNESS OF BREATH: 0
NAUSEA: 0
JOINT SWELLING: 0

## 2024-11-11 ASSESSMENT — PAIN - FUNCTIONAL ASSESSMENT
PAIN_FUNCTIONAL_ASSESSMENT: UNABLE TO SELF-REPORT
PAIN_FUNCTIONAL_ASSESSMENT: 0-10
PAIN_FUNCTIONAL_ASSESSMENT: UNABLE TO SELF-REPORT
PAIN_FUNCTIONAL_ASSESSMENT: 0-10
PAIN_FUNCTIONAL_ASSESSMENT: 0-10
PAIN_FUNCTIONAL_ASSESSMENT: UNABLE TO SELF-REPORT
PAIN_FUNCTIONAL_ASSESSMENT: 0-10

## 2024-11-11 ASSESSMENT — COGNITIVE AND FUNCTIONAL STATUS - GENERAL
TURNING FROM BACK TO SIDE WHILE IN FLAT BAD: A LITTLE
WALKING IN HOSPITAL ROOM: A LITTLE
PATIENT BASELINE BEDBOUND: YES
STANDING UP FROM CHAIR USING ARMS: A LITTLE
TOILETING: A LITTLE
MOBILITY SCORE: 18
DRESSING REGULAR LOWER BODY CLOTHING: A LITTLE
PERSONAL GROOMING: A LITTLE
MOVING FROM LYING ON BACK TO SITTING ON SIDE OF FLAT BED WITH BEDRAILS: A LITTLE
DAILY ACTIVITIY SCORE: 18
HELP NEEDED FOR BATHING: A LITTLE
EATING MEALS: A LITTLE
MOVING TO AND FROM BED TO CHAIR: A LITTLE
CLIMB 3 TO 5 STEPS WITH RAILING: A LITTLE
DRESSING REGULAR UPPER BODY CLOTHING: A LITTLE

## 2024-11-11 ASSESSMENT — PAIN SCALES - GENERAL
PAINLEVEL_OUTOF10: 3
PAINLEVEL_OUTOF10: 5 - MODERATE PAIN
PAINLEVEL_OUTOF10: 3
PAINLEVEL_OUTOF10: 0 - NO PAIN
PAINLEVEL_OUTOF10: 0 - NO PAIN
PAINLEVEL_OUTOF10: 3
PAINLEVEL_OUTOF10: 0 - NO PAIN
PAINLEVEL_OUTOF10: 4
PAINLEVEL_OUTOF10: 0 - NO PAIN
PAIN_LEVEL: 2

## 2024-11-11 ASSESSMENT — PATIENT HEALTH QUESTIONNAIRE - PHQ9
1. LITTLE INTEREST OR PLEASURE IN DOING THINGS: NOT AT ALL
2. FEELING DOWN, DEPRESSED OR HOPELESS: NOT AT ALL
SUM OF ALL RESPONSES TO PHQ9 QUESTIONS 1 & 2: 0

## 2024-11-11 ASSESSMENT — LIFESTYLE VARIABLES
AUDIT-C TOTAL SCORE: 4
SKIP TO QUESTIONS 9-10: 0
SUBSTANCE_ABUSE_PAST_12_MONTHS: YES
AUDIT-C TOTAL SCORE: 2
AUDIT-C TOTAL SCORE: 2
HOW MANY STANDARD DRINKS CONTAINING ALCOHOL DO YOU HAVE ON A TYPICAL DAY: 1 OR 2
HOW OFTEN DO YOU HAVE A DRINK CONTAINING ALCOHOL: MONTHLY OR LESS
HOW OFTEN DO YOU HAVE 6 OR MORE DRINKS ON ONE OCCASION: LESS THAN MONTHLY
PRESCIPTION_ABUSE_PAST_12_MONTHS: NO
SKIP TO QUESTIONS 9-10: 0

## 2024-11-11 ASSESSMENT — PAIN DESCRIPTION - DESCRIPTORS: DESCRIPTORS: OTHER (COMMENT)

## 2024-11-11 NOTE — ANESTHESIA PROCEDURE NOTES
Peripheral IV  Date/Time: 11/11/2024 7:56 AM      Placement  Needle size: 16 G  Laterality: right  Location: hand  Local anesthetic: none  Site prep: alcohol  Technique: anatomical landmarks  Attempts: 1

## 2024-11-11 NOTE — H&P
History Of Present Illness  Wade Machado is a 69 y.o. male presenting with a history of pontine hemorrhage and diverticulitis with CVF for planned laparoscopic sigmoidectomy with CVF takedown by Dr. Mcintyre.    He reports persistent fecaluria and pneumaturia. Moeller catheter in place. He tolerated the bowel prep without issues. He is not stoma marked.    MRI brain with stable cavernoma. Plan to avoid perioperative anticoagulation.     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    Prostate cancer (Multi)     Tobacco use     Unspecified osteoarthritis, unspecified site     Osteoarthritis       Surgical History  Past Surgical History:   Procedure Laterality Date    BRAIN SURGERY  2005    third 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 (at [facility])    COLONOSCOPY  08/12/2024    HIP ARTHROPLASTY Right     HIP SURGERY      R hip reduction    JOINT REPLACEMENT      OTHER SURGICAL HISTORY  05/09/2022    Retinal detachment repair    OTHER SURGICAL HISTORY  05/09/2022    Cataract surgery        Social History  He reports that he has been smoking cigarettes. He started smoking about 51 years ago. He has a 51.9 pack-year smoking history. He has never used smokeless tobacco. He reports current alcohol use of about 3.0 standard drinks of alcohol per week. He reports current drug use. Frequency: 1.00 time per week. Drug: Marijuana.    Family History  No family history on file.     Allergies  Patient has no known allergies.    Review of Systems   Constitutional:  Negative for chills and fever.   HENT:  Negative for trouble swallowing.    Respiratory:  Negative for shortness of breath.   "  Cardiovascular:  Negative for chest pain.   Gastrointestinal:  Negative for abdominal pain, diarrhea, nausea and vomiting.   Genitourinary:  Negative for difficulty urinating, dysuria and hematuria.   Musculoskeletal:  Negative for arthralgias and joint swelling.   Skin:  Negative for color change.   Neurological:  Negative for dizziness, light-headedness and numbness.   Psychiatric/Behavioral:  Negative for agitation and behavioral problems.         Physical Exam  Vitals reviewed.   Constitutional:       General: He is not in acute distress.     Appearance: Normal appearance.   HENT:      Head: Normocephalic and atraumatic.   Cardiovascular:      Rate and Rhythm: Normal rate and regular rhythm.   Pulmonary:      Effort: Pulmonary effort is normal. No respiratory distress.   Abdominal:      General: Abdomen is flat.   Musculoskeletal:         General: No swelling, tenderness or deformity.   Skin:     General: Skin is warm and dry.   Neurological:      General: No focal deficit present.      Mental Status: He is alert and oriented to person, place, and time.   Psychiatric:         Mood and Affect: Mood normal.         Behavior: Behavior normal.         Thought Content: Thought content normal.          Last Recorded Vitals  Blood pressure 136/84, pulse 87, temperature 36 °C (96.8 °F), temperature source Temporal, resp. rate 18, height 1.778 m (5' 10\"), weight 107 kg (234 lb 12.6 oz), SpO2 97%.    Relevant Results  MR brain wo IV contrast  Narrative: Interpreted By:  Theron Vicente and Awan Komal   STUDY:  MR BRAIN WO IV CONTRAST;  10/30/2024 9:05 am      INDICATION:  I61.3 Nontraumatic intracerebral hemorrhage in brain stem (Multi)      COMPARISON:      MRI dated 08/09/2024. CT dated 10/20/2024.      ACCESSION NUMBER(S):  [id number]      ORDERING CLINICIAN:  JOCELYN KOCH      TECHNIQUE:  Axial diffusion, axial T2, axial FLAIR, axial gradient echo T2,  coronal T1, and sagittal T1 weighted MRI images of the brain " were  obtained without intravenous contrast administration.      FINDINGS:  Postoperative changes are again identified compatible with a previous  right frontal craniotomy.      There is again evidence of a nonspecific mixed signal  intraparenchymal hemorrhage centered within the carmen which is similar  in size and configuration when compared with the prior MRI dated  08/09/2024. Specifically, using similar points of measurement on the  current and prior study, it currently measures approximately 20 mm x  20 mm in transaxial dimensions by 20 mm in the craniocaudal dimension  compared with the prior study dated 08/09/2024 where it measured  approximately 20 mm x 20 mm in transaxial dimensions by 20 mm in  craniocaudal dimension. Again, no significant edema within the  surrounding brain parenchyma. There is mild mass effect upon the  ventral 4th ventricle similar when compared with the prior study.      There is again evidence of similar ventriculomegaly involving the  lateral ventricles and 3rd ventricle with the lateral ventricles  again demonstrating disproportionate prominence when compared with  the sulci within the cerebral convexities. This disproportionate  ventriculomegaly may be related to more pronounced central volume  loss versus a component of stable communicating hydrocephalus.      Scattered nonspecific white matter changes are again noted within  cerebral hemispheres bilaterally which while nonspecific, given the  patient's age, likely represent sequelae of more remote small-vessel  ischemic change.      Additional small foci of bright signal on the T2 weighted images are  again noted within the subinsular regions, basal ganglia, and thalami  bilaterally suggesting incidental mildly prominent perivascular  spaces and/or small scattered more remote lacunar infarctions.      There is no midline shift. The suprasellar/basilar cisterns are  patent.      There is near-complete opacification of the right  maxillary sinus.  Retention cysts or polyps are identified within the left maxillary  sinus. There is a minimal mucosal thickening noted within scattered  ethmoid air cells.      The mastoid air cells are clear.          Impression:     Postoperative changes are again identified compatible with a previous  right frontal craniotomy.      There is again evidence of a nonspecific mixed signal  intraparenchymal hemorrhage centered within the carmen which is similar  in size and configuration when compared with the prior MRI dated  08/09/2024. Specifically, using similar points of measurement on the  current and prior study, it currently measures approximately 20 mm x  20 mm in transaxial dimensions by 20 mm in the craniocaudal dimension  compared with the prior study dated 08/09/2024 where it measured  approximately 20 mm x 20 mm in transaxial dimensions by 20 mm in  craniocaudal dimension. No post gadolinium imaging was performed on  the current study limiting further evaluation for possible  superimposed pathologic enhancement. Again, no significant edema  within the surrounding brain parenchyma. There is mild mass effect  upon the ventral 4th ventricle similar when compared with the prior  study.      There is again evidence of similar ventriculomegaly involving the  lateral ventricles and 3rd ventricle with the lateral ventricles  again demonstrating disproportionate prominence when compared with  the sulci within the cerebral convexities. This disproportionate  ventriculomegaly may be related to more pronounced central volume  loss versus a component of stable communicating hydrocephalus.      Scattered nonspecific white matter changes are again noted within  cerebral hemispheres bilaterally which while nonspecific, given the  patient's age, likely represent sequelae of more remote small-vessel  ischemic change. Additional small foci of bright signal on the T2  weighted images are again noted within the subinsular  regions, basal  ganglia, and thalami bilaterally suggesting incidental mildly  prominent perivascular spaces and/or small scattered more remote  lacunar infarctions.      There is near-complete opacification of the right maxillary sinus.          I personally reviewed the images/study and I agree with the findings  as stated by Resident Kasia Clemons. This study was interpreted at  [facility], [place], [place].      MACRO:  None.      Signed by: Theron Vicente 11/1/2024 10:41 AM  Dictation workstation:   [id number]         Assessment/Plan   Assessment & Plan  Colovesical fistula    Gastroesophageal reflux disease    Prostate cancer (Multi)    Obesity      Wade Machado is a 69 y.o. male presenting with a history of pontine hemorrhage and diverticulitis with CVF for planned laparoscopic sigmoidectomy with CVF takedown by Dr. Mcintyre.       I spent 10 minutes in the professional and overall care of this patient.      Anabella Lemus MD

## 2024-11-11 NOTE — ANESTHESIA POSTPROCEDURE EVALUATION
Patient: Wade Machado    Procedure Summary       Date: 11/11/24 Room / Location: [facility] OR 06 / [facility]    Anesthesia Start: 0740 Anesthesia Stop: 1202    Procedure: Resection Laparoscopy Sigmoid Colon with takedown of colovesical fistula Diagnosis:       Colovesical fistula      (Colovesical fistula [N32.1])    Surgeons: Thomas Mcintyre MD Responsible Provider: Lamine Coley MD    Anesthesia Type: general ASA Status: 3            Anesthesia Type: general    Vitals Value Taken Time   /75 11/11/24 1200   Temp 36.2 °C (97.2 °F) 11/11/24 1153   Pulse 68 11/11/24 1207   Resp 19 11/11/24 1207   SpO2 91 % 11/11/24 1207   Vitals shown include unfiled device data.    Anesthesia Post Evaluation    Patient location during evaluation: bedside  Patient participation: complete - patient participated  Level of consciousness: awake  Pain score: 2  Pain management: adequate  Airway patency: patent  Cardiovascular status: acceptable and hemodynamically stable  Respiratory status: acceptable and face mask  Hydration status: acceptable  Postoperative Nausea and Vomiting: none        There were no known notable events for this encounter.

## 2024-11-11 NOTE — CONSULTS
Wade Machado is a 69 y.o. year old male patient who presents for Procedure(s):  Resection Laparoscopy Sigmoid Colon with takedown of colovesical fistula with Thomas Mcintyre MD on 11/11/2024.  Acute Pain consulted for assistance with pain control.     Anticipated Postop Pain Issues -   Palliative: typically relieved with IV analgesics and regional local anesthetics  Provocative: typically with movement  Quality: typically burning and aching  Radiation: typically none  Severity: typically severe 8-10/10  Timing: typically constant    Past Medical History:   Diagnosis Date    Alcohol use     BPH (benign prostatic hyperplasia)     Colloid cyst of brain (Multi)     s/p R crani for resection ([facility], 2005)    Colovesical fistula     Diverticulitis     Hypertension     Indwelling Moeller catheter present     catheter change on 11/4/24 16 Fr    Obesity     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     Social History     Socioeconomic History    Marital status:      Spouse name: Not on file    Number of children: Not on file    Years of education: Not on file    Highest education level: Not on file   Occupational History    Not on file   Tobacco Use    Smoking status: Every Day     Current packs/day: 1.00     Average packs/day: 1 pack/day for 51.9 years (51.9 ttl pk-yrs)     Types: Cigarettes     Start date: 1973    Smokeless tobacco: Never   Substance and Sexual Activity    Alcohol use: Yes      Alcohol/week: 3.0 standard drinks of alcohol     Types: 3 Standard drinks or equivalent per week     Comment: 12 pack weekly, now down to 2-3 drinks per week    Drug use: Yes     Frequency: 1.0 times per week     Types: Marijuana    Sexual activity: Not Currently   Other Topics Concern    Not on file   Social History Narrative    Not on file     Social Drivers of Health     Financial Resource Strain: Low Risk  (10/20/2024)    Overall Financial Resource Strain (CARDIA)     Difficulty of Paying Living Expenses: Not very hard   Food Insecurity: No Food Insecurity (10/20/2024)    Hunger Vital Sign     Worried About Running Out of Food in the Last Year: Never true     Ran Out of Food in the Last Year: Never true   Transportation Needs: No Transportation Needs (10/20/2024)    PRAPARE - Transportation     Lack of Transportation (Medical): No     Lack of Transportation (Non-Medical): No   Physical Activity: Inactive (10/20/2024)    Exercise Vital Sign     Days of Exercise per Week: 0 days     Minutes of Exercise per Session: 0 min   Stress: Not on file   Social Connections: Not on file   Intimate Partner Violence: Not At Risk (10/20/2024)    Humiliation, Afraid, Rape, and Kick questionnaire     Fear of Current or Ex-Partner: No     Emotionally Abused: No     Physically Abused: No     Sexually Abused: No   Housing Stability: Low Risk  (10/20/2024)    Housing Stability Vital Sign     Unable to Pay for Housing in the Last Year: No     Number of Times Moved in the Last Year: 0     Homeless in the Last Year: No        No Known Allergies      Review of Systems  Gen: No fatigue, anorexia, insomnia, fever.   Eyes: No vision loss, double vision, drainage, eye pain.   ENT: No pharyngitis, dry mouth, no hearing changes or ear discharge  Cardiac: No chest pain, palpitations, syncope, near syncope.   Pulmonary: No shortness of breath, cough, hemoptysis.   Heme/lymph: No swollen glands, fever, bleeding.   GI: No abdominal pain, change in  bowel habits, melena, hematemesis, hematochezia, nausea, vomiting, diarrhea.   : No discharge, dysuria, frequency, urgency, hematuria.  Endo: No polyuria or weight loss.   Musculoskeletal: Negative for any pain or loss of ROM/weakness  Skin: No rashes or lesions  Neuro: Normal speech, no numbness or weakness. No gait difficulties  Review of systems is otherwise negative unless stated above or in history of present illness.    Physical Exam:  Constitutional:  no distress, alert and cooperative  Eyes: clear sclera  Head/Neck: No apparent injury, trachea midline  Respiratory/Thorax: Patent airways, thorax symmetric, breathing comfortably  Cardiovascular: no pitting edema  Gastrointestinal: Nondistended  Musculoskeletal: ROM intact  Extremities: no clubbing  Neurological: alert, shaw x4  Psychological: Appropriate affect      No results found for this or any previous visit (from the past 24 hours).     Wade Machado is a 69 y.o. year old male patient who presents for Procedure(s):  Resection Laparoscopy Sigmoid Colon with takedown of colovesical fistula with Thomas Mcintyre MD on 11/11/2024. Acute Pain consulted for assistance with pain control.     Plan:    - Bilateral single shot quadratus lumborum blocks performed pre-operatively 11/11/24  - Ambit ball with Ropivacaine 0.2%/NaCl 0.9% 500mL, Rate 7 cc/hr bilaterally   - Ambit medication will not interfere with pain medication prescribed by the primary team.   - Please be aware of local anesthetic toxic dose and absorption variability before considering lidocaine patches  - Acute pain service will follow while catheters in place  - Rest of pain management per primary team  - Acute pain will follow   - Pain medications per primary team    Andreina Cosme MD   Anesthesiology, CA-2    Acute Pain Resident  pg [phone] ph [phone]

## 2024-11-11 NOTE — OP NOTE
Resection Laparoscopy Sigmoid Colon with takedown of colovesical fistula Operative Note     Date: 2024  OR Location: [facility] OR    Name: Wade Machado : 1955, Age: 69 y.o., MRN: [id number], Sex: male    Diagnosis  Pre-op Diagnosis      * Colovesical fistula [N32.1] Post-op Diagnosis     * Colovesical fistula [N32.1]     Procedures  Resection Laparoscopy Sigmoid Colon with takedown of colovesical fistula  88145 - VT LAPAROSCOPY COLECTOMY PARTIAL W/ANASTOMOSIS      Surgeons      * Thomas Mcintyre - Primary    Resident/Fellow/Other Assistant:  Surgeons and Role:  * No surgeons found with a matching role *    Staff:   Circulator: Tayar  Scrub Person: Ronny Henderson Circulator: Parris Henderson Circulator: Marifer Henderson Scrub: Eddie    Anesthesia Staff: Anesthesiologist: Lamine Coley MD  Anesthesia Resident: Harley Solano DO  Frontline Breaker: COLTON Hauser-VICKIE    Procedure Summary  Anesthesia: General  ASA: III  Estimated Blood Loss: 50mL  Intra-op Medications:   Administrations occurring from 0700 to 1300 on 24:   Medication Name Total Dose   surgical lubricant gel 1 Application   sterile water irrigation solution 1,000 mL   albumin human bottle 5% 250 mL   ceFAZolin (Ancef) vial 1 g 2 g   dexAMETHasone (Decadron) 4 mg/mL 4 mg   esmolol 10 mg/mL 90 mg   fentaNYL (Sublimaze) injection 50 mcg/mL 100 mcg   HYDROmorphone (Dilaudid) injection 1 mg/mL 1 mg   labetalol 5 mg/mL 10 mg   lactated Ringer's infusion 220.83 mL   lidocaine (Xylocaine) injection 2 % 100 mg   ondansetron 2 mg/mL 4 mg   propofol (Diprivan) injection 10 mg/mL 200 mg   rocuronium (ZeMuron) 50 mg/5 mL injection 110 mg   ropivacaine PF (Naropin) 0.5 % 30 mL   metroNIDAZOLE (Flagyl) 500 mg in sodium chloride (iso)  mL 500 mg              Anesthesia Record               Intraprocedure I/O Totals          Intake    lactated Ringer's infusion 1000.00 mL    Total Intake 1000 mL       Output    Urine 100 mL    Total  Output 100 mL       Net    Net Volume 900 mL          Specimen:   ID Type Source Tests Collected by Time   1 : SIGMOID COLON Tissue COLON - SIGMOID RESECTION SURGICAL PATHOLOGY EXAM Thomas Mcintyre MD 11/11/2024 1025                 Drains and/or Catheters:   Urethral Catheter Non-latex 16 Fr. (Active)       Findings: colovesical fistula to the sigmoid colon    Indications: Wade Machado is an 69 y.o. male who is having surgery for Colovesical fistula [N32.1].     The patient was seen in the preoperative area. The risks, benefits, complications, treatment options, non-operative alternatives, expected recovery and outcomes were discussed with the patient. The possibilities of reaction to medication, pulmonary aspiration, injury to surrounding structures, bleeding, recurrent infection, the need for additional procedures, failure to diagnose a condition, and creating a complication requiring transfusion or operation were discussed with the patient. The patient concurred with the proposed plan, giving informed consent.  The site of surgery was properly noted/marked if necessary per policy. The patient has been actively warmed in preoperative area. Preoperative antibiotics have been ordered and given within 1 hours of incision. Venous thrombosis prophylaxis have been ordered including bilateral sequential compression devices    Procedure Details: Safety checklist was performed in preoperative area confirming correct patient and correct procedure.  The patient was brought to the operating room.  Sequential compression devices were applied to bilateral lower extremities.  Following induction of general anesthesia, the patient was placed in lithotomy position with bilateral arms tucked.  Careful attention was paid to padding of pressure points.  The patient's chest was gently taped to the table to prevent sliding using blue towels and silk tape.  A 16Fr silicone llamas catheter was replaced under sterile technique.  The  patient's abdomen was prepped with chloraprep and draped in the usual sterile fashion.  A time out was performed, once again confirming correct patient and correct procedure.  Following completion of perioperative cefazolin and flagyl antibiotic, a sterile skin marker was used to delineate a vertical midline incision extending from the xiphoid process to the pubic symphysis.  Using the #15 scalpel, a small daniel-umbilical vertical midline incision was created.  Using a combination of blunt and electrocautery dissection, the incision was carried down to the level of the fascia which was grasped and sharply divided between Kocher clamps with the #15 scalpel blade.  A small wound protector and an air tight cap was placed. A 12mm trochar was inserted into the wound protector and carbon dioxide pneumoperitoneum established.  The 10mm 30 degree laparoscope was inserted into the abdomen.   2 additional  laparoscopic ports were inserted under direct vision; a 5 mm trocar was inserted into the right lower quadrant.  A 5 mm trocar was inserted into the right mid abdomen at the mid clavicular line. The greater omentum was then reflected superiorly.  The small bowel, was reflected to the patient's right without difficulty.  The descending colon and sigmoid was identified.  The sigmoid colon was noted to be adherent to the left pelvic sidewall and to the dome of the bladder.  Started first by identifying the IMV just inferior to the pancreas.  Left colon mesentery was then  from the retroperitoneum.  That dissection was carried laterally to the abdominal wall.  It was also carried cephalad and caudad.  Care was taken not to get behind the body of the pancreas.  The IMV was then taken using the LigaSure instrument just at the base of the pancreas.  At the white line of Toldt the left colon was  from the lateral abdominal wall joining the retroperitoneal dissection that we made to start the case off.  Using a  combination of LigaSure and blunt dissection sigmoid colon was  from the pelvic sidewall.  Anteriorly at the site where it was densely adherent to the bladder the LigaSure was utilized to separate the 2 organs successfully. Pus was noted once the 2 organs were . This maneuver allowed for access into the pelvis and full mobilization of the rectum.  The ureter was identified as it went over the iliac vessels.  The mesorectum was also identified in the presacral plane was entered.  At the level of the rectosigmoid junction the mesentery was thinned and an Endo ADELSO 60 green load stapler was fired across the top of the rectum.  The mesentery was then taken in a retrograde fashion starting distal and proceeding proximally.  Once the entire mesentery up to the level of the metabolic was taken the midline port was removed and the bowel was then eviscerated. The colon was sharply transected between atraumatic bowel clamps using #10 scalpel at the level of the distal descending colon.  At level was noted to be soft and supple.  The specimen was passed off the table for final pathology.  Gloves were changed.  A 31 EEA stapler was opened.  The anvil was sutured to the open end of the colonic conduit using 0-prolene suture placed in purse string fashion.  The colonic conduit was returned to the abdomen.  Carbon dioxide pneumoperitoneum was re-established.   The rectum easily accomodated the medium sizer.  The 31 EEA stapler was introduced into the rectum and carefully passed towards the top of the rectal stump.  The pin was deployed just anterior to the staple line.  The pin and anvil were joined.  The stapler was closed.  Proper non-twisted orientation of the colonic conduit was confirmed.  Additionally, no adjacent structures were introduced into the planned CRA.  The stapler was fired, opened, and removed from the anus.  Two complete anastomotic donuts were confirmed.  The pelvis was filled with normal  saline.  The colonic conduit proximal to the anastomosis was gently clamped.  A flexible sigmoidoscopy was performed.  The anastomosis was widely patent, hemostatic and healthy in appearance.  There was no air leak with insufflation.  The colon and distal rectum was desufflated and the sigmoidoscope completely removed.  The pelvis was suctioned.   The remaining 5mm ports were removed under direct vision with good hemostasis at the port sites.  The 12mm port site was closed using a 0 Vicryl in a Lee-Juany instrument.  Carbon dioxide pneumoperitoneum was completely released.  A preliminary surgical count was performed and confirmed to be correct.    We then created a loop ileostomy using the terminal ileum.  An aperture was created in the abdominal wall in the right upper quadrant.  Loop of bowel was pulled through and supported with a stoma nilay.  We confirmed what was distal and proximal by looking directly into the abdomen through the periumbilical incision.  The periumbilical incision was closed in continuous fashion with two #1 non-looped PDS sutures beginning at each end, meeting and tied in the middle.  A final count was performed and confirmed to be correct.  The skin incisions were re-approximated with the skin stapler.    The stoma was then matured in typical fashion by incising the E ferret limb partially and folding it back on itself creating a rosebud stoma.  The bowel was sutured to the subcutaneous tissues using a 3-0 chromic stitch.  The abdomen was cleansed and dried.  Dry dressings were applied to the abdomen.   A sign out was performed.  The patient was awakened and taken to the recovery area in stable condition.  Complications:  None; patient tolerated the procedure well.    Disposition: PACU - hemodynamically stable.  Condition: stable     This procedure was not performed to treat colon cancer through resection    Additional Details: n/a    Attending Attestation: I was present and scrubbed  for the entire procedure.    Thomas Mcintyre  Phone Number: [phone]

## 2024-11-11 NOTE — ANESTHESIA PROCEDURE NOTES
Airway  Date/Time: 11/11/2024 7:52 AM  Urgency: elective    Airway not difficult    Staffing  Performed: resident   Authorized by: Lamine Coley MD    Performed by: Harley Solano DO  Patient location during procedure: OR    Indications and Patient Condition  Indications for airway management: anesthesia  Spontaneous Ventilation: absent  Sedation level: deep  Preoxygenated: yes  Patient position: sniffing  Mask difficulty assessment: 1 - vent by mask  Planned trial extubation    Final Airway Details  Final airway type: endotracheal airway      Successful airway: ETT  Cuffed: yes   Successful intubation technique: video laryngoscopy  Facilitating devices/methods: intubating stylet and cricoid pressure  Endotracheal tube insertion site: oral  Blade: Denise  Blade size: #4  ETT size (mm): 7.5  Cormack-Lehane Classification: grade I - full view of glottis  Placement verified by: chest auscultation and capnometry   Measured from: lips  ETT to lips (cm): 23  Number of attempts at approach: 1

## 2024-11-11 NOTE — ANESTHESIA PROCEDURE NOTES
Arterial Line:    Date/Time: 11/11/2024 8:04 AM    Staffing  Performed: resident   Authorized by: Lamine Coley MD    Performed by: Harley Solano DO    An arterial line was placed. Procedure performed using surface landmarks.in the OR for the following indication(s): continuous blood pressure monitoring.    A 20 gauge (size), 1 and 3/4 inch (length), Angiocath (type) catheter was placed into the Left radial artery, secured by Tegaderm,   Seldinger technique not used.  Events:  patient tolerated procedure well with no complications.

## 2024-11-11 NOTE — ANESTHESIA PREPROCEDURE EVALUATION
Patient: Wade Machado    Procedure Information       Date/Time: 11/11/24 0700    Procedure: Resection Laparoscopy Sigmoid Colon with takedown of colovesical fistula    Location: [facility] OR 06 / Virtual [facility] OR    Surgeons: Thomas Mcintyre MD          Relevant Problems   Cardiac   (+) Benign essential hypertension      Neuro   (+) Pontine hemorrhage (Multi)      GI   (+) Gastroesophageal reflux disease      /Renal   (+) BPH (benign prostatic hyperplasia)   (+) Prostate cancer (Multi)      Endocrine   (+) Obesity       Clinical information reviewed:   Tobacco  Allergies  Meds   Med Hx  Surg Hx   Fam Hx  Soc Hx        NPO Detail:  NPO/Void Status  Date of Last Liquid: 11/11/24  Time of Last Liquid: 0200  Date of Last Solid: 11/10/24  Time of Last Solid: 2359         Physical Exam    Airway  Mallampati: IV  TM distance: >3 FB  Neck ROM: full     Cardiovascular   Rhythm: regular  Rate: normal     Dental    Pulmonary   Breath sounds clear to auscultation     Abdominal   (+) obese  Abdomen: soft       Other findings: Missing teeth  Thick neck  Facial hair  No weakness in BUE/BLE      Anesthesia Plan    History of general anesthesia?: yes  History of complications of general anesthesia?: no    ASA 3     general   (69M with hx HTN, BPH, prostate cancer, GERD, obesity, indwelling llamas catheter, admitted in August for AMS found to have pontine hemorrhage. Presenting for laparoscopic resection sigmoid colon, colovesical fistula takedown. Per neuro note 8/9, pontine mass c/f cavernoma - mass stable on repeat MRI, no contraindication for surgery, avoid periop AC given c/f hemorrhage on initial scans.     Previous records document history of difficult intubation, although no relevant notes describing this. Airway exam mallampati IV - plan for Vázquez, art line. )  intravenous induction   Postoperative administration of opioids is intended.  Anesthetic plan and risks discussed with patient.  Use of blood products  discussed with patient who consented to blood products.    Plan discussed with resident and attending.

## 2024-11-11 NOTE — ANESTHESIA PROCEDURE NOTES
Peripheral Block    Patient location during procedure: pre-op  Start time: 11/11/2024 7:20 AM  End time: 11/11/2024 7:34 AM  Reason for block: at surgeon's request and post-op pain management  Staffing  Performed: resident   Authorized by: Myah Granda MD    Performed by: Emir Sales MD  Preanesthetic Checklist  Completed: patient identified, IV checked, site marked, risks and benefits discussed, surgical consent, monitors and equipment checked, pre-op evaluation and timeout performed   Timeout performed at: 11/11/2024 7:19 AM  Peripheral Block  Patient position: laying flat  Prep: ChloraPrep  Patient monitoring: heart rate and continuous pulse ox  Block type: QL  Laterality: B/L  Injection technique: catheter  Guidance: ultrasound guided  Local infiltration: lidocaine  Infiltration strength: 1 %  Dose: 4 mL  Needle  Needle type: Tuohy   Needle gauge: 22 G  Needle length: 8 cm  Needle localization: ultrasound guidance     image stored in chart  Assessment  Injection assessment: negative aspiration for heme, no paresthesia on injection, incremental injection and local visualized surrounding nerve on ultrasound  Heart rate change: no  Additional Notes  QL catheters:  Informed consent obtained. Risks, benefits, and alternatives discussed.  ASA monitors placed, and timeout performed.  Patient positioned, prepped with chlorhexidine, and draped with sterile towels.     Ultrasound guidance used with visualization of the needle throughout duration of the procedure. Aspiration was negative. A total of Type of Local: 30 cc of 0.5% ropivacaine, dexamethasone 4mg, and 1:200,000 epinephrine, was divided and injected bilaterally. Catheters threaded into space and secured. Patient tolerated procedure well.

## 2024-11-11 NOTE — SIGNIFICANT EVENT
Post-Operative Check Note    S: Patient seen at bedside status post sigmoidectomy, takedown of colovesical fistula, and diverting look ileostomy for post-operative evaluation. Patient denies fever, chills, chest pain, and shortness of breath. Pain is well controlled. They are tolerating sips of clear liquids without nausea or vomiting. Has not yet ambulated.    O:  General: resting comfortably in bed  Neuro: alert and oriented, no obvious focal deficit   Head/Neck: normocephalic, atraumatic  ENT: moist mucous membranes  CV: regular rate and rhythm  Pulm: nonlabored breathing on room air, no conversational dyspnea  Abd: soft, nondistended, nontender,midline incision covered with island dressing, ileostomy stoma is pink and well perfused  : llamas catheter draining clear yellow urine  MSK: EVANGELISTA, no gross deformities  Psych: mood and behavior normal    A/P: Patient is doing well post-operatively.     - C/w CLD  - multimodal pain control  - IVF 40  - llamas catheter  - NO SQH    Kali Greer MD  Colorectal Surgery [place]

## 2024-11-12 LAB
ANION GAP SERPL CALC-SCNC: 13 MMOL/L (ref 10–20)
BUN SERPL-MCNC: 9 MG/DL (ref 6–23)
CALCIUM SERPL-MCNC: 9.3 MG/DL (ref 8.6–10.6)
CHLORIDE SERPL-SCNC: 100 MMOL/L (ref 98–107)
CO2 SERPL-SCNC: 27 MMOL/L (ref 21–32)
CREAT SERPL-MCNC: 0.63 MG/DL (ref 0.5–1.3)
EGFRCR SERPLBLD CKD-EPI 2021: >90 ML/MIN/1.73M*2
ERYTHROCYTE [DISTWIDTH] IN BLOOD BY AUTOMATED COUNT: 17.2 % (ref 11.5–14.5)
GLUCOSE SERPL-MCNC: 93 MG/DL (ref 74–99)
HCT VFR BLD AUTO: 40.9 % (ref 41–52)
HGB BLD-MCNC: 13.4 G/DL (ref 13.5–17.5)
MCH RBC QN AUTO: 29.6 PG (ref 26–34)
MCHC RBC AUTO-ENTMCNC: 32.8 G/DL (ref 32–36)
MCV RBC AUTO: 91 FL (ref 80–100)
NRBC BLD-RTO: 0 /100 WBCS (ref 0–0)
PLATELET # BLD AUTO: 313 X10*3/UL (ref 150–450)
POTASSIUM SERPL-SCNC: 3.8 MMOL/L (ref 3.5–5.3)
RBC # BLD AUTO: 4.52 X10*6/UL (ref 4.5–5.9)
SODIUM SERPL-SCNC: 136 MMOL/L (ref 136–145)
WBC # BLD AUTO: 13 X10*3/UL (ref 4.4–11.3)

## 2024-11-12 PROCEDURE — 2500000001 HC RX 250 WO HCPCS SELF ADMINISTERED DRUGS (ALT 637 FOR MEDICARE OP): Performed by: NURSE PRACTITIONER

## 2024-11-12 PROCEDURE — 2500000004 HC RX 250 GENERAL PHARMACY W/ HCPCS (ALT 636 FOR OP/ED): Performed by: STUDENT IN AN ORGANIZED HEALTH CARE EDUCATION/TRAINING PROGRAM

## 2024-11-12 PROCEDURE — 80048 BASIC METABOLIC PNL TOTAL CA: CPT | Performed by: STUDENT IN AN ORGANIZED HEALTH CARE EDUCATION/TRAINING PROGRAM

## 2024-11-12 PROCEDURE — 2500000002 HC RX 250 W HCPCS SELF ADMINISTERED DRUGS (ALT 637 FOR MEDICARE OP, ALT 636 FOR OP/ED): Performed by: STUDENT IN AN ORGANIZED HEALTH CARE EDUCATION/TRAINING PROGRAM

## 2024-11-12 PROCEDURE — 99231 SBSQ HOSP IP/OBS SF/LOW 25: CPT

## 2024-11-12 PROCEDURE — 1170000001 HC PRIVATE ONCOLOGY ROOM DAILY

## 2024-11-12 PROCEDURE — 99232 SBSQ HOSP IP/OBS MODERATE 35: CPT | Performed by: NURSE PRACTITIONER

## 2024-11-12 PROCEDURE — 85027 COMPLETE CBC AUTOMATED: CPT | Performed by: STUDENT IN AN ORGANIZED HEALTH CARE EDUCATION/TRAINING PROGRAM

## 2024-11-12 PROCEDURE — 2500000001 HC RX 250 WO HCPCS SELF ADMINISTERED DRUGS (ALT 637 FOR MEDICARE OP): Performed by: STUDENT IN AN ORGANIZED HEALTH CARE EDUCATION/TRAINING PROGRAM

## 2024-11-12 PROCEDURE — 36415 COLL VENOUS BLD VENIPUNCTURE: CPT | Performed by: STUDENT IN AN ORGANIZED HEALTH CARE EDUCATION/TRAINING PROGRAM

## 2024-11-12 PROCEDURE — 2500000004 HC RX 250 GENERAL PHARMACY W/ HCPCS (ALT 636 FOR OP/ED)

## 2024-11-12 RX ORDER — ONDANSETRON HYDROCHLORIDE 2 MG/ML
4 INJECTION, SOLUTION INTRAVENOUS ONCE
Status: COMPLETED | OUTPATIENT
Start: 2024-11-12 | End: 2024-11-12

## 2024-11-12 RX ORDER — CALCIUM CARBONATE 200(500)MG
500 TABLET,CHEWABLE ORAL DAILY
Status: DISCONTINUED | OUTPATIENT
Start: 2024-11-12 | End: 2024-11-19 | Stop reason: HOSPADM

## 2024-11-12 ASSESSMENT — PAIN SCALES - GENERAL
PAINLEVEL_OUTOF10: 8
PAINLEVEL_OUTOF10: 6
PAINLEVEL_OUTOF10: 2
PAINLEVEL_OUTOF10: 0 - NO PAIN
PAINLEVEL_OUTOF10: 2

## 2024-11-12 ASSESSMENT — PAIN - FUNCTIONAL ASSESSMENT
PAIN_FUNCTIONAL_ASSESSMENT: 0-10
PAIN_FUNCTIONAL_ASSESSMENT: 0-10

## 2024-11-12 ASSESSMENT — PAIN DESCRIPTION - LOCATION: LOCATION: ABDOMEN

## 2024-11-12 ASSESSMENT — COGNITIVE AND FUNCTIONAL STATUS - GENERAL
EATING MEALS: A LITTLE
MOBILITY SCORE: 18
DRESSING REGULAR LOWER BODY CLOTHING: A LITTLE
PERSONAL GROOMING: A LITTLE
HELP NEEDED FOR BATHING: A LITTLE
CLIMB 3 TO 5 STEPS WITH RAILING: A LITTLE
TURNING FROM BACK TO SIDE WHILE IN FLAT BAD: A LITTLE
DRESSING REGULAR UPPER BODY CLOTHING: A LITTLE
WALKING IN HOSPITAL ROOM: A LITTLE
DAILY ACTIVITIY SCORE: 18
MOVING TO AND FROM BED TO CHAIR: A LITTLE
TOILETING: A LITTLE
MOVING FROM LYING ON BACK TO SITTING ON SIDE OF FLAT BED WITH BEDRAILS: A LITTLE
STANDING UP FROM CHAIR USING ARMS: A LITTLE

## 2024-11-12 NOTE — PROGRESS NOTES
Postop Pain HPI -   Palliative: relieved with IV analgesics and regional local anesthetics  Provocative: movement  Quality:  burning and aching  Radiation:  none  Severity:  0/10  Timing: constant    24-HOUR OPIOID CONSUMPTION:  None    Scheduled medications  acetaminophen, 975 mg, oral, q6h PEMA  cefTRIAXone, 1 g, intravenous, q24h  finasteride, 5 mg, oral, Daily  gabapentin, 100 mg, oral, TID  [Held by provider] lisinopril, 40 mg, oral, Daily  metoprolol succinate XL, 25 mg, oral, Daily  tamsulosin, 0.4 mg, oral, Daily      Continuous medications  ropivacaine (PF) in NS cmpd, 14 mL/hr  sodium chloride 0.9%, 40 mL/hr, Last Rate: 40 mL/hr (11/11/24 1741)      PRN medications  PRN medications: naloxone, ondansetron ODT **OR** ondansetron, oxybutynin, oxyCODONE, oxyCODONE     Physical Exam:  Constitutional:  no distress, alert and cooperative  Eyes: clear sclera  Head/Neck: No apparent injury, trachea midline  Respiratory/Thorax: Patent airways, thorax symmetric, breathing comfortably  Cardiovascular: no pitting edema  Gastrointestinal: Nondistended  Musculoskeletal: ROM intact  Extremities: no clubbing  Neurological: alert, shaw x4  Psychological: Appropriate affect    Results for orders placed or performed during the hospital encounter of 11/11/24 (from the past 24 hours)   Basic Metabolic Panel   Result Value Ref Range    Glucose 93 74 - 99 mg/dL    Sodium 136 136 - 145 mmol/L    Potassium 3.8 3.5 - 5.3 mmol/L    Chloride 100 98 - 107 mmol/L    Bicarbonate 27 21 - 32 mmol/L    Anion Gap 13 10 - 20 mmol/L    Urea Nitrogen 9 6 - 23 mg/dL    Creatinine 0.63 0.50 - 1.30 mg/dL    eGFR >90 >60 mL/min/1.73m*2    Calcium 9.3 8.6 - 10.6 mg/dL   CBC   Result Value Ref Range    WBC 13.0 (H) 4.4 - 11.3 x10*3/uL    nRBC 0.0 0.0 - 0.0 /100 WBCs    RBC 4.52 4.50 - 5.90 x10*6/uL    Hemoglobin 13.4 (L) 13.5 - 17.5 g/dL    Hematocrit 40.9 (L) 41.0 - 52.0 %    MCV 91 80 - 100 fL    MCH 29.6 26.0 - 34.0 pg    MCHC 32.8 32.0 - 36.0 g/dL     RDW 17.2 (H) 11.5 - 14.5 %    Platelets 313 150 - 450 x10*3/uL   Wade Machado is a 69 y.o. year old male patient who presents for Procedure(s):  Resection Laparoscopy Sigmoid Colon with takedown of colovesical fistula with Thomas Mcintyre MD on 11/11/2024. Acute Pain consulted for assistance with pain control.     - Bilateral quadratus lumborum blocks with catheters performed pre-operatively on 11/11/2024  - Ambit ball with Ropivacaine 0.2%/NaCl 0.9% 500mL, Rate 7 cc/hr bilaterally  - Ambit medication will not interfere with pain medication prescribed by the primary team.   - Please be aware of local anesthetic toxic dose and absorption variability before considering lidocaine patches  - Acute pain service will follow while catheters in place  - Rest of pain management per primary team     Andreina Cosme MD   Anesthesiology, CA-2     Acute Pain Resident  pg [phone] ph [phone]

## 2024-11-12 NOTE — CARE PLAN
The patient's goals for the shift include      The clinical goals for the shift include pt will not fall throughout shift      Problem: Pain  Goal: Takes deep breaths with improved pain control throughout the shift  Outcome: Progressing  Goal: Turns in bed with improved pain control throughout the shift  Outcome: Progressing  Goal: Walks with improved pain control throughout the shift  Outcome: Progressing  Goal: Performs ADL's with improved pain control throughout shift  Outcome: Progressing  Goal: Participates in PT with improved pain control throughout the shift  Outcome: Progressing  Goal: Free from opioid side effects throughout the shift  Outcome: Progressing  Goal: Free from acute confusion related to pain meds throughout the shift  Outcome: Progressing

## 2024-11-12 NOTE — PROGRESS NOTES
Postop Pain HPI -   Palliative: relieved with IV analgesics and regional local anesthetics  Provocative: movement  Quality:  burning and aching  Radiation:  none  Severity:  */10  Timing: constant    24-HOUR OPIOID CONSUMPTION:  Tylenol 975 x2  Gabapentin 100mg x1  No opioids    Scheduled medications  acetaminophen, 975 mg, oral, q6h PEMA  cefTRIAXone, 1 g, intravenous, q24h  finasteride, 5 mg, oral, Daily  gabapentin, 100 mg, oral, TID  [Held by provider] lisinopril, 40 mg, oral, Daily  metoprolol succinate XL, 25 mg, oral, Daily  tamsulosin, 0.4 mg, oral, Daily      Continuous medications  ropivacaine (PF) in NS cmpd, 14 mL/hr  sodium chloride 0.9%, 40 mL/hr, Last Rate: 40 mL/hr (11/11/24 1741)      PRN medications  PRN medications: naloxone, ondansetron ODT **OR** ondansetron, oxybutynin, oxyCODONE, oxyCODONE, oxygen     Physical Exam:  Constitutional:  no distress, alert and cooperative  Eyes: clear sclera  Head/Neck: No apparent injury, trachea midline  Respiratory/Thorax: Patent airways, thorax symmetric, breathing comfortably  Cardiovascular: no pitting edema  Gastrointestinal: Nondistended  Musculoskeletal: ROM intact  Extremities: no clubbing  Neurological: alert, shaw x4  Psychological: Appropriate affect    No results found for this or any previous visit (from the past 24 hours).     - Bilateral quadratus lumborum blocks with catheters performed pre-operatively on 11/11/2024  - Ambit ball with Ropivacaine 0.2%/NaCl 0.9% 500mL, Rate 7 cc/hr bilaterally  - Ambit medication will not interfere with pain medication prescribed by the primary team.   - Please be aware of local anesthetic toxic dose and absorption variability before considering lidocaine patches  - Acute pain service will follow while catheters in place  - Rest of pain management per primary team    Mariaa Villa DO

## 2024-11-12 NOTE — CONSULTS
"  Wound Care Consult     Visit Date: 2024      Patient Name: Wade Machado         MRN: [id number]           YOB: 1955     Reason for Consult: assess stoma care/ education         Wound History: Patient with a past medical history of diverticulitis with colovesical fistula, now hospital day 1 s/p Lx sigmoid resection , takedown fistula with DLI      Pertinent Labs:   Albumin   Date Value Ref Range Status   2024 3.8 3.4 - 5.0 g/dL Final       Wound Assessment:  Wound 24 Incision Umbilicus (Active)   Site Assessment Unable to assess 24   Elsie-Wound Assessment Unable to assess 24   Dressing Other (Comment) 24   Dressing Changed Changed 24 1154   Dressing Status Clean;Dry;Old drainage 24 1000   Ileostomy RUQ   Placement Date/Time: 24 1122   Location: RUQ   Stomal Appliance 2 piece   Site/Stoma Assessment Red   Peristomal Assessment Intact   Treatment Pouch change       Wound Team Summary Assessment:   Ostomy type: loop ileostomy     size: 1 \"      color: red      protruding: budded  Arturo: flat white arturo   Functioning: bowel seat   Mucocutaneous junction: intact  Peristomal skin: intact  Pouchin piece RED flat wafer with drainable pouch and barrier ring    Ostomy Education: Patient looked at stoma today. Patient states that he did not receive preoperative education. He has never know anyone with an ostomy. Simple explain of anatomy and function of ileostomy given. Will place written materials as bedside   Plan: assess stoma/pouching      Wound Team Plan: Ostomy team will follow      Magdalena MCCRAY   2024  4:01 PM        "

## 2024-11-12 NOTE — PROGRESS NOTES
Colorectal Surgery Progress Note        Wade Machado is a 69 y.o. male with a past medical history of diverticulitis with colovesical fistula, now hospital day 1 s/p Lx sigmoid resection , takedown fistula with DLI    Subjective  No acute events overnight, afebrile with stable vital signs.  Moderate pain relief with multimodal plan, denies belching, N/V. Ostomy with bowel sweat in pouch, no gas or stool as expected.  Plan for OOB today, gum and clear liquids     Objective    Vital signs:   Temp:  [36 °C (96.8 °F)-37 °C (98.6 °F)] 36.5 °C (97.7 °F)  Heart Rate:  [63-83] 72  Resp:  [11-20] 16  BP: (121-178)/(62-99) 163/96  Arterial Line BP 1: (149)/(63) 149/63    Physical Exam:  GEN: arouses easily to verbal, conversive, no acute distress  HEENT: Sclera anicteric. Moist mucous membranes.  RESP: chest expansion symmetrical, unlabored on  On  2LNC.  CV: Regular rate, normotensive  GI: Abdomen soft, mildly distended, appropriately tender for postoperative course. Lap sites and surgical incision with gauze in place, minimal strike through, stoma well pouched, pink and well perfused, no gas or stool, bowel sweat in pouch  : Moeller in place with sufficient  urine.  MSK: No gross deformities. Moves all extremities spontaneously.  NEURO: Alert and oriented x3. No focal deficits.  PSYCH: Appropriate mood and affect.  SKIN: No rashes or lesions.    I/O last 3 completed shifts:  In: 1671.7 (15.7 mL/kg) [P.O.:10; I.V.:1311.7 (12.3 mL/kg); IV Piggyback:350]  Out: 2040 (19.2 mL/kg) [Urine:2020 (0.5 mL/kg/hr); Blood:20]  Weight: 106.5 kg    I/O this shift:  In: 0   Out: 200 [Urine:200]             Data Review:  CBC:   Lab Results   Component Value Date    WBC 13.0 (H) 11/12/2024    RBC 4.52 11/12/2024     BMP:   Lab Results   Component Value Date    GLUCOSE 93 11/12/2024    CO2 27 11/12/2024    BUN 9 11/12/2024    CREATININE 0.63 11/12/2024    CALCIUM 9.3 11/12/2024       Medications reviewed.  Vital signs reviewed.  Labs  reviewed.           Assessment/Plan      Wade Machado is a 69 y.o. male with a past medical history of diverticulitis with colovesical fistula, now hospital day 1 s/p Lx sigmoid resection , takedown fistula with DLI    Plan Today:   - soft diet as tolerated  - OOB, ambulate  - Wean oxygen  - Llamas stays til POD #6    Neuro: Post op pain is moderately controlled  - Tylenol and Gabapentin per ERAS  - multimodal pain control with: oxycodone and QL pain catheters  - Sleep hygiene, encourage shades up during day, limited inactions at night as appropriate    Cardiac: Hemodynamically stable, Hx of HTN  -Q4VS         - Continue home Metoprolol XL and hold home lisinopril                                                                                                                                                       Pulm: no acute issues, history of no significant pulmonary history  - Incentive spirometry Q 1 hours  - Encourage Ambulation/OOB  - wean oxygen to room air for oxygen sat 92% or better    GI: Hx of  diverticulitis with colovesical fistula, S/P Lx sig takedown fistula and DLI  - Soft diet as tolerated  - Juvin TID  - Zofran prn for nausea  - Wound and ostomy nurses consulted for supplies, education and support  - GUM TID    : Hx BPH, continues with indwelling llamas until POD 6 secondary to colovesical fistula  - Strict intake and output  - Trend renal function and replace electrolytes as needed  - IV fluids at KVO  - continue home Flomax and Proscar    HEME- H/H stable, no evidence of active bleeding, history of no significant hematological history  - trend CBC  - in indication for transfusion at this time    Endo no acute issues, history of no significant endocrine history   - no issues, no indication for SSI    ID afebrile, no leukocytosis,   - Continue to trend Temp per floor routine, trend CBC prn  - Continues on Ceftriaxone for UTI in setting of colovesical fistula    Proph - Continue SCDs, Lovenox  for DVT prevention    Dispo:  - Continue regular nursing floor  - OT/PT  - plan at discharge... plan for HC for new stoma      Hospital Day: 2       Patient's exam, labs, and findings seen and discussed with surgical team         COLTON Shaw-CNP  Colorectal Surgery NP #[id number]  [facility] # [id number]

## 2024-11-13 ENCOUNTER — APPOINTMENT (OUTPATIENT)
Dept: RADIOLOGY | Facility: HOSPITAL | Age: 69
DRG: 674 | End: 2024-11-13
Payer: COMMERCIAL

## 2024-11-13 LAB
ANION GAP SERPL CALC-SCNC: 18 MMOL/L (ref 10–20)
BUN SERPL-MCNC: 10 MG/DL (ref 6–23)
CALCIUM SERPL-MCNC: 9.4 MG/DL (ref 8.6–10.6)
CHLORIDE SERPL-SCNC: 94 MMOL/L (ref 98–107)
CO2 SERPL-SCNC: 24 MMOL/L (ref 21–32)
CREAT SERPL-MCNC: 0.58 MG/DL (ref 0.5–1.3)
EGFRCR SERPLBLD CKD-EPI 2021: >90 ML/MIN/1.73M*2
ERYTHROCYTE [DISTWIDTH] IN BLOOD BY AUTOMATED COUNT: 17.2 % (ref 11.5–14.5)
GLUCOSE SERPL-MCNC: 96 MG/DL (ref 74–99)
HCT VFR BLD AUTO: 43.4 % (ref 41–52)
HGB BLD-MCNC: 14.1 G/DL (ref 13.5–17.5)
MCH RBC QN AUTO: 29.1 PG (ref 26–34)
MCHC RBC AUTO-ENTMCNC: 32.5 G/DL (ref 32–36)
MCV RBC AUTO: 90 FL (ref 80–100)
NRBC BLD-RTO: 0 /100 WBCS (ref 0–0)
PLATELET # BLD AUTO: 308 X10*3/UL (ref 150–450)
POTASSIUM SERPL-SCNC: 3.7 MMOL/L (ref 3.5–5.3)
RBC # BLD AUTO: 4.84 X10*6/UL (ref 4.5–5.9)
SODIUM SERPL-SCNC: 132 MMOL/L (ref 136–145)
WBC # BLD AUTO: 17 X10*3/UL (ref 4.4–11.3)

## 2024-11-13 PROCEDURE — 74018 RADEX ABDOMEN 1 VIEW: CPT | Performed by: RADIOLOGY

## 2024-11-13 PROCEDURE — 2500000004 HC RX 250 GENERAL PHARMACY W/ HCPCS (ALT 636 FOR OP/ED): Performed by: NURSE PRACTITIONER

## 2024-11-13 PROCEDURE — 1170000001 HC PRIVATE ONCOLOGY ROOM DAILY

## 2024-11-13 PROCEDURE — 0D9670Z DRAINAGE OF STOMACH WITH DRAINAGE DEVICE, VIA NATURAL OR ARTIFICIAL OPENING: ICD-10-PCS | Performed by: NURSE PRACTITIONER

## 2024-11-13 PROCEDURE — 99232 SBSQ HOSP IP/OBS MODERATE 35: CPT | Performed by: NURSE PRACTITIONER

## 2024-11-13 PROCEDURE — 2500000004 HC RX 250 GENERAL PHARMACY W/ HCPCS (ALT 636 FOR OP/ED): Performed by: STUDENT IN AN ORGANIZED HEALTH CARE EDUCATION/TRAINING PROGRAM

## 2024-11-13 PROCEDURE — 74018 RADEX ABDOMEN 1 VIEW: CPT

## 2024-11-13 PROCEDURE — 99231 SBSQ HOSP IP/OBS SF/LOW 25: CPT

## 2024-11-13 PROCEDURE — 85027 COMPLETE CBC AUTOMATED: CPT | Performed by: STUDENT IN AN ORGANIZED HEALTH CARE EDUCATION/TRAINING PROGRAM

## 2024-11-13 PROCEDURE — 2500000004 HC RX 250 GENERAL PHARMACY W/ HCPCS (ALT 636 FOR OP/ED)

## 2024-11-13 PROCEDURE — 80048 BASIC METABOLIC PNL TOTAL CA: CPT | Performed by: STUDENT IN AN ORGANIZED HEALTH CARE EDUCATION/TRAINING PROGRAM

## 2024-11-13 PROCEDURE — 36415 COLL VENOUS BLD VENIPUNCTURE: CPT | Performed by: STUDENT IN AN ORGANIZED HEALTH CARE EDUCATION/TRAINING PROGRAM

## 2024-11-13 RX ORDER — LIDOCAINE HYDROCHLORIDE 20 MG/ML
1 JELLY TOPICAL ONCE
Status: DISCONTINUED | OUTPATIENT
Start: 2024-11-13 | End: 2024-11-16

## 2024-11-13 RX ORDER — POTASSIUM CHLORIDE 14.9 MG/ML
20 INJECTION INTRAVENOUS
Status: COMPLETED | OUTPATIENT
Start: 2024-11-13 | End: 2024-11-13

## 2024-11-13 RX ORDER — METOPROLOL TARTRATE 1 MG/ML
2.5 INJECTION, SOLUTION INTRAVENOUS EVERY 6 HOURS
Status: DISCONTINUED | OUTPATIENT
Start: 2024-11-13 | End: 2024-11-15

## 2024-11-13 RX ORDER — ACETAMINOPHEN 10 MG/ML
1000 INJECTION, SOLUTION INTRAVENOUS EVERY 6 HOURS SCHEDULED
Status: COMPLETED | OUTPATIENT
Start: 2024-11-13 | End: 2024-11-14

## 2024-11-13 RX ORDER — METOPROLOL TARTRATE 1 MG/ML
5 INJECTION, SOLUTION INTRAVENOUS EVERY 6 HOURS
Status: DISCONTINUED | OUTPATIENT
Start: 2024-11-13 | End: 2024-11-13

## 2024-11-13 RX ORDER — HYDROMORPHONE HYDROCHLORIDE 1 MG/ML
0.2 INJECTION, SOLUTION INTRAMUSCULAR; INTRAVENOUS; SUBCUTANEOUS EVERY 2 HOUR PRN
Status: DISCONTINUED | OUTPATIENT
Start: 2024-11-13 | End: 2024-11-15

## 2024-11-13 RX ORDER — SODIUM CHLORIDE 9 MG/ML
40 INJECTION, SOLUTION INTRAVENOUS CONTINUOUS
Status: ACTIVE | OUTPATIENT
Start: 2024-11-13 | End: 2024-11-15

## 2024-11-13 RX ORDER — HYDROMORPHONE HYDROCHLORIDE 1 MG/ML
0.4 INJECTION, SOLUTION INTRAMUSCULAR; INTRAVENOUS; SUBCUTANEOUS EVERY 2 HOUR PRN
Status: DISCONTINUED | OUTPATIENT
Start: 2024-11-13 | End: 2024-11-15

## 2024-11-13 ASSESSMENT — PAIN SCALES - GENERAL
PAINLEVEL_OUTOF10: 7
PAINLEVEL_OUTOF10: 0 - NO PAIN

## 2024-11-13 ASSESSMENT — PAIN SCALES - PAIN ASSESSMENT IN ADVANCED DEMENTIA (PAINAD): TOTALSCORE: MEDICATION (SEE MAR)

## 2024-11-13 NOTE — PROGRESS NOTES
Postop Pain HPI -   Palliative: relieved with IV analgesics and regional local anesthetics  Provocative: movement  Quality:  burning and aching  Radiation:  none  Severity:  5/10  Timing: constant    24-HOUR OPIOID CONSUMPTION:  Oxycodone 10 mg     Scheduled medications  acetaminophen, 975 mg, oral, q6h PEMA  calcium carbonate, 500 mg, oral, Daily  cefTRIAXone, 1 g, intravenous, q24h  finasteride, 5 mg, oral, Daily  gabapentin, 100 mg, oral, TID  lidocaine, 1 Application, Topical, Once  [Held by provider] lisinopril, 40 mg, oral, Daily  metoprolol succinate XL, 25 mg, oral, Daily  tamsulosin, 0.4 mg, oral, Daily      Continuous medications  ropivacaine (PF) in NS cmpd, 14 mL/hr  sodium chloride 0.9%, 40 mL/hr, Last Rate: 40 mL/hr (11/11/24 1741)      PRN medications  PRN medications: HYDROmorphone, HYDROmorphone, naloxone, ondansetron ODT **OR** ondansetron, oxybutynin     Physical Exam:  Constitutional:  no distress, alert and cooperative  Eyes: clear sclera  Head/Neck: No apparent injury, trachea midline  Respiratory/Thorax: Patent airways, thorax symmetric, breathing comfortably  Cardiovascular: no pitting edema  Gastrointestinal: Nondistended  Musculoskeletal: ROM intact  Extremities: no clubbing  Neurological: alert, shaw x4  Psychological: Appropriate affect    Results for orders placed or performed during the hospital encounter of 11/11/24 (from the past 24 hours)   CBC   Result Value Ref Range    WBC 17.0 (H) 4.4 - 11.3 x10*3/uL    nRBC 0.0 0.0 - 0.0 /100 WBCs    RBC 4.84 4.50 - 5.90 x10*6/uL    Hemoglobin 14.1 13.5 - 17.5 g/dL    Hematocrit 43.4 41.0 - 52.0 %    MCV 90 80 - 100 fL    MCH 29.1 26.0 - 34.0 pg    MCHC 32.5 32.0 - 36.0 g/dL    RDW 17.2 (H) 11.5 - 14.5 %    Platelets 308 150 - 450 x10*3/uL        Wade Machado is a 69 y.o. year old male patient who presents for Procedure(s):  Resection Laparoscopy Sigmoid Colon with takedown of colovesical fistula with Thomas Mcintyre MD on 11/11/2024. Acute  Pain consulted for assistance with pain control.      - Bilateral single shot quadratus lumborum blocks performed pre-operatively 11/11/24  - Catheter removed, tips intact  - Pain medications per primary team  - Acute pain will sign off      Andreina Cosme MD   Anesthesiology, CA-2     Acute Pain Resident  pg [phone] ph [phone]

## 2024-11-13 NOTE — PROGRESS NOTES
Colorectal Surgery Progress Note        Wade Machado is a 69 y.o. male with a past medical history of diverticulitis with colovesical fistula, now hospital day 2 s/p Lx sigmoid resection , takedown fistula with DLI.     Subjective  Vitals stable overnight, however patient had large emesis late in afternoon that persisted through night.  NGT placed this am for decompression.  Moderate pain control, remained NPO.   Out of bed yesterday, will encourage today.  Continues with no gas or stool from ostomy.     Objective    Vital signs:   Temp:  [36.4 °C (97.5 °F)-38.2 °C (100.8 °F)] 36.4 °C (97.5 °F)  Heart Rate:  [80-94] 80  Resp:  [16-20] 16  BP: (146-171)/(86-98) 159/86    Physical Exam:  GEN: arouses easily to verbal, conversive, no acute distress  HEENT: Sclera anicteric. Moist mucous membranes.  RESP: chest expansion symmetrical, unlabored on  On RA  CV: Regular rate, normotensive  GI: Abdomen soft, mildly distended, with fullness in epigastrium appropriately tender for postoperative course. Lap sites and surgical incision c/d/I stoma well pouched, pink and well perfused, no gas or stool, bowel sweat in pouch  : Moeller in place with sufficient  urine.  MSK: No gross deformities. Moves all extremities spontaneously.  NEURO: Alert and oriented x3. No focal deficits.  PSYCH: Appropriate mood and affect.  SKIN: No rashes or lesions.    I/O last 3 completed shifts:  In: 240 (2.3 mL/kg) [P.O.:240]  Out: 2775 (26.1 mL/kg) [Urine:2775 (0.7 mL/kg/hr)]  Weight: 106.5 kg    I/O this shift:  In: 0   Out: 250 [Urine:250]         Data Review:  CBC:   Lab Results   Component Value Date    WBC 17.0 (H) 11/13/2024    RBC 4.84 11/13/2024     BMP:   Lab Results   Component Value Date    GLUCOSE 96 11/13/2024    CO2 24 11/13/2024    BUN 10 11/13/2024    CREATININE 0.58 11/13/2024    CALCIUM 9.4 11/13/2024       Medications reviewed.  Vital signs reviewed.  Labs reviewed.           Assessment/Plan      Wade Machado is a 69  y.o. male with a past medical history of diverticulitis with colovesical fistula, now hospital day 1 s/p Lx sigmoid resection , takedown fistula with DLI    Plan Today:   - NGT to [facility]AN[facility]  - NPO  - Llamas stays til POD #6    Neuro: Post op pain is moderately controlled  - Tylenol  per ERAS  - multimodal pain control with: IV dilaudid  - Sleep hygiene, encourage shades up during day, limited inactions at night as appropriate    Cardiac: Hemodynamically stable, Hx of HTN  -Q 4 VS         - home metoprolol converted to IV and hold home lisinopril                                                                                                                                                       Pulm: no acute issues, history of no significant pulmonary history  - Incentive spirometry Q 1 hours  - Encourage Ambulation/OOB    GI: Hx of  diverticulitis with colovesical fistula, S/P Lx sig takedown fistula and DLI  - NPO, NGT to TAN[facility] Cooley TID  - Zofran prn for nausea  - Wound and ostomy nurses consulted for supplies, education and support  - GUM TID    : Hx BPH, continues with indwelling llamas until POD 6 secondary to colovesical fistula  - Strict intake and output  - Trend renal function and replace electrolytes as needed  - IV fluids at 40/hr   - continue home Flomax and Proscar    HEME- H/H stable, no evidence of active bleeding, history of no significant hematological history  - trend CBC  - in indication for transfusion at this time    Endo no acute issues, history of no significant endocrine history   - no issues, no indication for SSI    ID afebrile, no leukocytosis,   - Continue to trend Temp per floor routine, trend CBC prn  - Continues on Ceftriaxone for UTI in setting of colovesical fistula    Proph - Continue SCDs, Lovenox for DVT prevention    Dispo:  - Continue regular nursing floor  - OT/PT  - plan at discharge... plan for HC for new stoma      Hospital Day: 3       Patient's exam, labs, and findings seen  and discussed with surgical team         Kacy Shukla, APRN-CNP  Colorectal Surgery NP #[id number]  [facility] # [id number]

## 2024-11-13 NOTE — PROGRESS NOTES
11/13/24 1500   Discharge Planning   Living Arrangements Spouse/significant other   Support Systems Spouse/significant other   Type of Residence Private residence   Number of Stairs to Enter Residence 1   Number of Stairs Within Residence 0   Do you have animals or pets at home? No   Who is requesting discharge planning? Provider   Home or Post Acute Services In home services   Type of Home Care Services Home nursing visits   Expected Discharge Disposition Home H     Care Transitions Note    Plan per Medical/Surgical Team: Lx sigmoid resection , takedown fistula with DLI.   Status: Inpatient  Payor Source: [employer]  Discharge disposition: Home  Expected date of discharge: 11/17/24  Barriers:  Primary Oncologist:  Preferred home care agency: [facility]    SW met with pt to complete admission assessment and discuss home care recommendation.  Demographics and insurance verifed with patient. Pt lives lives his wife Phuong in their own 1 story home.  Pt feels safe at home. Pt is a retired  and receives SS and pension.  Pt reports all his basic needs are met and denies needs or concerns.  LESLIE discussed recommendation for home care RN.  Pt would be agreeable and when preference discussed pt would like referral to [facility].  LESLIE to continue to follow and assist with discharge planning.   Elizabeth Gunsalus LISW-S  Care Transitions Supervisor

## 2024-11-14 LAB
ANION GAP SERPL CALC-SCNC: 18 MMOL/L (ref 10–20)
BUN SERPL-MCNC: 14 MG/DL (ref 6–23)
CALCIUM SERPL-MCNC: 9.6 MG/DL (ref 8.6–10.6)
CHLORIDE SERPL-SCNC: 100 MMOL/L (ref 98–107)
CO2 SERPL-SCNC: 25 MMOL/L (ref 21–32)
CREAT SERPL-MCNC: 0.55 MG/DL (ref 0.5–1.3)
EGFRCR SERPLBLD CKD-EPI 2021: >90 ML/MIN/1.73M*2
ERYTHROCYTE [DISTWIDTH] IN BLOOD BY AUTOMATED COUNT: 17 % (ref 11.5–14.5)
GLUCOSE SERPL-MCNC: 94 MG/DL (ref 74–99)
HCT VFR BLD AUTO: 41.7 % (ref 41–52)
HGB BLD-MCNC: 13.9 G/DL (ref 13.5–17.5)
MCH RBC QN AUTO: 30.1 PG (ref 26–34)
MCHC RBC AUTO-ENTMCNC: 33.3 G/DL (ref 32–36)
MCV RBC AUTO: 90 FL (ref 80–100)
NRBC BLD-RTO: 0 /100 WBCS (ref 0–0)
PLATELET # BLD AUTO: 293 X10*3/UL (ref 150–450)
POTASSIUM SERPL-SCNC: 3.6 MMOL/L (ref 3.5–5.3)
RBC # BLD AUTO: 4.62 X10*6/UL (ref 4.5–5.9)
SODIUM SERPL-SCNC: 139 MMOL/L (ref 136–145)
WBC # BLD AUTO: 15.8 X10*3/UL (ref 4.4–11.3)

## 2024-11-14 PROCEDURE — 85027 COMPLETE CBC AUTOMATED: CPT | Performed by: STUDENT IN AN ORGANIZED HEALTH CARE EDUCATION/TRAINING PROGRAM

## 2024-11-14 PROCEDURE — 80048 BASIC METABOLIC PNL TOTAL CA: CPT | Performed by: STUDENT IN AN ORGANIZED HEALTH CARE EDUCATION/TRAINING PROGRAM

## 2024-11-14 PROCEDURE — 2500000004 HC RX 250 GENERAL PHARMACY W/ HCPCS (ALT 636 FOR OP/ED)

## 2024-11-14 PROCEDURE — 36415 COLL VENOUS BLD VENIPUNCTURE: CPT | Performed by: STUDENT IN AN ORGANIZED HEALTH CARE EDUCATION/TRAINING PROGRAM

## 2024-11-14 PROCEDURE — 2500000004 HC RX 250 GENERAL PHARMACY W/ HCPCS (ALT 636 FOR OP/ED): Performed by: NURSE PRACTITIONER

## 2024-11-14 PROCEDURE — 1170000001 HC PRIVATE ONCOLOGY ROOM DAILY

## 2024-11-14 PROCEDURE — 2500000004 HC RX 250 GENERAL PHARMACY W/ HCPCS (ALT 636 FOR OP/ED): Performed by: STUDENT IN AN ORGANIZED HEALTH CARE EDUCATION/TRAINING PROGRAM

## 2024-11-14 RX ORDER — PANTOPRAZOLE SODIUM 40 MG/10ML
40 INJECTION, POWDER, LYOPHILIZED, FOR SOLUTION INTRAVENOUS DAILY
Status: DISCONTINUED | OUTPATIENT
Start: 2024-11-14 | End: 2024-11-16

## 2024-11-14 ASSESSMENT — PAIN - FUNCTIONAL ASSESSMENT: PAIN_FUNCTIONAL_ASSESSMENT: 0-10

## 2024-11-14 ASSESSMENT — COGNITIVE AND FUNCTIONAL STATUS - GENERAL
MOVING TO AND FROM BED TO CHAIR: A LITTLE
DAILY ACTIVITIY SCORE: 24
STANDING UP FROM CHAIR USING ARMS: A LITTLE
MOBILITY SCORE: 20
CLIMB 3 TO 5 STEPS WITH RAILING: A LITTLE
WALKING IN HOSPITAL ROOM: A LITTLE

## 2024-11-14 ASSESSMENT — PAIN SCALES - GENERAL
PAINLEVEL_OUTOF10: 4
PAINLEVEL_OUTOF10: 2

## 2024-11-14 NOTE — PROGRESS NOTES
Colorectal Surgery Progress Note      Wade Machado is a 69 y.o. male with a past medical history of diverticulitis with colovesical fistula, now hospital day 2 s/p Lx sigmoid resection , takedown fistula with DLI.     Subjective      No acute events overnight. Still has not passed gas or stool through ostomy. NG with 2500cc of output yesterday. Overall feeling better.     Objective    Vital signs:   Vitals:    11/14/24 0509   BP: (!) 155/91   Pulse: 78   Resp: 16   Temp: 36.2 °C (97.2 °F)   SpO2: 92%        Physical Exam  GEN: arouses easily to verbal, conversive, no acute distress  HEENT: Sclera anicteric. Moist mucous membranes.  RESP: chest expansion symmetrical, unlabored on  On RA  CV: Regular rate, normotensive  GI: Abdomen soft, mildly distended, with fullness in epigastrium appropriately tender for postoperative course. Lap sites and surgical incision c/d/I stoma well pouched, pink and well perfused, no gas or stool, bowel sweat in pouch  : Moeller in place with sufficient  urine.  MSK: No gross deformities. Moves all extremities spontaneously.  NEURO: Alert and oriented x3. No focal deficits.  PSYCH: Appropriate mood and affect.  SKIN: No rashes or lesions.    I/O last 2 completed shifts:  In: 300 (2.8 mL/kg) [IV Piggyback:300]  Out: 3225 (30.3 mL/kg) [Urine:725 (0.3 mL/kg/hr); Emesis/NG output:2500]  Weight: 106.5 kg      Lines/Drains/Tubes:   Patient Lines/Drains/Airways Status       Active Airway       None                  Output by Drain (mL) 11/12/24 0700 - 11/12/24 1859 11/12/24 1900 - 11/13/24 0659 11/13/24 0700 - 11/13/24 1859 11/13/24 1900 - 11/14/24 0659 11/14/24 0700 - 11/14/24 0827   Requested LDAs do not have output data documented.        Labs Past 18 Hours:  Recent Results (from the past 18 hours)   Basic Metabolic Panel    Collection Time: 11/14/24  6:39 AM   Result Value Ref Range    Glucose 94 74 - 99 mg/dL    Sodium 139 136 - 145 mmol/L    Potassium 3.6 3.5 - 5.3 mmol/L     Chloride 100 98 - 107 mmol/L    Bicarbonate 25 21 - 32 mmol/L    Anion Gap 18 10 - 20 mmol/L    Urea Nitrogen 14 6 - 23 mg/dL    Creatinine 0.55 0.50 - 1.30 mg/dL    eGFR >90 >60 mL/min/1.73m*2    Calcium 9.6 8.6 - 10.6 mg/dL   CBC    Collection Time: 11/14/24  6:39 AM   Result Value Ref Range    WBC 15.8 (H) 4.4 - 11.3 x10*3/uL    nRBC 0.0 0.0 - 0.0 /100 WBCs    RBC 4.62 4.50 - 5.90 x10*6/uL    Hemoglobin 13.9 13.5 - 17.5 g/dL    Hematocrit 41.7 41.0 - 52.0 %    MCV 90 80 - 100 fL    MCH 30.1 26.0 - 34.0 pg    MCHC 33.3 32.0 - 36.0 g/dL    RDW 17.0 (H) 11.5 - 14.5 %    Platelets 293 150 - 450 x10*3/uL        Meds:    Current Facility-Administered Medications:     acetaminophen (Ofirmev) injection 1,000 mg, 1,000 mg, intravenous, q6h PEMA, DARIA Shaw, Stopped at 11/14/24 0533    calcium carbonate (Tums) chewable tablet 500 mg, 500 mg, oral, Daily, DARIA Shaw, 500 mg at 11/12/24 1612    cefTRIAXone (Rocephin) 1 g in dextrose (iso) IV 50 mL, 1 g, intravenous, q24h, Anabella Lemus MD, Stopped at 11/13/24 1820    [Held by provider] finasteride (Proscar) tablet 5 mg, 5 mg, oral, Daily, Anabella Lemus MD, 5 mg at 11/12/24 0819    [Held by provider] gabapentin (Neurontin) capsule 100 mg, 100 mg, oral, TID, Anabella Lemus MD, 100 mg at 11/12/24 1442    HYDROmorphone (Dilaudid) injection 0.2 mg, 0.2 mg, intravenous, q2h PRN, DARIA Shaw    HYDROmorphone (Dilaudid) injection 0.4 mg, 0.4 mg, intravenous, q2h PRN, Kacy Shukla, APRN-CNP, 0.4 mg at 11/13/24 7622    lidocaine 2 % mucosal jelly (Uro-Jet) 1 Application, 1 Application, Topical, Once, Kali Greer MD    [Held by provider] lisinopril tablet 40 mg, 40 mg, oral, Daily, Anabella Lemus MD    [Held by provider] metoprolol succinate XL (Toprol-XL) 24 hr tablet 25 mg, 25 mg, oral, Daily, Anabella Lemus MD, 25 mg at 11/12/24 0819    metoprolol tartrate (Lopressor) injection 2.5 mg, 2.5 mg, intravenous, q6h,  Clare Shukla, APRN-CNP, 2.5 mg at 11/14/24 0518    naloxone (Narcan) injection 0.2 mg, 0.2 mg, intravenous, q5 min PRN, Anabella Lemus MD    ondansetron ODT (Zofran-ODT) disintegrating tablet 4 mg, 4 mg, oral, q8h PRN **OR** ondansetron (Zofran) injection 4 mg, 4 mg, intravenous, q8h PRN, Anabella Lemus MD, 4 mg at 11/12/24 1532    oxybutynin (Ditropan) tablet 5 mg, 5 mg, oral, TID PRN, Anabella Lemus MD    phenoL (Chloraseptic) 1.4 % mouth/throat spray 1 spray, 1 spray, Mouth/Throat, q2h PRN, Kali Greer MD    sodium chloride 0.9% infusion, 40 mL/hr, intravenous, Continuous, Patito Alonzo MD, Last Rate: 40 mL/hr at 11/13/24 2308, 40 mL/hr at 11/13/24 2308    [Held by provider] tamsulosin (Flomax) 24 hr capsule 0.4 mg, 0.4 mg, oral, Daily, Anabella Lemus MD, 0.4 mg at 11/12/24 0819     Imaging:  XR abdomen 1 view    Result Date: 11/13/2024  Interpreted By:  Lamine Grubbs and Omar Mahmoud STUDY: XR ABDOMEN 1 VIEW;  11/13/2024 11:44 am   INDICATION: Signs/Symptoms:Confirm NG tube placement.     COMPARISON: X-ray abdomen 11/13/2024 8:32 a.m.   ACCESSION NUMBER(S): [id number]   ORDERING CLINICIAN: CLARE SHUKLA   FINDINGS: Interval placement of an enteric tube with tip projecting over the expected location of the proximal gastric body with proximal side-port projecting over the expected location of the gastric fundus.   Nonobstructive bowel gas pattern. Limited evaluation of pneumoperitoneum on supine imaging, however no gross evidence of free air is noted.   Visualized lungs are clear.   Skin clips yu abdominal incisions. Mild ileus is noted within the upper abdomen.   Osseous structures demonstrate no acute bony changes.       The enteric tube is in satisfactory position.   I personally reviewed the images/study and I agree with the findings as stated by Lauren Harvey MD (PGY-2). This study was interpreted at [facility], [place], [place].   MACRO: None    Signed by: Lamine Grubbs 11/13/2024 1:25 PM Dictation workstation:   [id number]    XR abdomen 1 view    Result Date: 11/13/2024  Interpreted By:  Lamine Grubbs,  and Wes Aguilar STUDY: XR ABDOMEN 1 VIEW;  11/13/2024 8:32 am   INDICATION: Signs/Symptoms:Ileus.     Per EMR: Patient underwent laparoscopic sigmoid colon resection and takedown of colovesical fistula on 11/11/2024   COMPARISON: CT abdomen pelvis 10/20/2024   ACCESSION NUMBER(S): [id number]   ORDERING CLINICIAN: SONIA MARCH   FINDINGS: Postsurgical changes from bilateral hip arthroplasties. Surgical staples over the mid lower abdomen.   Gaseous distention of several loops of bowel within the midabdomen in a nonobstructive pattern. There is diffuse vascular atheromatous disease of abdominal aorta iliac and femoral arteries. Bilateral hip arthroplasties are noted. Radium implants are demonstrated within the prostate.       Gaseous distention of several loops of bowel in a nonobstructive pattern.   I personally reviewed the images/study and I agree with the findings as stated by Lauren Harvey MD (PGY-2). This study was interpreted at [place], [place].   MACRO: None   Signed by: Lamine Grubbs 11/13/2024 9:46 AM Dictation workstation:   [id number]     Radiographic Interpretation: No new imaging to review today.      Medications reviewed.  Vital signs reviewed.  Labs reviewed.             Assessment/Plan    Wade Machado is a 69 y.o. male with a past medical history of diverticulitis with colovesical fistula, now hospital day 1 s/p Lx sigmoid resection , takedown fistula with DLI     Plan Today:   - continue NGT to LIWS, AROBF  - NPO  - Moeller stays til POD #6     Neuro: Post op pain is moderately controlled  - Tylenol  per ERAS  - multimodal pain control with: IV dilaudid  - Sleep hygiene, encourage shades up during day, limited inactions at night as appropriate     Cardiac: Hemodynamically stable, Hx of HTN  -Q 4  VS         - home metoprolol converted to IV and hold home lisinopril                                                                                                                                                       Pulm: no acute issues, history of no significant pulmonary history  - Incentive spirometry Q 1 hours  - Encourage Ambulation/OOB     GI: Hx of  diverticulitis with colovesical fistula, S/P Lx sig takedown fistula and DLI  - NPO, NGT to LIVON  - Lenora TID  - Zofran prn for nausea  - Wound and ostomy nurses consulted for supplies, education and support  - GUM TID     : Hx BPH, continues with indwelling llamas until POD 6 secondary to colovesical fistula  - Strict intake and output  - Trend renal function and replace electrolytes as needed  - IV fluids at 40/hr   - continue home Flomax and Proscar     HEME- H/H stable, no evidence of active bleeding, history of no significant hematological history  - trend CBC  - in indication for transfusion at this time     Endo no acute issues, history of no significant endocrine history   - no issues, no indication for SSI     ID afebrile, no leukocytosis,   - Continue to trend Temp per floor routine, trend CBC prn  - Continues on Ceftriaxone for UTI in setting of colovesical fistula     Proph - Continue SCDs, Lovenox for DVT prevention     Dispo:  - Continue regular nursing floor  - OT/PT  - plan at discharge... plan for HC for new stoma        Hospital Day: 4         Seen and discussed with chief Dr Simerlink and attending Dr Francisco Javier Greer MD  Colorectal Surgery  [place] [id number]

## 2024-11-15 ENCOUNTER — HOME HEALTH ADMISSION (OUTPATIENT)
Dept: HOME HEALTH SERVICES | Facility: HOME HEALTH | Age: 69
End: 2024-11-15
Payer: COMMERCIAL

## 2024-11-15 LAB
ANION GAP SERPL CALC-SCNC: 21 MMOL/L (ref 10–20)
BUN SERPL-MCNC: 26 MG/DL (ref 6–23)
CALCIUM SERPL-MCNC: 9.9 MG/DL (ref 8.6–10.6)
CHLORIDE SERPL-SCNC: 99 MMOL/L (ref 98–107)
CO2 SERPL-SCNC: 25 MMOL/L (ref 21–32)
CREAT SERPL-MCNC: 0.7 MG/DL (ref 0.5–1.3)
EGFRCR SERPLBLD CKD-EPI 2021: >90 ML/MIN/1.73M*2
ERYTHROCYTE [DISTWIDTH] IN BLOOD BY AUTOMATED COUNT: 17.2 % (ref 11.5–14.5)
GLUCOSE BLD MANUAL STRIP-MCNC: 133 MG/DL (ref 74–99)
GLUCOSE SERPL-MCNC: 114 MG/DL (ref 74–99)
HCT VFR BLD AUTO: 49.3 % (ref 41–52)
HGB BLD-MCNC: 15.7 G/DL (ref 13.5–17.5)
MCH RBC QN AUTO: 29.4 PG (ref 26–34)
MCHC RBC AUTO-ENTMCNC: 31.8 G/DL (ref 32–36)
MCV RBC AUTO: 92 FL (ref 80–100)
NRBC BLD-RTO: 0 /100 WBCS (ref 0–0)
PLATELET # BLD AUTO: 269 X10*3/UL (ref 150–450)
POTASSIUM SERPL-SCNC: 3.7 MMOL/L (ref 3.5–5.3)
RBC # BLD AUTO: 5.34 X10*6/UL (ref 4.5–5.9)
SODIUM SERPL-SCNC: 141 MMOL/L (ref 136–145)
WBC # BLD AUTO: 16.4 X10*3/UL (ref 4.4–11.3)

## 2024-11-15 PROCEDURE — 36415 COLL VENOUS BLD VENIPUNCTURE: CPT | Performed by: STUDENT IN AN ORGANIZED HEALTH CARE EDUCATION/TRAINING PROGRAM

## 2024-11-15 PROCEDURE — 1170000001 HC PRIVATE ONCOLOGY ROOM DAILY

## 2024-11-15 PROCEDURE — 2500000004 HC RX 250 GENERAL PHARMACY W/ HCPCS (ALT 636 FOR OP/ED)

## 2024-11-15 PROCEDURE — 82947 ASSAY GLUCOSE BLOOD QUANT: CPT

## 2024-11-15 PROCEDURE — 2500000004 HC RX 250 GENERAL PHARMACY W/ HCPCS (ALT 636 FOR OP/ED): Performed by: NURSE PRACTITIONER

## 2024-11-15 PROCEDURE — 2500000001 HC RX 250 WO HCPCS SELF ADMINISTERED DRUGS (ALT 637 FOR MEDICARE OP): Performed by: NURSE PRACTITIONER

## 2024-11-15 PROCEDURE — 85027 COMPLETE CBC AUTOMATED: CPT | Performed by: STUDENT IN AN ORGANIZED HEALTH CARE EDUCATION/TRAINING PROGRAM

## 2024-11-15 PROCEDURE — 99232 SBSQ HOSP IP/OBS MODERATE 35: CPT | Performed by: NURSE PRACTITIONER

## 2024-11-15 PROCEDURE — 80048 BASIC METABOLIC PNL TOTAL CA: CPT | Performed by: STUDENT IN AN ORGANIZED HEALTH CARE EDUCATION/TRAINING PROGRAM

## 2024-11-15 PROCEDURE — 2500000004 HC RX 250 GENERAL PHARMACY W/ HCPCS (ALT 636 FOR OP/ED): Performed by: STUDENT IN AN ORGANIZED HEALTH CARE EDUCATION/TRAINING PROGRAM

## 2024-11-15 RX ORDER — HYDROMORPHONE HYDROCHLORIDE 1 MG/ML
0.2 INJECTION, SOLUTION INTRAMUSCULAR; INTRAVENOUS; SUBCUTANEOUS EVERY 2 HOUR PRN
Status: DISCONTINUED | OUTPATIENT
Start: 2024-11-15 | End: 2024-11-16

## 2024-11-15 RX ORDER — OXYCODONE HYDROCHLORIDE 5 MG/1
5 TABLET ORAL EVERY 4 HOURS PRN
Status: DISCONTINUED | OUTPATIENT
Start: 2024-11-15 | End: 2024-11-19 | Stop reason: HOSPADM

## 2024-11-15 RX ORDER — OXYCODONE HYDROCHLORIDE 5 MG/1
10 TABLET ORAL EVERY 4 HOURS PRN
Status: DISCONTINUED | OUTPATIENT
Start: 2024-11-15 | End: 2024-11-16

## 2024-11-15 ASSESSMENT — COGNITIVE AND FUNCTIONAL STATUS - GENERAL
DAILY ACTIVITIY SCORE: 24
MOBILITY SCORE: 21
WALKING IN HOSPITAL ROOM: A LITTLE
CLIMB 3 TO 5 STEPS WITH RAILING: A LITTLE
STANDING UP FROM CHAIR USING ARMS: A LITTLE

## 2024-11-15 ASSESSMENT — PAIN SCALES - GENERAL
PAINLEVEL_OUTOF10: 5 - MODERATE PAIN
PAINLEVEL_OUTOF10: 0 - NO PAIN
PAINLEVEL_OUTOF10: 4

## 2024-11-15 NOTE — PROGRESS NOTES
11/13/24 1500   Discharge Planning   Living Arrangements Spouse/significant other   Support Systems Spouse/significant other   Type of Residence Private residence   Number of Stairs to Enter Residence 1   Number of Stairs Within Residence 0   Do you have animals or pets at home? No   Who is requesting discharge planning? Provider   Home or Post Acute Services In home services   Type of Home Care Services Home nursing visits   Expected Discharge Disposition Home H     Care Transitions Note    Plan per Medical/Surgical Team: Lx sigmoid resection , takedown fistula with DLI.   Status: Inpatient  Payor Source: [employer]  Discharge disposition: Home  Expected date of discharge: 11/17/24  Barriers:  Primary Oncologist:  Preferred home care agency: [facility]    LESLIE met with pt to complete admission assessment and discuss home care recommendation.  Demographics and insurance verifed with patient. Pt lives lives his wife Phuong in their own 1 story home.  Pt feels safe at home. Pt is a retired  and receives SS and pension.  Pt reports all his basic needs are met and denies needs or concerns.  SW discussed recommendation for home care RN.  Pt would be agreeable and when preference discussed pt would like referral to [facility].  LESLIE to continue to follow and assist with discharge planning.   Elizabeth Gunsalus LISW-S  Care Transitions Supervisor    11/15/24  LESLIE continuing to follow for discharge planning. Anticipate pt to discharge home on Monday 11/18/24.  Chat initiated with [facility] regarding home care referral for PT.  [facility] running benefit check and will follow up.  LESLIE to advise of updates.  Elizabeth Gunsalus LISW-S  Care Transitions Supervisor    Addendum 5:15 pm  [facility] accepted and start of care planned for 24-48 hours post discharge.  Elizabeth Gunsalus LISW-S  Care Transitions Supervisor

## 2024-11-15 NOTE — CARE PLAN
The patient's goals for the shift include Rest and comfort    The clinical goals for the shift include Pt will be safe, comfortable, and HD stable overnight.      Problem: Pain  Goal: Takes deep breaths with improved pain control throughout the shift  Outcome: Progressing  Goal: Turns in bed with improved pain control throughout the shift  Outcome: Progressing  Goal: Walks with improved pain control throughout the shift  Outcome: Progressing  Goal: Performs ADL's with improved pain control throughout shift  Outcome: Progressing  Goal: Participates in PT with improved pain control throughout the shift  Outcome: Progressing  Goal: Free from opioid side effects throughout the shift  Outcome: Progressing  Goal: Free from acute confusion related to pain meds throughout the shift  Outcome: Progressing

## 2024-11-15 NOTE — CONSULTS
"  Wound Care Consult     Visit Date: 11/15/2024      Patient Name: Wade Machado         MRN: [id number]           YOB: 1955     Reason for Consult: Ileostomy assessment and pouch check        Wound History: 69 y.o. male with a past medical history of diverticulitis with colovesical fistula, now hospital day 2 s/p Lx sigmoid resection , takedown fistula with DLI.     Ostomy type: DLI       Size: approx 1 1/4\"     Color: red  Protruding: budded  Functioning: liquid dark green effluent  Pouching: Clean, dry, and intact Daniel red flat wafer with high output pouch connected to gravity drainage.   Ostomy Education: Discussed pouching process and peristomal skin care. Pouch checked by author, patient reports pouch was changed by bedside RN today.  Some supplies and educational folder at bedside.    Plan: Assess stoma/pouching Sunday 11/17/24     Wound Team Plan: Ostomy/wound team will follow while inpatient for ileostomy and pouching care.       Kate Morgan RN, CWON  11/15/2024  5:49 PM        "

## 2024-11-16 LAB
ANION GAP SERPL CALC-SCNC: 19 MMOL/L (ref 10–20)
BUN SERPL-MCNC: 30 MG/DL (ref 6–23)
CALCIUM SERPL-MCNC: 9.3 MG/DL (ref 8.6–10.6)
CHLORIDE SERPL-SCNC: 98 MMOL/L (ref 98–107)
CO2 SERPL-SCNC: 24 MMOL/L (ref 21–32)
CREAT SERPL-MCNC: 0.64 MG/DL (ref 0.5–1.3)
EGFRCR SERPLBLD CKD-EPI 2021: >90 ML/MIN/1.73M*2
ERYTHROCYTE [DISTWIDTH] IN BLOOD BY AUTOMATED COUNT: 16.6 % (ref 11.5–14.5)
GLUCOSE SERPL-MCNC: 89 MG/DL (ref 74–99)
HCT VFR BLD AUTO: 43.6 % (ref 41–52)
HGB BLD-MCNC: 14.4 G/DL (ref 13.5–17.5)
MCH RBC QN AUTO: 29.6 PG (ref 26–34)
MCHC RBC AUTO-ENTMCNC: 33 G/DL (ref 32–36)
MCV RBC AUTO: 90 FL (ref 80–100)
NRBC BLD-RTO: 0 /100 WBCS (ref 0–0)
PLATELET # BLD AUTO: 328 X10*3/UL (ref 150–450)
POTASSIUM SERPL-SCNC: 3.2 MMOL/L (ref 3.5–5.3)
RBC # BLD AUTO: 4.87 X10*6/UL (ref 4.5–5.9)
SODIUM SERPL-SCNC: 138 MMOL/L (ref 136–145)
WBC # BLD AUTO: 13.7 X10*3/UL (ref 4.4–11.3)

## 2024-11-16 PROCEDURE — 85027 COMPLETE CBC AUTOMATED: CPT | Performed by: STUDENT IN AN ORGANIZED HEALTH CARE EDUCATION/TRAINING PROGRAM

## 2024-11-16 PROCEDURE — 2500000001 HC RX 250 WO HCPCS SELF ADMINISTERED DRUGS (ALT 637 FOR MEDICARE OP): Performed by: NURSE PRACTITIONER

## 2024-11-16 PROCEDURE — 36415 COLL VENOUS BLD VENIPUNCTURE: CPT | Performed by: STUDENT IN AN ORGANIZED HEALTH CARE EDUCATION/TRAINING PROGRAM

## 2024-11-16 PROCEDURE — 2500000002 HC RX 250 W HCPCS SELF ADMINISTERED DRUGS (ALT 637 FOR MEDICARE OP, ALT 636 FOR OP/ED): Performed by: STUDENT IN AN ORGANIZED HEALTH CARE EDUCATION/TRAINING PROGRAM

## 2024-11-16 PROCEDURE — 1170000001 HC PRIVATE ONCOLOGY ROOM DAILY

## 2024-11-16 PROCEDURE — 80048 BASIC METABOLIC PNL TOTAL CA: CPT | Performed by: STUDENT IN AN ORGANIZED HEALTH CARE EDUCATION/TRAINING PROGRAM

## 2024-11-16 PROCEDURE — 2500000001 HC RX 250 WO HCPCS SELF ADMINISTERED DRUGS (ALT 637 FOR MEDICARE OP): Performed by: STUDENT IN AN ORGANIZED HEALTH CARE EDUCATION/TRAINING PROGRAM

## 2024-11-16 PROCEDURE — 2500000002 HC RX 250 W HCPCS SELF ADMINISTERED DRUGS (ALT 637 FOR MEDICARE OP, ALT 636 FOR OP/ED): Performed by: NURSE PRACTITIONER

## 2024-11-16 RX ORDER — POTASSIUM CHLORIDE 20 MEQ/1
40 TABLET, EXTENDED RELEASE ORAL ONCE
Status: COMPLETED | OUTPATIENT
Start: 2024-11-16 | End: 2024-11-16

## 2024-11-16 RX ORDER — ACETAMINOPHEN 325 MG/1
975 TABLET ORAL EVERY 6 HOURS SCHEDULED
Status: DISCONTINUED | OUTPATIENT
Start: 2024-11-16 | End: 2024-11-18

## 2024-11-16 RX ORDER — ACETAMINOPHEN 325 MG/1
975 TABLET ORAL EVERY 6 HOURS PRN
Status: DISCONTINUED | OUTPATIENT
Start: 2024-11-16 | End: 2024-11-16

## 2024-11-16 ASSESSMENT — PAIN SCALES - GENERAL
PAINLEVEL_OUTOF10: 3
PAINLEVEL_OUTOF10: 2
PAINLEVEL_OUTOF10: 0 - NO PAIN

## 2024-11-16 ASSESSMENT — COGNITIVE AND FUNCTIONAL STATUS - GENERAL
MOVING TO AND FROM BED TO CHAIR: A LOT
MOBILITY SCORE: 13
TOILETING: A LITTLE
HELP NEEDED FOR BATHING: A LITTLE
STANDING UP FROM CHAIR USING ARMS: A LOT
WALKING IN HOSPITAL ROOM: A LOT
DRESSING REGULAR UPPER BODY CLOTHING: A LITTLE
TURNING FROM BACK TO SIDE WHILE IN FLAT BAD: A LITTLE
DRESSING REGULAR LOWER BODY CLOTHING: A LITTLE
MOVING FROM LYING ON BACK TO SITTING ON SIDE OF FLAT BED WITH BEDRAILS: A LITTLE
DAILY ACTIVITIY SCORE: 20
CLIMB 3 TO 5 STEPS WITH RAILING: TOTAL

## 2024-11-16 ASSESSMENT — PAIN - FUNCTIONAL ASSESSMENT: PAIN_FUNCTIONAL_ASSESSMENT: 0-10

## 2024-11-16 NOTE — PROGRESS NOTES
Colorectal Surgery Progress Note      Wade Machado is a 69 y.o. male with a past medical history of diverticulitis with colovesical fistula, now s/p laparoscopic sigmoid resection, takedown fistula with DLI on 11/11 with Dr. Mcintyre. Hospitalization complicated by ileus.     Subjective    No acute events overnight, afebrile with stable vital signs. He is having significant ileostomy output but does not yet feel comfortable with managing ileostomy. Tolerating clear liquids with some burping but no nausea. He is feeling hungry for real food today.     Objective    Vital signs:   Vitals:    11/16/24 0435   BP: 148/83   Pulse: 64   Resp: 16   Temp: 36.1 °C (97 °F)   SpO2: 93%        Physical Exam  GEN: lying in bed, awake this am, conversive, no acute distress  HEENT: Sclera anicteric. Moist mucous membranes.  RESP: chest expansion symmetrical, unlabored on  On RA  CV: Regular rate, normotensive  GI: Abdomen soft, minimal distention. Lap sites and surgical incision c/d/I. Stoma well pouched, pink and well perfused, large amount of bilious liquid in pouch  : Moeller in place with sufficient clear yellow urine.  MSK: No gross deformities. Moves all extremities spontaneously.  NEURO: Alert and oriented x3. No focal deficits.  PSYCH: Appropriate mood and affect.  SKIN: No rashes or lesions.    I/O last 2 completed shifts:  In: 410 (3.8 mL/kg) [I.V.:360 (3.4 mL/kg); IV Piggyback:50]  Out: 2700 (25.4 mL/kg) [Urine:675 (0.3 mL/kg/hr); Stool:2025]  Weight: 106.5 kg      Lines/Drains/Tubes:   Patient Lines/Drains/Airways Status       Active Airway       None                  Output by Drain (mL) 11/14/24 0700 - 11/14/24 1859 11/14/24 1900 - 11/15/24 0659 11/15/24 0700 - 11/15/24 1859 11/15/24 1900 - 11/16/24 0659 11/16/24 0700 - 11/16/24 1009   Requested LDAs do not have output data documented.        Labs Past 18 Hours:  Recent Results (from the past 18 hours)   Basic Metabolic Panel    Collection Time: 11/16/24  6:36 AM    Result Value Ref Range    Glucose 89 74 - 99 mg/dL    Sodium 138 136 - 145 mmol/L    Potassium 3.2 (L) 3.5 - 5.3 mmol/L    Chloride 98 98 - 107 mmol/L    Bicarbonate 24 21 - 32 mmol/L    Anion Gap 19 10 - 20 mmol/L    Urea Nitrogen 30 (H) 6 - 23 mg/dL    Creatinine 0.64 0.50 - 1.30 mg/dL    eGFR >90 >60 mL/min/1.73m*2    Calcium 9.3 8.6 - 10.6 mg/dL   CBC    Collection Time: 11/16/24  6:36 AM   Result Value Ref Range    WBC 13.7 (H) 4.4 - 11.3 x10*3/uL    nRBC 0.0 0.0 - 0.0 /100 WBCs    RBC 4.87 4.50 - 5.90 x10*6/uL    Hemoglobin 14.4 13.5 - 17.5 g/dL    Hematocrit 43.6 41.0 - 52.0 %    MCV 90 80 - 100 fL    MCH 29.6 26.0 - 34.0 pg    MCHC 33.0 32.0 - 36.0 g/dL    RDW 16.6 (H) 11.5 - 14.5 %    Platelets 328 150 - 450 x10*3/uL          Meds:    Current Facility-Administered Medications:     acetaminophen (Tylenol) tablet 975 mg, 975 mg, oral, q6h PEMA, Anabella Lemus MD    calcium carbonate (Tums) chewable tablet 500 mg, 500 mg, oral, Daily, COLTON Shaw-CNP, 500 mg at 11/15/24 1222    finasteride (Proscar) tablet 5 mg, 5 mg, oral, Daily, DARIA Shaw, 5 mg at 11/12/24 0819    [Held by provider] gabapentin (Neurontin) capsule 100 mg, 100 mg, oral, TID, DARIA Shaw, 100 mg at 11/15/24 2037    [Held by provider] lisinopril tablet 40 mg, 40 mg, oral, Daily, Anabella Lemus MD    metoprolol succinate XL (Toprol-XL) 24 hr tablet 25 mg, 25 mg, oral, Daily, DARIA Shaw, 25 mg at 11/12/24 0819    naloxone (Narcan) injection 0.2 mg, 0.2 mg, intravenous, q5 min PRN, Anabella Lemus MD    ondansetron ODT (Zofran-ODT) disintegrating tablet 4 mg, 4 mg, oral, q8h PRN **OR** ondansetron (Zofran) injection 4 mg, 4 mg, intravenous, q8h PRN, Anabella Lemus MD, 4 mg at 11/12/24 1532    oxybutynin (Ditropan) tablet 5 mg, 5 mg, oral, TID PRN, Anabella Lemus MD    oxyCODONE (Roxicodone) immediate release tablet 5 mg, 5 mg, oral, q4h PRN, COLTON Shaw-ELDER, 5 mg at  11/15/24 2039    phenoL (Chloraseptic) 1.4 % mouth/throat spray 1 spray, 1 spray, Mouth/Throat, q2h PRN, Kali Greer MD    potassium chloride CR (Klor-Con M20) ER tablet 40 mEq, 40 mEq, oral, Once, Anabella Lemus MD    tamsulosin (Flomax) 24 hr capsule 0.4 mg, 0.4 mg, oral, Daily, COLTON Shaw-CNP, 0.4 mg at 11/12/24 0819               Assessment/Plan    Wade Machado is a 69 y.o. male with a past medical history of diverticulitis with colovesical fistula, now s/p laparoscopic sigmoid resection, takedown fistula with DLI on 11/11/24 with Dr. Mcintyre. Hospitalization complicated by ileus.      Plan 11/16/24:   - Advance to regular diet, HLIV  - Llamas until 11/17, CT cysto prior to removal  - Replace potassium PO  - Patient needs to start taking care of own ileostomy, emptying the bag and recording output  - Anticipate discharge early next week     Neuro: Post op pain is moderately controlled  - Sched tylenol, PRN oxycodone  - Sleep hygiene, encourage shades up during day, limited inactions at night as appropriate     Cardiac: Hemodynamically stable, Hx of HTN  - Q4 VS         - continue home metoprolol  - continue to hold home lisinopril                                                                                                                                                       Pulm: no acute issues, history of no significant pulmonary history  - Incentive spirometry Q 1 hours  - Encourage Ambulation/OOB     GI: Hx of diverticulitis with colovesical fistula, S/P laparoscopic sigmoidectomy, takedown fistula and DLI on 11/11  - Advance to regular diet, HLIV  - Juvin TID  - Zofran prn for nausea  - Wound and ostomy nurses consulted for supplies, education and support  - Encourage patient to record output, empty bag  - GUM TID     : Hx BPH, continues with indwelling llamas until POD 6 secondary to colovesical fistula  - Plan for CT cystogram tomorrow prior to llamas removal  - Strict intake and  output  - Trend renal function and replace electrolytes as needed  - continue home Flomax and Proscar  - continue oxybutynin     HEME- H/H stable, no evidence of active bleeding, history of no significant hematological history  - trend CBC  - in indication for transfusion at this time     Endo no acute issues, history of no significant endocrine history   - no issues, no indication for SSI     ID afebrile, no leukocytosis  - Continue to trend Temp per floor routine, trend CBC prn  - Continues on Ceftriaxone for UTI in setting of colovesical fistula- completed course today     Proph - Continue SCDs, no chemoprophylaxis given history of head bleed     Dispo:  - Continue regular nursing floor  - PT  - plan at discharge... plan for HC for new stoma      Seen and discussed with surgical team    Anabella Lemus MD  Colorectal Surgery Fellow  [place] # [id number]

## 2024-11-16 NOTE — NURSING NOTE
Pt refusing SCDs. RN educated pt on importance of SCD use to decrease chance of forming a blood clot, especially as this pt is not a candidate for pharmacologic prophylaxis. Rn also educated on possible health risks of aquiring a blood clot. Pt still refuses at this time. Kacy Shukla NP notified via [employer].

## 2024-11-17 ENCOUNTER — APPOINTMENT (OUTPATIENT)
Dept: RADIOLOGY | Facility: HOSPITAL | Age: 69
DRG: 674 | End: 2024-11-17
Payer: COMMERCIAL

## 2024-11-17 VITALS
HEIGHT: 70 IN | BODY MASS INDEX: 33.61 KG/M2 | HEART RATE: 76 BPM | SYSTOLIC BLOOD PRESSURE: 114 MMHG | RESPIRATION RATE: 16 BRPM | WEIGHT: 234.79 LBS | OXYGEN SATURATION: 93 % | TEMPERATURE: 97 F | DIASTOLIC BLOOD PRESSURE: 78 MMHG

## 2024-11-17 LAB
ANION GAP SERPL CALC-SCNC: 15 MMOL/L (ref 10–20)
BUN SERPL-MCNC: 21 MG/DL (ref 6–23)
CALCIUM SERPL-MCNC: 8.9 MG/DL (ref 8.6–10.6)
CHLORIDE SERPL-SCNC: 100 MMOL/L (ref 98–107)
CO2 SERPL-SCNC: 24 MMOL/L (ref 21–32)
CREAT SERPL-MCNC: 0.55 MG/DL (ref 0.5–1.3)
EGFRCR SERPLBLD CKD-EPI 2021: >90 ML/MIN/1.73M*2
ERYTHROCYTE [DISTWIDTH] IN BLOOD BY AUTOMATED COUNT: 16.3 % (ref 11.5–14.5)
GLUCOSE SERPL-MCNC: 98 MG/DL (ref 74–99)
HCT VFR BLD AUTO: 42.6 % (ref 41–52)
HGB BLD-MCNC: 13.9 G/DL (ref 13.5–17.5)
MCH RBC QN AUTO: 29.5 PG (ref 26–34)
MCHC RBC AUTO-ENTMCNC: 32.6 G/DL (ref 32–36)
MCV RBC AUTO: 90 FL (ref 80–100)
NRBC BLD-RTO: 0 /100 WBCS (ref 0–0)
PLATELET # BLD AUTO: 300 X10*3/UL (ref 150–450)
POTASSIUM SERPL-SCNC: 3.6 MMOL/L (ref 3.5–5.3)
RBC # BLD AUTO: 4.71 X10*6/UL (ref 4.5–5.9)
SODIUM SERPL-SCNC: 135 MMOL/L (ref 136–145)
WBC # BLD AUTO: 12.8 X10*3/UL (ref 4.4–11.3)

## 2024-11-17 PROCEDURE — 2500000001 HC RX 250 WO HCPCS SELF ADMINISTERED DRUGS (ALT 637 FOR MEDICARE OP): Performed by: NURSE PRACTITIONER

## 2024-11-17 PROCEDURE — 74430 CONTRAST X-RAY BLADDER: CPT | Performed by: RADIOLOGY

## 2024-11-17 PROCEDURE — 2500000002 HC RX 250 W HCPCS SELF ADMINISTERED DRUGS (ALT 637 FOR MEDICARE OP, ALT 636 FOR OP/ED): Performed by: NURSE PRACTITIONER

## 2024-11-17 PROCEDURE — 72192 CT PELVIS W/O DYE: CPT | Performed by: RADIOLOGY

## 2024-11-17 PROCEDURE — 51600 INJECTION FOR BLADDER X-RAY: CPT | Performed by: RADIOLOGY

## 2024-11-17 PROCEDURE — 1170000001 HC PRIVATE ONCOLOGY ROOM DAILY

## 2024-11-17 PROCEDURE — 2500000001 HC RX 250 WO HCPCS SELF ADMINISTERED DRUGS (ALT 637 FOR MEDICARE OP): Performed by: STUDENT IN AN ORGANIZED HEALTH CARE EDUCATION/TRAINING PROGRAM

## 2024-11-17 PROCEDURE — 36415 COLL VENOUS BLD VENIPUNCTURE: CPT | Performed by: STUDENT IN AN ORGANIZED HEALTH CARE EDUCATION/TRAINING PROGRAM

## 2024-11-17 PROCEDURE — 72192 CT PELVIS W/O DYE: CPT

## 2024-11-17 PROCEDURE — 85027 COMPLETE CBC AUTOMATED: CPT | Performed by: STUDENT IN AN ORGANIZED HEALTH CARE EDUCATION/TRAINING PROGRAM

## 2024-11-17 PROCEDURE — 2550000001 HC RX 255 CONTRASTS: Performed by: COLON & RECTAL SURGERY

## 2024-11-17 PROCEDURE — 80048 BASIC METABOLIC PNL TOTAL CA: CPT | Performed by: STUDENT IN AN ORGANIZED HEALTH CARE EDUCATION/TRAINING PROGRAM

## 2024-11-17 ASSESSMENT — COGNITIVE AND FUNCTIONAL STATUS - GENERAL
DRESSING REGULAR UPPER BODY CLOTHING: A LITTLE
MOBILITY SCORE: 14
DRESSING REGULAR LOWER BODY CLOTHING: A LITTLE
WALKING IN HOSPITAL ROOM: A LOT
CLIMB 3 TO 5 STEPS WITH RAILING: A LOT
MOVING TO AND FROM BED TO CHAIR: A LOT
MOVING FROM LYING ON BACK TO SITTING ON SIDE OF FLAT BED WITH BEDRAILS: A LITTLE
HELP NEEDED FOR BATHING: A LITTLE
DAILY ACTIVITIY SCORE: 20
STANDING UP FROM CHAIR USING ARMS: A LOT
TOILETING: A LITTLE
TURNING FROM BACK TO SIDE WHILE IN FLAT BAD: A LITTLE

## 2024-11-17 ASSESSMENT — PAIN - FUNCTIONAL ASSESSMENT
PAIN_FUNCTIONAL_ASSESSMENT: 0-10
PAIN_FUNCTIONAL_ASSESSMENT: 0-10

## 2024-11-17 ASSESSMENT — PAIN SCALES - GENERAL
PAINLEVEL_OUTOF10: 0 - NO PAIN
PAINLEVEL_OUTOF10: 0 - NO PAIN
PAINLEVEL_OUTOF10: 4

## 2024-11-17 NOTE — PROGRESS NOTES
Colorectal Surgery Progress Note      Wade Machado is a 69 y.o. male with a past medical history of diverticulitis with colovesical fistula, now s/p laparoscopic sigmoid resection, takedown fistula with DLI on 11/11 with Dr. Mcintyre. Hospitalization complicated by ileus.     Subjective    No acute events overnight, afebrile with stable vital signs. Continues to have thickened ileostomy output. Didn't eat much breakfast but tolerated lunch and dinner. He has not yet been recording his own ileostomy output.     Objective    Vital signs:   Vitals:    11/17/24 0500   BP: 133/82   Pulse: 61   Resp: 18   Temp: 36.3 °C (97.3 °F)   SpO2: 95%        Physical Exam  GEN: lying in bed, awake this am, conversive, no acute distress  HEENT: Sclera anicteric. Moist mucous membranes.  RESP: chest expansion symmetrical, unlabored on RA  CV: Regular rate, normotensive  GI: Abdomen soft, minimal distention. Lap sites and surgical incision c/d/I. Stoma well pouched, pink and well perfused, large amount of thickened brown stool in the pouch  : Moeller in place with sufficient clear yellow urine.  MSK: No gross deformities. Moves all extremities spontaneously.  NEURO: Alert and oriented x3. No focal deficits.  PSYCH: Appropriate mood and affect.  SKIN: No rashes or lesions.    I/O last 2 completed shifts:  In: 1040 (9.8 mL/kg) [P.O.:1040]  Out: 2625 (24.6 mL/kg) [Urine:1350 (0.5 mL/kg/hr); Stool:1275]  Weight: 106.5 kg      Lines/Drains/Tubes:   Patient Lines/Drains/Airways Status       Active Airway       None                  Output by Drain (mL) 11/15/24 0700 - 11/15/24 1859 11/15/24 1900 - 11/16/24 0659 11/16/24 0700 - 11/16/24 1859 11/16/24 1900 - 11/17/24 0659 11/17/24 0700 - 11/17/24 0708   Requested LDAs do not have output data documented.        Labs Past 18 Hours:  No results found for this or any previous visit (from the past 18 hours).         Meds:    Current Facility-Administered Medications:     acetaminophen  (Tylenol) tablet 975 mg, 975 mg, oral, q6h PEMA, Anabella Lemus MD, 975 mg at 11/16/24 1811    calcium carbonate (Tums) chewable tablet 500 mg, 500 mg, oral, Daily, DARIA Shaw, 500 mg at 11/16/24 1023    finasteride (Proscar) tablet 5 mg, 5 mg, oral, Daily, DARIA Shaw, 5 mg at 11/16/24 1022    lisinopril tablet 40 mg, 40 mg, oral, Daily, Anabella Lemus MD    metoprolol succinate XL (Toprol-XL) 24 hr tablet 25 mg, 25 mg, oral, Daily, DARIA Shaw, 25 mg at 11/16/24 1023    naloxone (Narcan) injection 0.2 mg, 0.2 mg, intravenous, q5 min PRN, Anabella Lemus MD    ondansetron ODT (Zofran-ODT) disintegrating tablet 4 mg, 4 mg, oral, q8h PRN **OR** ondansetron (Zofran) injection 4 mg, 4 mg, intravenous, q8h PRN, Anabella Lemus MD, 4 mg at 11/12/24 1532    oxybutynin (Ditropan) tablet 5 mg, 5 mg, oral, TID PRN, Anabella Lemus MD    oxyCODONE (Roxicodone) immediate release tablet 5 mg, 5 mg, oral, q4h PRN, DARIA Shaw, 5 mg at 11/15/24 2039    phenoL (Chloraseptic) 1.4 % mouth/throat spray 1 spray, 1 spray, Mouth/Throat, q2h PRN, Kali Greer MD    tamsulosin (Flomax) 24 hr capsule 0.4 mg, 0.4 mg, oral, Daily, DARIA Shaw, 0.4 mg at 11/16/24 1022               Assessment/Plan    Wade Machado is a 69 y.o. male with a past medical history of diverticulitis with colovesical fistula, now s/p laparoscopic sigmoid resection, takedown fistula with DLI on 11/11/24 with Dr. Mcintyre. Hospitalization complicated by ileus.      Plan 11/17/24:   - Continue regular diet  - CT cysto today with likely llamas removal  - Continue ileostomy teaching and care  - Anticipate discharge early next week     Neuro: Post op pain is moderately controlled  - Sched tylenol, PRN oxycodone  - Sleep hygiene, encourage shades up during day, limited inactions at night as appropriate     Cardiac: Hemodynamically stable, Hx of HTN  - Q4 VS         - continue home  metoprolol  - continue to hold home lisinopril                                                                                                                                                       Pulm: no acute issues, history of no significant pulmonary history  - Incentive spirometry Q 1 hours  - Encourage Ambulation/OOB     GI: Hx of diverticulitis with colovesical fistula, S/P laparoscopic sigmoidectomy, takedown fistula and DLI on 11/11  - Advance to regular diet, HLIV  - Juvin TID  - Zofran prn for nausea  - Wound and ostomy nurses consulted for supplies, education and support  - Encourage patient to record output, empty bag  - GUM TID     : Hx BPH, continues with indwelling llamas until POD 6 secondary to colovesical fistula  - Plan for CT cystogram tomorrow prior to llamas removal  - Strict intake and output  - Trend renal function and replace electrolytes as needed  - continue home Flomax and Proscar  - continue oxybutynin     HEME- H/H stable, no evidence of active bleeding, history of no significant hematological history  - trend CBC  - in indication for transfusion at this time     Endo no acute issues, history of no significant endocrine history   - no issues, no indication for SSI     ID afebrile, no leukocytosis  - Continue to trend Temp per floor routine, trend CBC prn  - Continues on Ceftriaxone for UTI in setting of colovesical fistula- completed course today     Proph - Continue SCDs, no chemoprophylaxis given history of head bleed     Dispo:  - Continue regular nursing floor  - PT  - plan at discharge... plan for HC for new stoma      Seen and discussed with surgical team    Anabella Lemus MD  Colorectal Surgery Fellow  [facility] # [id number]

## 2024-11-18 ENCOUNTER — DOCUMENTATION (OUTPATIENT)
Dept: HOME HEALTH SERVICES | Facility: HOME HEALTH | Age: 69
End: 2024-11-18
Payer: COMMERCIAL

## 2024-11-18 LAB
ANION GAP SERPL CALC-SCNC: 15 MMOL/L (ref 10–20)
BUN SERPL-MCNC: 19 MG/DL (ref 6–23)
CALCIUM SERPL-MCNC: 9.2 MG/DL (ref 8.6–10.6)
CHLORIDE SERPL-SCNC: 102 MMOL/L (ref 98–107)
CO2 SERPL-SCNC: 24 MMOL/L (ref 21–32)
CREAT SERPL-MCNC: 0.6 MG/DL (ref 0.5–1.3)
EGFRCR SERPLBLD CKD-EPI 2021: >90 ML/MIN/1.73M*2
ERYTHROCYTE [DISTWIDTH] IN BLOOD BY AUTOMATED COUNT: 16.5 % (ref 11.5–14.5)
GLUCOSE SERPL-MCNC: 82 MG/DL (ref 74–99)
HCT VFR BLD AUTO: 44.4 % (ref 41–52)
HGB BLD-MCNC: 14.1 G/DL (ref 13.5–17.5)
MCH RBC QN AUTO: 29.1 PG (ref 26–34)
MCHC RBC AUTO-ENTMCNC: 31.8 G/DL (ref 32–36)
MCV RBC AUTO: 92 FL (ref 80–100)
NRBC BLD-RTO: 0 /100 WBCS (ref 0–0)
PLATELET # BLD AUTO: 297 X10*3/UL (ref 150–450)
POTASSIUM SERPL-SCNC: 3.8 MMOL/L (ref 3.5–5.3)
RBC # BLD AUTO: 4.85 X10*6/UL (ref 4.5–5.9)
SODIUM SERPL-SCNC: 137 MMOL/L (ref 136–145)
WBC # BLD AUTO: 12.8 X10*3/UL (ref 4.4–11.3)

## 2024-11-18 PROCEDURE — 2500000001 HC RX 250 WO HCPCS SELF ADMINISTERED DRUGS (ALT 637 FOR MEDICARE OP): Performed by: NURSE PRACTITIONER

## 2024-11-18 PROCEDURE — 85027 COMPLETE CBC AUTOMATED: CPT | Performed by: STUDENT IN AN ORGANIZED HEALTH CARE EDUCATION/TRAINING PROGRAM

## 2024-11-18 PROCEDURE — 99232 SBSQ HOSP IP/OBS MODERATE 35: CPT | Performed by: NURSE PRACTITIONER

## 2024-11-18 PROCEDURE — 36415 COLL VENOUS BLD VENIPUNCTURE: CPT | Performed by: STUDENT IN AN ORGANIZED HEALTH CARE EDUCATION/TRAINING PROGRAM

## 2024-11-18 PROCEDURE — 2500000001 HC RX 250 WO HCPCS SELF ADMINISTERED DRUGS (ALT 637 FOR MEDICARE OP): Performed by: STUDENT IN AN ORGANIZED HEALTH CARE EDUCATION/TRAINING PROGRAM

## 2024-11-18 PROCEDURE — 1170000001 HC PRIVATE ONCOLOGY ROOM DAILY

## 2024-11-18 PROCEDURE — 2500000002 HC RX 250 W HCPCS SELF ADMINISTERED DRUGS (ALT 637 FOR MEDICARE OP, ALT 636 FOR OP/ED): Performed by: NURSE PRACTITIONER

## 2024-11-18 PROCEDURE — 80048 BASIC METABOLIC PNL TOTAL CA: CPT | Performed by: STUDENT IN AN ORGANIZED HEALTH CARE EDUCATION/TRAINING PROGRAM

## 2024-11-18 RX ORDER — LOPERAMIDE HYDROCHLORIDE 2 MG/1
2 CAPSULE ORAL
Status: DISCONTINUED | OUTPATIENT
Start: 2024-11-18 | End: 2024-11-19 | Stop reason: HOSPADM

## 2024-11-18 RX ORDER — ACETAMINOPHEN 325 MG/1
650 TABLET ORAL EVERY 6 HOURS SCHEDULED
Status: DISCONTINUED | OUTPATIENT
Start: 2024-11-18 | End: 2024-11-19 | Stop reason: HOSPADM

## 2024-11-18 ASSESSMENT — COGNITIVE AND FUNCTIONAL STATUS - GENERAL
WALKING IN HOSPITAL ROOM: A LOT
TURNING FROM BACK TO SIDE WHILE IN FLAT BAD: A LITTLE
HELP NEEDED FOR BATHING: A LITTLE
DRESSING REGULAR LOWER BODY CLOTHING: A LITTLE
CLIMB 3 TO 5 STEPS WITH RAILING: A LOT
MOBILITY SCORE: 15
MOVING TO AND FROM BED TO CHAIR: A LITTLE
MOVING FROM LYING ON BACK TO SITTING ON SIDE OF FLAT BED WITH BEDRAILS: A LITTLE
STANDING UP FROM CHAIR USING ARMS: A LOT
DRESSING REGULAR UPPER BODY CLOTHING: A LITTLE
DAILY ACTIVITIY SCORE: 20
TOILETING: A LITTLE

## 2024-11-18 ASSESSMENT — PAIN SCALES - GENERAL: PAINLEVEL_OUTOF10: 0 - NO PAIN

## 2024-11-18 NOTE — CONSULTS
"  Wound Care Consult     Visit Date: 11/17/2024      Patient Name: Wade Machado         MRN: [id number]           YOB: 1955     Reason for Consult: Ileostomy assessment and pouch check        Wound History:  69 y.o. male with a past medical history of diverticulitis with colovesical fistula, now hospital day 2 s/p Lx sigmoid resection , takedown fistula with DLI.     Ostomy type: DLI       Size: approx 1 1/4\"     Color: red  Protruding: budded  Functioning: liquid dark green effluent  Pouching: Clean, dry, and intact 2 piece Rory red flat wafer with high output pouch.  Ostomy Education: Discussed pouching process and peristomal skin care.  Patient has no knowledge of changing his pouch. Pouch change done and talked patient through alll the steps. Patient listened and asked questions, but did not participate.       Wound Team Plan: Ostomy/wound team will follow while inpatient for ileostomy and pouching care.       SUZANNE GERHARDT, RN, BSN, CWOCN  11/17/2024  7:00 PM        "

## 2024-11-18 NOTE — CARE PLAN
Problem: Pain  Goal: Takes deep breaths with improved pain control throughout the shift  Outcome: Progressing  Goal: Turns in bed with improved pain control throughout the shift  Outcome: Progressing  Goal: Walks with improved pain control throughout the shift  Outcome: Progressing  Goal: Performs ADL's with improved pain control throughout shift  Outcome: Progressing  Goal: Participates in PT with improved pain control throughout the shift  Outcome: Progressing  Goal: Free from opioid side effects throughout the shift  Outcome: Progressing  Goal: Free from acute confusion related to pain meds throughout the shift  Outcome: Progressing     Problem: Skin  Goal: Decreased wound size/increased tissue granulation at next dressing change  Outcome: Progressing  Goal: Participates in plan/prevention/treatment measures  Outcome: Progressing  Goal: Prevent/manage excess moisture  Outcome: Progressing  Goal: Prevent/minimize sheer/friction injuries  Outcome: Progressing  Goal: Promote/optimize nutrition  Outcome: Progressing  Goal: Promote skin healing  Outcome: Progressing   The patient's goals for the shift include Rest and comfort    The clinical goals for the shift include patient will tolerate po intake    Over the shift, the patient did not make progress toward the following goals. Barriers to progression include safety. Recommendations to address these barriers include maintain alarms.

## 2024-11-18 NOTE — CONSULTS
"  Wound Care Consult     Visit Date: 2024      Patient Name: Wade Machado         MRN: [id number]           YOB: 1955     Reason for Consult: ostomy care/ education         Wound History: Patient with a past medical history of diverticulitis with colovesical fistula, now s/p laparoscopic sigmoid resection, takedown fistula with DLI on  with Dr. Mcintyre.      Pertinent Labs:   Albumin   Date Value Ref Range Status   2024 3.8 3.4 - 5.0 g/dL Final       Wound Assessment:  Wound 24 Incision Umbilicus (Active)   Site Assessment Clean;Dry 24 0930   Elsie-Wound Assessment Clean;Dry 24 0930   Closure Staples 24 0930   Sutures/Staple Line Approximated 24 09   Drainage Description None 24 0930   Drainage Amount None 24 0930   Dressing Open to air 24 0930   Dressing Changed Changed 24 1154   Dressing Status Old drainage 24 1930      24 1100   Ileostomy RUQ   Placement Date/Time: 24 1122   Location: RUQ   Stomal Appliance 2 piece   Site/Stoma Assessment Red;Swelling   Peristomal Assessment Intact   Treatment Pouch change   Output (mL) 100 mL   Output Description Brown;Loose       Wound Team Summary Assessment:   Ostomy type: loop ileostomy     size: 1 3/8\"      color: red      protruding: budded    Functioning: loose liquid brown stool  Mucocutaneous junction: intact  Peristomal skin: intact  Pouchin piece red flat wafer with barrier ring   Ostomy Education: Discussed diet, hydration, altered anatomy, output and basic ostomy care with patient and wife. Reviewed information in education folder. Patient's wife changed pouch today. Removed old pouch, cleansed skin/ stoma. Applied skin prep. Measured stoma, kyle pattern and cut new skin barrier. Patient's wife was able to apply barrier ring, skin barrier and attach new pouch. Patient opened and closed pouch tail. Patient was attentive during pouch change. Additional supplies " for home going at bedside   Plan: assess stoma/pouching      Wound Team Plan: Ostomy team will follow      Magdalena MCCRAY   11/18/2024  6:58 PM

## 2024-11-18 NOTE — PROGRESS NOTES
Colorectal Surgery Progress Note      Wade Machado is a 69 y.o. male with a past medical history of diverticulitis with colovesical fistula, now s/p laparoscopic sigmoid resection, takedown fistula with DLI on 11/11 with Dr. Mcintyre. Hospitalization complicated by ileus.     Subjective    No acute events overnight, afebrile with stable vital signs.  Pain controlled, tolerating soft diet without N/V.  Abdomen soft, stoma appliance leaking.  Stool output 1325 ml in last 24 hours.  Has been OOB, ambulating and using walker       Objective    Vital signs:   Vitals:    11/18/24 0852   BP: 120/78   Pulse: 79   Resp: 16   Temp: 36.3 °C (97.3 °F)   SpO2: 95%        Physical Exam  GEN: lying in bed, awake and conversive, no acute distress  HEENT: Sclera anicteric. Moist mucous membranes.  RESP: chest expansion symmetrical, unlabored on RA  CV: Regular rate, normotensive  GI: Abdomen soft, non distended, Lap sites and surgical incision c/d/I. Stoma well pouched, pink and well perfused, soft mushy stool with gas in the pouch  : voiding sufficient urine  MSK: No gross deformities. Moves all extremities spontaneously.  NEURO: Alert with appropriate conversation, no focal deficits  PSYCH: Appropriate mood and affect.  SKIN: No rashes or lesions.    I/O last 2 completed shifts:  In: 920 (8.6 mL/kg) [P.O.:920]  Out: 2025 (19 mL/kg) [Urine:700 (0.3 mL/kg/hr); Stool:1325]  Weight: 106.5 kg      Lines/Drains/Tubes:   Patient Lines/Drains/Airways Status       Active Airway       None                  Output by Drain (mL) 11/16/24 0700 - 11/16/24 1859 11/16/24 1900 - 11/17/24 0659 11/17/24 0700 - 11/17/24 1859 11/17/24 1900 - 11/18/24 0659 11/18/24 0700 - 11/18/24 1021   Requested LDAs do not have output data documented.        Labs Past 18 Hours:  Recent Results (from the past 18 hours)   Basic Metabolic Panel    Collection Time: 11/18/24  5:13 AM   Result Value Ref Range    Glucose 82 74 - 99 mg/dL    Sodium 137 136 - 145  mmol/L    Potassium 3.8 3.5 - 5.3 mmol/L    Chloride 102 98 - 107 mmol/L    Bicarbonate 24 21 - 32 mmol/L    Anion Gap 15 10 - 20 mmol/L    Urea Nitrogen 19 6 - 23 mg/dL    Creatinine 0.60 0.50 - 1.30 mg/dL    eGFR >90 >60 mL/min/1.73m*2    Calcium 9.2 8.6 - 10.6 mg/dL   CBC    Collection Time: 11/18/24  5:13 AM   Result Value Ref Range    WBC 12.8 (H) 4.4 - 11.3 x10*3/uL    nRBC 0.0 0.0 - 0.0 /100 WBCs    RBC 4.85 4.50 - 5.90 x10*6/uL    Hemoglobin 14.1 13.5 - 17.5 g/dL    Hematocrit 44.4 41.0 - 52.0 %    MCV 92 80 - 100 fL    MCH 29.1 26.0 - 34.0 pg    MCHC 31.8 (L) 32.0 - 36.0 g/dL    RDW 16.5 (H) 11.5 - 14.5 %    Platelets 297 150 - 450 x10*3/uL            Meds:    Current Facility-Administered Medications:     acetaminophen (Tylenol) tablet 650 mg, 650 mg, oral, q6h PEMA, DARIA Shaw    calcium carbonate (Tums) chewable tablet 500 mg, 500 mg, oral, Daily, DARIA Shaw, 500 mg at 11/18/24 0931    finasteride (Proscar) tablet 5 mg, 5 mg, oral, Daily, DARIA Shaw, 5 mg at 11/18/24 0931    lisinopril tablet 40 mg, 40 mg, oral, Daily, Anabella Lemus MD, 40 mg at 11/18/24 0931    loperamide (Imodium) capsule 2 mg, 2 mg, oral, Before meals & nightly, DARIA Shaw    metoprolol succinate XL (Toprol-XL) 24 hr tablet 25 mg, 25 mg, oral, Daily, DARIA Shaw, 25 mg at 11/18/24 0931    naloxone (Narcan) injection 0.2 mg, 0.2 mg, intravenous, q5 min PRN, Anabella Lemus MD    ondansetron ODT (Zofran-ODT) disintegrating tablet 4 mg, 4 mg, oral, q8h PRN **OR** ondansetron (Zofran) injection 4 mg, 4 mg, intravenous, q8h PRN, Anabella Lemus MD, 4 mg at 11/12/24 1532    oxybutynin (Ditropan) tablet 5 mg, 5 mg, oral, TID PRN, Anabella Lemus MD    oxyCODONE (Roxicodone) immediate release tablet 5 mg, 5 mg, oral, q4h PRN, Kacy Shukla, APRN-CNP, 5 mg at 11/15/24 2039    phenoL (Chloraseptic) 1.4 % mouth/throat spray 1 spray, 1 spray, Mouth/Throat, q2h PRN,  Kali Greer MD    tamsulosin (Flomax) 24 hr capsule 0.4 mg, 0.4 mg, oral, Daily, Kacy Shukla APRN-CNP, 0.4 mg at 11/18/24 0931               Assessment/Plan    Wade Machado is a 69 y.o. male with a past medical history of diverticulitis with colovesical fistula, now s/p laparoscopic sigmoid resection, takedown fistula with DLI on 11/11/24 with Dr. Mcintyre. Hospitalization complicated by ileus.      Plan 11/18/24:   - continue soft diet  - Imodium 2 mg QID started today  - encourage him to record output, hands on  - ET eduction as well as dehydration education     Neuro: Post op pain is well controlled  - Sched tylenol, PRN oxycodone  - Sleep hygiene, encourage shades up during day, limited inactions at night as appropriate     Cardiac: Hemodynamically stable, Hx of HTN  - Q4 VS         - continue home metoprolol  - continue to hold home lisinopril - will consider restarting at discharge                                                                                                                                                      Pulm: no acute issues, history of no significant pulmonary history  - Incentive spirometry Q 1 hours  - Encourage Ambulation/OOB     GI: Hx of diverticulitis with colovesical fistula, S/P laparoscopic sigmoidectomy, takedown fistula and DLI on 11/11  - regular soft diet as tolerated  - Juvin TID  - Zofran prn for nausea  - Wound and ostomy nurses consulted for supplies, education and support  - Encourage patient to record output, empty bag  - dehydration education  - GUM TID     : Hx BPH, cT cystogram done and llamas removed secondary to no leak, making sufficient urine  - Strict intake and output  - Trend renal function and replace electrolytes as needed  - continue home Flomax and Proscar  - continue oxybutynin     HEME- H/H stable, no evidence of active bleeding, history of no significant hematological history  - trend CBC  - in indication for transfusion at this time      Endo no acute issues, history of no significant endocrine history   - no issues, no indication for SSI     ID afebrile, no leukocytosis  - Continue to trend Temp per floor routine, trend CBC prn  - ATB course completed for UTI     Proph - Continue SCDs, no chemoprophylaxis given history of head bleed     Dispo:  - Continue regular nursing floor  - PT  - plan at discharge... plan for HC for new stoma, PT when appropirate      Seen and discussed with surgical team    Kacy Shukla APRN-CNP  Colorectal Surgery Fellow  [facility] # [id number]

## 2024-11-18 NOTE — HH CARE COORDINATION
Home Care received a Referral for Nursing. We have processed the referral for a Start of Care on 11/19 - 11/20/24.     If you have any questions or concerns, please feel free to contact us at [phone]. Follow the prompts, enter your five digit zip code, and you will be directed to your care team on [facility].      Impression: Other vitreous opacities, bilateral: H43.393. Plan: All signs/symptoms and risks of retinal detachment and tears were discussed in detail. Patient instructed to call office immediately if any symptoms noted.

## 2024-11-19 VITALS
RESPIRATION RATE: 16 BRPM | DIASTOLIC BLOOD PRESSURE: 76 MMHG | WEIGHT: 234.79 LBS | HEIGHT: 70 IN | HEART RATE: 76 BPM | BODY MASS INDEX: 33.61 KG/M2 | TEMPERATURE: 97.7 F | OXYGEN SATURATION: 94 % | SYSTOLIC BLOOD PRESSURE: 133 MMHG

## 2024-11-19 PROBLEM — N32.1 COLOVESICAL FISTULA: Status: RESOLVED | Noted: 2024-10-08 | Resolved: 2024-11-19

## 2024-11-19 PROBLEM — C61 PROSTATE CANCER (MULTI): Status: RESOLVED | Noted: 2024-11-11 | Resolved: 2024-11-19

## 2024-11-19 LAB
ANION GAP SERPL CALC-SCNC: 16 MMOL/L (ref 10–20)
BUN SERPL-MCNC: 17 MG/DL (ref 6–23)
CALCIUM SERPL-MCNC: 9 MG/DL (ref 8.6–10.6)
CHLORIDE SERPL-SCNC: 103 MMOL/L (ref 98–107)
CO2 SERPL-SCNC: 22 MMOL/L (ref 21–32)
CREAT SERPL-MCNC: 0.6 MG/DL (ref 0.5–1.3)
EGFRCR SERPLBLD CKD-EPI 2021: >90 ML/MIN/1.73M*2
ERYTHROCYTE [DISTWIDTH] IN BLOOD BY AUTOMATED COUNT: 16.5 % (ref 11.5–14.5)
GLUCOSE SERPL-MCNC: 84 MG/DL (ref 74–99)
HCT VFR BLD AUTO: 45.3 % (ref 41–52)
HGB BLD-MCNC: 14.3 G/DL (ref 13.5–17.5)
MCH RBC QN AUTO: 28.8 PG (ref 26–34)
MCHC RBC AUTO-ENTMCNC: 31.6 G/DL (ref 32–36)
MCV RBC AUTO: 91 FL (ref 80–100)
NRBC BLD-RTO: 0 /100 WBCS (ref 0–0)
PLATELET # BLD AUTO: 283 X10*3/UL (ref 150–450)
POTASSIUM SERPL-SCNC: 4 MMOL/L (ref 3.5–5.3)
RBC # BLD AUTO: 4.97 X10*6/UL (ref 4.5–5.9)
SODIUM SERPL-SCNC: 137 MMOL/L (ref 136–145)
WBC # BLD AUTO: 12.6 X10*3/UL (ref 4.4–11.3)

## 2024-11-19 PROCEDURE — 85027 COMPLETE CBC AUTOMATED: CPT | Performed by: STUDENT IN AN ORGANIZED HEALTH CARE EDUCATION/TRAINING PROGRAM

## 2024-11-19 PROCEDURE — 2500000002 HC RX 250 W HCPCS SELF ADMINISTERED DRUGS (ALT 637 FOR MEDICARE OP, ALT 636 FOR OP/ED): Performed by: NURSE PRACTITIONER

## 2024-11-19 PROCEDURE — 99238 HOSP IP/OBS DSCHRG MGMT 30/<: CPT | Performed by: NURSE PRACTITIONER

## 2024-11-19 PROCEDURE — 2500000001 HC RX 250 WO HCPCS SELF ADMINISTERED DRUGS (ALT 637 FOR MEDICARE OP): Performed by: NURSE PRACTITIONER

## 2024-11-19 PROCEDURE — 2500000001 HC RX 250 WO HCPCS SELF ADMINISTERED DRUGS (ALT 637 FOR MEDICARE OP): Performed by: STUDENT IN AN ORGANIZED HEALTH CARE EDUCATION/TRAINING PROGRAM

## 2024-11-19 PROCEDURE — 80048 BASIC METABOLIC PNL TOTAL CA: CPT | Performed by: STUDENT IN AN ORGANIZED HEALTH CARE EDUCATION/TRAINING PROGRAM

## 2024-11-19 PROCEDURE — 36415 COLL VENOUS BLD VENIPUNCTURE: CPT | Performed by: STUDENT IN AN ORGANIZED HEALTH CARE EDUCATION/TRAINING PROGRAM

## 2024-11-19 RX ORDER — OXYCODONE HYDROCHLORIDE 5 MG/1
5 TABLET ORAL EVERY 6 HOURS PRN
Qty: 26 TABLET | Refills: 0 | Status: SHIPPED | OUTPATIENT
Start: 2024-11-19 | End: 2024-11-26

## 2024-11-19 RX ORDER — LOPERAMIDE HYDROCHLORIDE 2 MG/1
2 CAPSULE ORAL
Qty: 120 CAPSULE | Refills: 3 | Status: SHIPPED | OUTPATIENT
Start: 2024-11-19 | End: 2025-03-19

## 2024-11-19 ASSESSMENT — PAIN SCALES - GENERAL: PAINLEVEL_OUTOF10: 0 - NO PAIN

## 2024-11-19 NOTE — DISCHARGE SUMMARY
Discharge Diagnosis  Colovesical fistula    Issues Requiring Follow-Up      Discharge Meds     Medication List      START taking these medications     loperamide 2 mg capsule; Commonly known as: Imodium; Take 1 capsule (2   mg) by mouth 4 times a day before meals.   oxyCODONE 5 mg immediate release tablet; Commonly known as: Roxicodone;   Take 1 tablet (5 mg) by mouth every 6 hours if needed for moderate pain (4   - 6) for up to 7 days.     CONTINUE taking these medications     acetaminophen 325 mg tablet; Commonly known as: Tylenol   ascorbic acid (vitamin C) 100 mg chewable tablet   calcium carbonate 300 mg (750 mg) chewable tablet; Commonly known as:   Tums Extra Strength   finasteride 5 mg tablet; Commonly known as: Proscar   lisinopril 40 mg tablet; TAKE ONE TABLET BY MOUTH once DAILY for blood   pressure   metoprolol succinate XL 25 mg 24 hr tablet; Commonly known as: Toprol-XL   oxybutynin 5 mg tablet; Commonly known as: Ditropan; Take 1 tablet (5   mg) by mouth 3 times a day as needed (bladder spasm).   tamsulosin 0.4 mg 24 hr capsule; Commonly known as: Flomax; Take 1   capsule (0.4 mg) by mouth 2 times a day.     STOP taking these medications     chlorhexidine 0.12 % solution; Commonly known as: Peridex   docusate sodium 100 mg capsule; Commonly known as: Colace   gabapentin 100 mg capsule; Commonly known as: Neurontin   metroNIDAZOLE 250 mg tablet; Commonly known as: FlagyL   neomycin 500 mg tablet; Commonly known as: Mycifradin       Test Results Pending At Discharge  Pending Labs       Order Current Status    Surgical Pathology Exam In process            Hospital Course   Wade Machado is a 69 year old male with a past medical history significant for HTN, BPH, pontine hemorrhage, alcohol use, diverticulitis with colovesical fistula who presented for surgery and underwent  An ex lap sigmoid colectomy, takedown of colovesical fistula, and diverting loop ileostomy on 11/11. He tolerated the procedure and  was admitted to a regular nursing floor.  His hospital course was complicated by ileus requiring NGT placement for decompression.  Once he passed his gravity trial, diet was slowly stared and advanced as tolerated to a soft diet.  His indwelling llamas remained in place until 11/17, secondary to takedown of colovesical fistula. He underwent a cystogram prior to removal and he was able to void without issue.  Blood pressure was well controlled, and will restart home blood pressure medications at home. His output from his ostomy was initially high and as his diet was advanced it remained greater than 1000 ml in 24 hours.  He was started on imodium 1 tab 4 times per day before meals and bedtime.  He was educated on signs and symptoms of dehydration, diet choices to thicken his stool, how to adjust his imodium dosing and the importance of recording output to assist in anticipating changed should the output become to high.  His wife was also educated.  He and his wife worked with the ostomy team to learn to pouch his stoma.  He was able to return the demonstration and answer questions.  He will discharge with home care to assist with the new stoma and he will have weekly labs on Monday to assess for dehydration.  At the time of discharge, he was eager to go home, he was tolerating diet, pain was controlled and he was able to discuss signs of dehydration.  He will follow up in approximately 2 weeks for stable removal.  Contact information and discharge instruction were given to the patient.          Pertinent Physical Exam At Time of Discharge  Physical Exam  Constitutional:       Appearance: Normal appearance.   HENT:      Head: Normocephalic and atraumatic.      Nose: Nose normal.      Mouth/Throat:      Mouth: Mucous membranes are moist.   Eyes:      Extraocular Movements: Extraocular movements intact.   Cardiovascular:      Rate and Rhythm: Normal rate.   Pulmonary:      Effort: Pulmonary effort is normal.      Breath  sounds: Normal breath sounds.   Abdominal:      Palpations: Abdomen is soft.      Comments: Non distended, appropriately tender. No guarding or rebound tenderness.  Incision with staples well approximated, c/d/I.  Ostomy was well pouched with lose brown stool and gas in the pouch   Musculoskeletal:         General: Normal range of motion.      Cervical back: Normal range of motion.   Skin:     General: Skin is warm and dry.      Coloration: Skin is not jaundiced.      Findings: No bruising or rash.   Neurological:      General: No focal deficit present.      Mental Status: He is alert and oriented to person, place, and time.   Psychiatric:         Mood and Affect: Mood normal.         Behavior: Behavior normal.         Outpatient Follow-Up  Future Appointments   Date Time Provider Department Center   11/20/2024  9:45 AM Gil Brown MD [id number]         COLTON Shaw-CNP

## 2024-11-19 NOTE — HOSPITAL COURSE
Wade Machado is a 69 year old male with a past medical history significant for HTN, BPH, pontine hemorrhage, alcohol use, diverticulitis with colovesical fistula who presented for surgery and underwent  An ex lap sigmoid colectomy, takedown of colovesical fistula, and diverting loop ileostomy on 11/11. He tolerated the procedure and was admitted to a regular nursing floor.  His hospital course was complicated by ileus requiring NGT placement for decompression.  Once he passed his gravity trial, diet was slowly stared and advanced as tolerated to a soft diet.  His indwelling llamas remained in place until 11/17, secondary to takedown of colovesical fistula. He underwent a cystogram prior to removal and he was able to void without issue.  Blood pressure was well controlled, and will restart home blood pressure medications at home. His output from his ostomy was initially high and as his diet was advanced it remained greater than 1000 ml in 24 hours.  He was started on imodium 1 tab 4 times per day before meals and bedtime.  He was educated on signs and symptoms of dehydration, diet choices to thicken his stool, how to adjust his imodium dosing and the importance of recording output to assist in anticipating changed should the output become to high.  His wife was also educated.  He and his wife worked with the ostomy team to learn to pouch his stoma.  He was able to return the demonstration and answer questions.  He will discharge with home care to assist with the new stoma and he will have weekly labs on Monday to assess for dehydration.  At the time of discharge, he was eager to go home, he was tolerating diet, pain was controlled and he was able to discuss signs of dehydration.  He will follow up in approximately 2 weeks for stable removal.  Contact information and discharge instruction were given to the patient.

## 2024-11-19 NOTE — CARE PLAN
Problem: Pain  Goal: Takes deep breaths with improved pain control throughout the shift  Outcome: Progressing  Goal: Turns in bed with improved pain control throughout the shift  Outcome: Progressing  Goal: Walks with improved pain control throughout the shift  Outcome: Progressing  Goal: Performs ADL's with improved pain control throughout shift  Outcome: Progressing  Goal: Participates in PT with improved pain control throughout the shift  Outcome: Progressing  Goal: Free from opioid side effects throughout the shift  Outcome: Progressing  Goal: Free from acute confusion related to pain meds throughout the shift  Outcome: Progressing     Problem: Skin  Goal: Decreased wound size/increased tissue granulation at next dressing change  Outcome: Progressing  Goal: Participates in plan/prevention/treatment measures  Outcome: Progressing  Goal: Prevent/manage excess moisture  Outcome: Progressing  Goal: Prevent/minimize sheer/friction injuries  Outcome: Progressing  Goal: Promote/optimize nutrition  Outcome: Progressing  Goal: Promote skin healing  Outcome: Progressing   The patient's goals for the shift include Rest and comfort    The clinical goals for the shift include patient will be hands on with ostomy care    Over the shift, the patient did not make progress toward the following goals. Barriers to progression include safety. Recommendations to address these barriers include maintain alarms.

## 2024-11-20 ENCOUNTER — PATIENT OUTREACH (OUTPATIENT)
Dept: CARE COORDINATION | Facility: CLINIC | Age: 69
End: 2024-11-20

## 2024-11-20 ENCOUNTER — APPOINTMENT (OUTPATIENT)
Dept: UROLOGY | Facility: CLINIC | Age: 69
End: 2024-11-20
Payer: COMMERCIAL

## 2024-11-20 VITALS
HEART RATE: 90 BPM | WEIGHT: 234 LBS | BODY MASS INDEX: 33.58 KG/M2 | TEMPERATURE: 98.2 F | SYSTOLIC BLOOD PRESSURE: 104 MMHG | DIASTOLIC BLOOD PRESSURE: 73 MMHG

## 2024-11-20 DIAGNOSIS — N40.0 ENLARGED PROSTATE: Primary | ICD-10-CM

## 2024-11-20 PROCEDURE — 1159F MED LIST DOCD IN RCRD: CPT | Performed by: STUDENT IN AN ORGANIZED HEALTH CARE EDUCATION/TRAINING PROGRAM

## 2024-11-20 PROCEDURE — 1160F RVW MEDS BY RX/DR IN RCRD: CPT | Performed by: STUDENT IN AN ORGANIZED HEALTH CARE EDUCATION/TRAINING PROGRAM

## 2024-11-20 PROCEDURE — 3074F SYST BP LT 130 MM HG: CPT | Performed by: STUDENT IN AN ORGANIZED HEALTH CARE EDUCATION/TRAINING PROGRAM

## 2024-11-20 PROCEDURE — 51798 US URINE CAPACITY MEASURE: CPT | Performed by: STUDENT IN AN ORGANIZED HEALTH CARE EDUCATION/TRAINING PROGRAM

## 2024-11-20 PROCEDURE — 3078F DIAST BP <80 MM HG: CPT | Performed by: STUDENT IN AN ORGANIZED HEALTH CARE EDUCATION/TRAINING PROGRAM

## 2024-11-20 PROCEDURE — G2211 COMPLEX E/M VISIT ADD ON: HCPCS | Performed by: STUDENT IN AN ORGANIZED HEALTH CARE EDUCATION/TRAINING PROGRAM

## 2024-11-20 PROCEDURE — 99214 OFFICE O/P EST MOD 30 MIN: CPT | Performed by: STUDENT IN AN ORGANIZED HEALTH CARE EDUCATION/TRAINING PROGRAM

## 2024-11-20 PROCEDURE — 1111F DSCHRG MED/CURRENT MED MERGE: CPT | Performed by: STUDENT IN AN ORGANIZED HEALTH CARE EDUCATION/TRAINING PROGRAM

## 2024-11-20 NOTE — PROGRESS NOTES
Scribed for Dr. Gil Brown by Delonte Cazares. I, Dr. Gil Bronw have personally reviewed and agreed with the information entered by the Virtual Scribe. 11/20/24.    ASSESSMENT:  Problem List Items Addressed This Visit    None  Visit Diagnoses       Enlarged prostate    -  Primary    Relevant Orders    Post-Void Residual (Completed)           PLAN:  #BPH with urinary retention  #Indwelling Llamas - removed.  #Colovesical fistula   #Bladder hematoma  S/p ex-lap sigmoid colectomy, colovesical fistula takedown + diverting loop ileostomy (11/11/24)  Post-op course c/b ileus requiring NGT for decompression; eventually resolved.   Followed by colorectal surgery (Dr. Mcintyre).  Continues with a colostomy; Llamas removed (11/17/24)  CT cystogram (11/17/24) was negative for leak.  Noted to have a left sided bladder hematoma.     Has been voiding well without issue since llamas removal.   Continue follow up with ostomy team for continued care.   Opts to continue with an indwelling Llamas for now;  With subsequent routine monthly exchanges.   If his urinary symptoms//retention recur;  Can consider UDS, and/or surgical options. BPH surgery is high risk for incontinence following radiation therapy and we will proceed with this only as necessary. Further work-up will need to be delayed until he has completely recovered from surgery.    RTC in 6 weeks for reassessment and PVR.    #Prostate cancer  Refer to medical oncology following his colorectal surgery.   S/p brachytherapy (2015)  PSA ~ 4.8 (July 2023)  NM PET CT prostate (01/17/24)  Negative for pelvic or distant metastatic disease.     #Tobacco abuse  We discussed smoking cessation for >3 minutes.  I advised this is the best thing the patient can do for their health.  Advised that cessation of tobacco can improve healing following surgery.   Patient acknowledged understanding.    All questions were answered to the patient’s satisfaction.  Patient agrees with the plan and  "wishes to proceed.  Continue follow-up for ongoing care of his chronic medical conditions.       History of Present Illness (HPI):  Wade presents for a follow up visit.   The patient’s EMR has been reviewed.  Lives in [place], [place] with his Wife (\"Phuong\")  Occupation: Retired.     Hx of prostate cancer s/p brachytherapy (2015), BPH + retention, and Jackelyn's.   Recent admission in Aug 2024; see below.   Dx with colovesical fistula in setting of colonic diverticulosis.   Followed by colorectal surgery for continued care.   Urology consulted for possible colovesical fistula;  As well as for recurrent cystitis and urinary retention.   His urinary retention was determined to be likely 2/2 BPH.  An indwelling Llamas was placed which he has continued.  Was started on tamsulosin and finasteride.     S/p ex-lap sigmoid colectomy, colovesical fistula takedown + diverting loop ileostomy (11/11/24)  Post-op course c/b ileus requiring NGT for decompression; eventually resolved.   Continues with a colostomy.   Llamas removed (11/17/24)    TODAY: (11/20/24)  Reports he has been progressing well since the procedure.   S/p Llamas removal, now voiding spontaneously and independently.   Denies any urinary issues at this time.   PVR: 201 mL (but did not void)    CT cystogram (11/17/24) personally reviewed.   No extravasation of urine  Left sided bladder hematoma.   See full impression at bottom of note.    TO REVIEW: Initial visit (09/18/24)  Reports he has been doing well overall.   Continues with an indwelling llamas.   Draining clear, yellow urine, no recent infections.   No recent fevers, chills, or gross hematuria.     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 CT AP without contrast was also done at outside hospital and showed possible colovesicular fistula and bladder abscess vs extrinsic mass. Urology consulted for possible colovesical fistula, recurrent cystitis and urinary retention.     Urology consulted-->   Seen for possible colovesical fistula, recurrent cystitis and urinary retention (chronic).    Discussed with Colorectal Surgery s/p colonoscopy.    Think may be edema 2/2 inflammatory process and not true colo vesicular fistula; patient can follow up outpatient.  Imaging showed mild bilateral hydronephrosis.  Repeat RBUS (08/12) showed resolution of hydronephrosis.   Continue home proscar and flomax.    Indwelling Moeller placed for his retention.  Will need management of likely bladder outlet obstruction due to BPH and cystoscopic work-up of bladder thickening seen on CT outpatient with his Urologist.      Per Discharge summary  CTH showing pontine ICH with ventriculomegaly;  incr'd vents compared to 2005 and 2007.   CT A/P with mild bilateral hydronephrosis, and concern for colovesicular fistula.     CT C spine neg for fx,   CTA H/N stable hemorrhage, neg for vasc abnormality,   MRI brain pontine hemorrhage.    8/9 Colorectal consulted--> Recommend colonoscopy to better evaluate sigmoid colon for possible colovesical fistula and to rule out colon cancer; antibiotics for 14 days for UTI and possible acute diverticulitis. GI consulted for eval for Colonoscopy.    8/12  Colonoscopy--> Sigmoid diverticulosis, mild segmental colitis associated with diverticulosis, circumferential edema in sigmoid colon near suspected fistula site (biopsied, awaiting pathology), small internal hemorrhoids.      8/13 Urology ~ RBUS  hydronephrosis resolved,   ok for discharge.    PT/OT eval recommend Rehab;  but patient and family request home with Home  care.  Discharged to home with llamas and follow up with Urology scheduled.    Requested follow up with Dr. Thomas Mcintyre Colorectal Surgery ,  PCP for HTN and Neurology Stroke.    PMH//PSH: ventricular colloid cyst s/p R craniectomy for resection ([facility], 2005), R total hip arthroplasty c/b recurrent R prosthetic hip dislocation (5/11) s/p R hip reduction, HTN, alcohol use, tobacco use, obesity,  FH: Denies FH of  malignancy.   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  Social History     Tobacco Use   Smoking Status Every Day    Current packs/day: 1.00    Average packs/day: 1 pack/day for 51.9 years (51.9 ttl pk-yrs)    Types: Cigarettes    Start date: 1973   Smokeless Tobacco Never     Current Outpatient Medications   Medication Sig Dispense Refill    acetaminophen (Tylenol) 325 mg tablet Take 1-2 tablets (325-650 mg) by mouth every 4 hours if needed for mild pain (1 - 3).      ascorbic acid, vitamin C, 100 mg chewable tablet Chew 4 tablets (400 mg) once daily.      finasteride (Proscar) 5 mg tablet Take 1 tablet (5 mg) by mouth once daily.      lisinopril 40 mg  tablet TAKE ONE TABLET BY MOUTH once DAILY for blood pressure 90 tablet 3    loperamide (Imodium) 2 mg capsule Take 1 capsule (2 mg) by mouth 4 times a day before meals. 120 capsule 3    metoprolol succinate XL (Toprol-XL) 25 mg 24 hr tablet 1 tablet (25 mg) once daily.      tamsulosin (Flomax) 0.4 mg 24 hr capsule Take 1 capsule (0.4 mg) by mouth 2 times a day.      calcium carbonate (Tums Extra Strength) 300 mg (750 mg) chewable tablet Chew 300 mg 3 times a day as needed for indigestion or heartburn. (Patient not taking: Reported on 11/20/2024)      oxybutynin (Ditropan) 5 mg tablet Take 1 tablet (5 mg) by mouth 3 times a day as needed (bladder spasm). (Patient not taking: Reported on 11/20/2024) 90 tablet 0    oxyCODONE (Roxicodone) 5 mg immediate release tablet Take 1 tablet (5 mg) by mouth every 6 hours if needed for moderate pain (4 - 6) for up to 7 days. (Patient not taking: Reported on 11/20/2024) 26 tablet 0     No current facility-administered medications for this visit.     No Known Allergies  Past medical, surgical, family and social history in the chart was reviewed and is accurate including any additions to what is in this HPI.    REVIEW OF SYSTEMS (ROS):   Constitutional: denies any unintentional weight loss or change in strength.  Integumentary: denies any rashes or pruritus.  Eyes: denies any double vision or eye pain.  Ear/Nose/Mouth/Throat: denies any nosebleeds or gum bleeds.  Cardiovascular: denies any chest pain or syncope.  Respiratory: denies hemoptysis.  Gastrointestinal: denies nausea or vomiting.  Musculoskeletal: denies muscle cramping or weakness.  Neurologic: denies convulsions or seizures.  Hematologic/Lymphatic: denies bleeding tendencies.  Endocrine: denies heat/cold intolerance.  All other systems have been reviewed and are negative unless otherwise noted in the HPI.     OBJECTIVE:  Visit Vitals  /73   Pulse 90   Temp 36.8 °C (98.2 °F)     PHYSICAL EXAM:  Constitutional: No  obvious distress.  Eyes: Non-injected conjunctiva, sclera clear, EOMI.  Ears/Nose/Mouth/Throat: No obvious drainage per ears or nose.  Cardiovascular: Extremities are warm and well perfused. No edema, cyanosis or pallor.  Respiratory: No audible wheezing/stridor; respirations do not appear labored.  Gastrointestinal: Abdomen soft, not distended.  Musculoskeletal: Normal ROM of extremities.  Skin: No obvious rashes or open sores.  Neurologic: Alert and oriented, CN 2-12 grossly intact.  Psychiatric: Answers questions appropriately with normal affect.  Hematologic/Lymphatic/Immunologic: No obvious bruises or sites of spontaneous bleeding.  Genitourinary: No CVA tenderness, bladder not palpable.     LABS & IMAGING:  Lab Results   Component Value Date    WBC 12.6 (H) 11/19/2024    HGB 14.3 11/19/2024    HCT 45.3 11/19/2024     11/19/2024    CHOL 149 08/28/2024    TRIG 114 08/28/2024    HDL 40.4 08/28/2024    ALT 11 11/07/2024    AST 12 11/07/2024     11/19/2024    K 4.0 11/19/2024     11/19/2024    CREATININE 0.60 11/19/2024    BUN 17 11/19/2024    CO2 22 11/19/2024    TSH 1.49 08/28/2024    PSA 4.8 (H) 07/17/2023    INR 1.4 (H) 08/12/2024    HGBA1C 6.0 (H) 08/09/2024     CT cystogram (11/17/24)  1. Postsurgical changes of sigmoidectomy and colovesical  fistulectomy. After contrast administration, there is a mural based  high-density filling defect along the left superior wall of the  urinary bladder suspicious for endoluminal or intramural hematoma.  2. No evidence of contrast extravasation from the urinary bladder to  suggest leak or recurrent fistula. Please that assessment is somewhat  limited by extensive beam hardening artifact from hip arthroplasty  hardware.  3. There is diffuse trabeculated wall thickening of the urinary  bladder, likely postsurgical in nature and possibly superimposed on  chronic outlet obstruction.  4. Ill-defined stranding and loculated fluid throughout the pelvis  and  presacral space, including around the colorectal anastomosis,  without discrete collection, extraluminal gas, or extraluminal  contrast.    Scribed for Dr. Gil Brown by Delonte Cazares.  I, Dr. Gil Brown have personally reviewed and agreed with the information entered by the Virtual Scribe. 11/20/24.

## 2024-11-20 NOTE — PROGRESS NOTES
Outreach call to patient to support a smooth transition of care from recent admission.  Spoke with patient, reviewed discharge medications, discharge instructions, assessed social needs, and provided education on importance of follow-up appointment with provider.  Will continue to monitor through transition period.  Medications  Medications reviewed with patient/caregiver?: No (No questions about prescribed medications at this time.   Mrs. Machado did ask if Wade could take his probiotic, advised them to ask his doctors prior to taking  hjm) (11/20/2024  1:47 PM)  Is the patient having any side effects they believe may be caused by any medication additions or changes?: No (11/20/2024  1:47 PM)  Does the patient have all medications ordered at discharge?: Yes (11/20/2024  1:47 PM)  Care Management Interventions: No intervention needed (11/20/2024  1:47 PM)  Is the patient taking all medications as directed (includes completed medication regime)?: Yes (11/20/2024  1:47 PM)    Appointments  Does the patient have a primary care provider?: Yes (11/20/2024  1:47 PM)  Care Management Interventions: Advised patient to make appointment (11/20/2024  1:47 PM)  Has the patient kept scheduled appointments due by today?: Yes (11/20/2024  1:47 PM)  Care Management Interventions: Advised patient to keep appointment (11/20/2024  1:47 PM)    Patient Teaching  Does the patient have access to their discharge instructions?: Yes (No questions regarding the discharge instructions at this time.   They did keep my contact information for future use if needed.  hjm) (11/20/2024  1:47 PM)  What is the patient's perception of their health status since discharge?: Improving (11/20/2024  1:47 PM)      Sparkle EDWARDSN, RN, [facility]  O: [phone]

## 2024-11-21 ENCOUNTER — HOME CARE VISIT (OUTPATIENT)
Dept: HOME HEALTH SERVICES | Facility: HOME HEALTH | Age: 69
End: 2024-11-21
Payer: COMMERCIAL

## 2024-11-21 VITALS
DIASTOLIC BLOOD PRESSURE: 64 MMHG | HEART RATE: 84 BPM | TEMPERATURE: 99.7 F | OXYGEN SATURATION: 97 % | RESPIRATION RATE: 18 BRPM | SYSTOLIC BLOOD PRESSURE: 112 MMHG

## 2024-11-21 LAB
LABORATORY COMMENT REPORT: NORMAL
PATH REPORT.FINAL DX SPEC: NORMAL
PATH REPORT.GROSS SPEC: NORMAL
PATH REPORT.RELEVANT HX SPEC: NORMAL
PATH REPORT.TOTAL CANCER: NORMAL
RESIDENT REVIEW: NORMAL

## 2024-11-21 PROCEDURE — G0299 HHS/HOSPICE OF RN EA 15 MIN: HCPCS

## 2024-11-21 PROCEDURE — 169592 NO-PAY CLAIM PROCEDURE

## 2024-11-21 ASSESSMENT — ENCOUNTER SYMPTOMS
LOWEST PAIN SEVERITY IN PAST 24 HOURS: 1/10
CHANGE IN APPETITE: INCREASED
APPETITE LEVEL: FAIR
PERSON REPORTING PAIN: PATIENT
SUBJECTIVE PAIN PROGRESSION: GRADUALLY IMPROVING
HIGHEST PAIN SEVERITY IN PAST 24 HOURS: 5/10
PAIN: 1
PAIN LOCATION - PAIN SEVERITY: 2/10
LOWER EXTREMITY EDEMA: 1
PAIN LOCATION - PAIN FREQUENCY: FREQUENT
ABDOMINAL PAIN: 1
FATIGUES EASILY: 1
PAIN SEVERITY GOAL: 0/10
LAST BOWEL MOVEMENT: 67165
MUSCLE WEAKNESS: 1
FATIGUE: 1
PAIN LOCATION: ABDOMEN
STOOL FREQUENCY: DAILY

## 2024-11-21 ASSESSMENT — LIFESTYLE VARIABLES: SMOKING_STATUS: 1

## 2024-11-21 ASSESSMENT — ACTIVITIES OF DAILY LIVING (ADL)
OASIS_M1830: 05
ENTERING_EXITING_HOME: MAXIMUM ASSIST

## 2024-11-22 ENCOUNTER — HOME CARE VISIT (OUTPATIENT)
Dept: HOME HEALTH SERVICES | Facility: HOME HEALTH | Age: 69
End: 2024-11-22
Payer: COMMERCIAL

## 2024-11-22 PROCEDURE — G0151 HHCP-SERV OF PT,EA 15 MIN: HCPCS

## 2024-11-22 ASSESSMENT — ENCOUNTER SYMPTOMS
PAIN: 1
LOWEST PAIN SEVERITY IN PAST 24 HOURS: 1/10
HIGHEST PAIN SEVERITY IN PAST 24 HOURS: 5/10
PAIN LOCATION - PAIN SEVERITY: 2/10
OCCASIONAL FEELINGS OF UNSTEADINESS: 1
PAIN LOCATION: ABDOMEN
PERSON REPORTING PAIN: PATIENT

## 2024-11-25 ENCOUNTER — HOME CARE VISIT (OUTPATIENT)
Dept: HOME HEALTH SERVICES | Facility: HOME HEALTH | Age: 69
End: 2024-11-25
Payer: COMMERCIAL

## 2024-11-25 ENCOUNTER — LAB REQUISITION (OUTPATIENT)
Dept: LAB | Facility: HOSPITAL | Age: 69
End: 2024-11-25
Payer: COMMERCIAL

## 2024-11-25 VITALS
HEART RATE: 69 BPM | TEMPERATURE: 97.8 F | SYSTOLIC BLOOD PRESSURE: 104 MMHG | DIASTOLIC BLOOD PRESSURE: 80 MMHG | RESPIRATION RATE: 16 BRPM

## 2024-11-25 VITALS
DIASTOLIC BLOOD PRESSURE: 62 MMHG | HEART RATE: 88 BPM | OXYGEN SATURATION: 98 % | TEMPERATURE: 98.6 F | RESPIRATION RATE: 20 BRPM | SYSTOLIC BLOOD PRESSURE: 112 MMHG

## 2024-11-25 DIAGNOSIS — N32.1 VESICOINTESTINAL FISTULA: ICD-10-CM

## 2024-11-25 LAB
ALBUMIN SERPL BCP-MCNC: 3.8 G/DL (ref 3.4–5)
ANION GAP SERPL CALC-SCNC: 14 MMOL/L (ref 10–20)
BUN SERPL-MCNC: 20 MG/DL (ref 6–23)
CALCIUM SERPL-MCNC: 9.4 MG/DL (ref 8.6–10.6)
CHLORIDE SERPL-SCNC: 99 MMOL/L (ref 98–107)
CO2 SERPL-SCNC: 23 MMOL/L (ref 21–32)
CREAT SERPL-MCNC: 0.69 MG/DL (ref 0.5–1.3)
EGFRCR SERPLBLD CKD-EPI 2021: >90 ML/MIN/1.73M*2
GLUCOSE SERPL-MCNC: 99 MG/DL (ref 74–99)
MAGNESIUM SERPL-MCNC: 1.94 MG/DL (ref 1.6–2.4)
PHOSPHATE SERPL-MCNC: 4.5 MG/DL (ref 2.5–4.9)
POTASSIUM SERPL-SCNC: 5.3 MMOL/L (ref 3.5–5.3)
SODIUM SERPL-SCNC: 131 MMOL/L (ref 136–145)

## 2024-11-25 PROCEDURE — 83735 ASSAY OF MAGNESIUM: CPT

## 2024-11-25 PROCEDURE — G0299 HHS/HOSPICE OF RN EA 15 MIN: HCPCS

## 2024-11-25 PROCEDURE — 80069 RENAL FUNCTION PANEL: CPT

## 2024-11-25 PROCEDURE — G0300 HHS/HOSPICE OF LPN EA 15 MIN: HCPCS

## 2024-11-25 ASSESSMENT — ENCOUNTER SYMPTOMS
APPETITE LEVEL: FAIR
SKIN LESIONS: 1
PAIN LOCATION: ABDOMEN
LAST BOWEL MOVEMENT: 67169
PAIN: 1
PERSON REPORTING PAIN: PATIENT
DENIES PAIN: 1
MUSCLE WEAKNESS: 1

## 2024-11-26 ENCOUNTER — HOME CARE VISIT (OUTPATIENT)
Dept: HOME HEALTH SERVICES | Facility: HOME HEALTH | Age: 69
End: 2024-11-26
Payer: COMMERCIAL

## 2024-11-26 NOTE — CASE COMMUNICATION
Saw patient for you per request of shaq.   patient had no supplies remaining in the home.   he was leaking again upon my arrival and wife had changed 2 x since shaq was there this am.     I left 4 deep convex pouches. his os is at 6 oclock when he is lying down. when he sits up there is a pretty large divit at 8-9 olcock.   i had no more paste or barrier extenders in my car so we used a deep convex coloplast thtat I left  after powder  and skin barrier film . added a moldable ring.     if he contienus to have issues with leaks I would add paste or strip paste in the divit and a barrier extender and the belt. I did leave a belt there.     I am ordering samples and if we are good after 24 hours I will order supplies too.   Vahe I can return when the samples are there at the end of the following week.

## 2024-11-26 NOTE — HOME HEALTH
[facility]  home nursing visit   identifed by name and bd    was leaking again upon arrival.     stoma type: ileostomy   size: 1 in   location:rt side os at 6 oclock  protrustion: slight   mucocutaneous junction: intact  mucosal condition and color: red and moist   Peristomal Skin: denuded and painful.   Peristomal contour: large divit at 8-9 oclock  character of output: varies     pouching system used : 1 piece deep convex coloplast 81724   accessories used: ring .     will plan on adding paste in divit and a belt if needed.   and barrier extenders.     left 4 [phone]   left 4 rings  left 16 0211  left 16 [phone]  left 1 1757

## 2024-11-27 ENCOUNTER — HOME CARE VISIT (OUTPATIENT)
Dept: HOME HEALTH SERVICES | Facility: HOME HEALTH | Age: 69
End: 2024-11-27
Payer: COMMERCIAL

## 2024-11-27 SDOH — ECONOMIC STABILITY: GENERAL

## 2024-11-27 ASSESSMENT — ACTIVITIES OF DAILY LIVING (ADL): MONEY MANAGEMENT (EXPENSES/BILLS): INDEPENDENT

## 2024-11-27 ASSESSMENT — ENCOUNTER SYMPTOMS: APPETITE LEVEL: GOOD

## 2024-11-27 NOTE — HOME HEALTH
coloplast samples requested  SenSura Baldemar Convex Deep 1-piece open    SenSura® Baldemar Convex Deep 1-piece drainable pouch  All-in-one convex ileostomy pouch that fits securely to uneven skin areas, deep-seated areas and stomas that need help to protrude.  12258 - [facility] [place]:  Maxi (11in / 655mL) - Stoma size: 15-33mm  5/8 - 1 5/16in - with filter, Transparent      Change variant    Brava Strip Paste  Brava® Strip Paste  Fills deeper folds, creating an even skin surface for the barrier to stick securely.  91900 - [facility]  - Strip paste - 2oz      Change variant    Brava Mouldable Ring  Brava® Moldable Ring  Creates a durable seal between the stoma and ostomy barrier to reduce leakage.  897362 - [facility]:  - Moldable ring - 4.2mm      Change variant    Brava Adhesive Remover Wipe  Brava® Adhesive Remover Wipes  Sting-free and easy removal of your appliance  926582 - [facility] [place]: ; Brava Adhesive remover wipe 30/bx      Change variant    Brava Adhesive Remover Wipe  Brava® Adhesive Remover Wipes  Sting-free and easy removal of your appliance  172718 - [facility] [place]: ; Brava Adhesive remover wipe 30/bx      Change variant    Brava Skin Barrier Wipe  Brava® Skin Barrier Wipes  Sting-free skin protection from output and adhesive.  877656 - [facility] [place]:  - Skin barrier wipe 30/bx      Change variant    Brava Powder  Brava® Powder  Keeps the skin dry and protects from skin irritation.  64115 - [facility] [place]:  - Powder - 25g  1oz      Change variant    Brava Elastic Tape C-shape  Brava® Elastic Barrier Strips – Curved  Prevents the edges of the barrier from rolling and lifting, securing the position of the barrier  218539 - [facility]:  - Elastic barrier strip      Change variant    Brava Belt for SenSura Baldemar  Brava® Belt for SenSura Baldemar  Discreet and comfortable belt solution for [employer] [place].  4237 - [facility]:  - SenSura® Baldemar Belt - 40in    maico samples reqeusted  8805  7760  18115  7917  7906  paste  99930    78701  7300    \convatec samples requested  [id number]  [id number]

## 2024-11-27 NOTE — HOME HEALTH
"supplies ordered via cardinal to med connectAdd Item Cancel Cart  Click here to Delete Item  Image Not Available  Mouse over to zoom  ADHESIVE REMOVER WIPE  In Stock  Item # : [id number]   : [employer]   Item # : [id number]  Estimated Qty Available ([facility]) : 16,487 Each  1 Box = 50 [place]  Order Type : [facility]  Change Qty  Quantity : 50 Each  Click here to Delete Item  Image Not Available  Mouse over to zoom  NO STING BARRIER FILM CAVILON 3/4ML WIPES, ALCOHOL-FREE  In Stock  Sold by Box of 50  Item # : [id number]   : [employer]   Item # : [id number]  Estimated Qty Available ([facility]) : 665 Box  Order Type : [facility]  Change Qty  Quantity : 1 Box  Click here to Delete Item  Image Not Available  Mouse over to zoom  BARRIER STRIP ELASTIC BRAVA C SHAPED STRIP  In Stock  Item # : [id number]   : [employer]   Item # : [id number]  Estimated Qty Available ([facility]) : 66,262 Each  1 Box = 20 [place]  Order Type : [facility]  Change Qty  Quantity : 20 Each  Click here to Delete Item  Image Not Available  Mouse over to zoom  BARRIER RING STANDARD 4.5 MM, 2\" CERARING  In Stock  Item # : [id number]   : [employer]   Item # : [id number]  Estimated Qty Available ([facility]) : 8,447 Each  1 Box = 10 [place]  Order Type : [facility]  Change Qty  Quantity : 20 Each  Click here to Delete Item  Image Not Available  Mouse over to zoom  BARRIER PASTE 2 OZ STOMAHESIVE  In Stock  Item # : [id number]   : [employer]   Item # : [id number]  Estimated Qty Available ([facility]) : 1,750 Each  Order Type : [facility]  Change Qty  Quantity : 1 Each  Click here to Delete Item  Image Not Available  Mouse over to zoom  1 PC CTF STD WEAR DEEP CVX 5-8-1 5-16 SENSURA JANES  In Stock  Sold by Box of 10  Item # : [id number]   : [employer]   Item # : [id number]  Estimated Qty Available ([facility]) : 46 Box  Order Type : [facility]"

## 2024-12-02 ENCOUNTER — HOME CARE VISIT (OUTPATIENT)
Dept: HOME HEALTH SERVICES | Facility: HOME HEALTH | Age: 69
End: 2024-12-02
Payer: COMMERCIAL

## 2024-12-02 VITALS
HEART RATE: 81 BPM | DIASTOLIC BLOOD PRESSURE: 68 MMHG | RESPIRATION RATE: 18 BRPM | TEMPERATURE: 98 F | OXYGEN SATURATION: 96 % | SYSTOLIC BLOOD PRESSURE: 106 MMHG

## 2024-12-02 PROCEDURE — 83735 ASSAY OF MAGNESIUM: CPT

## 2024-12-02 PROCEDURE — G0300 HHS/HOSPICE OF LPN EA 15 MIN: HCPCS

## 2024-12-02 PROCEDURE — 80053 COMPREHEN METABOLIC PANEL: CPT

## 2024-12-02 NOTE — PROGRESS NOTES
HISTORY OF PRESENTING ILLNESS: Wade Machado is a 69yoM s/p laparoscopy sigmoid solon with takedown of colovesical fistula and diverting loop ileostomy 11/11/24.      Pathology: A. SIGMOID COLON,SEGMENTAL RESECTION:  -- Segment of colon with diverticulosis and serosal fibrous adhesion  -- Benign lymph nodes    His post-op course was prolonged by ileus, requiring NGT.  He was discharged home 11/19/24.  His llamas was removed 11/17/24 and he saw Dr. Brown 11/20/24 for post-op follow-up.  He will follow up again in 6 weeks.      He is feeling well since the surgery.  He is eating and drinking well with no n/v.  He has no more abdominal pain.  He is having no issues with keeping his ostomy bag on for the past week.  The stool is usually thick.      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    Prostate cancer (Multi)     Tobacco use     Unspecified osteoarthritis, unspecified site     Osteoarthritis         Past Surgical History:   Procedure Laterality Date    BRAIN SURGERY  2005    third 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 (at [facility])    COLON SURGERY  11/11/2024    Laparoscopy Sigmoid Colon with takedown of colovesical fistula    COLONOSCOPY  08/12/2024    HIP ARTHROPLASTY Right     HIP SURGERY      R hip reduction    JOINT REPLACEMENT      OTHER SURGICAL HISTORY  05/09/2022    Retinal detachment repair    OTHER SURGICAL HISTORY  05/09/2022    Cataract surgery         Social History     Tobacco Use    Smoking status: Every Day     Current packs/day: 1.00     Average packs/day: 1 pack/day for 51.9 years (51.9 ttl pk-yrs)     Types: Cigarettes     Start date: 1973    Smokeless tobacco: Never    Substance Use Topics    Alcohol use: Yes     Alcohol/week: 3.0 standard drinks of alcohol     Types: 3 Standard drinks or equivalent per week     Comment: 12 pack weekly, now down to 2-3 drinks per week    Drug use: Yes     Frequency: 1.0 times per week     Types: Marijuana         No family history on file.        Current Outpatient Medications:     acetaminophen (Tylenol) 325 mg tablet, Take 1-2 tablets (325-650 mg) by mouth every 4 hours if needed for mild pain (1 - 3)., Disp: , Rfl:     ascorbic acid, vitamin C, 100 mg chewable tablet, Chew 4 tablets once daily. Indications: inadequate vitamin C, Disp: , Rfl:     calcium carbonate (Tums Extra Strength) 300 mg (750 mg) chewable tablet, Chew 300 mg 3 times a day as needed for indigestion or heartburn. Indications: heartburn, Disp: , Rfl:     finasteride (Proscar) 5 mg tablet, Take 1 tablet by mouth once daily. Indications: enlarged prostate with urination problem, Disp: , Rfl:     lisinopril 40 mg tablet, TAKE ONE TABLET BY MOUTH once DAILY for blood pressure, Disp: 90 tablet, Rfl: 3    loperamide (Imodium) 2 mg capsule, Take 1 capsule (2 mg) by mouth 4 times a day before meals., Disp: 120 capsule, Rfl: 3    metoprolol succinate XL (Toprol-XL) 25 mg 24 hr tablet, Take 1 tablet by mouth once daily. Indications: high blood pressure, Disp: , Rfl:     oxybutynin (Ditropan) 5 mg tablet, Take 1 tablet (5 mg) by mouth 3 times a day as needed (bladder spasm). (Patient not taking: Reported on 11/20/2024), Disp: 90 tablet, Rfl: 0    tamsulosin (Flomax) 0.4 mg 24 hr capsule, Take 1 capsule (0.4 mg) by mouth 2 times a day., Disp: , Rfl:        No Known Allergies      REVIEW OF SYSTEMS:  Review of Systems   All other systems reviewed and are negative.      Labs:       Imaging:  CT cystogram    Result Date: 11/17/2024  Interpreted By:  Mauro Dubois,  and Lázaro Monzon STUDY: CT CYSTOGRAM;  11/17/2024 1:46 pm   INDICATION: Signs/Symptoms:69M hx of colovesicle  fistula takedown, eval for bladder injury. .  Per EMR: 69-year-old male with history of diverticulitis and colovesical fistula status post sigmoid resection and fistula takedown on 11/11/2024.   COMPARISON: CT abdomen pelvis 10/20/2024   ACCESSION NUMBER(S): [id number]   ORDERING CLINICIAN: SONIA MARCH   TECHNIQUE: An initial CT of the pelvis without intravenous contrast was performed. Following a noncontrast scan, total of 100 mL of iodinated contrast was instilled under gravity through existing Moeller catheter into the urinary bladder and an CT of the pelvis was performed after filling of the urinary bladder to the extent tolerated by the patient. A postvoid CT scan was performed   FINDINGS: Note: Evaluation is limited in the absence of IV contrast. Bilateral total hip arthroplasties with extensive associated beam hardening artifact also limit evaluation.   PELVIS:   BLADDER: Prior to contrast instillation, the urinary bladder is largely obscured due to beam hardening artifact, however otherwise appears small and decompressed. Small volume of gas within bladder lumen. There is a Moeller catheter in place.   After distention with contrast, there is a mural based filling defect along left superior urinary bladder wall measuring 4.8 x 2.4 x 4.0 cm with dense internal attenuation, however with imprecise density measurement secondary to beam hardening artifact. The remaining wall of the urinary bladder is otherwise mildly diffusely thickened and trabeculated in appearance. No evidence of contrast extravasation.   Diffuse perivesical stranding and fluid.   After void, there is complete emptying of the urinary bladder without evidence of retained extraluminal contrast   REPRODUCTIVE ORGANS: Brachytherapy beads throughout the prostate.   BOWEL: Postsurgical changes of sigmoidectomy. The anastomosis appears intact with mild surrounding ill-defined fluid and stranding. No discrete adjacent collection or extraluminal gas.  No significant abnormal distention or mild thickening of the remaining visualized loops of bowel in the lower abdomen and pelvis there is mild focal thickening of the upper rectum, incompletely characterized due to beam hardening artifact.   VESSELS: Advanced calcified atherosclerosis of the visualized arterial branches.   PERITONEUM/LYMPH NODES: Presacral stranding and ill-defined loculated fluid throughout the pelvis and lower abdomen. Few prominent pelvic lymph nodes without discrete lymphadenopathy. No pneumoperitoneum or walled-off collection is visualized.   OSSEOUS STRUCTURES: Bilateral total hip arthroplasty partially visualized without evidence of acute hardware complication. Mild lucency surrounding the left acetabular component which may indicate loosening. Moderate facet arthrosis of the lower lumbar spine.   SOFT TISSUES: Partially visualized surgical changes of the ventral midline abdomen with mild subcutaneous stranding. No discrete subcutaneous collection.       1. Postsurgical changes of sigmoidectomy and colovesical fistulectomy. After contrast administration, there is a mural based high-density filling defect along the left superior wall of the urinary bladder suspicious for endoluminal or intramural hematoma. 2. No evidence of contrast extravasation from the urinary bladder to suggest leak or recurrent fistula. Please that assessment is somewhat limited by extensive beam hardening artifact from hip arthroplasty hardware. 3. There is diffuse trabeculated wall thickening of the urinary bladder, likely postsurgical in nature and possibly superimposed on chronic outlet obstruction. 4. Ill-defined stranding and loculated fluid throughout the pelvis and presacral space, including around the colorectal anastomosis, without discrete collection, extraluminal gas, or extraluminal contrast.   I personally reviewed the image(s)/study and resident interpretation as stated by Dr. Na Sesay MD. I  agree with the findings as stated. This study was interpreted at [facility], [place], [place].   Signed by: Mauro Jacintosen 11/17/2024 4:33 PM Dictation workstation:   [id number]    XR abdomen 1 view    Result Date: 11/13/2024  Interpreted By:  Lamine Grubbs and Omar Mahmoud STUDY: XR ABDOMEN 1 VIEW;  11/13/2024 11:44 am   INDICATION: Signs/Symptoms:Confirm NG tube placement.     COMPARISON: X-ray abdomen 11/13/2024 8:32 a.m.   ACCESSION NUMBER(S): [id number]   ORDERING CLINICIAN: CLARE JOHNSON   FINDINGS: Interval placement of an enteric tube with tip projecting over the expected location of the proximal gastric body with proximal side-port projecting over the expected location of the gastric fundus.   Nonobstructive bowel gas pattern. Limited evaluation of pneumoperitoneum on supine imaging, however no gross evidence of free air is noted.   Visualized lungs are clear.   Skin clips yu abdominal incisions. Mild ileus is noted within the upper abdomen.   Osseous structures demonstrate no acute bony changes.       The enteric tube is in satisfactory position.   I personally reviewed the images/study and I agree with the findings as stated by Lauren Harvey MD (PGY-2). This study was interpreted at [facility], [place], [place].   MACRO: None   Signed by: Lamine Grubbs 11/13/2024 1:25 PM Dictation workstation:   [id number]    XR abdomen 1 view    Result Date: 11/13/2024  Interpreted By:  Lamine Grubbs and Omar Mahmoud STUDY: XR ABDOMEN 1 VIEW;  11/13/2024 8:32 am   INDICATION: Signs/Symptoms:Ileus.     Per EMR: Patient underwent laparoscopic sigmoid colon resection and takedown of colovesical fistula on 11/11/2024   COMPARISON: CT abdomen pelvis 10/20/2024   ACCESSION NUMBER(S): [id number]   ORDERING CLINICIAN: SONIA MARCH   FINDINGS: Postsurgical changes from bilateral hip arthroplasties. Surgical staples over the mid lower abdomen.    Gaseous distention of several loops of bowel within the midabdomen in a nonobstructive pattern. There is diffuse vascular atheromatous disease of abdominal aorta iliac and femoral arteries. Bilateral hip arthroplasties are noted. Radium implants are demonstrated within the prostate.       Gaseous distention of several loops of bowel in a nonobstructive pattern.   I personally reviewed the images/study and I agree with the findings as stated by Lauren Harvey MD (PGY-2). This study was interpreted at [place], [place].   MACRO: None   Signed by: Lamine  11/13/2024 9:46 AM Dictation workstation:   [id number]       Physical Exam:  Physical Exam  Vitals reviewed. Exam conducted with a chaperone present.   HENT:      Head: Normocephalic.   Cardiovascular:      Rate and Rhythm: Normal rate and regular rhythm.   Pulmonary:      Effort: Pulmonary effort is normal.      Breath sounds: Normal breath sounds.   Abdominal:      General: Abdomen is flat. Bowel sounds are normal.      Palpations: Abdomen is soft.      Comments: Staples were removed.  His midline incision and ports sites are intact.  No abdominal pain.  Stoma looks good.   Genitourinary:     Rectum: Normal.   Musculoskeletal:         General: Normal range of motion.   Skin:     General: Skin is warm and dry.   Neurological:      General: No focal deficit present.   Psychiatric:         Mood and Affect: Mood normal.         Behavior: Behavior normal.         Thought Content: Thought content normal.         Judgment: Judgment normal.       ASSESSMENT AND PLAN: 69-year-old male with colovesicular fistula status post laparoscopic sigmoid resection with takedown of fistula creation of colorectal anastomosis and diverting loop ileostomy.  Things have been going reasonably well at home.  I would however like to close his ileostomy sooner rather than later.  He will return in 4 weeks at which time we will get a Gastrografin  enema.  Once we confirm that this is normal we will schedule closure of his stoma hopefully around at least a 2-month yu.    MD Traci Aguilar RN   12/2/2024

## 2024-12-03 ENCOUNTER — LAB REQUISITION (OUTPATIENT)
Dept: LAB | Facility: HOSPITAL | Age: 69
End: 2024-12-03
Payer: COMMERCIAL

## 2024-12-03 ENCOUNTER — OFFICE VISIT (OUTPATIENT)
Dept: SURGERY | Facility: CLINIC | Age: 69
End: 2024-12-03
Payer: COMMERCIAL

## 2024-12-03 VITALS
DIASTOLIC BLOOD PRESSURE: 67 MMHG | SYSTOLIC BLOOD PRESSURE: 93 MMHG | HEIGHT: 70 IN | BODY MASS INDEX: 32.07 KG/M2 | HEART RATE: 83 BPM | TEMPERATURE: 98.1 F | WEIGHT: 224 LBS

## 2024-12-03 DIAGNOSIS — N32.1 VESICOINTESTINAL FISTULA: ICD-10-CM

## 2024-12-03 DIAGNOSIS — N32.1 COLOVESICAL FISTULA: Primary | ICD-10-CM

## 2024-12-03 LAB
ALBUMIN SERPL BCP-MCNC: 4 G/DL (ref 3.4–5)
ALP SERPL-CCNC: 110 U/L (ref 33–136)
ALT SERPL W P-5'-P-CCNC: 18 U/L (ref 10–52)
ANION GAP SERPL CALC-SCNC: 20 MMOL/L (ref 10–20)
AST SERPL W P-5'-P-CCNC: 15 U/L (ref 9–39)
BILIRUB SERPL-MCNC: 0.3 MG/DL (ref 0–1.2)
BUN SERPL-MCNC: 23 MG/DL (ref 6–23)
CALCIUM SERPL-MCNC: 9.5 MG/DL (ref 8.6–10.6)
CHLORIDE SERPL-SCNC: 97 MMOL/L (ref 98–107)
CO2 SERPL-SCNC: 20 MMOL/L (ref 21–32)
CREAT SERPL-MCNC: 0.69 MG/DL (ref 0.5–1.3)
EGFRCR SERPLBLD CKD-EPI 2021: >90 ML/MIN/1.73M*2
GLUCOSE SERPL-MCNC: 74 MG/DL (ref 74–99)
MAGNESIUM SERPL-MCNC: 2.21 MG/DL (ref 1.6–2.4)
POTASSIUM SERPL-SCNC: 5 MMOL/L (ref 3.5–5.3)
PROT SERPL-MCNC: 7.7 G/DL (ref 6.4–8.2)
SODIUM SERPL-SCNC: 132 MMOL/L (ref 136–145)

## 2024-12-03 PROCEDURE — 3078F DIAST BP <80 MM HG: CPT | Performed by: COLON & RECTAL SURGERY

## 2024-12-03 PROCEDURE — 1159F MED LIST DOCD IN RCRD: CPT | Performed by: COLON & RECTAL SURGERY

## 2024-12-03 PROCEDURE — 3008F BODY MASS INDEX DOCD: CPT | Performed by: COLON & RECTAL SURGERY

## 2024-12-03 PROCEDURE — 99211 OFF/OP EST MAY X REQ PHY/QHP: CPT | Performed by: COLON & RECTAL SURGERY

## 2024-12-03 PROCEDURE — 1111F DSCHRG MED/CURRENT MED MERGE: CPT | Performed by: COLON & RECTAL SURGERY

## 2024-12-03 PROCEDURE — 3074F SYST BP LT 130 MM HG: CPT | Performed by: COLON & RECTAL SURGERY

## 2024-12-03 ASSESSMENT — ENCOUNTER SYMPTOMS
APPETITE LEVEL: GOOD
LAST BOWEL MOVEMENT: 67176
DENIES PAIN: 1

## 2024-12-04 ENCOUNTER — PATIENT OUTREACH (OUTPATIENT)
Dept: CARE COORDINATION | Facility: CLINIC | Age: 69
End: 2024-12-04
Payer: COMMERCIAL

## 2024-12-04 NOTE — PROGRESS NOTES
Attempted outreach call to patient following up on an appointment with the care provider.  Unable to leave a voice message with my contact information., voice mail was full.   Will continue to follow.        Saprkle ALICIA, RN, [facility] Organization  O: [phone]

## 2024-12-05 ENCOUNTER — APPOINTMENT (OUTPATIENT)
Dept: HOME HEALTH SERVICES | Facility: HOME HEALTH | Age: 69
End: 2024-12-05
Payer: COMMERCIAL

## 2024-12-07 ENCOUNTER — HOME CARE VISIT (OUTPATIENT)
Dept: HOME HEALTH SERVICES | Facility: HOME HEALTH | Age: 69
End: 2024-12-07
Payer: COMMERCIAL

## 2024-12-09 ENCOUNTER — HOME CARE VISIT (OUTPATIENT)
Dept: HOME HEALTH SERVICES | Facility: HOME HEALTH | Age: 69
End: 2024-12-09
Payer: COMMERCIAL

## 2024-12-09 VITALS
SYSTOLIC BLOOD PRESSURE: 108 MMHG | DIASTOLIC BLOOD PRESSURE: 70 MMHG | TEMPERATURE: 97.1 F | HEART RATE: 91 BPM | RESPIRATION RATE: 18 BRPM

## 2024-12-09 DIAGNOSIS — B37.2 YEAST INFECTION OF THE SKIN: ICD-10-CM

## 2024-12-09 PROCEDURE — G0299 HHS/HOSPICE OF RN EA 15 MIN: HCPCS

## 2024-12-09 RX ORDER — NYSTATIN 100000 [USP'U]/G
POWDER TOPICAL
Qty: 30 G | Refills: 2 | Status: SHIPPED | OUTPATIENT
Start: 2024-12-09

## 2024-12-09 ASSESSMENT — ENCOUNTER SYMPTOMS
APPETITE LEVEL: GOOD
SKIN LESIONS: 1

## 2024-12-09 NOTE — HOME HEALTH
[facility] home nursing visit   known to sn.   stoma type: ileostomy   size: 1 in   location:rt side os at 6 oclock   protrustion: slight   mucocutaneous junction: intact   mucosal condition and color: red and moist   Peristomal Skin: much improved.   however now rash away from stoma itcy .  Peristomal contour: large divit at 8-9 oclock   character of output: varies   pouching system used : 1 piece deep convex coloplast 40068   shaved today.   has one set up ready. instructed in nystatin use.     accessories used: ring . remover, powder skin barrier film and barrier extenders and the belt.   message to santy neri at dr. maguire office. requesting nystatin powder and to see if.  message to see if additional labs are needed. stated no but to continue with fluids orally.    bp ranges have been 106-115/52-70  hr ranges 60-92    not measuring output however emptying 3 to 4 x a day.    a little dizzy with transfers at time. much education on increasing fluid and perhaps adding a little salt to diet. for now.   has new pcp in february.
decreased ability to use arms for pushing/pulling/decreased ability to use legs for bridging/pushing/impaired ability to control trunk for mobility

## 2024-12-09 NOTE — CASE COMMUNICATION
saw patient for you .   labs are now discontinued.   requested nystatin powder due to peristomal rash. i will return next week monday to asses the skin. instructed wife in use of the nystatin powder.   recieved ordered supplies and all samples. she had a question on lub deodorant . instructed in its use.

## 2024-12-16 ENCOUNTER — APPOINTMENT (OUTPATIENT)
Dept: HOME HEALTH SERVICES | Facility: HOME HEALTH | Age: 69
End: 2024-12-16
Payer: COMMERCIAL

## 2024-12-19 ENCOUNTER — PATIENT OUTREACH (OUTPATIENT)
Dept: CARE COORDINATION | Facility: CLINIC | Age: 69
End: 2024-12-19
Payer: COMMERCIAL

## 2024-12-19 NOTE — PROGRESS NOTES
Attempted outreach call to patient to check in 30 days after hospital discharge to support smooth transition of care.  Left a voice message with my contact information.  No additional outreach needed at this time.    jovanna ALICIA, RN, [facility]  O: [phone]

## 2024-12-20 ENCOUNTER — HOME CARE VISIT (OUTPATIENT)
Dept: HOME HEALTH SERVICES | Facility: HOME HEALTH | Age: 69
End: 2024-12-20
Payer: COMMERCIAL

## 2024-12-21 ENCOUNTER — HOME CARE VISIT (OUTPATIENT)
Dept: HOME HEALTH SERVICES | Facility: HOME HEALTH | Age: 69
End: 2024-12-21
Payer: COMMERCIAL

## 2024-12-23 ENCOUNTER — HOME CARE VISIT (OUTPATIENT)
Dept: HOME HEALTH SERVICES | Facility: HOME HEALTH | Age: 69
End: 2024-12-23
Payer: COMMERCIAL

## 2024-12-23 VITALS
RESPIRATION RATE: 18 BRPM | TEMPERATURE: 98 F | DIASTOLIC BLOOD PRESSURE: 62 MMHG | OXYGEN SATURATION: 94 % | SYSTOLIC BLOOD PRESSURE: 126 MMHG | HEART RATE: 84 BPM

## 2024-12-23 PROCEDURE — G0300 HHS/HOSPICE OF LPN EA 15 MIN: HCPCS

## 2024-12-23 SDOH — ECONOMIC STABILITY: GENERAL

## 2024-12-23 ASSESSMENT — ACTIVITIES OF DAILY LIVING (ADL)
HOME_HEALTH_OASIS: 00
OASIS_M1830: 00
MONEY MANAGEMENT (EXPENSES/BILLS): INDEPENDENT

## 2024-12-23 ASSESSMENT — ENCOUNTER SYMPTOMS
LAST BOWEL MOVEMENT: 67197
DENIES PAIN: 1

## 2024-12-24 NOTE — CASE COMMUNICATION
agency discharge paperwork completed. shaq was there today and she stated he is ready for discharge.   i missed  a visit last week. because they were doing well.

## 2025-01-01 ENCOUNTER — HOME CARE VISIT (OUTPATIENT)
Dept: HOME HEALTH SERVICES | Facility: HOME HEALTH | Age: 70
End: 2025-01-01
Payer: COMMERCIAL

## 2025-01-06 NOTE — H&P (VIEW-ONLY)
HISTORY OF PRESENTING ILLNESS:  Wade Machado is a 69yoM s/p laparoscopy sigmoid colon with takedown of colovesical fistula and diverting loop ileostomy 11/11/24.      He is here today to discuss ileostomy closure.  He follows up with Dr. Brown tomorrow.  He needs GGE.      He has no issues with his ostomy and will change the wafer every 3-4 days.  No c/o any abdominal pain.  He is eating and drinking well with no n/v.      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    Prostate cancer (Multi)     Tobacco use     Unspecified osteoarthritis, unspecified site     Osteoarthritis         Past Surgical History:   Procedure Laterality Date    BRAIN SURGERY  2005    third 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 (at [facility])    COLON SURGERY  11/11/2024    Laparoscopy Sigmoid Colon with takedown of colovesical fistula    COLONOSCOPY  08/12/2024    HIP ARTHROPLASTY Right     HIP SURGERY      R hip reduction    JOINT REPLACEMENT      OTHER SURGICAL HISTORY  05/09/2022    Retinal detachment repair    OTHER SURGICAL HISTORY  05/09/2022    Cataract surgery         Social History     Tobacco Use    Smoking status: Every Day     Current packs/day: 1.00     Average packs/day: 1 pack/day for 52.0 years (52.0 ttl pk-yrs)     Types: Cigarettes     Start date: 1973    Smokeless tobacco: Never   Substance Use Topics    Alcohol use: Yes     Alcohol/week: 3.0 standard drinks of alcohol     Types: 3 Standard drinks or equivalent per week     Comment: 12 pack weekly, now down to 2-3 drinks per week    Drug use: Yes     Frequency: 1.0 times per week     Types: Marijuana         No family history on file.        Current Outpatient  Medications:     acetaminophen (Tylenol) 325 mg tablet, Take 1-2 tablets (325-650 mg) by mouth every 4 hours if needed for mild pain (1 - 3)., Disp: , Rfl:     ascorbic acid, vitamin C, 100 mg chewable tablet, Chew 4 tablets (400 mg) once daily., Disp: , Rfl:     calcium carbonate (Tums Extra Strength) 300 mg (750 mg) chewable tablet, Chew 300 mg 3 times a day as needed for indigestion or heartburn. Indications: heartburn, Disp: , Rfl:     finasteride (Proscar) 5 mg tablet, Take 1 tablet (5 mg) by mouth once daily., Disp: , Rfl:     lisinopril 40 mg tablet, TAKE ONE TABLET BY MOUTH once DAILY for blood pressure, Disp: 90 tablet, Rfl: 3    loperamide (Imodium) 2 mg capsule, Take 1 capsule (2 mg) by mouth 4 times a day before meals., Disp: 120 capsule, Rfl: 3    metoprolol succinate XL (Toprol-XL) 25 mg 24 hr tablet, Take 1 tablet (25 mg) by mouth once daily., Disp: , Rfl:     nystatin (Mycostatin) 100,000 unit/gram powder, Sprinkle on affected skin twice daily., Disp: 30 g, Rfl: 2    oxybutynin (Ditropan) 5 mg tablet, Take 1 tablet (5 mg) by mouth 3 times a day as needed (bladder spasm)., Disp: 90 tablet, Rfl: 0    tamsulosin (Flomax) 0.4 mg 24 hr capsule, Take 1 capsule (0.4 mg) by mouth 2 times a day., Disp: , Rfl:        No Known Allergies      REVIEW OF SYSTEMS:  Review of Systems   All other systems reviewed and are negative.      Labs:       Imaging:      Physical Exam:  Physical Exam  Vitals reviewed. Exam conducted with a chaperone present.   HENT:      Head: Normocephalic.   Cardiovascular:      Rate and Rhythm: Normal rate and regular rhythm.   Pulmonary:      Effort: Pulmonary effort is normal.      Breath sounds: Normal breath sounds.   Abdominal:      General: Abdomen is flat. Bowel sounds are normal.      Palpations: Abdomen is soft.      Comments: Ileostomy in the RUQ with no pain or discomfort   Genitourinary:     Rectum: Normal.   Musculoskeletal:         General: Normal range of motion.   Skin:      General: Skin is warm and dry.   Neurological:      General: No focal deficit present.   Psychiatric:         Mood and Affect: Mood normal.         Behavior: Behavior normal.         Thought Content: Thought content normal.         Judgment: Judgment normal.       ASSESSMENT AND PLAN: 69-year-old male with a history of sigmoid diverticulitis complicated by colovesicular fistula status post laparoscopic sigmoid resection with takedown of fistula and diverting loop ileostomy.  He has had issues with managing his ileostomy output and as such will plan to close his ileostomy after he obtains a GGE.  Discussed the risks of the operation and he is willing to proceed.    MD Traci Aguilar, RN   1/6/2025

## 2025-01-06 NOTE — PROGRESS NOTES
HISTORY OF PRESENTING ILLNESS:  Wade Machado is a 69yoM s/p laparoscopy sigmoid colon with takedown of colovesical fistula and diverting loop ileostomy 11/11/24.      He is here today to discuss ileostomy closure.  He follows up with Dr. Brown tomorrow.  He needs GGE.      He has no issues with his ostomy and will change the wafer every 3-4 days.  No c/o any abdominal pain.  He is eating and drinking well with no n/v.      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    Prostate cancer (Multi)     Tobacco use     Unspecified osteoarthritis, unspecified site     Osteoarthritis         Past Surgical History:   Procedure Laterality Date    BRAIN SURGERY  2005    third 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 (at [facility])    COLON SURGERY  11/11/2024    Laparoscopy Sigmoid Colon with takedown of colovesical fistula    COLONOSCOPY  08/12/2024    HIP ARTHROPLASTY Right     HIP SURGERY      R hip reduction    JOINT REPLACEMENT      OTHER SURGICAL HISTORY  05/09/2022    Retinal detachment repair    OTHER SURGICAL HISTORY  05/09/2022    Cataract surgery         Social History     Tobacco Use    Smoking status: Every Day     Current packs/day: 1.00     Average packs/day: 1 pack/day for 52.0 years (52.0 ttl pk-yrs)     Types: Cigarettes     Start date: 1973    Smokeless tobacco: Never   Substance Use Topics    Alcohol use: Yes     Alcohol/week: 3.0 standard drinks of alcohol     Types: 3 Standard drinks or equivalent per week     Comment: 12 pack weekly, now down to 2-3 drinks per week    Drug use: Yes     Frequency: 1.0 times per week     Types: Marijuana         No family history on file.        Current Outpatient  Medications:     acetaminophen (Tylenol) 325 mg tablet, Take 1-2 tablets (325-650 mg) by mouth every 4 hours if needed for mild pain (1 - 3)., Disp: , Rfl:     ascorbic acid, vitamin C, 100 mg chewable tablet, Chew 4 tablets (400 mg) once daily., Disp: , Rfl:     calcium carbonate (Tums Extra Strength) 300 mg (750 mg) chewable tablet, Chew 300 mg 3 times a day as needed for indigestion or heartburn. Indications: heartburn, Disp: , Rfl:     finasteride (Proscar) 5 mg tablet, Take 1 tablet (5 mg) by mouth once daily., Disp: , Rfl:     lisinopril 40 mg tablet, TAKE ONE TABLET BY MOUTH once DAILY for blood pressure, Disp: 90 tablet, Rfl: 3    loperamide (Imodium) 2 mg capsule, Take 1 capsule (2 mg) by mouth 4 times a day before meals., Disp: 120 capsule, Rfl: 3    metoprolol succinate XL (Toprol-XL) 25 mg 24 hr tablet, Take 1 tablet (25 mg) by mouth once daily., Disp: , Rfl:     nystatin (Mycostatin) 100,000 unit/gram powder, Sprinkle on affected skin twice daily., Disp: 30 g, Rfl: 2    oxybutynin (Ditropan) 5 mg tablet, Take 1 tablet (5 mg) by mouth 3 times a day as needed (bladder spasm)., Disp: 90 tablet, Rfl: 0    tamsulosin (Flomax) 0.4 mg 24 hr capsule, Take 1 capsule (0.4 mg) by mouth 2 times a day., Disp: , Rfl:        No Known Allergies      REVIEW OF SYSTEMS:  Review of Systems   All other systems reviewed and are negative.      Labs:       Imaging:      Physical Exam:  Physical Exam  Vitals reviewed. Exam conducted with a chaperone present.   HENT:      Head: Normocephalic.   Cardiovascular:      Rate and Rhythm: Normal rate and regular rhythm.   Pulmonary:      Effort: Pulmonary effort is normal.      Breath sounds: Normal breath sounds.   Abdominal:      General: Abdomen is flat. Bowel sounds are normal.      Palpations: Abdomen is soft.      Comments: Ileostomy in the RUQ with no pain or discomfort   Genitourinary:     Rectum: Normal.   Musculoskeletal:         General: Normal range of motion.   Skin:      General: Skin is warm and dry.   Neurological:      General: No focal deficit present.   Psychiatric:         Mood and Affect: Mood normal.         Behavior: Behavior normal.         Thought Content: Thought content normal.         Judgment: Judgment normal.       ASSESSMENT AND PLAN: 69-year-old male with a history of sigmoid diverticulitis complicated by colovesicular fistula status post laparoscopic sigmoid resection with takedown of fistula and diverting loop ileostomy.  He has had issues with managing his ileostomy output and as such will plan to close his ileostomy after he obtains a GGE.  Discussed the risks of the operation and he is willing to proceed.    MD Traci Aguilar, RN   1/6/2025

## 2025-01-07 ENCOUNTER — PREP FOR PROCEDURE (OUTPATIENT)
Dept: SURGERY | Facility: CLINIC | Age: 70
End: 2025-01-07

## 2025-01-07 ENCOUNTER — OFFICE VISIT (OUTPATIENT)
Dept: SURGERY | Facility: CLINIC | Age: 70
End: 2025-01-07
Payer: COMMERCIAL

## 2025-01-07 VITALS
SYSTOLIC BLOOD PRESSURE: 124 MMHG | BODY MASS INDEX: 33 KG/M2 | TEMPERATURE: 99.3 F | WEIGHT: 230 LBS | DIASTOLIC BLOOD PRESSURE: 82 MMHG | HEART RATE: 86 BPM

## 2025-01-07 DIAGNOSIS — K57.92 DIVERTICULITIS: ICD-10-CM

## 2025-01-07 DIAGNOSIS — Z43.2 ATTENTION TO ILEOSTOMY: Primary | ICD-10-CM

## 2025-01-07 DIAGNOSIS — N32.1 COLOVESICAL FISTULA: Primary | ICD-10-CM

## 2025-01-07 PROCEDURE — 99215 OFFICE O/P EST HI 40 MIN: CPT | Performed by: COLON & RECTAL SURGERY

## 2025-01-07 PROCEDURE — 3074F SYST BP LT 130 MM HG: CPT | Performed by: COLON & RECTAL SURGERY

## 2025-01-07 PROCEDURE — 3079F DIAST BP 80-89 MM HG: CPT | Performed by: COLON & RECTAL SURGERY

## 2025-01-07 RX ORDER — METRONIDAZOLE 500 MG/100ML
500 INJECTION, SOLUTION INTRAVENOUS ONCE
OUTPATIENT
Start: 2025-01-07 | End: 2025-01-07

## 2025-01-07 RX ORDER — HEPARIN SODIUM 5000 [USP'U]/ML
5000 INJECTION, SOLUTION INTRAVENOUS; SUBCUTANEOUS ONCE
OUTPATIENT
Start: 2025-01-07 | End: 2025-01-07

## 2025-01-07 RX ORDER — CEFAZOLIN SODIUM 2 G/100ML
2 INJECTION, SOLUTION INTRAVENOUS ONCE
OUTPATIENT
Start: 2025-01-07 | End: 2025-01-07

## 2025-01-08 ENCOUNTER — APPOINTMENT (OUTPATIENT)
Dept: UROLOGY | Facility: CLINIC | Age: 70
End: 2025-01-08
Payer: COMMERCIAL

## 2025-01-08 VITALS — DIASTOLIC BLOOD PRESSURE: 60 MMHG | HEART RATE: 80 BPM | SYSTOLIC BLOOD PRESSURE: 132 MMHG

## 2025-01-08 DIAGNOSIS — N40.0 ENLARGED PROSTATE: ICD-10-CM

## 2025-01-08 PROCEDURE — G2211 COMPLEX E/M VISIT ADD ON: HCPCS | Performed by: STUDENT IN AN ORGANIZED HEALTH CARE EDUCATION/TRAINING PROGRAM

## 2025-01-08 PROCEDURE — 1159F MED LIST DOCD IN RCRD: CPT | Performed by: STUDENT IN AN ORGANIZED HEALTH CARE EDUCATION/TRAINING PROGRAM

## 2025-01-08 PROCEDURE — 3078F DIAST BP <80 MM HG: CPT | Performed by: STUDENT IN AN ORGANIZED HEALTH CARE EDUCATION/TRAINING PROGRAM

## 2025-01-08 PROCEDURE — 3075F SYST BP GE 130 - 139MM HG: CPT | Performed by: STUDENT IN AN ORGANIZED HEALTH CARE EDUCATION/TRAINING PROGRAM

## 2025-01-08 PROCEDURE — 99214 OFFICE O/P EST MOD 30 MIN: CPT | Performed by: STUDENT IN AN ORGANIZED HEALTH CARE EDUCATION/TRAINING PROGRAM

## 2025-01-08 NOTE — PROGRESS NOTES
Scribed for Dr. Gil Brown by Delonte Cazares. I, Dr. Gil Brown have personally reviewed and agreed with the information entered by the Virtual Scribe. 01/08/25.    ASSESSMENT:  Problem List Items Addressed This Visit    None  Visit Diagnoses       Enlarged prostate        Relevant Orders    Post-Void Residual (Completed)    POCT UA Automated manually resulted    Follow Up In Urology           PLAN:  #BPH with urinary retention  #Indwelling Llamas - removed.  #Colovesical fistula   #Bladder hematoma  S/p ex-lap sigmoid colectomy, colovesical fistula takedown + diverting loop ileostomy (11/11/24)  Post-op course c/b ileus requiring NGT for decompression; eventually resolved.   Followed by colorectal surgery (Dr. Mcintyre).  Continues with a colostomy; Llamas removed (11/17/24)  CT cystogram (11/17/24) was negative for leak.  Noted to have a left sided bladder hematoma.     Has been voiding well without issue since llamas removal (11/17/24).   Continue follow up with ostomy team for continued care.   If his urinary symptoms//retention recur;  Can consider UDS, and/or surgical options. BPH surgery is high risk for incontinence following radiation therapy and we will proceed with this only as necessary. Further work-up will need to be delayed until he has completely recovered from surgery.    RTC in 1 year for reassessment and PVR.     #Prostate cancer  Refer to medical oncology following his colorectal surgery.   S/p brachytherapy (2015)  PSA ~ 4.8 (July 2023)  NM PET CT prostate (01/17/24)  Negative for pelvic or distant metastatic disease.     #Tobacco abuse  We discussed smoking cessation for >3 minutes.  I advised this is the best thing the patient can do for their health.  Advised that cessation of tobacco can improve healing following surgery.   Patient acknowledged understanding.    All questions were answered to the patient’s satisfaction.  Patient agrees with the plan and wishes to proceed.  Continue  "follow-up for ongoing care of his chronic medical conditions.       History of Present Illness (HPI):  Wade presents for a follow up visit.   The patient’s EMR has been reviewed.  Lives in [place], [place] with his Wife (\"Phuong\")  Occupation: Retired.     Hx of prostate cancer s/p brachytherapy (2015), BPH + retention, and Jackelyn's.   Recent admission in Aug 2024; see below.   Dx with colovesical fistula in setting of colonic diverticulosis.   Followed by colorectal surgery for continued care.   Urology consulted for possible colovesical fistula;  As well as for recurrent cystitis and urinary retention.   His urinary retention was determined to be likely 2/2 BPH.  An indwelling Moeller was placed which he has continued.  Was started on tamsulosin and finasteride.     S/p ex-lap sigmoid colectomy, colovesical fistula takedown + diverting loop ileostomy (11/11/24)  Post-op course c/b ileus requiring NGT for decompression; eventually resolved.   Continues with a colostomy.   Moeller removed (11/17/24)    TODAY: (01/08/25)  Presents for follow up and PVR.  Reports he has been doing well overall.   States he had been voiding independently without issue.   However, still with some mild urgency and occasional leakage.   Leakage typically occurs at night, he is usually fine during the day.   Continues on finasteride and tamsulosin for BPH.   Denotes benefit, no side-effects.   No longer taking oxybutynin.   No recurrent bladder spasms since Moeller removal.   PVR: 98 mL    TO REVIEW: Last visit (11/20/24)  Reports he has been progressing well since the procedure.   S/p Moeller removal, now voiding spontaneously and independently.   Denies any urinary issues at this time.   PVR: 201 mL (but did not void)    CT cystogram (11/17/24) personally reviewed.   No extravasation of urine  Left sided bladder hematoma.   See full impression at bottom of note.    TO REVIEW: Initial visit (09/18/24)  Reports he has been doing well overall.   Continues " with an indwelling llamas.   Draining clear, yellow urine, no recent infections.   No recent fevers, chills, or gross hematuria.     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 CT AP without contrast was also done at outside hospital and showed possible colovesicular fistula and bladder abscess vs extrinsic mass. Urology consulted for possible colovesical fistula, recurrent cystitis and urinary retention.     Urology consulted-->   Seen for possible colovesical fistula, recurrent cystitis and urinary retention (chronic).    Discussed with Colorectal Surgery s/p colonoscopy.    Think may be edema 2/2 inflammatory process and not true colo vesicular fistula; patient can follow up outpatient.  Imaging showed mild bilateral hydronephrosis.  Repeat RBUS (08/12) showed resolution of hydronephrosis.   Continue home proscar and flomax.    Indwelling Llamas placed for his retention.  Will need management of likely bladder outlet obstruction due to BPH and cystoscopic work-up of bladder thickening seen on CT outpatient with his Urologist.      Per Discharge summary  CTH showing pontine ICH with ventriculomegaly;  incr'd vents compared to 2005 and 2007.   CT A/P with mild bilateral hydronephrosis, and concern for colovesicular fistula.     CT C spine neg for fx,   CTA H/N stable hemorrhage, neg for vasc abnormality,   MRI brain pontine hemorrhage.    8/9 Colorectal consulted--> Recommend colonoscopy to better evaluate sigmoid colon for possible colovesical fistula and to rule out colon cancer; antibiotics for 14 days for UTI and  possible acute diverticulitis. GI consulted for eval for Colonoscopy.    8/12  Colonoscopy--> Sigmoid diverticulosis, mild segmental colitis associated with diverticulosis, circumferential edema in sigmoid colon near suspected fistula site (biopsied, awaiting pathology), small internal hemorrhoids.      8/13 Urology ~ RBUS  hydronephrosis resolved,   ok for discharge.    PT/OT eval recommend Rehab;  but patient and family request home with Home care.  Discharged to home with llamas and follow up with Urology scheduled.    Requested follow up with Dr. Thomas Mcintyre Colorectal Surgery ,  PCP for HTN and Neurology Stroke.    PMH//PSH: ventricular colloid cyst s/p R [facility] ([facility]), R total hip arthroplasty c/b recurrent R prosthetic hip dislocation (5/11) s/p R hip reduction, HTN, alcohol use, tobacco use, obesity,  FH: Denies FH of  malignancy.   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    Prostate cancer (Multi)     Tobacco use     Unspecified osteoarthritis, unspecified site     Osteoarthritis     Past Surgical History:   Procedure Laterality Date    BRAIN SURGERY  2005    third 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 (at [facility])    COLON SURGERY  11/11/2024    Laparoscopy Sigmoid Colon with takedown of colovesical fistula    COLONOSCOPY  08/12/2024    HIP ARTHROPLASTY Right     HIP SURGERY      R hip reduction    JOINT REPLACEMENT      OTHER SURGICAL HISTORY  05/09/2022    Retinal detachment repair    OTHER SURGICAL HISTORY  05/09/2022    Cataract surgery     No family history on file.  Social History     Tobacco Use   Smoking Status Every Day    Current packs/day: 1.00    Average  packs/day: 1 pack/day for 52.0 years (52.0 ttl pk-yrs)    Types: Cigarettes    Start date: 1973   Smokeless Tobacco Never     Current Outpatient Medications   Medication Sig Dispense Refill    acetaminophen (Tylenol) 325 mg tablet Take 1-2 tablets (325-650 mg) by mouth every 4 hours if needed for mild pain (1 - 3).      ascorbic acid, vitamin C, 100 mg chewable tablet Chew 4 tablets (400 mg) once daily.      calcium carbonate (Tums Extra Strength) 300 mg (750 mg) chewable tablet Chew 300 mg 3 times a day as needed for indigestion or heartburn. Indications: heartburn      finasteride (Proscar) 5 mg tablet Take 1 tablet (5 mg) by mouth once daily.      lisinopril 40 mg tablet TAKE ONE TABLET BY MOUTH once DAILY for blood pressure 90 tablet 3    loperamide (Imodium) 2 mg capsule Take 1 capsule (2 mg) by mouth 4 times a day before meals. 120 capsule 3    metoprolol succinate XL (Toprol-XL) 25 mg 24 hr tablet Take 1 tablet (25 mg) by mouth once daily.      nystatin (Mycostatin) 100,000 unit/gram powder Sprinkle on affected skin twice daily. 30 g 2    oxybutynin (Ditropan) 5 mg tablet Take 1 tablet (5 mg) by mouth 3 times a day as needed (bladder spasm). 90 tablet 0    tamsulosin (Flomax) 0.4 mg 24 hr capsule Take 1 capsule (0.4 mg) by mouth 2 times a day.       No current facility-administered medications for this visit.     No Known Allergies  Past medical, surgical, family and social history in the chart was reviewed and is accurate including any additions to what is in this HPI.    REVIEW OF SYSTEMS (ROS):   Constitutional: denies any unintentional weight loss or change in strength.  Integumentary: denies any rashes or pruritus.  Eyes: denies any double vision or eye pain.  Ear/Nose/Mouth/Throat: denies any nosebleeds or gum bleeds.  Cardiovascular: denies any chest pain or syncope.  Respiratory: denies hemoptysis.  Gastrointestinal: denies nausea or vomiting.  Musculoskeletal: denies muscle cramping or  weakness.  Neurologic: denies convulsions or seizures.  Hematologic/Lymphatic: denies bleeding tendencies.  Endocrine: denies heat/cold intolerance.  All other systems have been reviewed and are negative unless otherwise noted in the HPI.     OBJECTIVE:  There were no vitals taken for this visit.    PHYSICAL EXAM:  Constitutional: No obvious distress.  Eyes: Non-injected conjunctiva, sclera clear, EOMI.  Ears/Nose/Mouth/Throat: No obvious drainage per ears or nose.  Cardiovascular: Extremities are warm and well perfused. No edema, cyanosis or pallor.  Respiratory: No audible wheezing/stridor; respirations do not appear labored.  Gastrointestinal: Abdomen soft, not distended.  Musculoskeletal: Normal ROM of extremities.  Skin: No obvious rashes or open sores.  Neurologic: Alert and oriented, CN 2-12 grossly intact.  Psychiatric: Answers questions appropriately with normal affect.  Hematologic/Lymphatic/Immunologic: No obvious bruises or sites of spontaneous bleeding.  Genitourinary: No CVA tenderness, bladder not palpable.     LABS & IMAGING:  Lab Results   Component Value Date    WBC 12.6 (H) 11/19/2024    HGB 14.3 11/19/2024    HCT 45.3 11/19/2024     11/19/2024    CHOL 149 08/28/2024    TRIG 114 08/28/2024    HDL 40.4 08/28/2024    ALT 18 12/02/2024    AST 15 12/02/2024     (L) 12/02/2024    K 5.0 12/02/2024    CL 97 (L) 12/02/2024    CREATININE 0.69 12/02/2024    BUN 23 12/02/2024    CO2 20 (L) 12/02/2024    TSH 1.49 08/28/2024    PSA 4.8 (H) 07/17/2023    INR 1.4 (H) 08/12/2024    HGBA1C 6.0 (H) 08/09/2024     CT cystogram (11/17/24)  1. Postsurgical changes of sigmoidectomy and colovesical  fistulectomy. After contrast administration, there is a mural based  high-density filling defect along the left superior wall of the  urinary bladder suspicious for endoluminal or intramural hematoma.  2. No evidence of contrast extravasation from the urinary bladder to  suggest leak or recurrent fistula. Please  that assessment is somewhat  limited by extensive beam hardening artifact from hip arthroplasty  hardware.  3. There is diffuse trabeculated wall thickening of the urinary  bladder, likely postsurgical in nature and possibly superimposed on  chronic outlet obstruction.  4. Ill-defined stranding and loculated fluid throughout the pelvis  and presacral space, including around the colorectal anastomosis,  without discrete collection, extraluminal gas, or extraluminal  contrast.    Scribed for Dr. Gil Brown by Delonte Cazares.  I, Dr. Gil Brown have personally reviewed and agreed with the information entered by the Virtual Scribe. 01/08/25.

## 2025-01-13 ENCOUNTER — HOSPITAL ENCOUNTER (OUTPATIENT)
Dept: RADIOLOGY | Facility: HOSPITAL | Age: 70
Discharge: HOME | End: 2025-01-13
Payer: COMMERCIAL

## 2025-01-13 ENCOUNTER — APPOINTMENT (OUTPATIENT)
Dept: PREADMISSION TESTING | Facility: HOSPITAL | Age: 70
End: 2025-01-13
Payer: COMMERCIAL

## 2025-01-13 DIAGNOSIS — N32.1 COLOVESICAL FISTULA: ICD-10-CM

## 2025-01-13 PROCEDURE — 2550000001 HC RX 255 CONTRASTS: Performed by: NURSE PRACTITIONER

## 2025-01-13 PROCEDURE — 74270 X-RAY XM COLON 1CNTRST STD: CPT | Performed by: RADIOLOGY

## 2025-01-13 PROCEDURE — 74270 X-RAY XM COLON 1CNTRST STD: CPT

## 2025-01-13 RX ADMIN — IOHEXOL 1000 ML: 350 INJECTION, SOLUTION INTRAVENOUS at 11:06

## 2025-01-15 ENCOUNTER — CLINICAL SUPPORT (OUTPATIENT)
Dept: PREADMISSION TESTING | Facility: HOSPITAL | Age: 70
End: 2025-01-15
Payer: COMMERCIAL

## 2025-01-15 NOTE — CPM/PAT H&P
CPM/PAT Evaluation       Name: Wade Machado (Wade Machado)  /Age: 1955/69 y.o.     {[facility] Visit Type:26226}      Chief Complaint: ***    HPI    Past Medical History:   Diagnosis Date    Alcohol use     BPH (benign prostatic hyperplasia)     Colloid cyst of brain (Multi)     s/p R crani for resection ([facility], )    Colovesical fistula 10/08/2024    Cystitis     Diverticulitis     GERD (gastroesophageal reflux disease)     Hypertension     Ileostomy in place (Multi) 2024    Obesity     Pontine hemorrhage (Multi) 2024    PONV (postoperative nausea and vomiting)     Prostate cancer (Multi)     Sepsis, due to unspecified organism, unspecified whether acute organ dysfunction present (Multi) 10/20/2024    Tobacco use     Unspecified osteoarthritis, unspecified site     Osteoarthritis       Past Surgical History:   Procedure Laterality Date    BRAIN SURGERY      third 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 (at [facility])    COLON SURGERY  2024    Laparoscopy Sigmoid Colon with takedown of colovesical fistula    COLONOSCOPY  2024    HIP ARTHROPLASTY Right 2024    HIP ARTHROPLASTY Left     HIP SURGERY  2024    Right prosthetic hip dislocation status post revision total hip arthroplasty on 2024. Closed reduction was performed with anesthesia in ED    OTHER SURGICAL HISTORY  2022    Retinal detachment repair    OTHER SURGICAL HISTORY  2018    Cataract surgery    PROSTATE SURGERY  2016    Prostate seed implantation       Patient  reports that he is not currently sexually active.    No family history on file.    No Known Allergies    Prior to Admission medications    Medication Sig Start Date End Date Taking? Authorizing Provider   acetaminophen (Tylenol) 325 mg tablet Take 1-2 tablets (325-650 mg) by mouth every 4 hours if needed for mild pain (1 - 3). 22   Historical Provider, MD   ascorbic acid, vitamin C, 100 mg chewable tablet Chew 4  tablets (400 mg) once daily. 11/21/24   Historical Provider, MD   calcium carbonate (Tums Extra Strength) 300 mg (750 mg) chewable tablet Chew 300 mg 3 times a day as needed for indigestion or heartburn. Indications: heartburn  Patient not taking: Reported on 1/8/2025 11/21/24   Historical Provider, MD   finasteride (Proscar) 5 mg tablet Take 1 tablet (5 mg) by mouth once daily. 11/21/24   Historical Provider, MD   lisinopril 40 mg tablet TAKE ONE TABLET BY MOUTH once DAILY for blood pressure 6/3/24   Eddie Crum DO   loperamide (Imodium) 2 mg capsule Take 1 capsule (2 mg) by mouth 4 times a day before meals. 11/19/24 3/19/25  DARIA hSaw   metoprolol succinate XL (Toprol-XL) 25 mg 24 hr tablet Take 1 tablet (25 mg) by mouth once daily. 11/21/24   Historical Provider, MD   nystatin (Mycostatin) 100,000 unit/gram powder Sprinkle on affected skin twice daily. 12/9/24   DARIA Ortiz   tamsulosin (Flomax) 0.4 mg 24 hr capsule Take 1 capsule (0.4 mg) by mouth 2 times a day. 8/13/24   DARIA Duke        PAT ROS     PAT Physical Exam     Airway    Testing/Diagnostic:   ECG 10/19/24  Normal sinus rhythm with sinus arrhythmia  Normal ECG  When compared with ECG of 08-AUG-2024 23:18,  No significant change was found  See ED provider note for full interpretation and clinical correlation  Confirmed by Yolanda Mercedes ([id number]) on 10/29/2024 12:47:12 PM    ECHO 8/10/24  CONCLUSIONS:   1. Poorly visualized anatomical structures due to suboptimal image quality.   2. Left ventricular ejection fraction is normal, by visual estimate at 65-70%.   3. There is normal right ventricular global systolic function.    CT cystogram 11/17/24  IMPRESSION:  1. Postsurgical changes of sigmoidectomy and colovesical  fistulectomy. After contrast administration, there is a mural based  high-density filling defect along the left superior wall of the  urinary bladder suspicious for endoluminal or intramural  hematoma.  2. No evidence of contrast extravasation from the urinary bladder to  suggest leak or recurrent fistula. Please that assessment is somewhat  limited by extensive beam hardening artifact from hip arthroplasty  hardware.  3. There is diffuse trabeculated wall thickening of the urinary  bladder, likely postsurgical in nature and possibly superimposed on  chronic outlet obstruction.  4. Ill-defined stranding and loculated fluid throughout the pelvis  and presacral space, including around the colorectal anastomosis,  without discrete collection, extraluminal gas, or extraluminal  contrast.    CT abdomen pelvis 10/20/24  IMPRESSION:  1. Somewhat limited evaluation of the bladder due to extensive beam  hardening artifact from bilateral hip arthroplasties. Within  limitations of the study, of the bladder contains a Moeller catheter,  but is not decompressed, and demonstrates somewhat thickened and  indistinct wall suggestive of underlying cystitis. Additionally,  there is redemonstration of focus of loculated air along the superior  aspect of the bladder, somewhat more consolidated in appearance  compared to the scattered foci seen on previous exam on 08/09/2024.  This focus of air appears to be tethered between the bladder in the  adjacent sigmoid colon, although no discrete fistulous tract is  identified, and is not entirely excluded on current study.  2. Additional focus of air is present in the lower pelvis/perineum  along the left lateral aspect of the prostate, new from prior exam on  08/09/2024, but difficult to definitely characterized due to  extensive artifact from hip arthroplasties, although it is favored to  be infectious/inflammatory, and possibly part of the same process  affecting the bladder.  3. Mild fullness of the of the ureters bilaterally with slight  mucosal enhancement possibly representing component of ureteritis.  There is no imaging evidence of pyelonephritis however.  4. Scattered  diverticula are present in the descending and sigmoid  colon, without definite evidence of acute diverticulitis.  5. Large volume of solid stool is present throughout the colon,  correlate with symptoms of constipation.  6. Extensive atherosclerotic plaques with likely some stenosis  present in the common and external iliac arteries bilaterally.    CT head 10/20/24  IMPRESSION:  No acute intracranial abnormality.  Stable hyperdense pontine mass.  Stable prominence of the lateral and 3rd ventricles, out of  proportion to sulcal prominence, suggesting disproportionate central  atrophy or communicating hydrocephalus.    MR brain 10/30/24  IMPRESSION:  Postoperative changes are again identified compatible with a previous  right frontal craniotomy.      There is again evidence of a nonspecific mixed signal  intraparenchymal hemorrhage centered within the carmen which is similar  in size and configuration when compared with the prior MRI dated  08/09/2024. Specifically, using similar points of measurement on the  current and prior study, it currently measures approximately 20 mm x  20 mm in transaxial dimensions by 20 mm in the craniocaudal dimension  compared with the prior study dated 08/09/2024 where it measured  approximately 20 mm x 20 mm in transaxial dimensions by 20 mm in  craniocaudal dimension. No post gadolinium imaging was performed on  the current study limiting further evaluation for possible  superimposed pathologic enhancement. Again, no significant edema  within the surrounding brain parenchyma. There is mild mass effect  upon the ventral 4th ventricle similar when compared with the prior  study.      There is again evidence of similar ventriculomegaly involving the  lateral ventricles and 3rd ventricle with the lateral ventricles  again demonstrating disproportionate prominence when compared with  the sulci within the cerebral convexities. This disproportionate  ventriculomegaly may be related to more  pronounced central volume  loss versus a component of stable communicating hydrocephalus.      Scattered nonspecific white matter changes are again noted within  cerebral hemispheres bilaterally which while nonspecific, given the  patient's age, likely represent sequelae of more remote small-vessel  ischemic change. Additional small foci of bright signal on the T2  weighted images are again noted within the subinsular regions, basal  ganglia, and thalami bilaterally suggesting incidental mildly  prominent perivascular spaces and/or small scattered more remote  lacunar infarctions.      There is near-complete opacification of the right maxillary sinus.    Patient Specialist/PCP:   Data only, no medication, providers, or history verified. Information updated based solely on chart review.      PMHx of Pontine hemorrhage, HTN, prostate cancer s/p brachy therapy, BPH, recurrent cystitis, Hx of third 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 ([facility], 2005) colovesical fistula extending to the sigmoid colon,alcohol use, tobacco use, obesity, previous R total hip arthroplasty c/b recurrent R prosthetic hip dislocation 5/11 s/p R hip reduction     CPM/PAT  11/7/24 - COLTON Jean-Baptiste-CNP     Neurology  8/28/24 - Saud Klein MD (resident)   Pontine mass.    11/6/24 - progress note:  “We saw the patient in clinic for pontine mass concerning for cavernoma. Mass looks stable on repeat MRI. From Neurology stand point there is no contraindication for surgery. We recommend avoiding perioperative anticoagulation use given concern of hemorrhage on initial scans.”    Urology  1/8/25 - Gil Brown MD   Hx of prostate cancer s/p brachytherapy (2015), BPH + retention, and Jackelyn's. Colovesical fistula, recurrent cystitis and urinary retention likely 2/2 BPH. S/p ex-lap sigmoid colectomy, colovesical fistula takedown + diverting loop ileostomy (11/11/24). Left sided bladder hematoma     Ortho Surg   Right prosthetic hip  dislocation status post revision total hip arthroplasty on 5/1/2024. Closed reduction was performed with anesthesia in ED    General Surgery  1/7/25 - Thomas Mcintyre MD   s/p laparoscopy sigmoid colon with takedown of colovesical fistula and diverting loop ileostomy 11/11/24. Evaluation to discuss ileostomy closure.       Visit Vitals  Smoking Status Every Day       DASI Risk Score    No data to display       Caprini DVT Assessment      Flowsheet Row Admission (Discharged) from 11/11/2024 in [facility] 5 with Thomas Mcintyre MD ED to Hosp-Admission (Discharged) from 10/19/2024 in [facility] 9 with Briseida Lovelace DO and Shannan Espinal DO   DVT Score (IF A SCORE IS NOT CALCULATING, MUST SELECT A BMI TO COMPLETE) 8 filed at 11/11/2024 1200 7 filed at 10/20/2024 0310   Medical Factors -- Medical patient at bedrest, Sepsis <1 month filed at 10/20/2024 0310   Surgical Factors Major surgery planned, lasting 2-3 hours filed at 11/11/2024 1200 --   BMI (BMI MUST BE CHOSEN) 31-40 (Obesity) filed at 11/11/2024 1200 31-40 (Obesity) filed at 10/20/2024 0310          Modified Frailty Index    No data to display       CHADS2 Stroke Risk  Current as of 30 minutes ago        N/A 3 to 100%: High Risk   2 to < 3%: Medium Risk   0 to < 2%: Low Risk     Last Change: N/A          This score determines the patient's risk of having a stroke if the patient has atrial fibrillation.        This score is not applicable to this patient. Components are not calculated.          Revised Cardiac Risk Index    No data to display       Apfel Simplified Score    No data to display       Risk Analysis Index Results This Encounter    No data found in the last 10 encounters.       Prodigy: High Risk  Total Score: 16              Prodigy Age Score      Prodigy Gender Score          ARISCAT Score for Postoperative Pulmonary Complications    No data to display       Nava Perioperative Risk for Myocardial Infarction or  Cardiac Arrest (ROSETTE)    No data to display         Assessment and Plan:     {[facility] EMBEDDED ASSESSMENT AND PLAN:33482}

## 2025-01-16 ENCOUNTER — ANESTHESIA EVENT (OUTPATIENT)
Dept: OPERATING ROOM | Facility: HOSPITAL | Age: 70
DRG: 331 | End: 2025-01-16
Payer: COMMERCIAL

## 2025-01-16 ENCOUNTER — PRE-ADMISSION TESTING (OUTPATIENT)
Dept: PREADMISSION TESTING | Facility: HOSPITAL | Age: 70
End: 2025-01-16
Payer: COMMERCIAL

## 2025-01-16 VITALS
TEMPERATURE: 98 F | BODY MASS INDEX: 32.63 KG/M2 | HEART RATE: 66 BPM | HEIGHT: 71 IN | WEIGHT: 233.1 LBS | OXYGEN SATURATION: 97 % | SYSTOLIC BLOOD PRESSURE: 102 MMHG | DIASTOLIC BLOOD PRESSURE: 63 MMHG

## 2025-01-16 DIAGNOSIS — K57.92 DIVERTICULITIS: ICD-10-CM

## 2025-01-16 DIAGNOSIS — Z43.2 ATTENTION TO ILEOSTOMY: ICD-10-CM

## 2025-01-16 DIAGNOSIS — Z41.9 SURGERY, ELECTIVE: Primary | ICD-10-CM

## 2025-01-16 LAB
ABO GROUP (TYPE) IN BLOOD: NORMAL
ANION GAP SERPL CALC-SCNC: 18 MMOL/L (ref 10–20)
ANTIBODY SCREEN: NORMAL
BUN SERPL-MCNC: 16 MG/DL (ref 6–23)
CALCIUM SERPL-MCNC: 9.4 MG/DL (ref 8.6–10.6)
CHLORIDE SERPL-SCNC: 100 MMOL/L (ref 98–107)
CO2 SERPL-SCNC: 20 MMOL/L (ref 21–32)
CREAT SERPL-MCNC: 0.66 MG/DL (ref 0.5–1.3)
EGFRCR SERPLBLD CKD-EPI 2021: >90 ML/MIN/1.73M*2
ERYTHROCYTE [DISTWIDTH] IN BLOOD BY AUTOMATED COUNT: 16.7 % (ref 11.5–14.5)
GLUCOSE SERPL-MCNC: 73 MG/DL (ref 74–99)
HCT VFR BLD AUTO: 43.9 % (ref 41–52)
HGB BLD-MCNC: 14.2 G/DL (ref 13.5–17.5)
MCH RBC QN AUTO: 29.5 PG (ref 26–34)
MCHC RBC AUTO-ENTMCNC: 32.3 G/DL (ref 32–36)
MCV RBC AUTO: 91 FL (ref 80–100)
NRBC BLD-RTO: 0 /100 WBCS (ref 0–0)
PLATELET # BLD AUTO: 290 X10*3/UL (ref 150–450)
POTASSIUM SERPL-SCNC: 4.7 MMOL/L (ref 3.5–5.3)
RBC # BLD AUTO: 4.81 X10*6/UL (ref 4.5–5.9)
RH FACTOR (ANTIGEN D): NORMAL
SODIUM SERPL-SCNC: 133 MMOL/L (ref 136–145)
WBC # BLD AUTO: 11.4 X10*3/UL (ref 4.4–11.3)

## 2025-01-16 PROCEDURE — 99214 OFFICE O/P EST MOD 30 MIN: CPT | Performed by: ANESTHESIOLOGY

## 2025-01-16 PROCEDURE — 99406 BEHAV CHNG SMOKING 3-10 MIN: CPT | Performed by: ANESTHESIOLOGY

## 2025-01-16 PROCEDURE — 36415 COLL VENOUS BLD VENIPUNCTURE: CPT

## 2025-01-16 PROCEDURE — 86901 BLOOD TYPING SEROLOGIC RH(D): CPT

## 2025-01-16 PROCEDURE — 85027 COMPLETE CBC AUTOMATED: CPT

## 2025-01-16 PROCEDURE — 80048 BASIC METABOLIC PNL TOTAL CA: CPT

## 2025-01-16 RX ORDER — CHLORHEXIDINE GLUCONATE 40 MG/ML
1 SOLUTION TOPICAL DAILY
Qty: 25 ML | Refills: 0 | Status: SHIPPED | OUTPATIENT
Start: 2025-01-22 | End: 2025-01-27

## 2025-01-16 RX ORDER — CHLORHEXIDINE GLUCONATE ORAL RINSE 1.2 MG/ML
15 SOLUTION DENTAL DAILY
Qty: 30 ML | Refills: 0 | Status: SHIPPED | OUTPATIENT
Start: 2025-01-25 | End: 2025-01-27

## 2025-01-16 ASSESSMENT — DUKE ACTIVITY SCORE INDEX (DASI)
CAN YOU DO MODERATE WORK AROUND THE HOUSE LIKE VACUUMING, SWEEPING FLOORS OR CARRYING GROCERIES: YES
CAN YOU WALK INDOORS, SUCH AS AROUND YOUR HOUSE: YES
DASI METS SCORE: 4.7
CAN YOU PARTICIPATE IN MODERATE RECREATIONAL ACTIVITIES LIKE GOLF, BOWLING, DANCING, DOUBLES TENNIS OR THROWING A BASEBALL OR FOOTBALL: NO
CAN YOU DO LIGHT WORK AROUND THE HOUSE LIKE DUSTING OR WASHING DISHES: YES
CAN YOU WALK A BLOCK OR TWO ON LEVEL GROUND: NO
CAN YOU TAKE CARE OF YOURSELF (EAT, DRESS, BATHE, OR USE TOILET): YES
CAN YOU RUN A SHORT DISTANCE: NO
TOTAL_SCORE: 16.2
CAN YOU DO YARD WORK LIKE RAKING LEAVES, WEEDING OR PUSHING A MOWER: NO
CAN YOU DO HEAVY WORK AROUND THE HOUSE LIKE SCRUBBING FLOORS OR LIFTING AND MOVING HEAVY FURNITURE: NO
CAN YOU HAVE SEXUAL RELATIONS: NO
CAN YOU CLIMB A FLIGHT OF STAIRS OR WALK UP A HILL: YES
CAN YOU PARTICIPATE IN STRENOUS SPORTS LIKE SWIMMING, SINGLES TENNIS, FOOTBALL, BASKETBALL, OR SKIING: NO

## 2025-01-16 ASSESSMENT — ENCOUNTER SYMPTOMS
SINUS CONGESTION: 0
DYSPNEA WITH EXERTION: 0
SHORTNESS OF BREATH: 0
BRUISES/BLEEDS EASILY: 0
ABDOMINAL PAIN: 0
FEVER: 0
PALPITATIONS: 0
COUGH: 0
CHILLS: 0

## 2025-01-16 ASSESSMENT — ACTIVITIES OF DAILY LIVING (ADL): ADL_SCORE: 1

## 2025-01-16 ASSESSMENT — LIFESTYLE VARIABLES: SMOKING_STATUS: SMOKER

## 2025-01-16 NOTE — PREPROCEDURE INSTRUCTIONS
Thank you for visiting [facility] today for your pre-procedure evaluation, you were seen by Dr. Galaviz [phone]      This summary includes instructions and information to aid you during your perioperative period.  Please read carefully. If you have any questions about your visit today, please call the number listed above.  If you become ill or have any changes to your health before your surgery, please contact your primary care provider and alert your surgeon.    Preparing for your Surgery       Exercises  Preoperative Deep Breathing Exercises  Why it is important to do deep breathing exercises before my surgery?  Deep breathing exercises strengthen your breathing muscles.  This helps you to recover after your surgery and decreases the chance of breathing complications.  How are the deep breathing exercises done?  Sit straight with your back supported.  Breathe in deeply and slowly through your nose. Your lower rib cage should expand and your abdomen may move forward.  Hold that breath for 3 to 5 seconds.  Breathe out through pursed lips, slowly and completely.  Rest and repeat 10 times every hour while awake.  Rest longer if you become dizzy or lightheaded.      Preoperative Brain Exercises    What are brain exercises?  A brain exercise is any activity that engages your thinking (cognitive) skills.    What types of activities are considered brain exercises?  Jigsaw puzzles, crossword puzzles, word jumble, memory games, word search, and many more.  Many can be found free online or on your phone via a mobile tsering.    Why should I do brain exercises before my surgery?  More recent research has shown brain exercise before surgery can lower the risk of postoperative delirium (confusion) which can be especially important for older adults.  Patients who did brain exercises for 5 to 10 hours the days before surgery, cut their risk of postoperative delirium in half up to 1 week after  surgery.    Sit-to-Stand Exercise    What is the sit-to-stand exercise?  The sit-to-stand exercise strengthens the muscles of your lower body and muscles in the center of your body (core muscles for stability) helping to maintain and improve your strength and mobility.  How do I do the sit-to-stand exercise?  The goal is to do this exercise without using your arms or hands.  If this is too difficult, use your arms and hands or a chair with armrests to help slowly push yourself to the standing position and lower yourself back to the sitting position. As the movement becomes easier use your arms and hands less.    Steps to the sit-to-stand exercise  Sit up tall in a sturdy chair, knees bent, feet flat on the floor shoulder-width apart.  Shift your hips/pelvis forward in the chair to correctly position yourself for the next movement.  Lean forward at your hips.  Stand up straight putting equal weight on both feet.  Check to be sure you are properly aligned with the chair, in a slow controlled movement sit back down.  Repeat this exercise 10-15 times.  If needed you can do it fewer times until your strength improves.  Rest for 1 minute.  Do another 10-15 sit-to-stand exercises.  Try to do this in the morning and evening.        Instructions    Preoperative Fasting Guidelines    Why must I stop eating and drinking near surgery time?  With sedation, food or liquid in your stomach can enter your lungs causing serious complications  Food can increase nausea and vomiting  When do I need to stop eating and drinking before my surgery?      Do not eat any food after midnight the night before your surgery/procedure. You may have up to 13.5 ounces of clear liquid until TWO hours before your instructed arrival time to the hospital.  This includes water, black tea/coffee, (no milk or cream) apple juice, and electrolyte drinks (Gatorade). You may chew gum until TWO hours before your surgery/procedure            Simple things you can  do to help prevent blood clots     Blood clots are blockages that can form in the body's veins. When a blood clot forms in your deep veins, it may be called a deep vein thrombosis, or DVT for short. Blood clots can happen in any part of the body where blood flows, but they are most common in the arms and legs. If a piece of a blood clot breaks free and travels to the lungs, it is called a pulmonary embolus (PE). A PE can be a very serious problem.         Being in the hospital or having surgery can raise your chances of getting a blood clot because you may not be well enough to move around as much as you normally do.         Ways you can help prevent blood clots in the hospital       Wearing SCDs  SCDs stands for Sequential Compression Devices.   SCDs are special sleeves that wrap around your legs. They attach to a pump that fills them with air to gently squeeze your legs every few minutes.  This helps return the blood in your legs to your heart.   SCDs should only be taken off when walking or bathing. SCDs may not be comfortable, but they can help save your life.              Pump SCD leg sleeves  Wearing compression stockings - if your doctor orders them. These special snug-fitting stockings gently squeeze your legs to help blood flow.       Walking. Walking helps move the blood in your legs.   If your doctor says it is ok, try walking the halls at least   5 times a day. Ask us to help you get up, so you don't fall.      Taking any blood-thinning medicines your doctor orders.              Ways you can help prevent blood clots at home         Wearing compression stockings - if your doctor orders them.   Walking - to help move the blood in your legs.    Taking any blood-thinning medicines your doctor orders.      Signs of a blood clot or PE    Tell your doctor or nurse right away if you have any of the problems listed below.         If you are at home, seek medical care right away. Call 911 for chest pain or  problems breathing.            Signs of a blood clot (DVT) - such as pain, swelling, redness, or warmth in your arm or legs.  Signs of a pulmonary embolism (PE) - such as chest pain or feeling short of breath      Tobacco and Alcohol;  Do not drink alcohol or smoke within 24 hours of surgery.  It is best to quit smoking for as long as possible before any surgery or procedure.    Quitting Smoking; Recognizing Dangerous Situations:  1-Alcohol use during the first month after quitting, 2-Being around smoke or someone who smokes 3-Times situation routinely smoked  4-Triggers-car, breaks, coffee, when awakening, social events  Coping Skills: 1-Learning new ways to manage stress 2-Exercising 3-Relaxation breathing   4-Change routines 5-Distraction techniques    Websites:  Smoke-Free - offers free text messages and an tsering to help you quit. Info includes eating and mood issues that may come with quitting. There is a Live Helpline to talk to an expert. Go to smokefree.gov; Become an Ex-Smoker - the free EX Plan is based on scientific research and useful advice from ex-smokers. It isn't just about quitting smoking.  It's about re-learning life without cigarettes using a 3-step program.  Go to becomeanex.org   Centers for Disease Control offer many suggestions for helping you quit. Includes a Quit Guide and real-life stories. There are sections for specific groups such as LGBT, , different ethnic groups, and pregnant women.  Go to cdc.gov/tobacco/campaign/tips    Other Resources:  [place] Tobacco Quit Line - call 1-800-QUIT-NOW or 1-250.274.4814.  [facility] 2-1-1 - to find local programs and resources. Call 211 or go to 211.org.  [facility] Tobacco Cessation Program - call 170-077-9723.  American Lung Association offers classes for quitting smoking. Some places may charge a fee. For a list of classes, go to lung.org or call 1-800LUNG-[place].     Some things to think about:; The health benefits of quitting smoking  can help most of the major parts of your body. There is no safe amount of cigarette smoke. Quitting smoking can add years to your life. When you quit, you'll also protect your loved ones from dangerous secondhand smoke. Make a plan, join a support group, and talk to your physician to assist in quitting smoking.                                                                                                                   Other Instructions  Body Wash; What is a home preoperative antibacterial shower? This shower is a way of cleaning the skin with a germ-killing solution before surgery.  The solution contains chlorhexidine, commonly known as CHG.  CHG is a skin cleanser with germ-killing ability.  Let your doctor know if you are allergic to chlorhexidine. Why do I need to take a preoperative antibacterial shower? Skin is not sterile.  It is best to try to make your skin as free of germs as possible before surgery.  Proper cleansing with a germ-killing soap before surgery can lower the number of germs on your skin.  This helps to reduce the risk of infection at the surgical site.  Following the instructions listed below will help you prepare your skin for surgery.   How do I use the solution? Steps:  Begin using your CHG soap 5 days before your scheduled surgery on ___________.   First, wash and rinse your hair using the CHG soap. Keep CHG soap away from ear canals and eyes.  Rinse completely, do not condition.  Hair extensions should be removed. , Oral/Dental Rinse: What is oral/dental rinse?  It is a mouthwash. It is a way of cleaning the mouth with a germ-killing solution before your surgery.  The solution contains chlorhexidine, commonly known as CHG. It is used inside the mouth to kill a bacteria known as Staphylococcus aureus.  Let your doctor know if you are allergic to Chlorhexidine. Why do I need to use CHG oral/dental rinse? The CHG oral/dental rinse helps to kill a bacteria in your mouth known as  Staphylococcus aureus.  This reduces the risk of infection at the surgical site.  Using your CHG oral/dental rinse STEPS: Use your CHG oral/dental rinse after you brush your teeth the night before (at bedtime) and the morning of your surgery.  Follow all directions on your prescription label.  Use the cap on the container to measure 15 ml.  Swish (gargle if you can) the mouthwash in your mouth for at least 30 seconds, (do not swallow) and spit out.  After you use your CHG rinse, do not rinse your mouth with water, drink or eat.  Please refer to the prescription label for the appropriate time to resume oral intake What side effects might I have using the CHG oral/dental rinse? CHG rinse will stick to plaque on the teeth.  Brush and floss just before use.  Teeth brushing will help avoid staining of plaque during use.     Ensure Surgery Immuno-Nutrition Shake; This shake provides specialized nutrients to help prepare your body for surgery. Your surgeon's office may send it to your home, or you may receive it from [facility] if you are scheduled for an appointment.  If your surgeon has instructed you to begin the shakes and you have not received them at least 7 days before your scheduled surgery, please contact your surgeon's office. You should begin drinking your shakes 6 days before your surgery date, start on _______.  You should drink 1 carton three times a day, you can have one carton with breakfast, lunch, and dinner.  If your surgeon has given you instructions to drink clear liquids the day before surgery, you can continue to drink your Ensure Surgery immune-nutrition shakes.         The Week before Surgery        Seven days before Surgery  Check your [facility] medication instructions  Do the exercises provided to you by [facility]   Arrange for a responsible, adult licensed  to take you home after surgery and stay with you for 24 hours.  You will not be permitted to drive yourself home if  you have received any anesthetic/sedation  Six days before surgery  Check your [facility] medication instructions  Do the exercises provided to you by [facility]   Start using Chlorhexidene (CHG) body wash if prescribed  Five days before surgery  Check your [facility] medication instructions  Do the exercises provided to you by [facility]   Continue to use CHG body wash if prescribed  Three days before surgery  Check your [facility] medication instructions  Do the exercises provided to you by [facility]   Continue to use CHG body wash if prescribed  Two days before surgery  Check your [facility] medication instructions  Do the exercises provided to you by [facility]   Continue to use CHG body wash if prescribed    The Day before Surgery       Check your [facility] medication and all other [facility] instructions including when to stop eating and drinking  You will be called with your arrival time for surgery in the late afternoon.  If you do not receive a call please reach out to your surgeon's office.  Do not smoke or drink 24 hours before surgery  Prepare items to bring with you to the hospital  Shower with your chlorhexidine wash if prescribed  Brush your teeth and use your chlorhexidine dental rinse if prescribed    The Day of Surgery       Check your [facility] medication instructions  Ensure you follow the instructions for when to stop eating and drinking  Shower, if prescribed use CHG.  Do not apply any lotions, creams, moisturizers, perfume or deodorant  Brush your teeth and use your CHG dental rinse if prescribed  Wear loose comfortable clothing  Avoid make-up  Remove  jewelry and piercings, consider professional piercing removal with a plastic spacer if needed  Bring photo ID and Insurance card  Bring an accurate medication list that includes medication dose, frequency and allergies  Bring a copy of your advanced directives (will, health care power of )  Bring any devices and controllers as well as medical devices you have been provided with for surgery (CPAP, slings,  braces, etc.)  Dentures, eyeglasses, and contacts will be removed before surgery, please bring cases for contacts or glasses

## 2025-01-16 NOTE — CPM/PAT H&P
CPM/PAT Evaluation       Name: Wade Mahcado (Wade Machado)  /Age: 1955/69 y.o.     Visit Type:   In-Person       Chief Complaint: PAT pre-op Eval    70 y/o male scheduled for ileostomy reversal on 25 with Dr. Mcintyre secondary to colovesical fistula I/s/o diverticulosis for which patient underwent lap sigmoid colon resection with takedown of colovesical fistula and diverting loop ileostomy 24 with Dr. Mcintyre.  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  Presents to CPM today for perioperative risk stratification and optimization.    Past Medical History:   Diagnosis Date    Alcohol use     BPH (benign prostatic hyperplasia)     Colloid cyst of brain (Multi)     s/p R crani for resection ([facility], )    Colovesical fistula 10/08/2024    Cystitis     Diverticulitis     GERD (gastroesophageal reflux disease)     Hypertension     Ileostomy in place (Multi) 2024    Obesity     Pontine hemorrhage (Multi) 2024    PONV (postoperative nausea and vomiting)     Prostate cancer (Multi)     Sepsis, due to unspecified organism, unspecified whether acute organ dysfunction present (Multi) 10/20/2024    Tobacco use     Unspecified osteoarthritis, unspecified site     Osteoarthritis       Past Surgical History:   Procedure Laterality Date    BRAIN SURGERY      third 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 (at [facility])    COLON SURGERY  2024    Laparoscopy Sigmoid Colon with takedown of colovesical fistula    COLONOSCOPY  2024    HIP ARTHROPLASTY Right 2024    HIP ARTHROPLASTY Left     HIP SURGERY  2024    Right prosthetic hip dislocation status post revision total hip arthroplasty on 2024. Closed reduction was performed with anesthesia in ED    OTHER SURGICAL HISTORY  2022     Retinal detachment repair    OTHER SURGICAL HISTORY  2018    Cataract surgery    PROSTATE SURGERY  2016    Prostate seed implantation       Patient  reports that he is not currently sexually active.    No family history on file.    No Known Allergies    Prior to Admission medications    Medication Sig Start Date End Date Taking? Authorizing Provider   acetaminophen (Tylenol) 325 mg tablet Take 1-2 tablets (325-650 mg) by mouth every 4 hours if needed for mild pain (1 - 3). 12/7/22   Historical Provider, MD   ascorbic acid, vitamin C, 100 mg chewable tablet Chew 4 tablets (400 mg) once daily. 11/21/24   Historical Provider, MD   calcium carbonate (Tums Extra Strength) 300 mg (750 mg) chewable tablet Chew 300 mg 3 times a day as needed for indigestion or heartburn. Indications: heartburn  Patient not taking: Reported on 1/8/2025 11/21/24   Historical Provider, MD   finasteride (Proscar) 5 mg tablet Take 1 tablet (5 mg) by mouth once daily. 11/21/24   Historical Provider, MD   lisinopril 40 mg tablet TAKE ONE TABLET BY MOUTH once DAILY for blood pressure 6/3/24   Eddie Crum DO   loperamide (Imodium) 2 mg capsule Take 1 capsule (2 mg) by mouth 4 times a day before meals. 11/19/24 3/19/25  Kacy Shukla APRN-CNP   metoprolol succinate XL (Toprol-XL) 25 mg 24 hr tablet Take 1 tablet (25 mg) by mouth once daily. 11/21/24   Historical Provider, MD   nystatin (Mycostatin) 100,000 unit/gram powder Sprinkle on affected skin twice daily. 12/9/24   DARIA Ortiz   tamsulosin (Flomax) 0.4 mg 24 hr capsule Take 1 capsule (0.4 mg) by mouth 2 times a day. 8/13/24   COLTON Duke-CNP        PAT ROS:   Constitutional:    no fever   no chills  Neuro/Psych:   Eyes:   Ears:   Nose:    no sinus congestion  Mouth:   Throat:   Neck:   Cardio:    no chest pain   no palpitations   no PEREZ  Respiratory:    no cough   no shortness of breath  Endocrine:   GI:    no abdominal pain  :   Musculoskeletal:   Hematologic:     does not bruise/bleed easily  Skin:      Physical Exam  Vitals reviewed. Physical exam within normal limits.   Constitutional:       Appearance: Normal appearance.   HENT:      Head: Normocephalic and atraumatic.   Cardiovascular:      Rate and Rhythm: Normal rate and regular rhythm.   Pulmonary:      Effort: Pulmonary effort is normal. No respiratory distress.      Breath sounds: Normal breath sounds.   Musculoskeletal:      Right lower leg: No edema.      Left lower leg: No edema.   Skin:     General: Skin is warm and dry.   Neurological:      Mental Status: He is alert and oriented to person, place, and time. Mental status is at baseline.      Gait: Gait abnormal.      Comments: Uses cane to ambulate   Psychiatric:         Mood and Affect: Mood normal.         Behavior: Behavior normal.         Thought Content: Thought content normal.         Judgment: Judgment normal.        PAT AIRWAY:   Airway:     Mallampati::  IV    TM distance::  >3 FB    Neck ROM::  Full        Latest Reference Range & Units 11/19/24 06:31 11/25/24 12:30 12/02/24 14:46   GLUCOSE 74 - 99 mg/dL 84 99 74   SODIUM 136 - 145 mmol/L 137 131 (L) 132 (L)   POTASSIUM 3.5 - 5.3 mmol/L 4.0 5.3 5.0   CHLORIDE 98 - 107 mmol/L 103 99 97 (L)   Bicarbonate 21 - 32 mmol/L 22 23 20 (L)   Anion Gap 10 - 20 mmol/L 16 14 20   Blood Urea Nitrogen 6 - 23 mg/dL 17 20 23   Creatinine 0.50 - 1.30 mg/dL 0.60 0.69 0.69   EGFR >60 mL/min/1.73m*2 >90 >90 >90   Calcium 8.6 - 10.6 mg/dL 9.0 9.4 9.5   PHOSPHORUS 2.5 - 4.9 mg/dL  4.5    Albumin 3.4 - 5.0 g/dL  3.8 4.0   Alkaline Phosphatase 33 - 136 U/L   110   ALT 10 - 52 U/L   18   AST 9 - 39 U/L   15   Bilirubin Total 0.0 - 1.2 mg/dL   0.3   Total Protein 6.4 - 8.2 g/dL   7.7   MAGNESIUM 1.60 - 2.40 mg/dL  1.94 2.21   WBC 4.4 - 11.3 x10*3/uL 12.6 (H)     nRBC 0.0 - 0.0 /100 WBCs 0.0     RBC 4.50 - 5.90 x10*6/uL 4.97     HEMOGLOBIN 13.5 - 17.5 g/dL 14.3     HEMATOCRIT 41.0 - 52.0 % 45.3     MCV 80 - 100 fL 91     MCH  "26.0 - 34.0 pg 28.8     MCHC 32.0 - 36.0 g/dL 31.6 (L)     RED CELL DISTRIBUTION WIDTH 11.5 - 14.5 % 16.5 (H)     Platelets 150 - 450 x10*3/uL 283     (L): Data is abnormally low  (H): Data is abnormally high    Visit Vitals  /63   Pulse 66   Temp 36.7 °C (98 °F) (Oral)   Ht 1.803 m (5' 11\")   Wt 106 kg (233 lb 1.6 oz)   SpO2 97%   BMI 32.51 kg/m²   Smoking Status Every Day   BSA 2.3 m²     Initial NIBP 95/69 -> recheck 102/63    DASI Risk Score      Flowsheet Row Pre-Admission Testing from 1/16/2025 in [facility]   Can you take care of yourself (eat, dress, bathe, or use toilet)?  2.75 filed at 01/16/2025 1005   Can you walk indoors, such as around your house? 1.75 filed at 01/16/2025 1005   Can you walk a block or two on level ground?  0 filed at 01/16/2025 1005   Can you climb a flight of stairs or walk up a hill? 5.5 filed at 01/16/2025 1005   Can you run a short distance? 0 filed at 01/16/2025 1005   Can you do light work around the house like dusting or washing dishes? 2.7 filed at 01/16/2025 1005   Can you do moderate work around the house like vacuuming, sweeping floors or carrying groceries? 3.5 filed at 01/16/2025 1005   Can you do heavy work around the house like scrubbing floors or lifting and moving heavy furniture?  0 filed at 01/16/2025 1005   Can you do yard work like raking leaves, weeding or pushing a mower? 0 filed at 01/16/2025 1005   Can you have sexual relations? 0 filed at 01/16/2025 1005   Can you participate in moderate recreational activities like golf, bowling, dancing, doubles tennis or throwing a baseball or football? 0 filed at 01/16/2025 1005   Can you participate in strenous sports like swimming, singles tennis, football, basketball, or skiing? 0 filed at 01/16/2025 1005   DASI SCORE 16.2 filed at 01/16/2025 1005   METS Score (Will be calculated only when all the questions are answered) 4.7 filed at 01/16/2025 1005          Caprini DVT Assessment      Flowsheet " Row Pre-Admission Testing from 1/16/2025 in [facility] Admission (Discharged) from 11/11/2024 in [facility] with Thomas Mcintyre MD   DVT Score (IF A SCORE IS NOT CALCULATING, MUST SELECT A BMI TO COMPLETE) 5 filed at 01/16/2025 1021 8 filed at 11/11/2024 1200   Surgical Factors -- Major surgery planned, lasting 2-3 hours filed at 11/11/2024 1200   BMI (BMI MUST BE CHOSEN) 31-40 (Obesity) filed at 01/16/2025 1021 31-40 (Obesity) filed at 11/11/2024 1200          Modified Frailty Index      Flowsheet Row Pre-Admission Testing from 1/16/2025 in [facility]   Non-independent functional status (problems with dressing, bathing, personal grooming, or cooking) 0 filed at 01/16/2025 1023   History of diabetes mellitus  0 filed at 01/16/2025 1023   History of COPD 0 filed at 01/16/2025 1023   History of CHF No filed at 01/16/2025 1023   History of MI 0 filed at 01/16/2025 1023   History of Percutaneous Coronary Intervention, Cardiac Surgery, or Angina No filed at 01/16/2025 1023   Hypertension requiring the use of medication  0.0909 filed at 01/16/2025 1023   Peripheral vascular disease 0 filed at 01/16/2025 1023   Impaired sensorium (cognitive impairement or loss, clouding, or delirium) 0 filed at 01/16/2025 1023   TIA or CVA withouy residual deficit 0.0909 filed at 01/16/2025 1023   Cerebrovascular accident with deficit 0 filed at 01/16/2025 1023   Modified Frailty Index Calculator .1818 filed at 01/16/2025 1023          CHADS2 Stroke Risk  Current as of about an hour ago        N/A 3 to 100%: High Risk   2 to < 3%: Medium Risk   0 to < 2%: Low Risk     Last Change: N/A          This score determines the patient's risk of having a stroke if the patient has atrial fibrillation.        This score is not applicable to this patient. Components are not calculated.          Revised Cardiac Risk Index      Flowsheet Row Pre-Admission Testing from 1/16/2025 in [facility]   High-Risk Surgery (Intraperitoneal, Intrathoracic,Suprainguinal vascular) 1 filed at 01/16/2025 1022   History of ischemic heart disease (History of MI, History of positive exercuse test, Current chest paint considered due to myocardial ischemia, Use of nitrate therapy, ECG with pathological Q Waves) 0 filed at 01/16/2025 1022   History of congestive heart failure (pulmonary edemia, bilateral rales or S3 gallop, Paroxysmal nocturnal dyspnea, CXR showing pulmonary vascular redistribution) 0 filed at 01/16/2025 1022   History of cerebrovascular disease (Prior TIA or stroke) 1 filed at 01/16/2025 1022   Pre-operative insulin treatment 0 filed at 01/16/2025 1022   Pre-operative creatinine>2 mg/dl 0 filed at 01/16/2025 1022   Revised Cardiac Risk Calculator 2 filed at 01/16/2025 1022          Apfel Simplified Score      Flowsheet Row Pre-Admission Testing [facility] 1/16/2025 in [facility]   Smoking status 0 filed at 01/16/2025 1023   History of motion sickness or PONV  0 filed at 01/16/2025 1023   Use of postoperative opioids 1 filed at 01/16/2025 1023   Gender - Female 0=No filed at 01/16/2025 1023   Apfel Simplified Score Calculator 1 filed at 01/16/2025 1023          Risk Analysis Index Results This Encounter         1/16/2025  1024             Do you live in a place other than your own home?: 0    Any kidney failure, kidney not working well, or seeing a kidney doctor (nephrologist)? If yes, was this for kidney stones or another problem?: 0 No    Any history of chronic (long-term) congestive heart failure (CHF)?: 0 No    Any shortness of breath when resting?: 0 No    In the past five years, have you been diagnosed with or treated for cancer?: No    During the last 3 months has it become difficult for you to remember things or organize your thoughts?: 0 No    Have you lost weight of 10 pounds or more in the past 3 months without trying?: 4 Yes    Do you have any loss of appetitie?: 4 Yes    Getting  Around (Mobility): 1 Needs help from cane, walker, or scooter    Eatin Can plan and prepare own meals    Toiletin Can use toilet without any help    Personal Hygiene (Bathing, Hand Washing, Changing Clothes): 0 Can shower or bathe without any help    EVANS Cancer History: Patient does not indicate history of cancer    Total Risk Analysis Index Score Without Cancer: 32    Total Risk Analysis Index Score: 32          Stop Bang Score      Flowsheet Row Pre-Admission Testing from 2025 in [facility]   Do you snore loudly? 1 filed at 2025 1004   Do you often feel tired or fatigued after your sleep? 1 filed at 2025 1004   Has anyone ever observed you stop breathing in your sleep? 0 filed at 2025 1004   Do you have or are you being treated for high blood pressure? 1 filed at 2025 1004   Recent BMI (Calculated) 32.1 filed at 2025 1004   Is BMI greater than 35 kg/m2? 0=No filed at 2025 1004   Age older than 50 years old? 1=Yes filed at 2025 1004   Is your neck circumference greater than 17 inches (Male) or 16 inches (Female)? 0 filed at 2025 1004   Gender - Male 1=Yes filed at 2025 1004   STOP-BANG Total Score 5 filed at 2025 1004          Prodigy: High Risk  Total Score: 16              Prodigy Age Score      Prodigy Gender Score          ARISCAT Score for Postoperative Pulmonary Complications      Flowsheet Row Pre-Admission Testing from 2025 in [facility]   Age Calculated Score 3 filed at 2025 1025   Preoperative SpO2 0 filed at 2025 1025   Respiratory infection in the last month Either upper or lower (i.e., URI, bronchitis, pneumonia), with fever and antibiotic treatment 0 filed at 2025 1025   Preoperative anemia (Hgb less than 10 g/dl) 0 filed at 2025 1025   Surgical incision  15 filed at 2025 1025   Duration of surgery  23 filed at 2025 1025   Emergency Procedure  0 filed at  "01/16/2025 1025   ARISCAT Total Score  41 filed at 01/16/2025 1025          Brandy Perioperative Risk for Myocardial Infarction or Cardiac Arrest (ROSETTE)      Flowsheet Row Pre-Admission Testing from 1/16/2025 in [facility]   Calculated Age Score 1.34 filed at 01/16/2025 1025   Functional Status  0.65 filed at 01/16/2025 1025   ASA Class  -1.92 filed at 01/16/2025 1025   Creatinine 0 filed at 01/16/2025 1025   Type of Procedure  1.14 filed at 01/16/2025 1025   ROSETTE Total Score  -4.04 filed at 01/16/2025 1025   ROSETTE % 1.73 filed at 01/16/2025 1025          No results found for this or any previous visit (from the past 24 hours).     Assessment and Plan:    68 y/o male scheduled for ileostomy reversal on 1/27/25 with Dr. Mcintyre secondary to colovesical fistula I/s/o diverticulosis for which patient underwent lap sigmoid colon resection with takedown of colovesical fistula and diverting loop ileostomy 11/11/24 with Dr. Mcintyre.  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  Presents to [facility] today for perioperative risk stratification and optimization.    Neuro:  H/o colloid cyst s/p [facility] with hx of pontine hemorrhage and admission 8/9-8/14/24,  -last saw neurology 11/6/24:   \"We saw the patient in clinic for pontine mass concerning for cavernoma. Mass looks stable on repeat MRI. From Neurology stand point there is no contraindication for surgery. We recommend avoiding perioperative anticoagulation use given concern of hemorrhage on initial scans. \"    -I messaged Dr. Gilmore with same recommendations as before and no contraindications from surgery from neuro perspective. Pt has increased bleeding risk given cavernoma and anticoagulation is relative contraindication. Email sent to Dr. Mcintyre to make " aware.    The patient is at increased risk for perioperative delirium secondary to  age, excessive alcohol use, type and duration of surgery, Patient instructed on and provided cognitive exercises  Patient is at increased risk for perioperative CVA secondary to  increased age, operative time > 2.5 hours    HEENT:  No HEENT diagnosis or significant findings on chart review or clinical presentation and evaluation. No further preoperative testing/intervention indicated at this time.    Cardiovascular:  HTN- continue metoprolol. Pt on lisinopril, NIBP soft today 102/63 on recheck. Pt asymptomatic. Reports BP has been running low, asymptomatic with this. Pt instructed to take BP at home prior to lisinopril and hold if systolics are consistently in the low 100s. He notes 20lb weight loss since having ileostomy created - suspect he may no longer need his antihypertensive given his weight loss. Advised to follow up with PCP. Told to hold 24hrs prior to surgery  METS: 4.7  RCRI: 2 points, 10.1% risk for postoperative MACE   ROSETTE: 1.73% risk for 30 day postoperative MACE  EKG - 10/19/24 - SR with sinus arrhythmia    TTE 8/10/24:   1. Poorly visualized anatomical structures due to suboptimal image quality.   2. Left ventricular ejection fraction is normal, by visual estimate at 65-70%.   3. There is normal right ventricular global systolic function.     Pulmonary:  No pulmonary diagnosis, however patient is at increased risk of perioperative complications secondary to  age > 60, Tobacco abuse, obesity, site of surgery, duration of surgery > 2 hours, types of anesthetic  Stop Bang score is 5 placing patient at high risk for PANKAJ  ARISCAT: 26-44 points, 13.3% risk of in-hospital postoperative pulmonary complication  PRODIGY: High risk for opioid induced respiratory depression  Smoking cessation discussed with patient, patient also provided cessation education.    Renal:   #BPH  #H/o prostate cancer s/p brachitherapy  #Colovesical  fistula   follows with Dr. Brown  No renal diagnosis, however patient is at increase risk for perioperative renal complications secondary to  Age equal to or greater than 56, HTN, intraperitoneal surgery, use of an ace, arb, or NSAID  Pt at Moderate risk for perioperative RAMANDEEP based on Dynamic Predictive Scoring Tool for Perioperative RAMANDEEP     Endocrine:  No endocrine diagnosis or significant findings on chart review or clinical presentation and evaluation. No further testing or intervention is indicated at this time.    Hematologic:  -T&S, CBC today  No hematologic diagnosis, however patient is at an increased risk for DVT  Caprini Score 5, patient at High risk for perioperative DVT.  Patient provided with VTE education/handout.    Gastrointestinal:   #Colovesical fistula I/s/o colonic diverticulosis-  -S/p ex-lap sigmoid colectomy, colovesical fistula takedown + diverting loop ileostomy (11/11/24). Post-op course c/b ileus requiring NGT for decompression; eventually resolved    Eat-10 score 0  Apfel 1    Infectious disease:   No infectious diagnosis or significant findings on chart review or clinical presentation and evaluation.   Prescription provided for CHG body wash and dental rinse. CHG use instructions reviewed and provided to patient.  Staph screen collected    Musculoskeletal:   No diagnosis or significant findings on chart review or clinical presentation and evaluation.     Anesthesia/Airway:  No anesthesia complications      Medication instructions and NPO guidelines reviewed with the patient.  All questions or concerns discussed and addressed.      Patient seen and staffed with Dr. Wilkes

## 2025-01-24 NOTE — PROGRESS NOTES
Pharmacy Medication History Review    Wade Machado is a 69 y.o. male who is planned to be admitted for No Principal Problem: There is no principal problem currently on the Problem List. Please update the Problem List and refresh.. Pharmacy called the patient prior to their scheduled procedure and reviewed the patient's [id number] medications for accuracy.    Medications ADDED:  none  Medications CHANGED:  Loperamide 2mg directions from #1QID to #1QAM plus PRN  Medications REMOVED:   none    Please review updated prior to admission medication list and comments regarding how patient may be taking medications differently by going to Admission tab --> Admission Orders --> Admit Orders / Review prior to admission medications.     Preferred pharmacy, last doses of medications, and allergies to be confirmed with patient by nursing the day of procedure.     Sources used to complete the med history [facility]  Pharmacy dispense history  Patient interview  Spouse  Chart Review  Care Everywhere     Below are additional concerns with the patient's PTA list.  Spoke with patient and spouse - jhoana -   Patient states they are taking #1 capsule of loperamide 2mg every morning and only takes and additional dose if needed for the rest of the day  Patient stopped supplements in preparation of procedure    Namita Lopez [facility]  Please reach out via Secure Chat for questions or call [facility] or [facility]

## 2025-01-27 ENCOUNTER — ANESTHESIA (OUTPATIENT)
Dept: OPERATING ROOM | Facility: HOSPITAL | Age: 70
DRG: 331 | End: 2025-01-27
Payer: COMMERCIAL

## 2025-01-27 ENCOUNTER — HOSPITAL ENCOUNTER (INPATIENT)
Facility: HOSPITAL | Age: 70
LOS: 2 days | Discharge: HOME | DRG: 331 | End: 2025-01-29
Attending: COLON & RECTAL SURGERY | Admitting: COLON & RECTAL SURGERY
Payer: COMMERCIAL

## 2025-01-27 DIAGNOSIS — K57.92 DIVERTICULITIS: ICD-10-CM

## 2025-01-27 DIAGNOSIS — Z43.2 ATTENTION TO ILEOSTOMY: Primary | ICD-10-CM

## 2025-01-27 PROCEDURE — 2500000004 HC RX 250 GENERAL PHARMACY W/ HCPCS (ALT 636 FOR OP/ED): Performed by: STUDENT IN AN ORGANIZED HEALTH CARE EDUCATION/TRAINING PROGRAM

## 2025-01-27 PROCEDURE — 2500000004 HC RX 250 GENERAL PHARMACY W/ HCPCS (ALT 636 FOR OP/ED)

## 2025-01-27 PROCEDURE — 7100000001 HC RECOVERY ROOM TIME - INITIAL BASE CHARGE: Performed by: COLON & RECTAL SURGERY

## 2025-01-27 PROCEDURE — 7100000002 HC RECOVERY ROOM TIME - EACH INCREMENTAL 1 MINUTE: Performed by: COLON & RECTAL SURGERY

## 2025-01-27 PROCEDURE — 2500000005 HC RX 250 GENERAL PHARMACY W/O HCPCS: Performed by: COLON & RECTAL SURGERY

## 2025-01-27 PROCEDURE — 3600000003 HC OR TIME - INITIAL BASE CHARGE - PROCEDURE LEVEL THREE: Performed by: COLON & RECTAL SURGERY

## 2025-01-27 PROCEDURE — 3600000008 HC OR TIME - EACH INCREMENTAL 1 MINUTE - PROCEDURE LEVEL THREE: Performed by: COLON & RECTAL SURGERY

## 2025-01-27 PROCEDURE — A44620 PR CLOSE ENTEROSTOMY: Performed by: ANESTHESIOLOGY

## 2025-01-27 PROCEDURE — 2500000005 HC RX 250 GENERAL PHARMACY W/O HCPCS

## 2025-01-27 PROCEDURE — 2500000004 HC RX 250 GENERAL PHARMACY W/ HCPCS (ALT 636 FOR OP/ED): Mod: TB | Performed by: COLON & RECTAL SURGERY

## 2025-01-27 PROCEDURE — 3700000001 HC GENERAL ANESTHESIA TIME - INITIAL BASE CHARGE: Performed by: COLON & RECTAL SURGERY

## 2025-01-27 PROCEDURE — 2720000007 HC OR 272 NO HCPCS: Performed by: COLON & RECTAL SURGERY

## 2025-01-27 PROCEDURE — 2500000004 HC RX 250 GENERAL PHARMACY W/ HCPCS (ALT 636 FOR OP/ED): Performed by: ANESTHESIOLOGY

## 2025-01-27 PROCEDURE — 64999 UNLISTED PX NERVOUS SYSTEM: CPT | Performed by: ANESTHESIOLOGY

## 2025-01-27 PROCEDURE — 2500000002 HC RX 250 W HCPCS SELF ADMINISTERED DRUGS (ALT 637 FOR MEDICARE OP, ALT 636 FOR OP/ED): Performed by: STUDENT IN AN ORGANIZED HEALTH CARE EDUCATION/TRAINING PROGRAM

## 2025-01-27 PROCEDURE — 0DBB0ZZ EXCISION OF ILEUM, OPEN APPROACH: ICD-10-PCS | Performed by: COLON & RECTAL SURGERY

## 2025-01-27 PROCEDURE — 1170000001 HC PRIVATE ONCOLOGY ROOM DAILY

## 2025-01-27 PROCEDURE — 2500000001 HC RX 250 WO HCPCS SELF ADMINISTERED DRUGS (ALT 637 FOR MEDICARE OP)

## 2025-01-27 PROCEDURE — 2500000001 HC RX 250 WO HCPCS SELF ADMINISTERED DRUGS (ALT 637 FOR MEDICARE OP): Performed by: STUDENT IN AN ORGANIZED HEALTH CARE EDUCATION/TRAINING PROGRAM

## 2025-01-27 PROCEDURE — 3700000002 HC GENERAL ANESTHESIA TIME - EACH INCREMENTAL 1 MINUTE: Performed by: COLON & RECTAL SURGERY

## 2025-01-27 PROCEDURE — 44620 REPAIR BOWEL OPENING: CPT | Performed by: COLON & RECTAL SURGERY

## 2025-01-27 PROCEDURE — 99222 1ST HOSP IP/OBS MODERATE 55: CPT | Performed by: ANESTHESIOLOGY

## 2025-01-27 RX ORDER — CEFAZOLIN SODIUM 2 G/100ML
2 INJECTION, SOLUTION INTRAVENOUS ONCE
Status: DISCONTINUED | OUTPATIENT
Start: 2025-01-27 | End: 2025-01-27 | Stop reason: HOSPADM

## 2025-01-27 RX ORDER — LIDOCAINE HYDROCHLORIDE 20 MG/ML
INJECTION, SOLUTION INFILTRATION; PERINEURAL AS NEEDED
Status: DISCONTINUED | OUTPATIENT
Start: 2025-01-27 | End: 2025-01-27

## 2025-01-27 RX ORDER — OXYCODONE HYDROCHLORIDE 5 MG/1
5 TABLET ORAL EVERY 4 HOURS PRN
Status: DISCONTINUED | OUTPATIENT
Start: 2025-01-27 | End: 2025-01-27 | Stop reason: HOSPADM

## 2025-01-27 RX ORDER — HEPARIN SODIUM 5000 [USP'U]/ML
5000 INJECTION, SOLUTION INTRAVENOUS; SUBCUTANEOUS ONCE
Status: DISCONTINUED | OUTPATIENT
Start: 2025-01-27 | End: 2025-01-27 | Stop reason: HOSPADM

## 2025-01-27 RX ORDER — ROPIVACAINE IN 0.9% SOD CHL/PF 0.2 %
14 PLASTIC BAG, INJECTION (ML) EPIDURAL CONTINUOUS
Status: DISCONTINUED | OUTPATIENT
Start: 2025-01-27 | End: 2025-01-29 | Stop reason: HOSPADM

## 2025-01-27 RX ORDER — ROPIVACAINE IN 0.9% SOD CHL/PF 0.2 %
PLASTIC BAG, INJECTION (ML) EPIDURAL CONTINUOUS PRN
Status: DISCONTINUED | OUTPATIENT
Start: 2025-01-27 | End: 2025-01-27

## 2025-01-27 RX ORDER — ONDANSETRON 4 MG/1
4 TABLET, ORALLY DISINTEGRATING ORAL EVERY 8 HOURS PRN
Status: DISCONTINUED | OUTPATIENT
Start: 2025-01-27 | End: 2025-01-29 | Stop reason: HOSPADM

## 2025-01-27 RX ORDER — NALOXONE HYDROCHLORIDE 0.4 MG/ML
0.2 INJECTION, SOLUTION INTRAMUSCULAR; INTRAVENOUS; SUBCUTANEOUS EVERY 5 MIN PRN
Status: DISCONTINUED | OUTPATIENT
Start: 2025-01-27 | End: 2025-01-29 | Stop reason: HOSPADM

## 2025-01-27 RX ORDER — METOPROLOL SUCCINATE 25 MG/1
25 TABLET, EXTENDED RELEASE ORAL DAILY
Status: DISCONTINUED | OUTPATIENT
Start: 2025-01-28 | End: 2025-01-29 | Stop reason: HOSPADM

## 2025-01-27 RX ORDER — ACETAMINOPHEN 325 MG/1
975 TABLET ORAL EVERY 6 HOURS SCHEDULED
Status: DISCONTINUED | OUTPATIENT
Start: 2025-01-27 | End: 2025-01-29 | Stop reason: HOSPADM

## 2025-01-27 RX ORDER — LISINOPRIL 40 MG/1
40 TABLET ORAL DAILY
Status: DISCONTINUED | OUTPATIENT
Start: 2025-01-28 | End: 2025-01-29 | Stop reason: HOSPADM

## 2025-01-27 RX ORDER — METHOCARBAMOL 100 MG/ML
1000 INJECTION, SOLUTION INTRAMUSCULAR; INTRAVENOUS ONCE
Status: COMPLETED | OUTPATIENT
Start: 2025-01-27 | End: 2025-01-27

## 2025-01-27 RX ORDER — ONDANSETRON HYDROCHLORIDE 2 MG/ML
4 INJECTION, SOLUTION INTRAVENOUS ONCE AS NEEDED
Status: DISCONTINUED | OUTPATIENT
Start: 2025-01-27 | End: 2025-01-27 | Stop reason: HOSPADM

## 2025-01-27 RX ORDER — HYDROMORPHONE HYDROCHLORIDE 0.2 MG/ML
0.2 INJECTION INTRAMUSCULAR; INTRAVENOUS; SUBCUTANEOUS EVERY 5 MIN PRN
Status: DISCONTINUED | OUTPATIENT
Start: 2025-01-27 | End: 2025-01-27 | Stop reason: HOSPADM

## 2025-01-27 RX ORDER — CEFAZOLIN 1 G/1
INJECTION, POWDER, FOR SOLUTION INTRAVENOUS AS NEEDED
Status: DISCONTINUED | OUTPATIENT
Start: 2025-01-27 | End: 2025-01-27

## 2025-01-27 RX ORDER — SODIUM CHLORIDE, SODIUM LACTATE, POTASSIUM CHLORIDE, CALCIUM CHLORIDE 600; 310; 30; 20 MG/100ML; MG/100ML; MG/100ML; MG/100ML
100 INJECTION, SOLUTION INTRAVENOUS CONTINUOUS
Status: DISCONTINUED | OUTPATIENT
Start: 2025-01-27 | End: 2025-01-27 | Stop reason: HOSPADM

## 2025-01-27 RX ORDER — MIDAZOLAM HYDROCHLORIDE 1 MG/ML
INJECTION INTRAMUSCULAR; INTRAVENOUS AS NEEDED
Status: DISCONTINUED | OUTPATIENT
Start: 2025-01-27 | End: 2025-01-27

## 2025-01-27 RX ORDER — OXYCODONE HYDROCHLORIDE 5 MG/1
5 TABLET ORAL EVERY 4 HOURS PRN
Status: DISCONTINUED | OUTPATIENT
Start: 2025-01-27 | End: 2025-01-29 | Stop reason: HOSPADM

## 2025-01-27 RX ORDER — OXYCODONE HYDROCHLORIDE 5 MG/1
10 TABLET ORAL EVERY 4 HOURS PRN
Status: DISCONTINUED | OUTPATIENT
Start: 2025-01-27 | End: 2025-01-29 | Stop reason: HOSPADM

## 2025-01-27 RX ORDER — OXYCODONE HYDROCHLORIDE 5 MG/1
10 TABLET ORAL EVERY 4 HOURS PRN
Status: DISCONTINUED | OUTPATIENT
Start: 2025-01-27 | End: 2025-01-27 | Stop reason: HOSPADM

## 2025-01-27 RX ORDER — SODIUM CHLORIDE 0.9 G/100ML
INJECTION, SOLUTION IRRIGATION AS NEEDED
Status: DISCONTINUED | OUTPATIENT
Start: 2025-01-27 | End: 2025-01-27 | Stop reason: HOSPADM

## 2025-01-27 RX ORDER — ROPIVACAINE HYDROCHLORIDE 5 MG/ML
INJECTION, SOLUTION EPIDURAL; INFILTRATION; PERINEURAL AS NEEDED
Status: DISCONTINUED | OUTPATIENT
Start: 2025-01-27 | End: 2025-01-27

## 2025-01-27 RX ORDER — HYDROMORPHONE HYDROCHLORIDE 0.2 MG/ML
0.1 INJECTION INTRAMUSCULAR; INTRAVENOUS; SUBCUTANEOUS EVERY 5 MIN PRN
Status: DISCONTINUED | OUTPATIENT
Start: 2025-01-27 | End: 2025-01-27 | Stop reason: HOSPADM

## 2025-01-27 RX ORDER — METRONIDAZOLE 500 MG/100ML
500 INJECTION, SOLUTION INTRAVENOUS ONCE
Status: COMPLETED | OUTPATIENT
Start: 2025-01-27 | End: 2025-01-27

## 2025-01-27 RX ORDER — FENTANYL CITRATE 50 UG/ML
INJECTION, SOLUTION INTRAMUSCULAR; INTRAVENOUS AS NEEDED
Status: DISCONTINUED | OUTPATIENT
Start: 2025-01-27 | End: 2025-01-27

## 2025-01-27 RX ORDER — ONDANSETRON HYDROCHLORIDE 2 MG/ML
4 INJECTION, SOLUTION INTRAVENOUS EVERY 8 HOURS PRN
Status: DISCONTINUED | OUTPATIENT
Start: 2025-01-27 | End: 2025-01-29 | Stop reason: HOSPADM

## 2025-01-27 RX ORDER — LIDOCAINE HYDROCHLORIDE 10 MG/ML
0.1 INJECTION, SOLUTION EPIDURAL; INFILTRATION; INTRACAUDAL; PERINEURAL ONCE
Status: DISCONTINUED | OUTPATIENT
Start: 2025-01-27 | End: 2025-01-27 | Stop reason: HOSPADM

## 2025-01-27 RX ORDER — PROPOFOL 10 MG/ML
INJECTION, EMULSION INTRAVENOUS AS NEEDED
Status: DISCONTINUED | OUTPATIENT
Start: 2025-01-27 | End: 2025-01-27

## 2025-01-27 RX ORDER — LIDOCAINE HCL/PF 100 MG/5ML
SYRINGE (ML) INTRAVENOUS AS NEEDED
Status: DISCONTINUED | OUTPATIENT
Start: 2025-01-27 | End: 2025-01-27

## 2025-01-27 RX ORDER — ONDANSETRON HYDROCHLORIDE 2 MG/ML
INJECTION, SOLUTION INTRAVENOUS AS NEEDED
Status: DISCONTINUED | OUTPATIENT
Start: 2025-01-27 | End: 2025-01-27

## 2025-01-27 RX ORDER — FINASTERIDE 5 MG/1
5 TABLET, FILM COATED ORAL DAILY
Status: DISCONTINUED | OUTPATIENT
Start: 2025-01-28 | End: 2025-01-29 | Stop reason: HOSPADM

## 2025-01-27 RX ORDER — SODIUM CHLORIDE 9 MG/ML
40 INJECTION, SOLUTION INTRAVENOUS CONTINUOUS
Status: DISCONTINUED | OUTPATIENT
Start: 2025-01-27 | End: 2025-01-28

## 2025-01-27 RX ORDER — METOCLOPRAMIDE HYDROCHLORIDE 5 MG/ML
10 INJECTION INTRAMUSCULAR; INTRAVENOUS ONCE AS NEEDED
Status: DISCONTINUED | OUTPATIENT
Start: 2025-01-27 | End: 2025-01-27 | Stop reason: HOSPADM

## 2025-01-27 RX ORDER — TAMSULOSIN HYDROCHLORIDE 0.4 MG/1
0.4 CAPSULE ORAL 2 TIMES DAILY
Status: DISCONTINUED | OUTPATIENT
Start: 2025-01-27 | End: 2025-01-29 | Stop reason: HOSPADM

## 2025-01-27 RX ORDER — ROCURONIUM BROMIDE 10 MG/ML
INJECTION, SOLUTION INTRAVENOUS AS NEEDED
Status: DISCONTINUED | OUTPATIENT
Start: 2025-01-27 | End: 2025-01-27

## 2025-01-27 RX ORDER — METHOCARBAMOL 500 MG/1
500 TABLET, FILM COATED ORAL EVERY 8 HOURS SCHEDULED
Status: DISCONTINUED | OUTPATIENT
Start: 2025-01-27 | End: 2025-01-29 | Stop reason: HOSPADM

## 2025-01-27 RX ORDER — HYDROMORPHONE HYDROCHLORIDE 1 MG/ML
INJECTION, SOLUTION INTRAMUSCULAR; INTRAVENOUS; SUBCUTANEOUS AS NEEDED
Status: DISCONTINUED | OUTPATIENT
Start: 2025-01-27 | End: 2025-01-27

## 2025-01-27 RX ORDER — SODIUM CHLORIDE, SODIUM LACTATE, POTASSIUM CHLORIDE, CALCIUM CHLORIDE 600; 310; 30; 20 MG/100ML; MG/100ML; MG/100ML; MG/100ML
INJECTION, SOLUTION INTRAVENOUS CONTINUOUS PRN
Status: DISCONTINUED | OUTPATIENT
Start: 2025-01-27 | End: 2025-01-27

## 2025-01-27 RX ADMIN — LIDOCAINE HYDROCHLORIDE 40 MG: 20 INJECTION INTRAVENOUS at 13:49

## 2025-01-27 RX ADMIN — Medication 14 ML/HR: at 14:57

## 2025-01-27 RX ADMIN — FENTANYL CITRATE 50 MCG: 50 INJECTION, SOLUTION INTRAMUSCULAR; INTRAVENOUS at 13:46

## 2025-01-27 RX ADMIN — SUGAMMADEX 200 MG: 100 INJECTION, SOLUTION INTRAVENOUS at 14:57

## 2025-01-27 RX ADMIN — PROPOFOL 130 MG: 10 INJECTION, EMULSION INTRAVENOUS at 13:49

## 2025-01-27 RX ADMIN — ONDANSETRON 4 MG: 2 INJECTION INTRAMUSCULAR; INTRAVENOUS at 14:52

## 2025-01-27 RX ADMIN — ROPIVACAINE HYDROCHLORIDE 30 ML: 5 INJECTION, SOLUTION EPIDURAL; INFILTRATION; PERINEURAL at 11:35

## 2025-01-27 RX ADMIN — ROCURONIUM BROMIDE 60 MG: 10 INJECTION INTRAVENOUS at 13:50

## 2025-01-27 RX ADMIN — METHOCARBAMOL 1000 MG: 1000 INJECTION, SOLUTION INTRAMUSCULAR; INTRAVENOUS at 15:27

## 2025-01-27 RX ADMIN — FENTANYL CITRATE 50 MCG: 50 INJECTION, SOLUTION INTRAMUSCULAR; INTRAVENOUS at 14:24

## 2025-01-27 RX ADMIN — TAMSULOSIN HYDROCHLORIDE 0.4 MG: 0.4 CAPSULE ORAL at 20:21

## 2025-01-27 RX ADMIN — SODIUM CHLORIDE 40 ML/HR: 9 INJECTION, SOLUTION INTRAVENOUS at 18:46

## 2025-01-27 RX ADMIN — PROPOFOL 20 MG: 10 INJECTION, EMULSION INTRAVENOUS at 14:46

## 2025-01-27 RX ADMIN — LIDOCAINE HYDROCHLORIDE 60 MG: 20 INJECTION INTRAVENOUS at 13:47

## 2025-01-27 RX ADMIN — Medication 8 L/MIN: at 15:10

## 2025-01-27 RX ADMIN — HYDROMORPHONE HYDROCHLORIDE 0.2 MG: 1 INJECTION, SOLUTION INTRAMUSCULAR; INTRAVENOUS; SUBCUTANEOUS at 14:44

## 2025-01-27 RX ADMIN — HYDROMORPHONE HYDROCHLORIDE 0.2 MG: 1 INJECTION, SOLUTION INTRAMUSCULAR; INTRAVENOUS; SUBCUTANEOUS at 14:47

## 2025-01-27 RX ADMIN — PROPOFOL 20 MG: 10 INJECTION, EMULSION INTRAVENOUS at 14:50

## 2025-01-27 RX ADMIN — HYDROMORPHONE HYDROCHLORIDE 0.5 MG: 1 INJECTION, SOLUTION INTRAMUSCULAR; INTRAVENOUS; SUBCUTANEOUS at 15:17

## 2025-01-27 RX ADMIN — METHOCARBAMOL 500 MG: 500 TABLET ORAL at 21:10

## 2025-01-27 RX ADMIN — OXYCODONE 10 MG: 5 TABLET ORAL at 16:59

## 2025-01-27 RX ADMIN — HYDROMORPHONE HYDROCHLORIDE 0.5 MG: 0.5 INJECTION, SOLUTION INTRAMUSCULAR; INTRAVENOUS; SUBCUTANEOUS at 15:53

## 2025-01-27 RX ADMIN — HYDROMORPHONE HYDROCHLORIDE 0.5 MG: 0.5 INJECTION, SOLUTION INTRAMUSCULAR; INTRAVENOUS; SUBCUTANEOUS at 16:23

## 2025-01-27 RX ADMIN — METRONIDAZOLE 500 MG: 5 INJECTION, SOLUTION INTRAVENOUS at 13:51

## 2025-01-27 RX ADMIN — CEFAZOLIN 2 G: 1 INJECTION, POWDER, FOR SOLUTION INTRAMUSCULAR; INTRAVENOUS at 13:54

## 2025-01-27 RX ADMIN — SODIUM CHLORIDE, POTASSIUM CHLORIDE, SODIUM LACTATE AND CALCIUM CHLORIDE: 600; 310; 30; 20 INJECTION, SOLUTION INTRAVENOUS at 13:26

## 2025-01-27 RX ADMIN — MIDAZOLAM HYDROCHLORIDE 2 MG: 1 INJECTION, SOLUTION INTRAMUSCULAR; INTRAVENOUS at 11:41

## 2025-01-27 RX ADMIN — DEXAMETHASONE SODIUM PHOSPHATE 8 MG: 4 INJECTION INTRA-ARTICULAR; INTRALESIONAL; INTRAMUSCULAR; INTRAVENOUS; SOFT TISSUE at 13:52

## 2025-01-27 RX ADMIN — ACETAMINOPHEN 975 MG: 325 TABLET ORAL at 18:47

## 2025-01-27 SDOH — ECONOMIC STABILITY: HOUSING INSECURITY: AT ANY TIME IN THE PAST 12 MONTHS, WERE YOU HOMELESS OR LIVING IN A SHELTER (INCLUDING NOW)?: NO

## 2025-01-27 SDOH — SOCIAL STABILITY: SOCIAL INSECURITY: DOES ANYONE TRY TO KEEP YOU FROM HAVING/CONTACTING OTHER FRIENDS OR DOING THINGS OUTSIDE YOUR HOME?: NO

## 2025-01-27 SDOH — ECONOMIC STABILITY: FOOD INSECURITY: HOW HARD IS IT FOR YOU TO PAY FOR THE VERY BASICS LIKE FOOD, HOUSING, MEDICAL CARE, AND HEATING?: NOT HARD AT ALL

## 2025-01-27 SDOH — SOCIAL STABILITY: SOCIAL INSECURITY: DO YOU FEEL UNSAFE GOING BACK TO THE PLACE WHERE YOU ARE LIVING?: NO

## 2025-01-27 SDOH — ECONOMIC STABILITY: INCOME INSECURITY: IN THE PAST 12 MONTHS HAS THE ELECTRIC, GAS, OIL, OR WATER COMPANY THREATENED TO SHUT OFF SERVICES IN YOUR HOME?: NO

## 2025-01-27 SDOH — HEALTH STABILITY: MENTAL HEALTH: CURRENT SMOKER: 1

## 2025-01-27 SDOH — SOCIAL STABILITY: SOCIAL INSECURITY: HAS ANYONE EVER THREATENED TO HURT YOUR FAMILY OR YOUR PETS?: NO

## 2025-01-27 SDOH — ECONOMIC STABILITY: FOOD INSECURITY: WITHIN THE PAST 12 MONTHS, THE FOOD YOU BOUGHT JUST DIDN'T LAST AND YOU DIDN'T HAVE MONEY TO GET MORE.: NEVER TRUE

## 2025-01-27 SDOH — SOCIAL STABILITY: SOCIAL INSECURITY: HAVE YOU HAD ANY THOUGHTS OF HARMING ANYONE ELSE?: NO

## 2025-01-27 SDOH — SOCIAL STABILITY: SOCIAL INSECURITY: WITHIN THE LAST YEAR, HAVE YOU BEEN AFRAID OF YOUR PARTNER OR EX-PARTNER?: NO

## 2025-01-27 SDOH — ECONOMIC STABILITY: FOOD INSECURITY: WITHIN THE PAST 12 MONTHS, YOU WORRIED THAT YOUR FOOD WOULD RUN OUT BEFORE YOU GOT THE MONEY TO BUY MORE.: NEVER TRUE

## 2025-01-27 SDOH — SOCIAL STABILITY: SOCIAL INSECURITY: ARE YOU OR HAVE YOU BEEN THREATENED OR ABUSED PHYSICALLY, EMOTIONALLY, OR SEXUALLY BY ANYONE?: NO

## 2025-01-27 SDOH — ECONOMIC STABILITY: HOUSING INSECURITY: IN THE LAST 12 MONTHS, WAS THERE A TIME WHEN YOU WERE NOT ABLE TO PAY THE MORTGAGE OR RENT ON TIME?: NO

## 2025-01-27 SDOH — SOCIAL STABILITY: SOCIAL INSECURITY: ABUSE: ADULT

## 2025-01-27 SDOH — SOCIAL STABILITY: SOCIAL INSECURITY: DO YOU FEEL ANYONE HAS EXPLOITED OR TAKEN ADVANTAGE OF YOU FINANCIALLY OR OF YOUR PERSONAL PROPERTY?: NO

## 2025-01-27 SDOH — SOCIAL STABILITY: SOCIAL INSECURITY: HAVE YOU HAD THOUGHTS OF HARMING ANYONE ELSE?: NO

## 2025-01-27 SDOH — SOCIAL STABILITY: SOCIAL INSECURITY: ARE THERE ANY APPARENT SIGNS OF INJURIES/BEHAVIORS THAT COULD BE RELATED TO ABUSE/NEGLECT?: NO

## 2025-01-27 SDOH — ECONOMIC STABILITY: HOUSING INSECURITY: IN THE PAST 12 MONTHS, HOW MANY TIMES HAVE YOU MOVED WHERE YOU WERE LIVING?: 1

## 2025-01-27 SDOH — SOCIAL STABILITY: SOCIAL INSECURITY: WITHIN THE LAST YEAR, HAVE YOU BEEN HUMILIATED OR EMOTIONALLY ABUSED IN OTHER WAYS BY YOUR PARTNER OR EX-PARTNER?: NO

## 2025-01-27 SDOH — SOCIAL STABILITY: SOCIAL INSECURITY: WERE YOU ABLE TO COMPLETE ALL THE BEHAVIORAL HEALTH SCREENINGS?: YES

## 2025-01-27 SDOH — ECONOMIC STABILITY: TRANSPORTATION INSECURITY: IN THE PAST 12 MONTHS, HAS LACK OF TRANSPORTATION KEPT YOU FROM MEDICAL APPOINTMENTS OR FROM GETTING MEDICATIONS?: NO

## 2025-01-27 ASSESSMENT — PAIN SCALES - GENERAL
PAINLEVEL_OUTOF10: 7
PAINLEVEL_OUTOF10: 7
PAINLEVEL_OUTOF10: 5 - MODERATE PAIN
PAINLEVEL_OUTOF10: 5 - MODERATE PAIN
PAINLEVEL_OUTOF10: 7
PAINLEVEL_OUTOF10: 7
PAIN_LEVEL: 8
PAINLEVEL_OUTOF10: 7
PAINLEVEL_OUTOF10: 0 - NO PAIN
PAINLEVEL_OUTOF10: 7
PAINLEVEL_OUTOF10: 7
PAINLEVEL_OUTOF10: 4
PAINLEVEL_OUTOF10: 7
PAINLEVEL_OUTOF10: 6
PAINLEVEL_OUTOF10: 5 - MODERATE PAIN

## 2025-01-27 ASSESSMENT — PAIN - FUNCTIONAL ASSESSMENT
PAIN_FUNCTIONAL_ASSESSMENT: 0-10

## 2025-01-27 ASSESSMENT — COGNITIVE AND FUNCTIONAL STATUS - GENERAL
PATIENT BASELINE BEDBOUND: NO
MOBILITY SCORE: 22
TOILETING: A LITTLE
CLIMB 3 TO 5 STEPS WITH RAILING: A LITTLE
DAILY ACTIVITIY SCORE: 20
DRESSING REGULAR LOWER BODY CLOTHING: A LITTLE
HELP NEEDED FOR BATHING: A LITTLE
DRESSING REGULAR UPPER BODY CLOTHING: A LITTLE
WALKING IN HOSPITAL ROOM: A LITTLE

## 2025-01-27 ASSESSMENT — ACTIVITIES OF DAILY LIVING (ADL)
PATIENT'S MEMORY ADEQUATE TO SAFELY COMPLETE DAILY ACTIVITIES?: YES
LACK_OF_TRANSPORTATION: NO
FEEDING YOURSELF: INDEPENDENT
WALKS IN HOME: INDEPENDENT
HEARING - RIGHT EAR: FUNCTIONAL
ADEQUATE_TO_COMPLETE_ADL: YES
JUDGMENT_ADEQUATE_SAFELY_COMPLETE_DAILY_ACTIVITIES: YES
LACK_OF_TRANSPORTATION: NO
TOILETING: INDEPENDENT
BATHING: INDEPENDENT
DRESSING YOURSELF: INDEPENDENT
HEARING - LEFT EAR: FUNCTIONAL
GROOMING: INDEPENDENT

## 2025-01-27 ASSESSMENT — PATIENT HEALTH QUESTIONNAIRE - PHQ9
1. LITTLE INTEREST OR PLEASURE IN DOING THINGS: NOT AT ALL
SUM OF ALL RESPONSES TO PHQ9 QUESTIONS 1 & 2: 0
2. FEELING DOWN, DEPRESSED OR HOPELESS: NOT AT ALL

## 2025-01-27 ASSESSMENT — LIFESTYLE VARIABLES
HOW OFTEN DO YOU HAVE 6 OR MORE DRINKS ON ONE OCCASION: NEVER
HOW OFTEN DO YOU HAVE A DRINK CONTAINING ALCOHOL: 2-4 TIMES A MONTH
AUDIT-C TOTAL SCORE: 2
AUDIT-C TOTAL SCORE: 2
HOW MANY STANDARD DRINKS CONTAINING ALCOHOL DO YOU HAVE ON A TYPICAL DAY: 1 OR 2
SKIP TO QUESTIONS 9-10: 1

## 2025-01-27 NOTE — ANESTHESIA PROCEDURE NOTES
Peripheral Block    Start time: 1/27/2025 11:32 AM  End time: 1/27/2025 12:04 PM  Reason for block: procedure for pain, at surgeon's request and post-op pain management  Staffing  Performed: attending   Authorized by: Michael Soto MD    Performed by: Michael Soto MD  Preanesthetic Checklist  Completed: patient identified, IV checked, site marked, risks and benefits discussed, surgical consent, monitors and equipment checked, pre-op evaluation and timeout performed   Timeout performed at: 1/27/2025 11:31 AM  Peripheral Block  Patient position: laying flat  Prep: ChloraPrep  Patient monitoring: heart rate and continuous pulse ox  Block type: QL  Laterality: B/L  Injection technique: single-shot  Local infiltration: lidocaine  Infiltration strength: 1 %  Dose: 6 mL  Needle  Needle type: Tuohy   Needle gauge: 18 G  Needle localization: anatomical landmarks and ultrasound guidance  Catheter type: open end  Catheter size: 20 g.  Assessment  Injection assessment: negative aspiration for heme, incremental injection, local visualized surrounding nerve on ultrasound and no paresthesia on injection  Paresthesia pain: none  Heart rate change: no  Slow fractionated injection: yes  Additional Notes  No complications  Tolerated well    QL1    Dosed 15 mL injectate each side

## 2025-01-27 NOTE — CARE PLAN
Problem: Pain - Adult  Goal: Verbalizes/displays adequate comfort level or baseline comfort level  Outcome: Progressing     Problem: Safety - Adult  Goal: Free from fall injury  Outcome: Progressing     Problem: Discharge Planning  Goal: Discharge to home or other facility with appropriate resources  Outcome: Progressing     Problem: Chronic Conditions and Co-morbidities  Goal: Patient's chronic conditions and co-morbidity symptoms are monitored and maintained or improved  Outcome: Progressing     Problem: Nutrition  Goal: Nutrient intake appropriate for maintaining nutritional needs  Outcome: Progressing

## 2025-01-27 NOTE — CONSULTS
Wade Machado is a 69 y.o. year old male patient who presents for Procedure(s):  CLOSURE, ILEOSTOMY with Thomas Mcintyre MD on 1/27/2025.  Acute Pain consulted for assistance with pain control.     Anticipated Postop Pain Issues -   Palliative: typically relieved with IV analgesics and regional local anesthetics  Provocative: typically with movement  Quality: typically burning and aching  Radiation: typically none  Severity: typically severe 8-10/10  Timing: typically constant    Past Medical History:   Diagnosis Date    Alcohol use     BPH (benign prostatic hyperplasia)     Colloid cyst of brain (Multi) 2005    s/p R crani for resection ([facility], 2005)    Colovesical fistula 10/08/2024    Cystitis     Diverticulitis     GERD (gastroesophageal reflux disease)     Hypertension     Ileostomy in place (Multi) 11/11/2024    Obesity     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    PONV (postoperative nausea and vomiting)     Prostate cancer (Multi) 2015    Sepsis, due to unspecified organism, unspecified whether acute organ dysfunction present (Multi) 10/20/2024    Tobacco use     Unspecified osteoarthritis, unspecified site     Osteoarthritis        Past Surgical History:   Procedure Laterality Date    BRAIN SURGERY  2005    third 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 (at [facility])    COLON SURGERY  11/11/2024    Laparoscopy Sigmoid Colon with takedown of colovesical fistula    COLONOSCOPY  08/12/2024    HIP ARTHROPLASTY Right 05/01/2024    HIP ARTHROPLASTY Left     HIP SURGERY  05/13/2024    Right prosthetic hip dislocation status post revision total hip arthroplasty on 5/1/2024. Closed reduction was performed with anesthesia in ED    OTHER SURGICAL HISTORY  05/09/2022    Retinal detachment repair    OTHER SURGICAL HISTORY  2018    Cataract surgery    PROSTATE SURGERY  2016    Prostate seed implantation        No family history on file.     Social History     Socioeconomic History    Marital status:       Spouse name: Not on file    Number of children: Not on file    Years of education: Not on file    Highest education level: Not on file   Occupational History    Not on file   Tobacco Use    Smoking status: Every Day     Current packs/day: 1.00     Average packs/day: 1 pack/day for 52.1 years (52.1 ttl pk-yrs)     Types: Cigarettes     Start date: 1973    Smokeless tobacco: Never   Substance and Sexual Activity    Alcohol use: Yes     Alcohol/week: 3.0 standard drinks of alcohol     Types: 3 Standard drinks or equivalent per week     Comment: 12 pack weekly, now down to 2-3 drinks per week    Drug use: Yes     Frequency: 1.0 times per week     Types: Marijuana    Sexual activity: Not Currently   Other Topics Concern    Not on file   Social History Narrative    Not on file     Social Drivers of Health     Financial Resource Strain: Low Risk  (11/11/2024)    Overall Financial Resource Strain (CARDIA)     Difficulty of Paying Living Expenses: Not hard at all   Food Insecurity: No Food Insecurity (11/11/2024)    Hunger Vital Sign     Worried About Running Out of Food in the Last Year: Never true     Ran Out of Food in the Last Year: Never true   Transportation Needs: No Transportation Needs (12/23/2024)    OASIS : Transportation     Lack of Transportation (Medical): No     Lack of Transportation (Non-Medical): No     Patient Unable or Declines to Respond: No   Physical Activity: Insufficiently Active (11/11/2024)    Exercise Vital Sign     Days of Exercise per Week: 2 days     Minutes of Exercise per Session: 30 min   Stress: No Stress Concern Present (11/11/2024)    [employer] - Occupational Stress Questionnaire     Feeling of Stress : Only a little   Social Connections: Feeling Socially Integrated (12/23/2024)    OASIS : Social Isolation     Frequency of experiencing loneliness or isolation: Never   Recent Concern: Social Connections - Moderately Isolated (11/11/2024)    Social  Connection and Isolation Panel [NHANES]     Frequency of Communication with Friends and Family: Twice a week     Frequency of Social Gatherings with Friends and Family: More than three times a week     Attends Gnosticism Services: Never     Active Member of Clubs or Organizations: No     Attends Club or Organization Meetings: Never     Marital Status:    Intimate Partner Violence: Not At Risk (11/11/2024)    Humiliation, Afraid, Rape, and Kick questionnaire     Fear of Current or Ex-Partner: No     Emotionally Abused: No     Physically Abused: No     Sexually Abused: No   Housing Stability: Low Risk  (11/11/2024)    Housing Stability Vital Sign     Unable to Pay for Housing in the Last Year: No     Number of Times Moved in the Last Year: 0     Homeless in the Last Year: No        No Known Allergies      Review of Systems  Gen: No fatigue, anorexia, insomnia, fever.   Eyes: No vision loss, double vision, drainage, eye pain.   ENT: No pharyngitis, dry mouth, no hearing changes or ear discharge  Cardiac: No chest pain, palpitations, syncope, near syncope.   Pulmonary: No shortness of breath, cough, hemoptysis.   Heme/lymph: No swollen glands, fever, bleeding.   GI: No abdominal pain, change in bowel habits, melena, hematemesis, hematochezia, nausea, vomiting, diarrhea.   : No discharge, dysuria, frequency, urgency, hematuria.  Endo: No polyuria or weight loss.   Musculoskeletal: Negative for any pain or loss of ROM/weakness  Skin: No rashes or lesions  Neuro: Normal speech, no numbness or weakness. No gait difficulties  Review of systems is otherwise negative unless stated above or in history of present illness.    Physical Exam:  Constitutional:  no distress, alert and cooperative  Eyes: clear sclera  Head/Neck: No apparent injury, trachea midline  Respiratory/Thorax: Patent airways, thorax symmetric, breathing comfortably  Cardiovascular: no pitting edema  Gastrointestinal: Nondistended  Musculoskeletal: ROM  intact  Extremities: no clubbing  Neurological: alert, shaw x4  Psychological: Appropriate affect    No results found for this or any previous visit (from the past 24 hours).     Wade Machado is a 69 y.o. year old male patient who presents for Procedure(s):  CLOSURE, ILEOSTOMY with Thomas Mcintyre MD on 1/27/2025. Acute Pain consulted for assistance with pain control.     Plan:    - Bilateral quadratus lumborum blocks with catheters performed pre-operatively on 1/27/2025  - Ambit ball with Ropivacaine 0.2%/NaCl 0.9% 500mL, Rate 7 cc/hr bilaterally  - Ambit medication will not interfere with pain medication prescribed by the primary team.   - Please be aware of local anesthetic toxic dose and absorption variability before considering lidocaine patches  - Acute pain service will follow while catheters in place  - Rest of pain management per primary team    Acute Pain Resident  pg [phone] ph [phone]

## 2025-01-27 NOTE — BRIEF OP NOTE
Date: 2025  OR Location: [facility] OR    Name: Wade Machado, : 1955, Age: 69 y.o., MRN: [id number], Sex: male    Diagnosis  Pre-op Diagnosis      * Attention to ileostomy [Z43.2]     * Diverticulitis [K57.92] Post-op Diagnosis     * Attention to ileostomy [Z43.2]     * Diverticulitis [K57.92]     Procedures  CLOSURE, ILEOSTOMY  60121 - [place] CLOSURE ENTEROSTOMY LG/SMALL INTESTINE      Surgeons      * Thomas Mcintyre - Primary    Resident/Fellow/Other Assistant:  Surgeons and Role:  * No surgeons found with a matching role *    Staff:   Relief Circulator: Saroj  Relief Scrub: Eddie  Scrub Person: Miranda  Circulator: Ronny Henderson Scrub: Adrienne    Anesthesia Staff: Anesthesiologist: Lisy Biswas MD  Anesthesia Resident: Margoth Martinez MD    Procedure Summary  Anesthesia: General  ASA: III  Estimated Blood Loss: 20mL  Intra-op Medications:   Administrations occurring from 1215 to 1455 on 25:   Medication Name Total Dose   sodium chloride 0.9 % irrigation solution 500 mL   ceFAZolin (Ancef) vial 1 g 2 g   dexAMETHasone (Decadron) injection 4 mg/mL 8 mg   fentaNYL (Sublimaze) injection 50 mcg/mL 100 mcg   HYDROmorphone (Dilaudid) injection 1 mg/mL 0.4 mg   LR infusion Cannot be calculated   lidocaine (cardiac) injection 2% prefilled syringe 100 mg   ondansetron (Zofran) 2 mg/mL injection 4 mg   propofol (Diprivan) injection 10 mg/mL 170 mg   rocuronium (ZeMuron) 50 mg/5 mL injection 60 mg   metroNIDAZOLE (Flagyl) 500 mg in sodium chloride (iso)  mL 500 mg              Anesthesia Record               Intraprocedure I/O Totals       None           Specimen: No specimens collected               Findings: diverting loop ileostomy closure with handsewn anastomosis, fascia closed with 0-PDS figure of eight sutures, skin closed with 3-0 Vicryl pursestring suture    Complications:  None; patient tolerated the procedure well.     Disposition: PACU - hemodynamically stable.  Condition: stable  Specimens  Collected: No specimens collected  Attending Attestation: I was present and scrubbed for the entire procedure.    Thomas Mcintyre  Phone Number: [phone]

## 2025-01-27 NOTE — INTERVAL H&P NOTE
H&P reviewed. The patient was examined and there are no changes to the H&P.    Plan for ileostomy closure with Dr. Mcintyre.    Anabella Lemus MD  Colorectal Surgery Fellow

## 2025-01-27 NOTE — ANESTHESIA PROCEDURE NOTES
Airway  Date/Time: 1/27/2025 1:52 PM  Urgency: elective    Airway not difficult    Staffing  Performed: resident   Authorized by: Lisy Biswas MD    Performed by: Margoth Martinez MD  Patient location during procedure: OR    Indications and Patient Condition  Indications for airway management: anesthesia  Spontaneous Ventilation: absent  Sedation level: deep  Preoxygenated: yes  Patient position: sniffing  Mask difficulty assessment: 1 - vent by mask  Planned trial extubation    Final Airway Details  Final airway type: endotracheal airway      Successful airway: ETT  Cuffed: yes   Successful intubation technique: direct laryngoscopy  Facilitating devices/methods: intubating stylet  Endotracheal tube insertion site: oral  Blade: Denise  Blade size: #3  ETT size (mm): 7.0  Cormack-Lehane Classification: grade I - full view of glottis  Placement verified by: chest auscultation and capnometry   Inital cuff pressure (cm H2O): 5  Measured from: lips  ETT to lips (cm): 22  Number of attempts at approach: 1

## 2025-01-27 NOTE — ANESTHESIA POSTPROCEDURE EVALUATION
Patient: Wade Machado    Procedure Summary       Date: 01/27/25 Room / Location: [facility] OR 06 / Virtual [facility] [place] [facility] OR    Anesthesia Start: 1337 Anesthesia Stop: 1516    Procedure: CLOSURE, ILEOSTOMY (Abdomen) Diagnosis:       Attention to ileostomy      Diverticulitis      (Attention to ileostomy [Z43.2])      (Diverticulitis [K57.92])    Surgeons: Thomas Mcintyre MD Responsible Provider: Lisy Biswas MD    Anesthesia Type: general ASA Status: 3            Anesthesia Type: general    Vitals Value Taken Time   /70 01/27/25 1510   Temp 36.2 01/27/25 1518   Pulse 58 01/27/25 1514   Resp 14 01/27/25 1514   SpO2 100 % 01/27/25 1514   Vitals shown include unfiled device data.    Anesthesia Post Evaluation    Patient location during evaluation: PACU  Patient participation: complete - patient participated  Level of consciousness: awake  Pain score: 8  Pain management: adequate  Airway patency: patent  Cardiovascular status: acceptable  Respiratory status: acceptable  Hydration status: acceptable  Postoperative Nausea and Vomiting: none        No notable events documented.

## 2025-01-27 NOTE — ANESTHESIA PREPROCEDURE EVALUATION
Patient: Wade Machado    Procedure Information       Date/Time: 01/27/25 1215    Procedure: CLOSURE, ILEOSTOMY    Location: [facility] OR 06 / Virtual [facility] OR    Surgeons: Thomas Mcintyre MD            Relevant Problems   Cardiac   (+) Benign essential hypertension      Neuro   (+) Pontine hemorrhage (Multi)      GI   (+) Gastroesophageal reflux disease      /Renal   (+) BPH (benign prostatic hyperplasia)      Endocrine   (+) Obesity       Clinical information reviewed:   Tobacco  Allergies  Meds   Med Hx  Surg Hx   Fam Hx  Soc Hx        NPO Detail:  NPO/Void Status  Date of Last Liquid: 01/27/25  Time of Last Liquid: 0630  Date of Last Solid: 01/26/25  Time of Last Solid: 2200  Last Intake Type: Clear fluids         Physical Exam    Airway  Mallampati: III  TM distance: >3 FB  Comments: Can bite upper lip   Cardiovascular    Dental - normal exam       Pulmonary    Abdominal          Anesthesia Plan    History of general anesthesia?: yes  History of complications of general anesthesia?: no    ASA 3     general     The patient is a current smoker. (Smoked before coming to the hospital, 1PPD)    intravenous induction   Postoperative administration of opioids is intended.  Trial extubation is planned.  Anesthetic plan and risks discussed with patient.  Use of blood products discussed with patient who consented to blood products.    Plan discussed with resident and attending.

## 2025-01-28 LAB
ANION GAP SERPL CALC-SCNC: 15 MMOL/L (ref 10–20)
BUN SERPL-MCNC: 13 MG/DL (ref 6–23)
CALCIUM SERPL-MCNC: 9.6 MG/DL (ref 8.6–10.6)
CHLORIDE SERPL-SCNC: 100 MMOL/L (ref 98–107)
CO2 SERPL-SCNC: 23 MMOL/L (ref 21–32)
CREAT SERPL-MCNC: 0.6 MG/DL (ref 0.5–1.3)
EGFRCR SERPLBLD CKD-EPI 2021: >90 ML/MIN/1.73M*2
ERYTHROCYTE [DISTWIDTH] IN BLOOD BY AUTOMATED COUNT: 16.3 % (ref 11.5–14.5)
GLUCOSE SERPL-MCNC: 86 MG/DL (ref 74–99)
HCT VFR BLD AUTO: 40.5 % (ref 41–52)
HGB BLD-MCNC: 13.4 G/DL (ref 13.5–17.5)
MAGNESIUM SERPL-MCNC: 2.16 MG/DL (ref 1.6–2.4)
MCH RBC QN AUTO: 30.5 PG (ref 26–34)
MCHC RBC AUTO-ENTMCNC: 33.1 G/DL (ref 32–36)
MCV RBC AUTO: 92 FL (ref 80–100)
NRBC BLD-RTO: 0 /100 WBCS (ref 0–0)
PLATELET # BLD AUTO: 242 X10*3/UL (ref 150–450)
POTASSIUM SERPL-SCNC: 4.2 MMOL/L (ref 3.5–5.3)
RBC # BLD AUTO: 4.39 X10*6/UL (ref 4.5–5.9)
SODIUM SERPL-SCNC: 134 MMOL/L (ref 136–145)
WBC # BLD AUTO: 12.4 X10*3/UL (ref 4.4–11.3)

## 2025-01-28 PROCEDURE — 2500000001 HC RX 250 WO HCPCS SELF ADMINISTERED DRUGS (ALT 637 FOR MEDICARE OP)

## 2025-01-28 PROCEDURE — 82374 ASSAY BLOOD CARBON DIOXIDE: CPT | Performed by: STUDENT IN AN ORGANIZED HEALTH CARE EDUCATION/TRAINING PROGRAM

## 2025-01-28 PROCEDURE — 99231 SBSQ HOSP IP/OBS SF/LOW 25: CPT | Performed by: STUDENT IN AN ORGANIZED HEALTH CARE EDUCATION/TRAINING PROGRAM

## 2025-01-28 PROCEDURE — 36415 COLL VENOUS BLD VENIPUNCTURE: CPT | Performed by: STUDENT IN AN ORGANIZED HEALTH CARE EDUCATION/TRAINING PROGRAM

## 2025-01-28 PROCEDURE — 2500000002 HC RX 250 W HCPCS SELF ADMINISTERED DRUGS (ALT 637 FOR MEDICARE OP, ALT 636 FOR OP/ED): Performed by: STUDENT IN AN ORGANIZED HEALTH CARE EDUCATION/TRAINING PROGRAM

## 2025-01-28 PROCEDURE — 2500000001 HC RX 250 WO HCPCS SELF ADMINISTERED DRUGS (ALT 637 FOR MEDICARE OP): Performed by: STUDENT IN AN ORGANIZED HEALTH CARE EDUCATION/TRAINING PROGRAM

## 2025-01-28 PROCEDURE — 99232 SBSQ HOSP IP/OBS MODERATE 35: CPT | Performed by: NURSE PRACTITIONER

## 2025-01-28 PROCEDURE — 83735 ASSAY OF MAGNESIUM: CPT

## 2025-01-28 PROCEDURE — 85027 COMPLETE CBC AUTOMATED: CPT | Performed by: STUDENT IN AN ORGANIZED HEALTH CARE EDUCATION/TRAINING PROGRAM

## 2025-01-28 PROCEDURE — 1170000001 HC PRIVATE ONCOLOGY ROOM DAILY

## 2025-01-28 RX ADMIN — FINASTERIDE 5 MG: 5 TABLET, FILM COATED ORAL at 08:18

## 2025-01-28 RX ADMIN — ACETAMINOPHEN 975 MG: 325 TABLET ORAL at 19:11

## 2025-01-28 RX ADMIN — TAMSULOSIN HYDROCHLORIDE 0.4 MG: 0.4 CAPSULE ORAL at 08:18

## 2025-01-28 RX ADMIN — METOPROLOL SUCCINATE 25 MG: 25 TABLET, EXTENDED RELEASE ORAL at 08:18

## 2025-01-28 RX ADMIN — ACETAMINOPHEN 975 MG: 325 TABLET ORAL at 05:19

## 2025-01-28 RX ADMIN — METHOCARBAMOL 500 MG: 500 TABLET ORAL at 05:19

## 2025-01-28 RX ADMIN — METHOCARBAMOL 500 MG: 500 TABLET ORAL at 13:40

## 2025-01-28 RX ADMIN — ACETAMINOPHEN 975 MG: 325 TABLET ORAL at 11:49

## 2025-01-28 RX ADMIN — METHOCARBAMOL 500 MG: 500 TABLET ORAL at 21:12

## 2025-01-28 RX ADMIN — TAMSULOSIN HYDROCHLORIDE 0.4 MG: 0.4 CAPSULE ORAL at 21:12

## 2025-01-28 ASSESSMENT — COGNITIVE AND FUNCTIONAL STATUS - GENERAL
CLIMB 3 TO 5 STEPS WITH RAILING: A LITTLE
WALKING IN HOSPITAL ROOM: A LITTLE
DAILY ACTIVITIY SCORE: 20
DRESSING REGULAR LOWER BODY CLOTHING: A LITTLE
MOBILITY SCORE: 22
TOILETING: A LITTLE
DRESSING REGULAR UPPER BODY CLOTHING: A LITTLE
HELP NEEDED FOR BATHING: A LITTLE

## 2025-01-28 ASSESSMENT — PAIN - FUNCTIONAL ASSESSMENT: PAIN_FUNCTIONAL_ASSESSMENT: 0-10

## 2025-01-28 ASSESSMENT — PAIN SCALES - GENERAL: PAINLEVEL_OUTOF10: 2

## 2025-01-28 NOTE — PROGRESS NOTES
Postop Pain HPI -   Palliative: relieved with IV analgesics and regional local anesthetics  Provocative: movement  Quality:  burning and aching  Radiation:  none  Severity:  0/10  Timing: constant    24-HOUR OPIOID CONSUMPTION:  Oxycodone 10mg   Dilaudid 1mg    Scheduled medications  acetaminophen, 975 mg, oral, q6h PEMA  finasteride, 5 mg, oral, Daily  [Held by provider] lisinopril, 40 mg, oral, Daily  methocarbamol, 500 mg, oral, q8h PEMA  metoprolol succinate XL, 25 mg, oral, Daily  tamsulosin, 0.4 mg, oral, BID      Continuous medications  ropivacaine (PF) in NS cmpd, 14 mL/hr      PRN medications  PRN medications: naloxone, ondansetron ODT **OR** ondansetron, oxyCODONE, oxyCODONE     Physical Exam:  Constitutional:  no distress, alert and cooperative  Eyes: clear sclera  Head/Neck: No apparent injury, trachea midline  Respiratory/Thorax: Patent airways, thorax symmetric, breathing comfortably  Cardiovascular: no pitting edema  Gastrointestinal: Nondistended  Musculoskeletal: ROM intact  Extremities: no clubbing  Neurological: alert, shaw x4  Psychological: Appropriate affect    Results for orders placed or performed during the hospital encounter of 01/27/25 (from the past 24 hours)   Basic Metabolic Panel   Result Value Ref Range    Glucose 86 74 - 99 mg/dL    Sodium 134 (L) 136 - 145 mmol/L    Potassium 4.2 3.5 - 5.3 mmol/L    Chloride 100 98 - 107 mmol/L    Bicarbonate 23 21 - 32 mmol/L    Anion Gap 15 10 - 20 mmol/L    Urea Nitrogen 13 6 - 23 mg/dL    Creatinine 0.60 0.50 - 1.30 mg/dL    eGFR >90 >60 mL/min/1.73m*2    Calcium 9.6 8.6 - 10.6 mg/dL   CBC   Result Value Ref Range    WBC 12.4 (H) 4.4 - 11.3 x10*3/uL    nRBC 0.0 0.0 - 0.0 /100 WBCs    RBC 4.39 (L) 4.50 - 5.90 x10*6/uL    Hemoglobin 13.4 (L) 13.5 - 17.5 g/dL    Hematocrit 40.5 (L) 41.0 - 52.0 %    MCV 92 80 - 100 fL    MCH 30.5 26.0 - 34.0 pg    MCHC 33.1 32.0 - 36.0 g/dL    RDW 16.3 (H) 11.5 - 14.5 %    Platelets 242 150 - 450 x10*3/uL   Magnesium    Result Value Ref Range    Magnesium 2.16 1.60 - 2.40 mg/dL     *Note: Due to a large number of results and/or encounters for the requested time period, some results have not been displayed. A complete set of results can be found in Results Review.      Wade Machado is a 69 y.o. year old male patient who presents for Procedure(s):  CLOSURE, ILEOSTOMY with Thomas Mcintyre MD on 1/27/2025. Acute Pain consulted for assistance with pain control.      Plan:     - Bilateral quadratus lumborum blocks with catheters performed pre-operatively on 1/27/2025  - Ambit ball with Ropivacaine 0.2%/NaCl 0.9% 500mL, Rate 7 cc/hr bilaterally  - Will refill ambit pump today  - Ambit medication will not interfere with pain medication prescribed by the primary team.   - Please be aware of local anesthetic toxic dose and absorption variability before considering lidocaine patches  - Acute pain service will follow while catheters in place  - Rest of pain management per primary team     Acute Pain Resident  pg [phone] ph [phone]

## 2025-01-28 NOTE — OP NOTE
CLOSURE, ILEOSTOMY Operative Note     Date: 2025  OR Location: [facility] OR    Name: Wade Machado, : 1955, Age: 69 y.o., MRN: [id number], Sex: male    Diagnosis  Pre-op Diagnosis      * Attention to ileostomy [Z43.2]     * Diverticulitis [K57.92] Post-op Diagnosis     * Attention to ileostomy [Z43.2]     * Diverticulitis [K57.92]     Procedures  CLOSURE, ILEOSTOMY  45073 - [place] CLOSURE ENTEROSTOMY LG/SMALL INTESTINE      Surgeons      * Thomas Mcintyre - Primary    Resident/Fellow/Other Assistant:  Surgeons and Role:  * No surgeons found with a matching role *    Staff:   Relief Circulator: Saroj Henderson Scrub: Eddie  Scrub Person: Miranda  Circulator: Ronny Henderson Scrub: Adrienne    Anesthesia Staff: Anesthesiologist: Lisy Biswas MD  Anesthesia Resident: Margoth Martinez MD    Procedure Summary  Anesthesia: General  ASA: III  Estimated Blood Loss: 15mL  Intra-op Medications:   Administrations occurring from 1215 to 1455 on 25:   Medication Name Total Dose   sodium chloride 0.9 % irrigation solution 500 mL   metroNIDAZOLE (Flagyl) 500 mg in sodium chloride (iso)  mL 500 mg   ceFAZolin (Ancef) vial 1 g 2 g   dexAMETHasone (Decadron) injection 4 mg/mL 8 mg   fentaNYL (Sublimaze) injection 50 mcg/mL 100 mcg   HYDROmorphone (Dilaudid) injection 1 mg/mL 0.4 mg   LR infusion Cannot be calculated   lidocaine (cardiac) injection 2% prefilled syringe 100 mg   ondansetron (Zofran) 2 mg/mL injection 4 mg   propofol (Diprivan) injection 10 mg/mL 170 mg   rocuronium (ZeMuron) 50 mg/5 mL injection 60 mg              Anesthesia Record               Intraprocedure I/O Totals          Intake    LR infusion 900.00 mL    Total Intake 900 mL          Specimen: No specimens collected              Drains and/or Catheters:   [REMOVED] NG/OG/Feeding Tube Left nostril (Removed)       [REMOVED] Ileostomy RUQ (Removed)   Site/Stoma Assessment Clean;Intact 25 1115   Peristomal Assessment Clean;Intact 25 1115        [REMOVED] Urethral Catheter Coude;Non-latex 18 Fr. (Removed)       [REMOVED] Urethral Catheter Non-latex 16 Fr. (Removed)       Findings: Elsie-stomal hernia    Indications: Wade Machado is an 69 y.o. male who is having surgery for Attention to ileostomy [Z43.2]  Diverticulitis [K57.92].     The patient was seen in the preoperative area. The risks, benefits, complications, treatment options, non-operative alternatives, expected recovery and outcomes were discussed with the patient. The possibilities of reaction to medication, pulmonary aspiration, injury to surrounding structures, bleeding, recurrent infection, the need for additional procedures, failure to diagnose a condition, and creating a complication requiring transfusion or operation were discussed with the patient. The patient concurred with the proposed plan, giving informed consent.  The site of surgery was properly noted/marked if necessary per policy. The patient has been actively warmed in preoperative area. Preoperative antibiotics have been ordered and given within 1 hours of incision. Venous thrombosis prophylaxis have been ordered including bilateral sequential compression devices and chemical prophylaxis    Procedure Details: Patient was brought to the OR and placed on the operating table in supine position. Surgical timeout was performed. After induction of general endotracheal anesthesia, the patient was frog legged and digital exam of the coloanal anastomosis performed. The anastomosis was found to be patent and without palpable defects. Patient was returned to the supine position and the skin of the abdomen prepped and draped in the usual sterile fashion.  A circumferential skin incision was made around the right lower quadrant loop ileostomy and dissection was carried downward until the serosal surface of the bowel was identified.  This was dissected free from the subcutaneous fat, fascia, and rectus abdominis muscle circumferentially so  that the peritoneal cavity was entered.  Both the a ferret and E ferret limbs of the ileostomy were brought up out of the abdomen.  The mesenteric adhesions were divided and the mucocutaneous junction was excised.  The ileostomy was then closed in a handsewn fashion with interrupted 3-0 Vicryl seromuscular stitches.  At the conclusion of the anastomosis it was felt to be both patent and without defects.  The bowel was irrigated off and was returned to the abdominal cavity.  Inspection made for hemostasis and was found to be adequate.  Sponge and instrument counts were confirmed as correct.  The fascia of the former stoma site was closed with interrupted #1 PDS figure-of-eight suture.  The wound was irrigated and the skin was pursestring with a running 3-0 Vicryl subcuticular stitch for hemostasis.  Sterile dressings were applied.  The patient tolerated above procedure.  Complications:  None; patient tolerated the procedure well.    Disposition: PACU - hemodynamically stable.  Condition: stable     Additional Details: n/a    Attending Attestation: I was present and scrubbed for the entire procedure.    Thomas Mcintyre  Phone Number: [phone]

## 2025-01-28 NOTE — CARE PLAN
The patient's goals for the shift include        Problem: Pain - Adult  Goal: Verbalizes/displays adequate comfort level or baseline comfort level  1/28/2025 0724 by Jayla Daniel  Outcome: Progressing  1/27/2025 1838 by Jayla Daniel  Outcome: Progressing     Problem: Safety - Adult  Goal: Free from fall injury  1/28/2025 0724 by Jayla Daniel  Outcome: Progressing  1/27/2025 1838 by Jayla Daniel  Outcome: Progressing     Problem: Discharge Planning  Goal: Discharge to home or other facility with appropriate resources  1/28/2025 0724 by Jayla Daniel  Outcome: Progressing  1/27/2025 1838 by Jayla Daniel  Outcome: Progressing     Problem: Chronic Conditions and Co-morbidities  Goal: Patient's chronic conditions and co-morbidity symptoms are monitored and maintained or improved  1/28/2025 0724 by Jayla Daniel  Outcome: Progressing  1/27/2025 1838 by Jayla Daniel  Outcome: Progressing     Problem: Nutrition  Goal: Nutrient intake appropriate for maintaining nutritional needs  1/28/2025 0724 by Jayla Daneil  Outcome: Progressing  1/27/2025 1838 by Jayla Daniel  Outcome: Progressing

## 2025-01-28 NOTE — SIGNIFICANT EVENT
"Colorectal Surgery Clinical Update    19:20, 01/27/2025  Post-Op Check  S: Patient seen at bedside s/p ileostomy closure for post-operative evaluation. Denies f/c, n/v, SOB, CP. Pain is well-controlled. Tolerating clear liquids.    O:  /80   Pulse 63   Temp 36 °C (96.8 °F) (Temporal)   Resp 14   Ht 1.803 m (5' 10.98\")   Wt 105 kg (231 lb)   SpO2 94%   BMI 32.23 kg/m²      - General: Sitting in chair in NAD  - CV: Regular rate, appears well-perfused  - Pulm: Normal respiratory effort on RA  - Abd: Soft, non-distended, 4x4 OR dressing overlying prior RLQ ostomy site with minimal strikethrough    A/P: Patient is doing well post-operatively. No changes in management at this time.    Sun Llamas MD  PGY1 General Surgery  "

## 2025-01-28 NOTE — PROGRESS NOTES
"    Colorectal Surgery Progress Note        Wade Machado is a 69 y.o. male with a past medical history of diverticulitis with CVF, s/p Lap sigmoid resection takedown of CVF with DLI, now hospital day 1 s/p ileostomy reversal by Dr Mcintyre    Subjective  No acute events overnight, afebrile with stable vital signs.  He is awake and conversive on am round, had tolerated clears overnight without N/V, or belching.  He states that he is hungry this am.  Dressing changed on am rounds, wound with minimal drainage.  OOB in room yesterday.     Objective    Vital signs:   Temp:  [36 °C (96.8 °F)-36.8 °C (98.2 °F)] 36.8 °C (98.2 °F)  Heart Rate:  [55-78] 68  Resp:  [10-17] 14  BP: (122-144)/(63-83) 126/77    Physical Exam:  GEN: arouses easily to verbal, conversive, no acute distress  HEENT: Sclera anicteric. Moist mucous membranes.  RESP: chest expansion symmetrical, unlabored on  On RA.  CV: Regular rate, normotensive  GI: Abdomen soft, nondistended, appropriately tender for postoperative course. No guarding or rebound tenderness, old ostomy site covered with dry guaze  : Voiding spontaneously.  MSK: No gross deformities. Moves all extremities spontaneously.  NEURO: Alert and oriented x3. No focal deficits.  PSYCH: Appropriate mood and affect.  SKIN: No rashes or lesions.    I/O last 3 completed shifts:  In: 2221.7 (21.2 mL/kg) [P.O.:1040; I.V.:1181.7 (11.3 mL/kg)]  Out: 850 (8.1 mL/kg) [Urine:850 (0.2 mL/kg/hr)]  Weight: 104.8 kg    No intake/output data recorded.             Data Review:  CBC: No results found for: \"WBC\", \"RBC\"  BMP: No results found for: \"GLUCOSE\", \"CO2\", \"BUN\", \"CREATININE\", \"CALCIUM\"  Medications reviewed.  Vital signs reviewed.  Labs reviewed.           Assessment/Plan      Wade Machado is a 69 yr old with past history significant for HTN, GERD, BPH, prostate cancer, multiple pontine hemorrhage, diverticulitis with CVF  s/p Lap sigmoid resection takedown of CVF with DLI, now hospital day 1 s/p " ileostomy reversal by Dr Mcintyre      Plan Today:   - CR Soft diet as tolerated  - OOB  - HL IVF    Neuro: Post op pain is well controlled, History of  pontine hemorrhage  - Tylenol and Gabapentin per ERAS  - multimodal pain control with oxycodone, robaxin  - Sleep hygiene, encourage shades up during day, limited inactions at night as appropriate    Cardiac: Hemodynamically stable, Hx of HTN  -Q4VS         - Continue home Metoprolol  - Holding home lisinopril                                                                                                                                                                  Pulm: no acute issues, history of no significant pulmonary history  - Incentive spirometry Q 1 hours  - Encourage Ambulation/OOB    GI: Hx of  diverticulitis and CVF s/p resection, GERD S/P ileostomy takedown  - soft diet as tolerated  - Juvin TID  - Zofran prn for nausea  - change dressing on abdomen BID     : voiding sufficiently, Hx BPH  - Strict intake and output  - Trend renal function and replace electrolytes as needed  - HL IVF  - Continue home flomax    HEME- H/H stable, no evidence of active bleeding, history of no significant hematological history  - trend CBC  - in indication for transfusion at this time    Endo no acute issues, history of no significant endocrine history   -no indication for SSI    ID afebrile, no leukocytosis,   - Continue to trend Temp per floor routine, trend CBC prn  - No indication for ATB    Proph - Continue SCDs,  no Lovenox secondary to pontine hemorrhage    Dispo:  - Continue regular nursing floor  - plan at discharge...anticipate no needs at DC      Hospital Day: 2       Patient's exam, labs, and findings seen and discussed with          Kacy Shukla APRN-CNP  Colorectal Surgery NP #[id number]  [place] # [id number]

## 2025-01-29 VITALS
SYSTOLIC BLOOD PRESSURE: 136 MMHG | BODY MASS INDEX: 32.34 KG/M2 | DIASTOLIC BLOOD PRESSURE: 87 MMHG | OXYGEN SATURATION: 96 % | RESPIRATION RATE: 16 BRPM | HEIGHT: 71 IN | WEIGHT: 231 LBS | TEMPERATURE: 97.7 F | HEART RATE: 85 BPM

## 2025-01-29 LAB
ANION GAP SERPL CALC-SCNC: 13 MMOL/L (ref 10–20)
BUN SERPL-MCNC: 14 MG/DL (ref 6–23)
CALCIUM SERPL-MCNC: 9.1 MG/DL (ref 8.6–10.6)
CHLORIDE SERPL-SCNC: 104 MMOL/L (ref 98–107)
CO2 SERPL-SCNC: 24 MMOL/L (ref 21–32)
CREAT SERPL-MCNC: 0.65 MG/DL (ref 0.5–1.3)
EGFRCR SERPLBLD CKD-EPI 2021: >90 ML/MIN/1.73M*2
ERYTHROCYTE [DISTWIDTH] IN BLOOD BY AUTOMATED COUNT: 16.4 % (ref 11.5–14.5)
GLUCOSE SERPL-MCNC: 75 MG/DL (ref 74–99)
HCT VFR BLD AUTO: 37.9 % (ref 41–52)
HGB BLD-MCNC: 12.5 G/DL (ref 13.5–17.5)
MAGNESIUM SERPL-MCNC: 2.16 MG/DL (ref 1.6–2.4)
MCH RBC QN AUTO: 30 PG (ref 26–34)
MCHC RBC AUTO-ENTMCNC: 33 G/DL (ref 32–36)
MCV RBC AUTO: 91 FL (ref 80–100)
NRBC BLD-RTO: 0 /100 WBCS (ref 0–0)
PLATELET # BLD AUTO: 227 X10*3/UL (ref 150–450)
POTASSIUM SERPL-SCNC: 3.6 MMOL/L (ref 3.5–5.3)
RBC # BLD AUTO: 4.16 X10*6/UL (ref 4.5–5.9)
SODIUM SERPL-SCNC: 137 MMOL/L (ref 136–145)
WBC # BLD AUTO: 10.2 X10*3/UL (ref 4.4–11.3)

## 2025-01-29 PROCEDURE — 83735 ASSAY OF MAGNESIUM: CPT

## 2025-01-29 PROCEDURE — 85027 COMPLETE CBC AUTOMATED: CPT | Performed by: STUDENT IN AN ORGANIZED HEALTH CARE EDUCATION/TRAINING PROGRAM

## 2025-01-29 PROCEDURE — 2500000001 HC RX 250 WO HCPCS SELF ADMINISTERED DRUGS (ALT 637 FOR MEDICARE OP): Performed by: STUDENT IN AN ORGANIZED HEALTH CARE EDUCATION/TRAINING PROGRAM

## 2025-01-29 PROCEDURE — 2500000002 HC RX 250 W HCPCS SELF ADMINISTERED DRUGS (ALT 637 FOR MEDICARE OP, ALT 636 FOR OP/ED): Performed by: NURSE PRACTITIONER

## 2025-01-29 PROCEDURE — 80048 BASIC METABOLIC PNL TOTAL CA: CPT | Performed by: STUDENT IN AN ORGANIZED HEALTH CARE EDUCATION/TRAINING PROGRAM

## 2025-01-29 PROCEDURE — 2500000001 HC RX 250 WO HCPCS SELF ADMINISTERED DRUGS (ALT 637 FOR MEDICARE OP)

## 2025-01-29 PROCEDURE — 2500000002 HC RX 250 W HCPCS SELF ADMINISTERED DRUGS (ALT 637 FOR MEDICARE OP, ALT 636 FOR OP/ED): Performed by: STUDENT IN AN ORGANIZED HEALTH CARE EDUCATION/TRAINING PROGRAM

## 2025-01-29 PROCEDURE — 99231 SBSQ HOSP IP/OBS SF/LOW 25: CPT | Performed by: STUDENT IN AN ORGANIZED HEALTH CARE EDUCATION/TRAINING PROGRAM

## 2025-01-29 PROCEDURE — 36415 COLL VENOUS BLD VENIPUNCTURE: CPT | Performed by: STUDENT IN AN ORGANIZED HEALTH CARE EDUCATION/TRAINING PROGRAM

## 2025-01-29 RX ORDER — POTASSIUM CHLORIDE 20 MEQ/1
40 TABLET, EXTENDED RELEASE ORAL ONCE
Status: COMPLETED | OUTPATIENT
Start: 2025-01-29 | End: 2025-01-29

## 2025-01-29 RX ORDER — OXYCODONE HYDROCHLORIDE 5 MG/1
5 TABLET ORAL EVERY 6 HOURS PRN
Qty: 28 TABLET | Refills: 0 | Status: SHIPPED | OUTPATIENT
Start: 2025-01-29 | End: 2025-02-05

## 2025-01-29 RX ADMIN — POTASSIUM CHLORIDE 40 MEQ: 1500 TABLET, EXTENDED RELEASE ORAL at 08:19

## 2025-01-29 RX ADMIN — ACETAMINOPHEN 975 MG: 325 TABLET ORAL at 05:59

## 2025-01-29 RX ADMIN — METHOCARBAMOL 500 MG: 500 TABLET ORAL at 05:59

## 2025-01-29 RX ADMIN — TAMSULOSIN HYDROCHLORIDE 0.4 MG: 0.4 CAPSULE ORAL at 08:19

## 2025-01-29 RX ADMIN — METOPROLOL SUCCINATE 25 MG: 25 TABLET, EXTENDED RELEASE ORAL at 08:19

## 2025-01-29 RX ADMIN — FINASTERIDE 5 MG: 5 TABLET, FILM COATED ORAL at 08:19

## 2025-01-29 RX ADMIN — ACETAMINOPHEN 975 MG: 325 TABLET ORAL at 00:28

## 2025-01-29 ASSESSMENT — COGNITIVE AND FUNCTIONAL STATUS - GENERAL
DAILY ACTIVITIY SCORE: 18
DRESSING REGULAR LOWER BODY CLOTHING: A LITTLE
EATING MEALS: A LITTLE
TOILETING: A LITTLE
DRESSING REGULAR UPPER BODY CLOTHING: A LITTLE
MOBILITY SCORE: 23
WALKING IN HOSPITAL ROOM: A LITTLE
PERSONAL GROOMING: A LITTLE
MOBILITY SCORE: 22
HELP NEEDED FOR BATHING: A LITTLE
DRESSING REGULAR UPPER BODY CLOTHING: A LITTLE
HELP NEEDED FOR BATHING: A LITTLE
DAILY ACTIVITIY SCORE: 20
DRESSING REGULAR LOWER BODY CLOTHING: A LITTLE
TOILETING: A LITTLE
CLIMB 3 TO 5 STEPS WITH RAILING: A LITTLE
CLIMB 3 TO 5 STEPS WITH RAILING: A LITTLE

## 2025-01-29 ASSESSMENT — PAIN SCALES - GENERAL
PAINLEVEL_OUTOF10: 2
PAINLEVEL_OUTOF10: 1

## 2025-01-29 NOTE — PROGRESS NOTES
Postop Pain HPI -   Palliative: relieved with IV analgesics and regional local anesthetics  Provocative: movement  Quality:  burning and aching  Radiation:  none  Severity:  0/10  Timing: constant    24-HOUR OPIOID CONSUMPTION:  none    Scheduled medications  acetaminophen, 975 mg, oral, q6h PEMA  finasteride, 5 mg, oral, Daily  [Held by provider] lisinopril, 40 mg, oral, Daily  methocarbamol, 500 mg, oral, q8h PEMA  metoprolol succinate XL, 25 mg, oral, Daily  tamsulosin, 0.4 mg, oral, BID      Continuous medications  ropivacaine (PF) in NS cmpd, 14 mL/hr      PRN medications  PRN medications: naloxone, ondansetron ODT **OR** ondansetron, oxyCODONE, oxyCODONE     Physical Exam:  Constitutional:  no distress, alert and cooperative  Eyes: clear sclera  Head/Neck: No apparent injury, trachea midline  Respiratory/Thorax: Patent airways, thorax symmetric, breathing comfortably  Cardiovascular: no pitting edema  Gastrointestinal: Nondistended  Musculoskeletal: ROM intact  Extremities: no clubbing  Neurological: alert, shaw x4  Psychological: Appropriate affect    Results for orders placed or performed during the hospital encounter of 01/27/25 (from the past 24 hours)   Basic Metabolic Panel   Result Value Ref Range    Glucose 75 74 - 99 mg/dL    Sodium 137 136 - 145 mmol/L    Potassium 3.6 3.5 - 5.3 mmol/L    Chloride 104 98 - 107 mmol/L    Bicarbonate 24 21 - 32 mmol/L    Anion Gap 13 10 - 20 mmol/L    Urea Nitrogen 14 6 - 23 mg/dL    Creatinine 0.65 0.50 - 1.30 mg/dL    eGFR >90 >60 mL/min/1.73m*2    Calcium 9.1 8.6 - 10.6 mg/dL   CBC   Result Value Ref Range    WBC 10.2 4.4 - 11.3 x10*3/uL    nRBC 0.0 0.0 - 0.0 /100 WBCs    RBC 4.16 (L) 4.50 - 5.90 x10*6/uL    Hemoglobin 12.5 (L) 13.5 - 17.5 g/dL    Hematocrit 37.9 (L) 41.0 - 52.0 %    MCV 91 80 - 100 fL    MCH 30.0 26.0 - 34.0 pg    MCHC 33.0 32.0 - 36.0 g/dL    RDW 16.4 (H) 11.5 - 14.5 %    Platelets 227 150 - 450 x10*3/uL   Magnesium   Result Value Ref Range     Magnesium 2.16 1.60 - 2.40 mg/dL      Wade Machado is a 69 y.o. year old male patient who presents for Procedure(s):  CLOSURE, ILEOSTOMY with Thomas Mcintyre MD on 1/27/2025. Acute Pain consulted for assistance with pain control.      Plan:     - Bilateral quadratus lumborum blocks with catheters performed pre-operatively on 1/27/2025  - QL catheters removed today  - Acute pain service will sign off  - Rest of pain management per primary team     Acute Pain Resident  pg [phone] ph [phone]

## 2025-01-29 NOTE — CARE PLAN
Problem: Pain - Adult  Goal: Verbalizes/displays adequate comfort level or baseline comfort level  Outcome: Progressing     Problem: Safety - Adult  Goal: Free from fall injury  Outcome: Progressing     Problem: Discharge Planning  Goal: Discharge to home or other facility with appropriate resources  Outcome: Progressing     Problem: Chronic Conditions and Co-morbidities  Goal: Patient's chronic conditions and co-morbidity symptoms are monitored and maintained or improved  Outcome: Progressing     Problem: Nutrition  Goal: Nutrient intake appropriate for maintaining nutritional needs  Outcome: Progressing   The patient's goals for the shift include  remain safe    The clinical goals for the shift include pt will get adequate rest throughout the shift

## 2025-01-29 NOTE — DISCHARGE SUMMARY
Discharge Diagnosis  Attention to ileostomy    Issues Requiring Follow-Up      Discharge Meds     Medication List      START taking these medications     oxyCODONE 5 mg immediate release tablet; Commonly known as: Roxicodone;   Take 1 tablet (5 mg) by mouth every 6 hours if needed for moderate pain (4   - 6) for up to 7 days.     CONTINUE taking these medications     acetaminophen 325 mg tablet; Commonly known as: Tylenol   ascorbic acid (vitamin C) 100 mg chewable tablet   calcium carbonate 300 mg (750 mg) chewable tablet; Commonly known as:   Tums Extra Strength   finasteride 5 mg tablet; Commonly known as: Proscar   lisinopril 40 mg tablet; TAKE ONE TABLET BY MOUTH once DAILY for blood   pressure   metoprolol succinate XL 25 mg 24 hr tablet; Commonly known as: Toprol-XL   tamsulosin 0.4 mg 24 hr capsule; Commonly known as: Flomax; Take 1   capsule (0.4 mg) by mouth 2 times a day.     STOP taking these medications     chlorhexidine 0.12 % solution; Commonly known as: Peridex   chlorhexidine 4 % external liquid; Commonly known as: Hibiclens   loperamide 2 mg capsule; Commonly known as: Imodium   nystatin 100,000 unit/gram powder; Commonly known as: Mycostatin       Test Results Pending At Discharge  Pending Labs       No current pending labs.            Hospital Course  Wade Machado is a  69 year old male with a past medical history significant for HTN, BPH, prostate cancer, multiple pontine hemorrhage, and diverticulitis.  He previously underwent a sigmoid colectomy and takedown of CVF with DLI,  He presented this admission for ileostomy reversal on 1/27 with Dr Mcintyre.  He tolerated the procedure and was admitted to a regular nursing floor.  Pain was controlled with oral medications and QL pain blocks.  He was started on clear liquids and advanced as tolerated to a soft diet.  He tolerated the diet without issues and was noted to have return of bowel function with flatus.  He continues to do well and is  verbalizing readiness for discharge.  He will follow up in the outpatient clinic in a couple weeks. Contact information was given to patient and he was happy to discharge.    Pertinent Physical Exam At Time of Discharge  Physical Exam  Constitutional:       Appearance: Normal appearance.   HENT:      Head: Normocephalic and atraumatic.      Nose: Nose normal. No congestion.   Eyes:      Extraocular Movements: Extraocular movements intact.   Cardiovascular:      Rate and Rhythm: Normal rate.   Pulmonary:      Effort: Pulmonary effort is normal. No respiratory distress.      Breath sounds: Normal breath sounds. No wheezing or rhonchi.   Abdominal:      General: There is no distension.      Tenderness: There is no guarding or rebound.      Comments: Abdomen appropriately tender, old ostomy site covered with dry gauze   Musculoskeletal:         General: Normal range of motion.      Cervical back: Normal range of motion.   Skin:     General: Skin is warm and dry.   Neurological:      General: No focal deficit present.      Mental Status: He is alert and oriented to person, place, and time.   Psychiatric:         Mood and Affect: Mood normal.         Behavior: Behavior normal.         Outpatient Follow-Up  Future Appointments   Date Time Provider Department [facility]   2/20/2025 10:40 AM Lisa Zuñiga APRN-CNP [id number] [place]   1/7/2026  9:30 AM Gil Brown MD [id number] [place]         COLTON Shaw-CNP

## 2025-01-29 NOTE — HOSPITAL COURSE
Wade Machado is a  69 year old male with a past medical history significant for HTN, BPH, prostate cancer, multiple pontine hemorrhage, and diverticulitis.  He previously underwent a sigmoid colectomy and takedown of CVF with DLI,  He presented this admission for ileostomy reversal on 1/27 with Dr Mcintyre.  He tolerated the procedure and was admitted to a regular nursing floor.  Pain was controlled with oral medications and QL pain blocks.  He was started on clear liquids and advanced as tolerated to a soft diet.  He tolerated the diet without issues and was noted to have return of bowel function with flatus.  He continues to do well and is verbalizing readiness for discharge.  He will follow up in the outpatient clinic in a couple weeks. Contact information was given to patient and he was happy to discharge.

## 2025-01-30 ENCOUNTER — PATIENT OUTREACH (OUTPATIENT)
Dept: CARE COORDINATION | Facility: CLINIC | Age: 70
End: 2025-01-30
Payer: COMMERCIAL

## 2025-01-30 SDOH — ECONOMIC STABILITY: GENERAL: WOULD YOU LIKE HELP WITH ANY OF THE FOLLOWING NEEDS?: I DONT NEED HELP WITH ANY OF THESE

## 2025-01-30 NOTE — PROGRESS NOTES
Outreach call to patient to support a smooth transition of care from recent admission.  Spoke with patient, reviewed discharge medications, discharge instructions,  and provided education on importance of follow-up appointment with provider.  Will continue to monitor through transition period.  Medications  Medications reviewed with patient/caregiver?: Yes (1/30/2025  1:40 PM)  Is the patient having any side effects they believe may be caused by any medication additions or changes?: No (1/30/2025  1:40 PM)  Does the patient have all medications ordered at discharge?: Yes (1/30/2025  1:40 PM)  Care Management Interventions: No intervention needed (1/30/2025  1:40 PM)  Is the patient taking all medications as directed (includes completed medication regime)?: Yes (1/30/2025  1:40 PM)    Appointments  Does the patient have a primary care provider?: Yes (1/30/2025  1:40 PM)  Care Management Interventions: Verified appointment date/time/provider (1/30/2025  1:40 PM)  Has the patient kept scheduled appointments due by today?: Yes (1/30/2025  1:40 PM)    Patient Teaching  Does the patient have access to their discharge instructions?: Yes (1/30/2025  1:40 PM)  Care Management Interventions: Reviewed instructions with patient (1/30/2025  1:40 PM)  What is the patient's perception of their health status since discharge?: Improving (1/30/2025  1:40 PM)        Sparkle ALICIA, RN, [facility]  O: [phone]

## 2025-02-20 ENCOUNTER — APPOINTMENT (OUTPATIENT)
Dept: SURGERY | Facility: CLINIC | Age: 70
End: 2025-02-20
Payer: COMMERCIAL

## 2025-02-27 ENCOUNTER — PATIENT OUTREACH (OUTPATIENT)
Dept: CARE COORDINATION | Facility: CLINIC | Age: 70
End: 2025-02-27
Payer: COMMERCIAL

## 2025-02-27 NOTE — PROGRESS NOTES
Outreach call to patient to check in 30 days after hospital discharge to support smooth transition of care.  Patient with no additional needs noted. No additional outreach needed at this time.     Sparkle EDWARDSN, RN, [facility]  O: [phone]

## 2025-03-03 ENCOUNTER — OFFICE VISIT (OUTPATIENT)
Dept: SURGERY | Facility: CLINIC | Age: 70
End: 2025-03-03
Payer: COMMERCIAL

## 2025-03-03 VITALS
HEART RATE: 60 BPM | HEIGHT: 71 IN | BODY MASS INDEX: 30.94 KG/M2 | DIASTOLIC BLOOD PRESSURE: 70 MMHG | WEIGHT: 221 LBS | TEMPERATURE: 98.4 F | SYSTOLIC BLOOD PRESSURE: 147 MMHG

## 2025-03-03 DIAGNOSIS — N32.1 COLOVESICAL FISTULA: Primary | ICD-10-CM

## 2025-03-03 PROCEDURE — 3077F SYST BP >= 140 MM HG: CPT | Performed by: NURSE PRACTITIONER

## 2025-03-03 PROCEDURE — 1159F MED LIST DOCD IN RCRD: CPT | Performed by: NURSE PRACTITIONER

## 2025-03-03 PROCEDURE — 3078F DIAST BP <80 MM HG: CPT | Performed by: NURSE PRACTITIONER

## 2025-03-03 PROCEDURE — 99211 OFF/OP EST MAY X REQ PHY/QHP: CPT | Performed by: NURSE PRACTITIONER

## 2025-03-03 PROCEDURE — 3008F BODY MASS INDEX DOCD: CPT | Performed by: NURSE PRACTITIONER

## 2025-03-03 NOTE — PROGRESS NOTES
History Of Present Illness  Wade Machado is a 70 y.o. male who is s/p ileostomy closure on 1/27/25 by Dr. Mcintyre for a hx of diverticulitis and a colovesical fistula.    Discharged on 1/29/25 with no complications.    He has been feeling really well over the past 5 weeks since his surgery.  He has minimal discomfort to the old stoma site.  The skin has healed nicely.  He is eating and drinking well with no n/v.  He has no issues with his BM's and will have 1 soft BM every day with no bleeding.  He has started increasing his fiber intake.       Past Medical History  He has a past medical history of Alcohol use, BPH (benign prostatic hyperplasia), Colloid cyst of brain (Multi) (2005), Colovesical fistula (10/08/2024), Cystitis, Diverticulitis, GERD (gastroesophageal reflux disease), Hypertension, Ileostomy in place (Multi) (11/11/2024), Obesity, Pontine hemorrhage (Multi) (08/09/2024), PONV (postoperative nausea and vomiting), Prostate cancer (Multi) (2015), Sepsis, due to unspecified organism, unspecified whether acute organ dysfunction present (Multi) (10/20/2024), Tobacco use, and Unspecified osteoarthritis, unspecified site.    Surgical History  He has a past surgical history that includes Other surgical history (05/09/2022); Other surgical history (2018); Brain surgery (2005); Colonoscopy (08/12/2024); Hip Arthroplasty (Right, 05/01/2024); Hip surgery (05/13/2024); Colon surgery (11/11/2024); Hip Arthroplasty (Left); and Prostate surgery (2016).     Social History  He reports that he has been smoking cigarettes. He started smoking about 52 years ago. He has a 52.2 pack-year smoking history. He has never used smokeless tobacco. He reports current alcohol use of about 3.0 standard drinks of alcohol per week. He reports current drug use. Frequency: 1.00 time per week. Drug: Marijuana.    Family History  No family history on file.     Allergies  Patient has no known allergies.    Review of Systems   All other  systems reviewed and are negative.       Physical Exam  Vitals reviewed. Exam conducted with a chaperone present.   HENT:      Head: Normocephalic.   Cardiovascular:      Rate and Rhythm: Normal rate and regular rhythm.   Pulmonary:      Effort: Pulmonary effort is normal.      Breath sounds: Normal breath sounds.   Abdominal:      General: Abdomen is flat. Bowel sounds are normal.      Palpations: Abdomen is soft.      Comments: His old stoma site has healed nicely with no pain   Genitourinary:     Rectum: Normal.   Musculoskeletal:         General: Normal range of motion.      Comments: Mild edema in shalonda. Lower legs, more in the right   Skin:     General: Skin is warm and dry.   Neurological:      General: No focal deficit present.   Psychiatric:         Mood and Affect: Mood normal.         Behavior: Behavior normal.         Thought Content: Thought content normal.         Judgment: Judgment normal.          Last Recorded Vitals  /70   Pulse 60   Temp 36.9 °C (98.4 °F) (Temporal)   Wt 100 kg (221 lb)      Assessment/Plan   Wade has done well over the past 5 weeks since his ostomy was closed.  He will continue to increase his fiber and water intake to keep his stools soft.  He will continue to not lift over 10 pounds for another week.  He will call with any issues and will follow up on an as needed basis.         Lisa Zuñiga, APRN-CNP

## 2025-03-06 ENCOUNTER — APPOINTMENT (OUTPATIENT)
Dept: SURGERY | Facility: CLINIC | Age: 70
End: 2025-03-06
Payer: COMMERCIAL

## 2025-06-05 DIAGNOSIS — N40.0 BENIGN PROSTATIC HYPERPLASIA, UNSPECIFIED WHETHER LOWER URINARY TRACT SYMPTOMS PRESENT: ICD-10-CM

## 2025-06-06 RX ORDER — TAMSULOSIN HYDROCHLORIDE 0.4 MG/1
0.4 CAPSULE ORAL 2 TIMES DAILY
Qty: 60 CAPSULE | Refills: 11 | Status: SHIPPED | OUTPATIENT
Start: 2025-06-06

## 2025-08-18 DIAGNOSIS — N32.1 COLOVESICAL FISTULA: ICD-10-CM

## 2026-01-07 ENCOUNTER — APPOINTMENT (OUTPATIENT)
Dept: UROLOGY | Facility: CLINIC | Age: 71
End: 2026-01-07
Payer: COMMERCIAL

## (undated) DEVICE — APPLICATOR, CHLORAPREP, W/ORANGE TINT, 26ML

## (undated) DEVICE — SCOPE WARMER, LAPAROSCOPE, BAG ONLY, LF

## (undated) DEVICE — Device

## (undated) DEVICE — SUTURE, VICRYL, 3-0, 27 IN, SH

## (undated) DEVICE — CANNULA, KII ADVANCED FIXATION, 5X100MM W/SEAL

## (undated) DEVICE — BANDAGE, ELASTIC, PREMIUM, SELF-CLOSE, 3 IN X 5 YD, STERILE

## (undated) DEVICE — BANDAGE, ELASTIC, PREMIUM, SELF-CLOSE, 2 IN X 5 YD, STERILE

## (undated) DEVICE — CATHETER TRAY, SURESTEP, 16FR, URINE METER W/STATLOCK

## (undated) DEVICE — LIGASURE, V SEALER/DIVIDER  5MM BLUNT TIP

## (undated) DEVICE — DRAPE, SHEET, ENDOSCOPY, GENERAL, FENESTRATED, ARMBOARD COVER, 98 X 123.5 IN, DISPOSABLE, LF, STERILE

## (undated) DEVICE — SPONGE, LAP, XRAY DECT, 18IN X 18IN, W/LOOP, STERILE

## (undated) DEVICE — GOWN, SURGICAL, SMARTGOWN, XLARGE, STERILE

## (undated) DEVICE — STAPLER, SKIN PROXIMATE, 35 WIDE

## (undated) DEVICE — SUTURE, PROLENE, 0, 30 IN, SH

## (undated) DEVICE — SPONGE GAUZE, XRAY SC+RFID, 4X4 16 PLY, STERILE

## (undated) DEVICE — COVER, CART, 45 X 27 X 48 IN, CLEAR

## (undated) DEVICE — SUTURE, MONOCRYL, 4-0, 18 IN, PS2, UNDYED

## (undated) DEVICE — DRESSING, SPONGE, GAUZE, CURITY, 4 X 4 IN, STERILE

## (undated) DEVICE — MANIFOLD, 4 PORT NEPTUNE STANDARD

## (undated) DEVICE — DRESSING, GAUZE, 4 X 4 IN, LF, STERILE

## (undated) DEVICE — SUTURE, PDS II, 1, 36 IN, CT-1, VIOLET

## (undated) DEVICE — BANDAGE, ELASTIC, PREMIUM, SELF-CLOSE, 4 IN X 5.5 YD, STERILE

## (undated) DEVICE — BANDAGE, ELASTIC, PREMIUM, SELF-CLOSE, 6 IN X 5.5 YD, STERILE

## (undated) DEVICE — TIP, SUCTION, YANKAUER, FLEXIBLE

## (undated) DEVICE — POSITIONING, THE PINK PAD, PIGAZZI SYSTEM

## (undated) DEVICE — ACCESS SYS, KII OPTICAL, Z-THREAD, 11X100CM

## (undated) DEVICE — STAPLER, ECHELON 3000, 60MM STD

## (undated) DEVICE — STAPLER,  LINEAR ENDO 60MM RELOAD, GREEN, DISP

## (undated) DEVICE — SUTURE, VICRYL, 0, SH 27 TAPER NEEDLE, UNDYED, BRAIDED

## (undated) DEVICE — STAPLER, ECHELON CIRCULAR POWERED, 31MM, DISP

## (undated) DEVICE — TROCAR SYSTEM, BALLOON, KII GELPORT, 12 X 100MM

## (undated) DEVICE — DRAPE, INSTRUMENT, W/POUCH, STERI DRAPE, 9 5/8 X 18 LONG